# Patient Record
Sex: MALE | Race: WHITE | NOT HISPANIC OR LATINO | Employment: OTHER | URBAN - METROPOLITAN AREA
[De-identification: names, ages, dates, MRNs, and addresses within clinical notes are randomized per-mention and may not be internally consistent; named-entity substitution may affect disease eponyms.]

---

## 2023-12-28 ENCOUNTER — HOSPITAL ENCOUNTER (INPATIENT)
Facility: HOSPITAL | Age: 83
LOS: 3 days | Discharge: HOME | DRG: 683 | End: 2023-12-31
Attending: EMERGENCY MEDICINE | Admitting: FAMILY MEDICINE
Payer: MEDICARE

## 2023-12-28 ENCOUNTER — APPOINTMENT (OUTPATIENT)
Dept: RADIOLOGY | Facility: HOSPITAL | Age: 83
DRG: 683 | End: 2023-12-28
Payer: MEDICARE

## 2023-12-28 ENCOUNTER — APPOINTMENT (OUTPATIENT)
Dept: CARDIOLOGY | Facility: HOSPITAL | Age: 83
DRG: 683 | End: 2023-12-28
Payer: MEDICARE

## 2023-12-28 DIAGNOSIS — E86.1 HYPOTENSION DUE TO HYPOVOLEMIA: Primary | ICD-10-CM

## 2023-12-28 DIAGNOSIS — N17.9 ACUTE KIDNEY INJURY SUPERIMPOSED ON CKD (CMS-HCC): ICD-10-CM

## 2023-12-28 DIAGNOSIS — I50.812 CHRONIC RIGHT-SIDED CONGESTIVE HEART FAILURE (MULTI): ICD-10-CM

## 2023-12-28 DIAGNOSIS — N18.9 ACUTE KIDNEY INJURY SUPERIMPOSED ON CKD (CMS-HCC): ICD-10-CM

## 2023-12-28 DIAGNOSIS — I95.89 HYPOTENSION DUE TO HYPOVOLEMIA: Primary | ICD-10-CM

## 2023-12-28 LAB
ALBUMIN SERPL BCP-MCNC: 3.8 G/DL (ref 3.4–5)
ALP SERPL-CCNC: 65 U/L (ref 33–136)
ALT SERPL W P-5'-P-CCNC: 13 U/L (ref 10–52)
ANION GAP SERPL CALC-SCNC: 17 MMOL/L (ref 10–20)
APPEARANCE UR: CLEAR
AST SERPL W P-5'-P-CCNC: 11 U/L (ref 9–39)
BASOPHILS # BLD AUTO: 0.07 X10*3/UL (ref 0–0.1)
BASOPHILS NFR BLD AUTO: 0.8 %
BILIRUB SERPL-MCNC: 1 MG/DL (ref 0–1.2)
BILIRUB UR STRIP.AUTO-MCNC: NEGATIVE MG/DL
BNP SERPL-MCNC: 48 PG/ML (ref 0–99)
BUN SERPL-MCNC: 59 MG/DL (ref 6–23)
CALCIUM SERPL-MCNC: 8.7 MG/DL (ref 8.6–10.3)
CARDIAC TROPONIN I PNL SERPL HS: 33 NG/L (ref 0–20)
CHLORIDE SERPL-SCNC: 99 MMOL/L (ref 98–107)
CO2 SERPL-SCNC: 20 MMOL/L (ref 21–32)
COLOR UR: YELLOW
CREAT SERPL-MCNC: 3.32 MG/DL (ref 0.5–1.3)
EOSINOPHIL # BLD AUTO: 0.31 X10*3/UL (ref 0–0.4)
EOSINOPHIL NFR BLD AUTO: 3.4 %
ERYTHROCYTE [DISTWIDTH] IN BLOOD BY AUTOMATED COUNT: 13.6 % (ref 11.5–14.5)
FLUAV RNA RESP QL NAA+PROBE: NOT DETECTED
FLUBV RNA RESP QL NAA+PROBE: NOT DETECTED
GFR SERPL CREATININE-BSD FRML MDRD: 18 ML/MIN/1.73M*2
GLUCOSE SERPL-MCNC: 215 MG/DL (ref 74–99)
GLUCOSE UR STRIP.AUTO-MCNC: ABNORMAL MG/DL
HCT VFR BLD AUTO: 43.3 % (ref 41–52)
HGB BLD-MCNC: 14.5 G/DL (ref 13.5–17.5)
IMM GRANULOCYTES # BLD AUTO: 0.21 X10*3/UL (ref 0–0.5)
IMM GRANULOCYTES NFR BLD AUTO: 2.3 % (ref 0–0.9)
KETONES UR STRIP.AUTO-MCNC: NEGATIVE MG/DL
LEUKOCYTE ESTERASE UR QL STRIP.AUTO: NEGATIVE
LYMPHOCYTES # BLD AUTO: 1.11 X10*3/UL (ref 0.8–3)
LYMPHOCYTES NFR BLD AUTO: 12.3 %
MCH RBC QN AUTO: 28.8 PG (ref 26–34)
MCHC RBC AUTO-ENTMCNC: 33.5 G/DL (ref 32–36)
MCV RBC AUTO: 86 FL (ref 80–100)
MONOCYTES # BLD AUTO: 1.48 X10*3/UL (ref 0.05–0.8)
MONOCYTES NFR BLD AUTO: 16.4 %
NEUTROPHILS # BLD AUTO: 5.84 X10*3/UL (ref 1.6–5.5)
NEUTROPHILS NFR BLD AUTO: 64.8 %
NITRITE UR QL STRIP.AUTO: NEGATIVE
NRBC BLD-RTO: 0 /100 WBCS (ref 0–0)
PH UR STRIP.AUTO: 5 [PH]
PLATELET # BLD AUTO: 223 X10*3/UL (ref 150–450)
POTASSIUM SERPL-SCNC: 4.2 MMOL/L (ref 3.5–5.3)
PROT SERPL-MCNC: 7.1 G/DL (ref 6.4–8.2)
PROT UR STRIP.AUTO-MCNC: NEGATIVE MG/DL
RBC # BLD AUTO: 5.04 X10*6/UL (ref 4.5–5.9)
RBC # UR STRIP.AUTO: NEGATIVE /UL
SARS-COV-2 RNA RESP QL NAA+PROBE: NOT DETECTED
SODIUM SERPL-SCNC: 132 MMOL/L (ref 136–145)
SP GR UR STRIP.AUTO: 1.01
UROBILINOGEN UR STRIP.AUTO-MCNC: <2 MG/DL
WBC # BLD AUTO: 9 X10*3/UL (ref 4.4–11.3)

## 2023-12-28 PROCEDURE — 93005 ELECTROCARDIOGRAM TRACING: CPT

## 2023-12-28 PROCEDURE — 85025 COMPLETE CBC W/AUTO DIFF WBC: CPT | Performed by: EMERGENCY MEDICINE

## 2023-12-28 PROCEDURE — 85025 COMPLETE CBC W/AUTO DIFF WBC: CPT | Performed by: INTERNAL MEDICINE

## 2023-12-28 PROCEDURE — 71045 X-RAY EXAM CHEST 1 VIEW: CPT | Performed by: RADIOLOGY

## 2023-12-28 PROCEDURE — 87636 SARSCOV2 & INF A&B AMP PRB: CPT | Performed by: EMERGENCY MEDICINE

## 2023-12-28 PROCEDURE — 71045 X-RAY EXAM CHEST 1 VIEW: CPT

## 2023-12-28 PROCEDURE — 84484 ASSAY OF TROPONIN QUANT: CPT | Performed by: EMERGENCY MEDICINE

## 2023-12-28 PROCEDURE — 80053 COMPREHEN METABOLIC PANEL: CPT | Performed by: EMERGENCY MEDICINE

## 2023-12-28 PROCEDURE — 1210000001 HC SEMI-PRIVATE ROOM DAILY

## 2023-12-28 PROCEDURE — 36415 COLL VENOUS BLD VENIPUNCTURE: CPT | Performed by: INTERNAL MEDICINE

## 2023-12-28 PROCEDURE — 99285 EMERGENCY DEPT VISIT HI MDM: CPT | Performed by: EMERGENCY MEDICINE

## 2023-12-28 PROCEDURE — 81003 URINALYSIS AUTO W/O SCOPE: CPT | Performed by: EMERGENCY MEDICINE

## 2023-12-28 PROCEDURE — 83880 ASSAY OF NATRIURETIC PEPTIDE: CPT | Performed by: EMERGENCY MEDICINE

## 2023-12-28 PROCEDURE — 2500000004 HC RX 250 GENERAL PHARMACY W/ HCPCS (ALT 636 FOR OP/ED): Performed by: EMERGENCY MEDICINE

## 2023-12-28 RX ADMIN — SODIUM CHLORIDE 500 ML: 9 INJECTION, SOLUTION INTRAVENOUS at 15:08

## 2023-12-28 ASSESSMENT — PAIN - FUNCTIONAL ASSESSMENT
PAIN_FUNCTIONAL_ASSESSMENT: 0-10

## 2023-12-28 ASSESSMENT — PAIN SCALES - GENERAL
PAINLEVEL_OUTOF10: 0 - NO PAIN

## 2023-12-28 NOTE — ED PROVIDER NOTES
HPI   Chief Complaint   Patient presents with    Hypotension       This is a 83-year-old man with past medical history ofA-fib, CKD 3, hypertension, lipidemia, diabetes is present with complaints of weakness and lightheadedness and hypotension.  Family states patient is changed as medication to metoprolol 150 mg twice daily from 100 mg.  No fever was reported.  No vomiting.  Has been taking Imodium for his diarrhea with improvement.                        [place] Coma Scale Score: 15                  Patient History   No past medical history on file.  No past surgical history on file.  No family history on file.  Social History     Tobacco Use    Smoking status: Not on file    Smokeless tobacco: Not on file   Substance Use Topics    Alcohol use: Not on file    Drug use: Not on file       Physical Exam   ED Triage Vitals   Temp Heart Rate Resp BP   12/28/23 1354 12/28/23 1354 12/28/23 1354 12/28/23 1354   37 °C (98.6 °F) 103 16 101/64      SpO2 Temp src Heart Rate Source Patient Position   12/28/23 1354 -- 12/28/23 1400 --   98 %  Monitor       BP Location FiO2 (%)     -- --             Physical Exam  Vitals and nursing note reviewed.   Constitutional:       Appearance: He is normal weight.   HENT:      Head: Normocephalic and atraumatic.      Nose: Nose normal.      Mouth/Throat:      Mouth: Mucous membranes are dry.      Pharynx: Oropharynx is clear.   Eyes:      Extraocular Movements: Extraocular movements intact.      Pupils: Pupils are equal, round, and reactive to light.   Cardiovascular:      Rate and Rhythm: Rhythm irregular.      Pulses: Normal pulses.      Heart sounds: Normal heart sounds.   Pulmonary:      Effort: Pulmonary effort is normal.      Breath sounds: Normal breath sounds.   Abdominal:      General: Abdomen is flat. Bowel sounds are normal. There is no distension.      Tenderness: There is no abdominal tenderness. There is no right CVA tenderness, left CVA tenderness, guarding or rebound.    Musculoskeletal:         General: Normal range of motion.      Cervical back: Normal range of motion and neck supple.   Skin:     General: Skin is dry.      Capillary Refill: Capillary refill takes less than 2 seconds.   Neurological:      General: No focal deficit present.      Mental Status: He is alert and oriented to person, place, and time. Mental status is at baseline.   Psychiatric:         Mood and Affect: Mood normal.         Behavior: Behavior normal.         Thought Content: Thought content normal.         Judgment: Judgment normal.         ED Course & MDM   Diagnoses as of 12/28/23 1736   Hypotension due to hypovolemia   Acute kidney injury superimposed on CKD (CMS/[facility])       Medical Decision Making  EKG interpreted by me showsNormal sinus rhythm at a rate of 103 with no acute ischemic changes.  No STEMI  IV is placed and labs drawn including CBC, CMP, UA, flu A, COVID, BNP, troponin.  Troponin noted at 33 but likely not ACS.  BNP noted to 48.  Nitrite and leuk esterase negative for UTI.  His COVID and flu swabs are negative.  No clinically significant anemia.  Does have signs for ROSE over CKD creatinine 3.32 with elevated BUN consistent with dehydration likely contributing patient's hypovolemia.  I provide patient 500 cc bolus he did remain persistently on the lower side blood pressure is and given ROSE with lower blood pressures and known CHF required mission for further medication adjustment, rehydration and evaluation        Procedure  Procedures     Abdi Paul MD  12/28/23 9328

## 2023-12-29 PROBLEM — Z79.01 ANTICOAGULATION ADEQUATE: Status: ACTIVE | Noted: 2023-12-29

## 2023-12-29 PROBLEM — Z86.79 S/P ABLATION OF ATRIAL FIBRILLATION: Status: ACTIVE | Noted: 2019-05-03

## 2023-12-29 PROBLEM — N18.32 STAGE 3B CHRONIC KIDNEY DISEASE (MULTI): Status: ACTIVE | Noted: 2023-12-29

## 2023-12-29 PROBLEM — Z79.01 LONG TERM (CURRENT) USE OF ANTICOAGULANTS: Status: ACTIVE | Noted: 2022-01-01

## 2023-12-29 PROBLEM — N18.9 ACUTE KIDNEY INJURY SUPERIMPOSED ON CKD (CMS-HCC): Status: ACTIVE | Noted: 2023-12-29

## 2023-12-29 PROBLEM — I48.0 PAROXYSMAL ATRIAL FIBRILLATION (MULTI): Chronic | Status: ACTIVE | Noted: 2018-03-08

## 2023-12-29 PROBLEM — Z79.01 LONG TERM (CURRENT) USE OF ANTICOAGULANTS: Chronic | Status: ACTIVE | Noted: 2022-01-01

## 2023-12-29 PROBLEM — Z86.79 S/P ABLATION OF ATRIAL FIBRILLATION: Chronic | Status: ACTIVE | Noted: 2019-05-03

## 2023-12-29 PROBLEM — Z98.890 S/P ABLATION OF ATRIAL FIBRILLATION: Chronic | Status: ACTIVE | Noted: 2019-05-03

## 2023-12-29 PROBLEM — N18.32 TYPE 2 DIABETES MELLITUS WITH STAGE 3B CHRONIC KIDNEY DISEASE, WITHOUT LONG-TERM CURRENT USE OF INSULIN (MULTI): Status: ACTIVE | Noted: 2023-12-29

## 2023-12-29 PROBLEM — N18.32 STAGE 3B CHRONIC KIDNEY DISEASE (MULTI): Chronic | Status: ACTIVE | Noted: 2023-12-29

## 2023-12-29 PROBLEM — I48.0 PAROXYSMAL ATRIAL FIBRILLATION (MULTI): Status: ACTIVE | Noted: 2018-03-08

## 2023-12-29 PROBLEM — Z98.890 S/P ABLATION OF ATRIAL FIBRILLATION: Status: ACTIVE | Noted: 2019-05-03

## 2023-12-29 PROBLEM — E11.22 TYPE 2 DIABETES MELLITUS WITH STAGE 3B CHRONIC KIDNEY DISEASE, WITHOUT LONG-TERM CURRENT USE OF INSULIN (MULTI): Status: ACTIVE | Noted: 2023-12-29

## 2023-12-29 PROBLEM — N17.9 ACUTE KIDNEY INJURY SUPERIMPOSED ON CKD (CMS-HCC): Status: ACTIVE | Noted: 2023-12-29

## 2023-12-29 LAB
ALBUMIN SERPL BCP-MCNC: 3.6 G/DL (ref 3.4–5)
ALP SERPL-CCNC: 64 U/L (ref 33–136)
ALT SERPL W P-5'-P-CCNC: 11 U/L (ref 10–52)
ANION GAP SERPL CALC-SCNC: 13 MMOL/L (ref 10–20)
AST SERPL W P-5'-P-CCNC: 10 U/L (ref 9–39)
ATRIAL RATE: 103 BPM
BASOPHILS # BLD AUTO: 0.04 X10*3/UL (ref 0–0.1)
BASOPHILS NFR BLD AUTO: 0.5 %
BILIRUB SERPL-MCNC: 0.6 MG/DL (ref 0–1.2)
BUN SERPL-MCNC: 55 MG/DL (ref 6–23)
CALCIUM SERPL-MCNC: 8.4 MG/DL (ref 8.6–10.3)
CHLORIDE SERPL-SCNC: 104 MMOL/L (ref 98–107)
CO2 SERPL-SCNC: 23 MMOL/L (ref 21–32)
CREAT SERPL-MCNC: 2.8 MG/DL (ref 0.5–1.3)
EOSINOPHIL # BLD AUTO: 0.24 X10*3/UL (ref 0–0.4)
EOSINOPHIL NFR BLD AUTO: 3 %
ERYTHROCYTE [DISTWIDTH] IN BLOOD BY AUTOMATED COUNT: 13.3 % (ref 11.5–14.5)
GFR SERPL CREATININE-BSD FRML MDRD: 22 ML/MIN/1.73M*2
GLUCOSE SERPL-MCNC: 161 MG/DL (ref 74–99)
HCT VFR BLD AUTO: 39.7 % (ref 41–52)
HGB BLD-MCNC: 13.4 G/DL (ref 13.5–17.5)
IMM GRANULOCYTES # BLD AUTO: 0.14 X10*3/UL (ref 0–0.5)
IMM GRANULOCYTES NFR BLD AUTO: 1.7 % (ref 0–0.9)
LYMPHOCYTES # BLD AUTO: 0.84 X10*3/UL (ref 0.8–3)
LYMPHOCYTES NFR BLD AUTO: 10.4 %
MAGNESIUM SERPL-MCNC: 1.8 MG/DL (ref 1.6–2.4)
MCH RBC QN AUTO: 29.1 PG (ref 26–34)
MCHC RBC AUTO-ENTMCNC: 33.8 G/DL (ref 32–36)
MCV RBC AUTO: 86 FL (ref 80–100)
MONOCYTES # BLD AUTO: 1.32 X10*3/UL (ref 0.05–0.8)
MONOCYTES NFR BLD AUTO: 16.4 %
NEUTROPHILS # BLD AUTO: 5.48 X10*3/UL (ref 1.6–5.5)
NEUTROPHILS NFR BLD AUTO: 68 %
NRBC BLD-RTO: 0 /100 WBCS (ref 0–0)
P OFFSET: 185 MS
P ONSET: 165 MS
PLATELET # BLD AUTO: 182 X10*3/UL (ref 150–450)
POTASSIUM SERPL-SCNC: 3.9 MMOL/L (ref 3.5–5.3)
PR INTERVAL: 128 MS
PROT SERPL-MCNC: 6.5 G/DL (ref 6.4–8.2)
Q ONSET: 219 MS
QRS COUNT: 17 BEATS
QRS DURATION: 124 MS
QT INTERVAL: 376 MS
QTC CALCULATION(BAZETT): 492 MS
QTC FREDERICIA: 450 MS
R AXIS: -32 DEGREES
RBC # BLD AUTO: 4.6 X10*6/UL (ref 4.5–5.9)
SODIUM SERPL-SCNC: 136 MMOL/L (ref 136–145)
T AXIS: 117 DEGREES
T OFFSET: 407 MS
VENTRICULAR RATE: 103 BPM
WBC # BLD AUTO: 8.1 X10*3/UL (ref 4.4–11.3)

## 2023-12-29 PROCEDURE — 85025 COMPLETE CBC W/AUTO DIFF WBC: CPT | Performed by: PHYSICIAN ASSISTANT

## 2023-12-29 PROCEDURE — 2500000001 HC RX 250 WO HCPCS SELF ADMINISTERED DRUGS (ALT 637 FOR MEDICARE OP): Performed by: FAMILY MEDICINE

## 2023-12-29 PROCEDURE — 2500000004 HC RX 250 GENERAL PHARMACY W/ HCPCS (ALT 636 FOR OP/ED): Performed by: PHYSICIAN ASSISTANT

## 2023-12-29 PROCEDURE — 99233 SBSQ HOSP IP/OBS HIGH 50: CPT | Performed by: PHYSICIAN ASSISTANT

## 2023-12-29 PROCEDURE — 83735 ASSAY OF MAGNESIUM: CPT | Performed by: PHYSICIAN ASSISTANT

## 2023-12-29 PROCEDURE — 1200000002 HC GENERAL ROOM WITH TELEMETRY DAILY

## 2023-12-29 PROCEDURE — 84075 ASSAY ALKALINE PHOSPHATASE: CPT | Performed by: PHYSICIAN ASSISTANT

## 2023-12-29 PROCEDURE — 36415 COLL VENOUS BLD VENIPUNCTURE: CPT | Performed by: PHYSICIAN ASSISTANT

## 2023-12-29 PROCEDURE — 99223 1ST HOSP IP/OBS HIGH 75: CPT | Performed by: INTERNAL MEDICINE

## 2023-12-29 PROCEDURE — 2500000001 HC RX 250 WO HCPCS SELF ADMINISTERED DRUGS (ALT 637 FOR MEDICARE OP): Performed by: PHARMACIST

## 2023-12-29 RX ORDER — UBIDECARENONE 75 MG
500 CAPSULE ORAL DAILY
Status: ON HOLD | COMMUNITY
Start: 2023-12-11 | End: 2024-03-25 | Stop reason: ALTCHOICE

## 2023-12-29 RX ORDER — TALC
3 POWDER (GRAM) TOPICAL
Status: DISCONTINUED | OUTPATIENT
Start: 2023-12-29 | End: 2023-12-31 | Stop reason: HOSPADM

## 2023-12-29 RX ORDER — FINASTERIDE 5 MG/1
5 TABLET, FILM COATED ORAL DAILY
COMMUNITY

## 2023-12-29 RX ORDER — ACETAMINOPHEN 325 MG/1
950 TABLET ORAL EVERY 6 HOURS PRN
Status: DISCONTINUED | OUTPATIENT
Start: 2023-12-29 | End: 2023-12-31 | Stop reason: HOSPADM

## 2023-12-29 RX ORDER — PANTOPRAZOLE SODIUM 40 MG/10ML
40 INJECTION, POWDER, LYOPHILIZED, FOR SOLUTION INTRAVENOUS
Status: DISCONTINUED | OUTPATIENT
Start: 2023-12-29 | End: 2023-12-31 | Stop reason: HOSPADM

## 2023-12-29 RX ORDER — PANTOPRAZOLE SODIUM 40 MG/1
40 TABLET, DELAYED RELEASE ORAL
Status: DISCONTINUED | OUTPATIENT
Start: 2023-12-29 | End: 2023-12-31 | Stop reason: HOSPADM

## 2023-12-29 RX ORDER — DOXAZOSIN 8 MG/1
8 TABLET ORAL NIGHTLY
COMMUNITY
End: 2023-12-31 | Stop reason: HOSPADM

## 2023-12-29 RX ORDER — SODIUM CHLORIDE 9 MG/ML
100 INJECTION, SOLUTION INTRAVENOUS CONTINUOUS
Status: DISCONTINUED | OUTPATIENT
Start: 2023-12-29 | End: 2023-12-31 | Stop reason: HOSPADM

## 2023-12-29 RX ORDER — LOPERAMIDE HYDROCHLORIDE 2 MG/1
2 CAPSULE ORAL 4 TIMES DAILY PRN
Status: DISCONTINUED | OUTPATIENT
Start: 2023-12-29 | End: 2023-12-31 | Stop reason: HOSPADM

## 2023-12-29 RX ORDER — METOPROLOL SUCCINATE 50 MG/1
150 TABLET, EXTENDED RELEASE ORAL 2 TIMES DAILY
Status: DISCONTINUED | OUTPATIENT
Start: 2023-12-29 | End: 2023-12-31 | Stop reason: HOSPADM

## 2023-12-29 RX ORDER — ATORVASTATIN CALCIUM 20 MG/1
20 TABLET, FILM COATED ORAL NIGHTLY
Status: DISCONTINUED | OUTPATIENT
Start: 2023-12-29 | End: 2023-12-31 | Stop reason: HOSPADM

## 2023-12-29 RX ORDER — FUROSEMIDE 40 MG/1
40 TABLET ORAL 2 TIMES DAILY
Status: ON HOLD | COMMUNITY
End: 2023-12-31 | Stop reason: SDUPTHER

## 2023-12-29 RX ORDER — LOPERAMIDE HYDROCHLORIDE 2 MG/1
2 CAPSULE ORAL 4 TIMES DAILY PRN
COMMUNITY
Start: 2023-12-26 | End: 2024-01-05

## 2023-12-29 RX ORDER — METFORMIN HYDROCHLORIDE 1000 MG/1
1 TABLET ORAL
COMMUNITY
End: 2023-12-31 | Stop reason: HOSPADM

## 2023-12-29 RX ORDER — LOSARTAN POTASSIUM 50 MG/1
1 TABLET ORAL DAILY
COMMUNITY
End: 2023-12-31 | Stop reason: HOSPADM

## 2023-12-29 RX ORDER — METOPROLOL SUCCINATE 50 MG/1
150 TABLET, EXTENDED RELEASE ORAL 2 TIMES DAILY
Status: ON HOLD | COMMUNITY
Start: 2023-12-26 | End: 2024-03-08

## 2023-12-29 RX ORDER — ATORVASTATIN CALCIUM 20 MG/1
20 TABLET, FILM COATED ORAL NIGHTLY
COMMUNITY

## 2023-12-29 RX ORDER — DOCUSATE SODIUM 100 MG/1
100 CAPSULE, LIQUID FILLED ORAL 2 TIMES DAILY PRN
Status: DISCONTINUED | OUTPATIENT
Start: 2023-12-29 | End: 2023-12-31 | Stop reason: HOSPADM

## 2023-12-29 RX ORDER — OMEPRAZOLE 20 MG/1
20 CAPSULE, DELAYED RELEASE ORAL
COMMUNITY

## 2023-12-29 RX ADMIN — APIXABAN 2.5 MG: 2.5 TABLET, FILM COATED ORAL at 01:25

## 2023-12-29 RX ADMIN — APIXABAN 2.5 MG: 2.5 TABLET, FILM COATED ORAL at 10:02

## 2023-12-29 RX ADMIN — ATORVASTATIN CALCIUM 20 MG: 20 TABLET, FILM COATED ORAL at 23:08

## 2023-12-29 RX ADMIN — SODIUM CHLORIDE 100 ML/HR: 9 INJECTION, SOLUTION INTRAVENOUS at 01:19

## 2023-12-29 RX ADMIN — Medication 3 MG: at 23:08

## 2023-12-29 RX ADMIN — METOPROLOL SUCCINATE 150 MG: 50 TABLET, EXTENDED RELEASE ORAL at 23:08

## 2023-12-29 RX ADMIN — APIXABAN 2.5 MG: 2.5 TABLET, FILM COATED ORAL at 23:10

## 2023-12-29 SDOH — SOCIAL STABILITY: SOCIAL INSECURITY: WERE YOU ABLE TO COMPLETE ALL THE BEHAVIORAL HEALTH SCREENINGS?: YES

## 2023-12-29 SDOH — SOCIAL STABILITY: SOCIAL INSECURITY: ABUSE: ADULT

## 2023-12-29 SDOH — SOCIAL STABILITY: SOCIAL INSECURITY: DOES ANYONE TRY TO KEEP YOU FROM HAVING/CONTACTING OTHER FRIENDS OR DOING THINGS OUTSIDE YOUR HOME?: NO

## 2023-12-29 SDOH — SOCIAL STABILITY: SOCIAL INSECURITY: ARE YOU OR HAVE YOU BEEN THREATENED OR ABUSED PHYSICALLY, EMOTIONALLY, OR SEXUALLY BY ANYONE?: NO

## 2023-12-29 SDOH — SOCIAL STABILITY: SOCIAL INSECURITY: DO YOU FEEL ANYONE HAS EXPLOITED OR TAKEN ADVANTAGE OF YOU FINANCIALLY OR OF YOUR PERSONAL PROPERTY?: NO

## 2023-12-29 SDOH — SOCIAL STABILITY: SOCIAL INSECURITY: ARE THERE ANY APPARENT SIGNS OF INJURIES/BEHAVIORS THAT COULD BE RELATED TO ABUSE/NEGLECT?: NO

## 2023-12-29 SDOH — SOCIAL STABILITY: SOCIAL INSECURITY: DO YOU FEEL UNSAFE GOING BACK TO THE PLACE WHERE YOU ARE LIVING?: NO

## 2023-12-29 SDOH — SOCIAL STABILITY: SOCIAL INSECURITY: HAVE YOU HAD THOUGHTS OF HARMING ANYONE ELSE?: NO

## 2023-12-29 SDOH — SOCIAL STABILITY: SOCIAL INSECURITY: HAS ANYONE EVER THREATENED TO HURT YOUR FAMILY OR YOUR PETS?: NO

## 2023-12-29 ASSESSMENT — COGNITIVE AND FUNCTIONAL STATUS - GENERAL
DAILY ACTIVITIY SCORE: 24
CLIMB 3 TO 5 STEPS WITH RAILING: A LITTLE
MOBILITY SCORE: 24
PATIENT BASELINE BEDBOUND: NO
CLIMB 3 TO 5 STEPS WITH RAILING: A LITTLE
MOBILITY SCORE: 23
MOBILITY SCORE: 23
DAILY ACTIVITIY SCORE: 24
DAILY ACTIVITIY SCORE: 24

## 2023-12-29 ASSESSMENT — LIFESTYLE VARIABLES
HOW OFTEN DO YOU HAVE 6 OR MORE DRINKS ON ONE OCCASION: NEVER
HOW MANY STANDARD DRINKS CONTAINING ALCOHOL DO YOU HAVE ON A TYPICAL DAY: PATIENT DOES NOT DRINK
AUDIT-C TOTAL SCORE: 0
SKIP TO QUESTIONS 9-10: 1
AUDIT-C TOTAL SCORE: 0
HOW OFTEN DO YOU HAVE A DRINK CONTAINING ALCOHOL: NEVER

## 2023-12-29 ASSESSMENT — ACTIVITIES OF DAILY LIVING (ADL)
BATHING: INDEPENDENT
ADEQUATE_TO_COMPLETE_ADL: YES
FEEDING YOURSELF: INDEPENDENT
GROOMING: INDEPENDENT
HEARING - RIGHT EAR: FUNCTIONAL
DRESSING YOURSELF: INDEPENDENT
LACK_OF_TRANSPORTATION: NO
JUDGMENT_ADEQUATE_SAFELY_COMPLETE_DAILY_ACTIVITIES: YES
LACK_OF_TRANSPORTATION: NO
PATIENT'S MEMORY ADEQUATE TO SAFELY COMPLETE DAILY ACTIVITIES?: YES
TOILETING: INDEPENDENT
HEARING - LEFT EAR: FUNCTIONAL
WALKS IN HOME: INDEPENDENT

## 2023-12-29 ASSESSMENT — PAIN SCALES - GENERAL
PAINLEVEL_OUTOF10: 0 - NO PAIN

## 2023-12-29 ASSESSMENT — COLUMBIA-SUICIDE SEVERITY RATING SCALE - C-SSRS
6. HAVE YOU EVER DONE ANYTHING, STARTED TO DO ANYTHING, OR PREPARED TO DO ANYTHING TO END YOUR LIFE?: NO
2. HAVE YOU ACTUALLY HAD ANY THOUGHTS OF KILLING YOURSELF?: NO
1. IN THE PAST MONTH, HAVE YOU WISHED YOU WERE DEAD OR WISHED YOU COULD GO TO SLEEP AND NOT WAKE UP?: NO

## 2023-12-29 ASSESSMENT — PAIN - FUNCTIONAL ASSESSMENT
PAIN_FUNCTIONAL_ASSESSMENT: 0-10

## 2023-12-29 ASSESSMENT — PATIENT HEALTH QUESTIONNAIRE - PHQ9
2. FEELING DOWN, DEPRESSED OR HOPELESS: NOT AT ALL
1. LITTLE INTEREST OR PLEASURE IN DOING THINGS: NOT AT ALL
SUM OF ALL RESPONSES TO PHQ9 QUESTIONS 1 & 2: 0

## 2023-12-29 NOTE — CONSULTS
Reason For Consult  Acute kidney injury on stage G3 CKD    History Of Present Illness  New Castano is a 83 y.o. male with a past medical history of stage G3 CKD with a baseline creatinine between 1.3 to 1.5 mg/deciliter, atrial fibrillation status post ablation on Eliquis and a beta-blocker, HFrEF with LVEF 30%, hypertension, hyperlipidemia, diabetes who was recently admitted to [facility] with weakness, altered mental status and UTI.  In the ED wide-complex was noted with A-fib RVR.  He was admitted to CCU on an amiodarone drip.  He was later transitioned back to metoprolol which was increased to 250 mg twice daily.  His acute encephalopathy was felt to to have resolved.  He endorsed diarrhea, C diff  was ordered but ultimately he was given Imodium.  He was sent out on 25 mg of Jardiance, furosemide 40 mg twice daily, losartan 50 mg, 1000 mg of metformin and 8 mg of Cardura on 12/26.  His blood pressure was 126/74 on 12/25 with a creatinine of 1.48 mg/a deciliter.  He comes in now with weakness, hypotension and diarrhea and was found to have an acute kidney injury with a creatinine of 3.3 milligrams/deciliter now down to 2.8 after receiving a 500 cc saline bolus.  Nephrology is consulted for renal care.     Past Medical History:   Diagnosis Date    Mixed hyperlipidemia 02/13/2013    Paroxysmal atrial fibrillation (CMS/HCC) 03/08/2018    -s/p cardioversion and ablation   -AC on Eliquis     S/P ablation of atrial fibrillation 05/03/2019    Stage 3b chronic kidney disease (CMS/HCC) 12/29/2023       Past Surgical History:   Procedure Laterality Date    CARDIAC ELECTROPHYSIOLOGY MAPPING AND ABLATION      CARDIOVERSION         Social History     Tobacco Use    Smoking status: Former     Types: Cigarettes    Smokeless tobacco: Former        Family History  No family history on file.     Allergies  Pollen extracts    Review of Systems   10 point review of systems obtained and negative unless stated in HPI  Physical  "Exam   General: Alert and oriented,  no acute distress.    Lungs: Clear to auscultation, no wheezes, rales or rhonchi.   Heart: Normal rate, no murmur, gallop or edema.   Abdomen: Soft, non-tender, non-distended, normal bowel sounds, no masses.   Extremities: No edema  Neurologic: Awake, alert, and oriented X3, moves extremities spontaneously  Psychiatric: Appropriate mood and affect.       I&O 24HR    Intake/Output Summary (Last 24 hours) at 12/29/2023 1830  Last data filed at 12/29/2023 0930  Gross per 24 hour   Intake 490 ml   Output --   Net 490 ml       Vitals 24HR  Heart Rate:  []   Temp:  [36.2 °C (97.2 °F)-36.7 °C (98 °F)]   Resp:  [16-22]   BP: ()/(56-78)   Height:  [180.3 cm (5' 10.98\")]   Weight:  [88.6 kg (195 lb 5.2 oz)]   SpO2:  [92 %-99 %]     Scheduled Medications  apixaban, 2.5 mg, oral, BID  atorvastatin, 20 mg, oral, Nightly  melatonin, 3 mg, oral, Daily  metoprolol succinate XL, 150 mg, oral, BID  pantoprazole, 40 mg, oral, Daily before breakfast   Or  pantoprazole, 40 mg, intravenous, Daily before breakfast      Continuous medications  sodium chloride 0.9%, 100 mL/hr, Last Rate: 100 mL/hr (12/29/23 0119)        PRN medications: acetaminophen, docusate sodium, loperamide     Relevant Results  Results from last 7 days   Lab Units 12/29/23  0645 12/28/23  1407   WBC AUTO x10*3/uL 8.1 9.0   HEMOGLOBIN g/dL 13.4* 14.5   HEMATOCRIT % 39.7* 43.3   PLATELETS AUTO x10*3/uL 182 223   NEUTROS PCT AUTO % 68.0 64.8   LYMPHS PCT AUTO % 10.4 12.3   MONOS PCT AUTO % 16.4 16.4   EOS PCT AUTO % 3.0 3.4      Results from last 7 days   Lab Units 12/29/23  0645 12/28/23  1407   SODIUM mmol/L 136 132*   POTASSIUM mmol/L 3.9 4.2   CHLORIDE mmol/L 104 99   CO2 mmol/L 23 20*   BUN mg/dL 55* 59*   CREATININE mg/dL 2.80* 3.32*   CALCIUM mg/dL 8.4* 8.7   PROTEIN TOTAL g/dL 6.5 7.1   BILIRUBIN TOTAL mg/dL 0.6 1.0   ALK PHOS U/L 64 65   ALT U/L 11 13   AST U/L 10 11   GLUCOSE mg/dL 161* 215*      XR chest 1 view "   Final Result   No acute cardiopulmonary process.        MACRO:   None        Signed by: Tia Dillon 12/28/2023 3:19 PM   Dictation workstation:   [id number]            Assessment/Plan     New Castano is a 83 y.o. male with a past medical history of stage G3 CKD with a baseline creatinine between 1.3 to 1.5 mg/deciliter, atrial fibrillation status post ablation on Eliquis and a beta-blocker, HFrEF with LVEF 30%, hypertension, hyperlipidemia, diabetes who was recently admitted to [facility] with weakness, altered mental status and UTI.  In the ED wide-complex was noted with A-fib RVR.  He was admitted to CCU on an amiodarone drip.  He was later transitioned back to metoprolol which was increased to 250 mg twice daily.  His acute encephalopathy was felt to to have resolved.  He endorsed diarrhea, C diff  was ordered but ultimately he was given Imodium.  He was sent out on 25 mg of Jardiance, furosemide 40 mg twice daily, losartan 50 mg, 1000 mg of metformin and 8 mg of Cardura on 12/26.  His blood pressure was 126/74 on 12/25 with a creatinine of 1.48 mg/a deciliter.  He comes in now with weakness, hypotension and diarrhea and was found to have an acute kidney injury with a creatinine of 3.3 milligrams/deciliter now down to 2.8 after receiving a 500 cc saline bolus.  Nephrology is consulted for renal care.    Mr. Castano is suffering hemodynamic acute kidney injury.  He is off loop diuretic, SGLT2, ARB and metformin.  He does not appear to be fluid overloaded.  His creatinine is improving following gentle IV volume expansion.  I will obtain orthostatic vitals.  If positive he can receive additional IV volume expansion unless he has true hypotension.  He denies retention symptoms and did not use anti-inflammatories.  Nephrology will follow with his care.      Principal Problem:    Acute kidney injury superimposed on CKD (CMS/[facility])  Active Problems:    Hypotension due to hypovolemia    Paroxysmal atrial  fibrillation (CMS/HCC)    Mixed hyperlipidemia    S/P ablation of atrial fibrillation    Stage 3b chronic kidney disease (CMS/[facility])    Type 2 diabetes mellitus with stage 3b chronic kidney disease, without long-term current use of insulin (CMS/[facility])    Anticoagulation adequate      I spent 50 minutes in the professional and overall care of this patient.      DEE Pleiteznpatient consult to Nephrology  Consult performed by: Juan Spangler DO  Consult ordered by: Kevon Gunderson MD

## 2023-12-29 NOTE — NURSING NOTE
Pt found sitting in room with IV fluids draining into the bed d/t IV being pulled out. Pt received about 800ml of the 1L ordered. Pt is also currently refusing a new IV at the moment. Will have dayshift re approach.

## 2023-12-29 NOTE — CONSULTS
Inpatient consult to Cardiology  Consult performed by: Richmond Stein DO  Consult ordered by: Kevon Gunderson MD  Reason for consult: chf        History Of Present Illness:    New Castano is a 83 y.o. male with past medical history of Afib s/p ablation (Eliquis/BB), CKD3a, HFrEF (38%), HTN, HLD, DM2 who presented to ED with weakness, hypotension, and diarrhea.  Cardiology consulted for CHF.    Recent admit to [facility] 12/21/2023 - 12/26/2023 for a fib RVR where metoprolol succinate was increased from 100mg BID to 150mg BID.  Also with confusion and diarrhea (c diff negative), discharged home where he lives with is wife.    He came to ED with weakness, lightheadedness, and hypotension.  States he has been having some diarrhea at home.  No exacerbating or relieving factors.  Patient denies chest pain and angina.  Pt denies shortness of breath, dyspnea on exertion, orthopnea, and paroxysmal nocturnal dyspnea.  Pt denies worsening lower extremity edema.  Pt denies palpitations or syncope.  No recent falls.  No fever or chills.  No cough.  No change in bowel or bladder habits.  No sick contacts.  No recent travel.    Home cardiac medications:  Eliquis 5mg BID, Toprol 150mg BID, losartan 50mg daily, Jardiance 25mg daily, furosemide 40mg BID, doxazosin 8mg daily.     Follows with Dr. Colton Tolentino at [facility]    Past Cardiology Tests (Last 3 Years):  EKG:  Results for orders placed during the hospital encounter of 12/28/23    ECG 12 lead (Preliminary)  This result has not been signed. Information might be incomplete.    Narrative  Sinus tachycardia  Left axis deviation  Left ventricular hypertrophy with QRS widening and repolarization abnormality ( R in aVL , [place] product )  Abnormal ECG  No previous ECGs available    Echo:  Echocardiogram: 08/20/2023 at [facility]    CONCLUSIONS:   - Exam indication: Sustained atrial fibrillation   - The left ventricle is mildly dilated. There is mild concentric left ventricular   hypertrophy.  Left ventricular systolic function is moderately decreased. EF = 38 ±   5% (2D biplane) Left ventricular diastolic function was not evaluated due to AF.   - The right ventricle is normal in size. Right ventricular systolic function is   mildly decreased.   - The left atrial cavity is moderately dilated.   - The right atrial cavity is dilated.   - There is mild (1+) mitral valve regurgitation.   - There is mild (1+) aortic valve regurgitation.   - Markedly abnormal global LV strain -7% with apical sparing consider cardiac   amyloid   - Exam was compared with the prior CC echocardiographic exam performed on   09/13/2022 ([facility]). Slight decrease in LVEF, prior EF 45%.     Cath:  No results found for this or any previous visit.    Stress Test:  No results found for this or any previous visit.    Cardiac Imaging:    NM Spect/CT Cardiac Amyloid: 08/21/2023 (reviewed)    Summary:   Not Consistent with TTR amyloidosis     Past Medical History:  He has a past medical history of Mixed hyperlipidemia (02/13/2013), Paroxysmal atrial fibrillation (CMS/[facility]) (03/08/2018), S/P ablation of atrial fibrillation (05/03/2019), and Stage 3b chronic kidney disease (CMS/[facility]) (12/29/2023).    Past Surgical History:  He has a past surgical history that includes Cardioversion and Cardiac electrophysiology mapping and ablation.      Social History:  He reports that he has quit smoking. His smoking use included cigarettes. He has quit using smokeless tobacco. No history on file for alcohol use and drug use.    Family History:  No family history on file.     Allergies:  Patient has no known allergies.    ROS:  10 point review of systems including (Constitutional, Eyes, ENMT, Respiratory, Cardiac, Gastrointestinal, Neurological, Psychiatric, and Hematologic) was performed and is otherwise negative.    Objective Data:  Last Recorded Vitals:  Vitals:    12/28/23 2315 12/28/23 2330 12/29/23 0027 12/29/23 0313   BP: 108/69 107/77 106/65 108/73   BP  "Location:   Left arm Right arm   Patient Position:   Sitting Lying   Pulse: 100 99 101 98   Resp: 22 20 20 18   Temp:   36.3 °C (97.3 °F) 36.7 °C (98 °F)   TempSrc:   Temporal Temporal   SpO2: 92% 94% 99% 96%   Weight:   88.6 kg (195 lb 5.2 oz)    Height:   1.803 m (5' 10.98\")      Medical Gas Therapy: None (Room air)  Weight  Av.6 kg (195 lb 5.2 oz)  Min: 88.6 kg (195 lb 5.2 oz)  Max: 88.6 kg (195 lb 5.2 oz)    LABS:  CMP:  Results from last 7 days   Lab Units 23  0645 23  1407   SODIUM mmol/L 136 132*   POTASSIUM mmol/L 3.9 4.2   CHLORIDE mmol/L 104 99   CO2 mmol/L 23 20*   ANION GAP mmol/L 13 17   BUN mg/dL 55* 59*   CREATININE mg/dL 2.80* 3.32*   EGFR mL/min/1.73m*2 22* 18*   MAGNESIUM mg/dL 1.80  --    ALBUMIN g/dL 3.6 3.8   ALT U/L 11 13   AST U/L 10 11   BILIRUBIN TOTAL mg/dL 0.6 1.0     CBC:  Results from last 7 days   Lab Units 23  0645 23  1407   WBC AUTO x10*3/uL 8.1 9.0   HEMOGLOBIN g/dL 13.4* 14.5   HEMATOCRIT % 39.7* 43.3   PLATELETS AUTO x10*3/uL 182 223   MCV fL 86 86     COAG:     ABO: No results found for: \"ABO\"  HEME/ENDO:     CARDIAC:   Results from last 7 days   Lab Units 23  1407   TROPHS ng/L 33*   BNP pg/mL 48             Last I/O:    Intake/Output Summary (Last 24 hours) at 2023 08  Last data filed at 2023 0149  Gross per 24 hour   Intake 250 ml   Output --   Net 250 ml     Net IO Since Admission: 250 mL [23 0802]      Imaging Results:  ECG 12 lead    Result Date: 2023  Sinus tachycardia Left axis deviation Left ventricular hypertrophy with QRS widening and repolarization abnormality ( R in aVL , [place] product ) Abnormal ECG No previous ECGs available    XR chest 1 view    Result Date: 2023  Interpreted By:  Tia Dillon, STUDY: XR CHEST 1 VIEW;  2023 3:09 pm   INDICATION: Signs/Symptoms:hypotension.   COMPARISON: None.   ACCESSION NUMBER(S): [id number]   ORDERING CLINICIAN: FELISA BOYD   FINDINGS: " CARDIOMEDIASTINAL SILHOUETTE: Cardiomediastinal silhouette is normal in size and configuration.     LUNGS: Lungs are clear.   ABDOMEN: No remarkable upper abdominal findings.     BONES: No acute osseous changes.       No acute cardiopulmonary process.   MACRO: None   Signed by: Tia Dillon 12/28/2023 3:19 PM Dictation workstation:   [id number]      Inpatient Medications:  Scheduled medications   Medication Dose Route Frequency    apixaban  2.5 mg oral BID    atorvastatin  20 mg oral Nightly    melatonin  3 mg oral Daily    metoprolol succinate XL  150 mg oral BID    pantoprazole  40 mg oral Daily before breakfast    Or    pantoprazole  40 mg intravenous Daily before breakfast     PRN medications   Medication    acetaminophen    docusate sodium    loperamide     Continuous Medications   Medication Dose Last Rate    sodium chloride 0.9%  100 mL/hr 100 mL/hr (12/29/23 0119)       Outpatient Medications:  Current Outpatient Medications   Medication Instructions    apixaban (ELIQUIS) 2.5 mg, oral, 2 times daily    atorvastatin (LIPITOR) 20 mg, oral, Nightly    cyanocobalamin (VITAMIN B-12) 500 mcg, oral, Daily    doxazosin (CARDURA) 8 mg, oral, Nightly    empagliflozin (JARDIANCE) 25 mg, oral, Daily    finasteride (PROSCAR) 5 mg, oral, Daily    furosemide (LASIX) 40 mg, oral, 2 times daily    loperamide (IMODIUM) 2 mg, oral, 4 times daily PRN    losartan (Cozaar) 50 mg tablet 1 tablet, oral, Daily    metFORMIN (Glucophage) 1,000 mg tablet 1 tablet, oral, 2 times daily with meals    metoprolol succinate XL (TOPROL-XL) 150 mg, oral, 2 times daily       Physical Exam:  General: Elderly, well-nourished, in no acute distress  HEENT: Normocephalic, atraumatic  Neck: Supple, JVP is normal negative hepatojugular reflux   Pulmonary: Normal respiratory effort, clear to auscultation  Cardiovascular: Normal S1 and S2, no murmurs rubs or gallops  Abdomen: Soft, nontender, nondistended  Extremities: Warm without edema, but some  chronic venous changes, 2+ radial pulses bilaterally   Neurologic: Alert and oriented x3  Psychiatric: Normal mood and affect      Assessment/Plan   New Castano is a 83 y.o. male with past medical history of Afib s/p ablation (Eliquis/BB), CKD3a, HFrEF (38%), HTN, HLD, DM2 who presented to ED with weakness, hypotension, and diarrhea.  Cardiology consulted for CHF.    12/29 > CXR does not show any acute process.  BNP is 48.  HS troponin 33.  Creatinine 3.32 on admit > 2.8 after IVF. EKG showing ST, no ischemic changes.  Mildly hypotensive, appears dry on exam.      Assessment:  # HFrEF (LVEF 38% on 8/20/2023). Chronic systolic HF  # Paroxsymal atrial fibrillation s/p ablation 2011 with two recent admits at [place] for a fib RVR, currently mainitaining NSR  # Hypertension (hypotension this admit)  # Acute on chronic kidney disease  # Diarrhea  # Dehydration ( overdiuresed in the setting of diarrhea)    Plan:  - Hold home furosemide 40mg oral BID.  Likely send home on reduced dose, if any at all.  - GDMT:  continue Toprol 150mg BID.  Restart losartan 50mg daily as BP stabilizes.  Continue Jardiance 25mg daily  - Continue Eliquis 2.5 mg oral BID.     Follow up with established cardiologist, Dr. SHELBY Tolentino, within one month of hospital discharge.   Code Status:  Full Code    STAFF ADDENDUM:  This is a shared visit. I have reviewed the Advanced Practice Provider's encounter note, approve the Advanced Practice Provider's documentation, and provide the following additional information from my personal encounter.     Admission for hypotension consistent with dehydration and ROSE.  Potentially combination of medications and diarrhea.  Normal BNP.  Not in acute CHF and rather quite the opposite.  Holding furosemide, losartan.  Okay to continue Toprol 150 mg twice daily and Jardiance.  Resume losartan if BP and renal function recovers.    Diarrhea workup per primary    Richmond Stein,

## 2023-12-29 NOTE — PROGRESS NOTES
12/29/23 1329   [facility] Disability Status   Are you deaf or do you have serious difficulty hearing? N   Are you blind or do you have serious difficulty seeing, even when wearing glasses? N   Because of a physical, mental, or emotional condition, do you have serious difficulty concentrating, remembering, or making decisions? (5 years old or older) N   Do you have serious difficulty walking or climbing stairs? N   Do you have serious difficulty dressing or bathing? N   Because of a physical, mental, or emotional condition, do you have serious difficulty doing errands alone such as visiting the doctor? N

## 2023-12-29 NOTE — SIGNIFICANT EVENT
Brief HPI:  New Castano is a 83 y.o. male who presented to the ER with c/o weakness, lightheadedness, and hypotension.   Recent admission from 12/21-12/26/23 at [place].  His metoprolol succinate was recently increased from 100 mg po BID to 150 mg po BID.  He has been having diarrhea.  He is taking imodium.      Past Medical History:   Diagnosis Date    Mixed hyperlipidemia 02/13/2013    Paroxysmal atrial fibrillation (CMS/[facility]) 03/08/2018    -s/p cardioversion and ablation   -AC on Eliquis     S/P ablation of atrial fibrillation 05/03/2019    Stage 3b chronic kidney disease (CMS/[facility]) 12/29/2023       Reviewed:  Most recent labs  Most recent imaging  Current medications  Vital Flow sheets  ER documentation  Most recent admission/discharge summary from [facility] on 12/21-12/26/23    Results for orders placed or performed during the hospital encounter of 12/28/23 (from the past 24 hour(s))   CBC with Differential   Result Value Ref Range    WBC 9.0 4.4 - 11.3 x10*3/uL    nRBC 0.0 0.0 - 0.0 /100 WBCs    RBC 5.04 4.50 - 5.90 x10*6/uL    Hemoglobin 14.5 13.5 - 17.5 g/dL    Hematocrit 43.3 41.0 - 52.0 %    MCV 86 80 - 100 fL    MCH 28.8 26.0 - 34.0 pg    MCHC 33.5 32.0 - 36.0 g/dL    RDW 13.6 11.5 - 14.5 %    Platelets 223 150 - 450 x10*3/uL    Neutrophils % 64.8 40.0 - 80.0 %    Immature Granulocytes %, Automated 2.3 (H) 0.0 - 0.9 %    Lymphocytes % 12.3 13.0 - 44.0 %    Monocytes % 16.4 2.0 - 10.0 %    Eosinophils % 3.4 0.0 - 6.0 %    Basophils % 0.8 0.0 - 2.0 %    Neutrophils Absolute 5.84 (H) 1.60 - 5.50 x10*3/uL    Immature Granulocytes Absolute, Automated 0.21 0.00 - 0.50 x10*3/uL    Lymphocytes Absolute 1.11 0.80 - 3.00 x10*3/uL    Monocytes Absolute 1.48 (H) 0.05 - 0.80 x10*3/uL    Eosinophils Absolute 0.31 0.00 - 0.40 x10*3/uL    Basophils Absolute 0.07 0.00 - 0.10 x10*3/uL   Comprehensive Metabolic Panel   Result Value Ref Range    Glucose 215 (H) 74 - 99 mg/dL    Sodium 132 (L) 136 - 145 mmol/L    Potassium 4.2  3.5 - 5.3 mmol/L    Chloride 99 98 - 107 mmol/L    Bicarbonate 20 (L) 21 - 32 mmol/L    Anion Gap 17 10 - 20 mmol/L    Urea Nitrogen 59 (H) 6 - 23 mg/dL    Creatinine 3.32 (H) 0.50 - 1.30 mg/dL    eGFR 18 (L) >60 mL/min/1.73m*2    Calcium 8.7 8.6 - 10.3 mg/dL    Albumin 3.8 3.4 - 5.0 g/dL    Alkaline Phosphatase 65 33 - 136 U/L    Total Protein 7.1 6.4 - 8.2 g/dL    AST 11 9 - 39 U/L    Bilirubin, Total 1.0 0.0 - 1.2 mg/dL    ALT 13 10 - 52 U/L   Troponin I, High Sensitivity   Result Value Ref Range    Troponin I, High Sensitivity 33 (H) 0 - 20 ng/L   B-type Natriuretic Peptide   Result Value Ref Range    BNP 48 0 - 99 pg/mL   ECG 12 lead   Result Value Ref Range    Ventricular Rate 103 BPM    Atrial Rate 103 BPM    LA Interval 128 ms    QRS Duration 124 ms    QT Interval 376 ms    QTC Calculation(Bazett) 492 ms    R Axis -32 degrees    T Axis 117 degrees    QRS Count 17 beats    Q Onset 219 ms    P Onset 165 ms    P Offset 185 ms    T Offset 407 ms    QTC Fredericia 450 ms   Sars-CoV-2 and Influenza A/B PCR   Result Value Ref Range    Flu A Result Not Detected Not Detected    Flu B Result Not Detected Not Detected    Coronavirus 2019, PCR Not Detected Not Detected   Urinalysis with Reflex Microscopic   Result Value Ref Range    Color, Urine Yellow Straw, Yellow    Appearance, Urine Clear Clear    Specific Gravity, Urine 1.008 1.005 - 1.035    pH, Urine 5.0 5.0, 5.5, 6.0, 6.5, 7.0, 7.5, 8.0    Protein, Urine NEGATIVE NEGATIVE mg/dL    Glucose, Urine >=500 (3+) (A) NEGATIVE mg/dL    Blood, Urine NEGATIVE NEGATIVE    Ketones, Urine NEGATIVE NEGATIVE mg/dL    Bilirubin, Urine NEGATIVE NEGATIVE    Urobilinogen, Urine <2.0 <2.0 mg/dL    Nitrite, Urine NEGATIVE NEGATIVE    Leukocyte Esterase, Urine NEGATIVE NEGATIVE     XR chest 1 view   Final Result   No acute cardiopulmonary process.        MACRO:   None        Signed by: Tia Dillon 12/28/2023 3:19 PM   Dictation workstation:   [id number]           /65 (BP  "Location: Left arm, Patient Position: Sitting)   Pulse 101   Temp 36.3 °C (97.3 °F) (Temporal)   Resp 20   Ht 1.803 m (5' 10.98\")   Wt 88.6 kg (195 lb 5.2 oz)   SpO2 99%   BMI 27.25 kg/m²     A/P:  Principal Problem:    Acute kidney injury superimposed on CKD (CMS/HCC)  Active Problems:    Hypotension due to hypovolemia    Paroxysmal atrial fibrillation (CMS/HCC)    Mixed hyperlipidemia    S/P ablation of atrial fibrillation    Stage 3b chronic kidney disease (CMS/HCC)  -CMP: BUN/creatinine increased from baseline CKD indicating ROSE on CKD --> will give additional IVF  -CBC: no leukocytosis, no acute anemia, no thrombocytopenia  -CMP: no acute electrolyte abnormalities, no acute renal or liver dysfunction appreciated   -hold diuretics  -renally adjust meds  -metoprolol re-ordered with holding parameters  -home BP meds will need to be resumed when appropriate   -repeat labs in AM   -resume home meds as appropriate  -GI ppx: Protonix, bowel regimen  -VTE ppx: AC on Eliquis (Renally dosed)  -basic admission orders as a courtesy to attending physician.   -Pt was not examined by this provider.   -Primary attending to follow with full H&P, medication reconciliation, and additional orders/consults as appropriate    Total time spent obtaining and reviewing patient medical record, labs, vitals, and recent documentation without direct patient care: 45 minutes    Rosi Ojeda PA-C    "

## 2023-12-29 NOTE — PROGRESS NOTES
12/29/23 1330   Current Planned Discharge Disposition   Current Planned Discharge Disposition Home

## 2023-12-29 NOTE — H&P
History Of Present Illness  New Castano is a 83 y.o. male presenting with not feeling well , and low BP 75 systolic  Pt with chf ef 50  Recent adm to [facility] , for a fib rvr, chf , increased metoprolol to 150 bid, stopped amlodipine and is on losartan, cardura, jardiance, metformin, lasix bid,   And discharged   He is not doing well at home did have low bP and was sent to ER at [facility]   And admitted with arcelia, creat 3.3  Baseline 1.48  Given fluids and admitted  He is able to provide limited hx  Has nt been eating or drinking much over past couple days  Lives with wife  No chest pain  No shortness of rbeath        Past Medical History  He has a past medical history of Mixed hyperlipidemia (02/13/2013), Paroxysmal atrial fibrillation (CMS/[facility]) (03/08/2018), S/P ablation of atrial fibrillation (05/03/2019), and Stage 3b chronic kidney disease (CMS/[facility]) (12/29/2023).    Surgical History  He has a past surgical history that includes Cardioversion and Cardiac electrophysiology mapping and ablation.     Social History  He reports that he has quit smoking. His smoking use included cigarettes. He has quit using smokeless tobacco. No history on file for alcohol use and drug use.    Family History  No family history on file.     Allergies  Patient has no known allergies.    Review of Systems   See hpi    Physical Exam   Alert , thin, no resp distress, walking wth cane, unsteady,   Pleasant cooperative  Neck no jvd  Cvs regular  Resp good air entry   Abdo soft bs active no masses  Ext no edema  Cns alert apporpriate to simple questions  Last Recorded Vitals  /70 (BP Location: Left arm, Patient Position: Sitting)   Pulse 102   Temp 36.2 °C (97.2 °F) (Temporal)   Resp 18   Wt 88.6 kg (195 lb 5.2 oz)   SpO2 96%     Relevant Results    Scheduled medications  apixaban, 2.5 mg, oral, BID  atorvastatin, 20 mg, oral, Nightly  melatonin, 3 mg, oral, Daily  metoprolol succinate XL, 150 mg, oral, BID  pantoprazole, 40 mg,  oral, Daily before breakfast   Or  pantoprazole, 40 mg, intravenous, Daily before breakfast      Continuous medications  sodium chloride 0.9%, 100 mL/hr, Last Rate: 100 mL/hr (12/29/23 0119)      PRN medications  PRN medications: acetaminophen, docusate sodium, loperamide  Results for orders placed or performed during the hospital encounter of 12/28/23 (from the past 96 hour(s))   CBC with Differential   Result Value Ref Range    WBC 9.0 4.4 - 11.3 x10*3/uL    nRBC 0.0 0.0 - 0.0 /100 WBCs    RBC 5.04 4.50 - 5.90 x10*6/uL    Hemoglobin 14.5 13.5 - 17.5 g/dL    Hematocrit 43.3 41.0 - 52.0 %    MCV 86 80 - 100 fL    MCH 28.8 26.0 - 34.0 pg    MCHC 33.5 32.0 - 36.0 g/dL    RDW 13.6 11.5 - 14.5 %    Platelets 223 150 - 450 x10*3/uL    Neutrophils % 64.8 40.0 - 80.0 %    Immature Granulocytes %, Automated 2.3 (H) 0.0 - 0.9 %    Lymphocytes % 12.3 13.0 - 44.0 %    Monocytes % 16.4 2.0 - 10.0 %    Eosinophils % 3.4 0.0 - 6.0 %    Basophils % 0.8 0.0 - 2.0 %    Neutrophils Absolute 5.84 (H) 1.60 - 5.50 x10*3/uL    Immature Granulocytes Absolute, Automated 0.21 0.00 - 0.50 x10*3/uL    Lymphocytes Absolute 1.11 0.80 - 3.00 x10*3/uL    Monocytes Absolute 1.48 (H) 0.05 - 0.80 x10*3/uL    Eosinophils Absolute 0.31 0.00 - 0.40 x10*3/uL    Basophils Absolute 0.07 0.00 - 0.10 x10*3/uL   Comprehensive Metabolic Panel   Result Value Ref Range    Glucose 215 (H) 74 - 99 mg/dL    Sodium 132 (L) 136 - 145 mmol/L    Potassium 4.2 3.5 - 5.3 mmol/L    Chloride 99 98 - 107 mmol/L    Bicarbonate 20 (L) 21 - 32 mmol/L    Anion Gap 17 10 - 20 mmol/L    Urea Nitrogen 59 (H) 6 - 23 mg/dL    Creatinine 3.32 (H) 0.50 - 1.30 mg/dL    eGFR 18 (L) >60 mL/min/1.73m*2    Calcium 8.7 8.6 - 10.3 mg/dL    Albumin 3.8 3.4 - 5.0 g/dL    Alkaline Phosphatase 65 33 - 136 U/L    Total Protein 7.1 6.4 - 8.2 g/dL    AST 11 9 - 39 U/L    Bilirubin, Total 1.0 0.0 - 1.2 mg/dL    ALT 13 10 - 52 U/L   Troponin I, High Sensitivity   Result Value Ref Range    Troponin  I, High Sensitivity 33 (H) 0 - 20 ng/L   B-type Natriuretic Peptide   Result Value Ref Range    BNP 48 0 - 99 pg/mL   ECG 12 lead   Result Value Ref Range    Ventricular Rate 103 BPM    Atrial Rate 103 BPM    TN Interval 128 ms    QRS Duration 124 ms    QT Interval 376 ms    QTC Calculation(Bazett) 492 ms    R Axis -32 degrees    T Axis 117 degrees    QRS Count 17 beats    Q Onset 219 ms    P Onset 165 ms    P Offset 185 ms    T Offset 407 ms    QTC Fredericia 450 ms   Sars-CoV-2 and Influenza A/B PCR   Result Value Ref Range    Flu A Result Not Detected Not Detected    Flu B Result Not Detected Not Detected    Coronavirus 2019, PCR Not Detected Not Detected   Urinalysis with Reflex Microscopic   Result Value Ref Range    Color, Urine Yellow Straw, Yellow    Appearance, Urine Clear Clear    Specific Gravity, Urine 1.008 1.005 - 1.035    pH, Urine 5.0 5.0, 5.5, 6.0, 6.5, 7.0, 7.5, 8.0    Protein, Urine NEGATIVE NEGATIVE mg/dL    Glucose, Urine >=500 (3+) (A) NEGATIVE mg/dL    Blood, Urine NEGATIVE NEGATIVE    Ketones, Urine NEGATIVE NEGATIVE mg/dL    Bilirubin, Urine NEGATIVE NEGATIVE    Urobilinogen, Urine <2.0 <2.0 mg/dL    Nitrite, Urine NEGATIVE NEGATIVE    Leukocyte Esterase, Urine NEGATIVE NEGATIVE   Comprehensive metabolic panel   Result Value Ref Range    Glucose 161 (H) 74 - 99 mg/dL    Sodium 136 136 - 145 mmol/L    Potassium 3.9 3.5 - 5.3 mmol/L    Chloride 104 98 - 107 mmol/L    Bicarbonate 23 21 - 32 mmol/L    Anion Gap 13 10 - 20 mmol/L    Urea Nitrogen 55 (H) 6 - 23 mg/dL    Creatinine 2.80 (H) 0.50 - 1.30 mg/dL    eGFR 22 (L) >60 mL/min/1.73m*2    Calcium 8.4 (L) 8.6 - 10.3 mg/dL    Albumin 3.6 3.4 - 5.0 g/dL    Alkaline Phosphatase 64 33 - 136 U/L    Total Protein 6.5 6.4 - 8.2 g/dL    AST 10 9 - 39 U/L    Bilirubin, Total 0.6 0.0 - 1.2 mg/dL    ALT 11 10 - 52 U/L   CBC and Auto Differential   Result Value Ref Range    WBC 8.1 4.4 - 11.3 x10*3/uL    nRBC 0.0 0.0 - 0.0 /100 WBCs    RBC 4.60 4.50 -  5.90 x10*6/uL    Hemoglobin 13.4 (L) 13.5 - 17.5 g/dL    Hematocrit 39.7 (L) 41.0 - 52.0 %    MCV 86 80 - 100 fL    MCH 29.1 26.0 - 34.0 pg    MCHC 33.8 32.0 - 36.0 g/dL    RDW 13.3 11.5 - 14.5 %    Platelets 182 150 - 450 x10*3/uL    Neutrophils % 68.0 40.0 - 80.0 %    Immature Granulocytes %, Automated 1.7 (H) 0.0 - 0.9 %    Lymphocytes % 10.4 13.0 - 44.0 %    Monocytes % 16.4 2.0 - 10.0 %    Eosinophils % 3.0 0.0 - 6.0 %    Basophils % 0.5 0.0 - 2.0 %    Neutrophils Absolute 5.48 1.60 - 5.50 x10*3/uL    Immature Granulocytes Absolute, Automated 0.14 0.00 - 0.50 x10*3/uL    Lymphocytes Absolute 0.84 0.80 - 3.00 x10*3/uL    Monocytes Absolute 1.32 (H) 0.05 - 0.80 x10*3/uL    Eosinophils Absolute 0.24 0.00 - 0.40 x10*3/uL    Basophils Absolute 0.04 0.00 - 0.10 x10*3/uL   Magnesium   Result Value Ref Range    Magnesium 1.80 1.60 - 2.40 mg/dL          Assessment/Plan   Principal Problem:    Acute kidney injury superimposed on CKD (CMS/[facility])  Active Problems:    Hypotension due to hypovolemia    Paroxysmal atrial fibrillation (CMS/[facility])    Mixed hyperlipidemia    S/P ablation of atrial fibrillation    Stage 3b chronic kidney disease (CMS/[facility])    Type 2 diabetes mellitus with stage 3b chronic kidney disease, without long-term current use of insulin (CMS/[facility])    Anticoagulation adequate    Current meds reviewed   Consult renal and cardio   Monitor labs  Holding BP mesd  Will need to reintroduce slowly   Dc metformin  Called and dw his daughter       Kevon Gunderson MD

## 2023-12-29 NOTE — PROGRESS NOTES
12/29/23 1324   Discharge Planning   Living Arrangements Spouse/significant other   Support Systems Spouse/significant other   Assistance Needed Independent, uses a cane   Type of Residence Private residence   Number of Stairs to Enter Residence 2   Number of Stairs Within Residence 3   Do you have animals or pets at home? Yes   Type of Animals or Pets 2 dogs   Who is requesting discharge planning? Other (Comment)  ([facility] admission assessment)   Home or Post Acute Services None   Patient expects to be discharged to: Home   Does the patient need discharge transport arranged? No   Financial Resource Strain   How hard is it for you to pay for the very basics like food, housing, medical care, and heating? Not hard   Housing Stability   In the last 12 months, was there a time when you were not able to pay the mortgage or rent on time? N   In the last 12 months, how many places have you lived? 1   In the last 12 months, was there a time when you did not have a steady place to sleep or slept in a shelter (including now)? N   Transportation Needs   In the past 12 months, has lack of transportation kept you from medical appointments or from getting medications? no   In the past 12 months, has lack of transportation kept you from meetings, work, or from getting things needed for daily living? No     Met with patient at the bedside to discuss discharge plan.  Patient lives with his wife and was independent with his care including driving prior to admission.  His daughter, Nena, will transport patient home upon discharge.  Patient denies need for any homecare.  Patient was admitted for rehydration and ROSE.  Cardiology to see patient.  ADOD: tomorrow  Christy Fitzpatrick RN

## 2023-12-29 NOTE — PROGRESS NOTES
Pharmacy Medication History Review    New Castano is a 83 y.o. male admitted for Acute kidney injury superimposed on CKD (CMS/[facility]). Pharmacy reviewed the patient's [id number] medications and allergies for accuracy.    The list below reflectives the updated PTA list. Please review each medication in order reconciliation for additional clarification and justification.  Prior to Admission Medications   Prescriptions Last Dose Informant Patient Reported? Taking?   apixaban (Eliquis) 2.5 mg tablet 12/28/2023  Yes Yes   Sig: Take 1 tablet (2.5 mg) by mouth 2 times a day.   atorvastatin (Lipitor) 20 mg tablet 12/27/2023  Yes Yes   Sig: Take 1 tablet (20 mg) by mouth once daily at bedtime.   cyanocobalamin (Vitamin B-12) 500 mcg tablet   Yes Yes   Sig: Take 1 tablet (500 mcg) by mouth once daily.   doxazosin (Cardura) 8 mg tablet 12/27/2023  Yes Yes   Sig: Take 1 tablet (8 mg) by mouth once daily at bedtime.   empagliflozin (Jardiance) 25 mg 12/28/2023  Yes Yes   Sig: Take 1 tablet (25 mg) by mouth once daily.   finasteride (Proscar) 5 mg tablet 12/28/2023  Yes Yes   Sig: Take 1 tablet (5 mg) by mouth once daily.   furosemide (Lasix) 40 mg tablet 12/28/2023  Yes Yes   Sig: Take 1 tablet (40 mg) by mouth twice a day.   loperamide (Imodium) 2 mg capsule 12/28/2023  Yes Yes   Sig: Take 1 capsule (2 mg) by mouth 4 times a day as needed for diarrhea.   losartan (Cozaar) 50 mg tablet 12/28/2023  Yes Yes   Sig: Take 1 tablet (50 mg) by mouth once daily.   metFORMIN (Glucophage) 1,000 mg tablet 12/28/2023  Yes Yes   Sig: Take 1 tablet (1,000 mg) by mouth 2 times a day with meals.   metoprolol succinate XL (Toprol-XL) 50 mg 24 hr tablet 12/28/2023  Yes Yes   Sig: Take 3 tablets (150 mg) by mouth twice a day.   omeprazole (PriLOSEC) 20 mg DR capsule 12/28/2023  Yes No   Sig: Take 1 capsule (20 mg) by mouth once daily in the morning. Take before meals. Do not crush or chew.      Facility-Administered Medications: None          The list below reflectives the updated allergy list. Please review each documented allergy for additional clarification and justification.  Allergies  Reviewed by Kevon Gunderson MD on 12/29/2023        Severity Reactions Comments    Pollen Extracts Not Specified Itching             Below are additional concerns with the patient's PTA list.  Patient's daughter verified all medications and doses.    Tita Damico CPhT

## 2023-12-30 LAB
ANION GAP SERPL CALC-SCNC: 11 MMOL/L (ref 10–20)
BUN SERPL-MCNC: 47 MG/DL (ref 6–23)
CALCIUM SERPL-MCNC: 8.6 MG/DL (ref 8.6–10.3)
CHLORIDE SERPL-SCNC: 104 MMOL/L (ref 98–107)
CO2 SERPL-SCNC: 23 MMOL/L (ref 21–32)
CREAT SERPL-MCNC: 2.07 MG/DL (ref 0.5–1.3)
ERYTHROCYTE [DISTWIDTH] IN BLOOD BY AUTOMATED COUNT: 13.5 % (ref 11.5–14.5)
GFR SERPL CREATININE-BSD FRML MDRD: 31 ML/MIN/1.73M*2
GLUCOSE SERPL-MCNC: 238 MG/DL (ref 74–99)
HCT VFR BLD AUTO: 37.7 % (ref 41–52)
HGB BLD-MCNC: 12.7 G/DL (ref 13.5–17.5)
MCH RBC QN AUTO: 29.1 PG (ref 26–34)
MCHC RBC AUTO-ENTMCNC: 33.7 G/DL (ref 32–36)
MCV RBC AUTO: 86 FL (ref 80–100)
NRBC BLD-RTO: 0 /100 WBCS (ref 0–0)
PLATELET # BLD AUTO: 170 X10*3/UL (ref 150–450)
POTASSIUM SERPL-SCNC: 4.3 MMOL/L (ref 3.5–5.3)
RBC # BLD AUTO: 4.37 X10*6/UL (ref 4.5–5.9)
SODIUM SERPL-SCNC: 134 MMOL/L (ref 136–145)
WBC # BLD AUTO: 7.7 X10*3/UL (ref 4.4–11.3)

## 2023-12-30 PROCEDURE — 99232 SBSQ HOSP IP/OBS MODERATE 35: CPT | Performed by: INTERNAL MEDICINE

## 2023-12-30 PROCEDURE — 80048 BASIC METABOLIC PNL TOTAL CA: CPT | Performed by: NURSE PRACTITIONER

## 2023-12-30 PROCEDURE — 85027 COMPLETE CBC AUTOMATED: CPT | Performed by: NURSE PRACTITIONER

## 2023-12-30 PROCEDURE — 1200000002 HC GENERAL ROOM WITH TELEMETRY DAILY

## 2023-12-30 PROCEDURE — 2500000001 HC RX 250 WO HCPCS SELF ADMINISTERED DRUGS (ALT 637 FOR MEDICARE OP): Performed by: FAMILY MEDICINE

## 2023-12-30 PROCEDURE — 36415 COLL VENOUS BLD VENIPUNCTURE: CPT | Performed by: NURSE PRACTITIONER

## 2023-12-30 PROCEDURE — 2500000004 HC RX 250 GENERAL PHARMACY W/ HCPCS (ALT 636 FOR OP/ED): Performed by: FAMILY MEDICINE

## 2023-12-30 PROCEDURE — 99233 SBSQ HOSP IP/OBS HIGH 50: CPT | Performed by: NURSE PRACTITIONER

## 2023-12-30 RX ADMIN — ATORVASTATIN CALCIUM 20 MG: 20 TABLET, FILM COATED ORAL at 20:25

## 2023-12-30 RX ADMIN — METOPROLOL SUCCINATE 150 MG: 50 TABLET, EXTENDED RELEASE ORAL at 09:15

## 2023-12-30 RX ADMIN — APIXABAN 2.5 MG: 2.5 TABLET, FILM COATED ORAL at 20:25

## 2023-12-30 RX ADMIN — METOPROLOL SUCCINATE 150 MG: 50 TABLET, EXTENDED RELEASE ORAL at 20:25

## 2023-12-30 RX ADMIN — APIXABAN 2.5 MG: 2.5 TABLET, FILM COATED ORAL at 09:15

## 2023-12-30 RX ADMIN — PANTOPRAZOLE SODIUM 40 MG: 40 TABLET, DELAYED RELEASE ORAL at 06:52

## 2023-12-30 ASSESSMENT — COGNITIVE AND FUNCTIONAL STATUS - GENERAL
MOBILITY SCORE: 23
CLIMB 3 TO 5 STEPS WITH RAILING: A LITTLE

## 2023-12-30 ASSESSMENT — PAIN - FUNCTIONAL ASSESSMENT
PAIN_FUNCTIONAL_ASSESSMENT: 0-10

## 2023-12-30 ASSESSMENT — PAIN SCALES - GENERAL
PAINLEVEL_OUTOF10: 0 - NO PAIN

## 2023-12-30 NOTE — PROGRESS NOTES
"Subjective Data:  Sitting on side of bed.  No complaints, feeling better today  Denies chest pain, or SOB    Tele     Overnight Events:    none     Objective Data:  Last Recorded Vitals:  Vitals:    232 237 23 0813 23 1208   BP: 111/73 128/72 110/70 126/82   BP Location:   Left arm Left arm   Patient Position: Standing Lying Lying Lying   Pulse: 75 77 103 97   Resp:  16 18 18   Temp:  36.6 °C (97.8 °F) 36.4 °C (97.5 °F) 36.4 °C (97.5 °F)   TempSrc:  Oral Temporal Temporal   SpO2:  98% 96% 98%   Weight:       Height:         Medical Gas Therapy: None (Room air)  Weight  Av.6 kg (195 lb 5.2 oz)  Min: 88.6 kg (195 lb 5.2 oz)  Max: 88.6 kg (195 lb 5.2 oz)    LABS:  CMP:  Results from last 7 days   Lab Units 23  0956 23  0645 23  1407   SODIUM mmol/L 134* 136 132*   POTASSIUM mmol/L 4.3 3.9 4.2   CHLORIDE mmol/L 104 104 99   CO2 mmol/L 23 23 20*   ANION GAP mmol/L 11 13 17   BUN mg/dL 47* 55* 59*   CREATININE mg/dL 2.07* 2.80* 3.32*   EGFR mL/min/1.73m*2 31* 22* 18*   MAGNESIUM mg/dL  --  1.80  --    ALBUMIN g/dL  --  3.6 3.8   ALT U/L  --  11 13   AST U/L  --  10 11   BILIRUBIN TOTAL mg/dL  --  0.6 1.0     CBC:  Results from last 7 days   Lab Units 23  0956 23  0645 23  1407   WBC AUTO x10*3/uL 7.7 8.1 9.0   HEMOGLOBIN g/dL 12.7* 13.4* 14.5   HEMATOCRIT % 37.7* 39.7* 43.3   PLATELETS AUTO x10*3/uL 170 182 223   MCV fL 86 86 86     COAG:     ABO: No results found for: \"ABO\"  HEME/ENDO:     CARDIAC:   Results from last 7 days   Lab Units 23  1407   TROPHS ng/L 33*   BNP pg/mL 48             Last I/O:    Intake/Output Summary (Last 24 hours) at 2023 121  Last data filed at 2023 0900  Gross per 24 hour   Intake 1470 ml   Output 750 ml   Net 720 ml     Net IO Since Admission: 1,210 mL [23 1219]      Imaging Results:  ECG 12 lead    Result Date: 2023  Sinus tachycardia Left axis deviation Left ventricular hypertrophy " with QRS widening and repolarization abnormality ( R in aVL , Alan product ) Abnormal ECG No previous ECGs available See ED provider note for full interpretation and clinical correlation Confirmed by Yanick Solorzano ([id number]) on 12/29/2023 10:56:33 PM    XR chest 1 view    Result Date: 12/28/2023  Interpreted By:  Tia Dillon, STUDY: XR CHEST 1 VIEW;  12/28/2023 3:09 pm   INDICATION: Signs/Symptoms:hypotension.   COMPARISON: None.   ACCESSION NUMBER(S): [id number]   ORDERING CLINICIAN: FELISA BOYD   FINDINGS: CARDIOMEDIASTINAL SILHOUETTE: Cardiomediastinal silhouette is normal in size and configuration.     LUNGS: Lungs are clear.   ABDOMEN: No remarkable upper abdominal findings.     BONES: No acute osseous changes.       No acute cardiopulmonary process.   MACRO: None   Signed by: Tia Dillon 12/28/2023 3:19 PM Dictation workstation:   [id number]          Past Cardiology Tests (Last 3 Years):  EKG:  Results for orders placed during the hospital encounter of 12/28/23    ECG 12 lead    Narrative  Sinus tachycardia  Left axis deviation  Left ventricular hypertrophy with QRS widening and repolarization abnormality ( R in aVL , Alan product )  Abnormal ECG  No previous ECGs available  See ED provider note for full interpretation and clinical correlation  Confirmed by Yanick Solorzano ([id number]) on 12/29/2023 10:56:33 PM    Echo:  Echocardiogram: 08/20/2023 at [facility]    CONCLUSIONS:   - Exam indication: Sustained atrial fibrillation   - The left ventricle is mildly dilated. There is mild concentric left ventricular   hypertrophy. Left ventricular systolic function is moderately decreased. EF = 38 ±   5% (2D biplane) Left ventricular diastolic function was not evaluated due to AF.   - The right ventricle is normal in size. Right ventricular systolic function is   mildly decreased.   - The left atrial cavity is moderately dilated.   - The right atrial cavity is dilated.   - There is mild (1+) mitral valve  regurgitation.   - There is mild (1+) aortic valve regurgitation.   - Markedly abnormal global LV strain -7% with apical sparing consider cardiac   amyloid   - Exam was compared with the prior  echocardiographic exam performed on   09/13/2022 ([facility]). Slight decrease in LVEF, prior EF 45%.      Cath:  No results found for this or any previous visit.     Stress Test:  No results found for this or any previous visit.     Cardiac Imaging:     NM Spect/CT Cardiac Amyloid: 08/21/2023 (reviewed)    Summary:   Not Consistent with TTR amyloidosis     Inpatient Medications:  Scheduled medications   Medication Dose Route Frequency    apixaban  2.5 mg oral BID    atorvastatin  20 mg oral Nightly    melatonin  3 mg oral Daily    metoprolol succinate XL  150 mg oral BID    pantoprazole  40 mg oral Daily before breakfast    Or    pantoprazole  40 mg intravenous Daily before breakfast     PRN medications   Medication    acetaminophen    docusate sodium    loperamide     Continuous Medications   Medication Dose Last Rate    sodium chloride 0.9%  100 mL/hr 100 mL/hr (12/29/23 0119)       Physical Exam:    General: Elderly, well-nourished, in no acute distress  HEENT: Normocephalic, atraumatic  Neck: Supple, JVP is normal negative hepatojugular reflux   Pulmonary: Normal respiratory effort, clear to auscultation  Cardiovascular: Normal S1 and S2, no murmurs rubs or gallops  Abdomen: Soft, nontender, nondistended  Extremities: Warm without edema, but some chronic venous changes, 2+ radial pulses bilaterally   Neurologic: Alert and oriented x3  Psychiatric: Normal mood and affect      Assessment/Plan     New Castano is a 83 y.o. male with past medical history of Afib s/p ablation (Eliquis/SAMIRA), CKD3a, HFrEF (38%), HTN, HLD, DM2 who presented to ED with weakness, hypotension, and diarrhea.  Cardiology consulted for CHF.     12/29 > CXR does not show any acute process.  BNP is 48.  HS troponin 33.  Creatinine 3.32 on admit > 2.8 after  IVF. EKG showing ST, no ischemic changes.  Mildly hypotensive, appears dry on exam.      12/30 > Feeling better, Normotensive.  Creatinine better today, down from 2.8 > 2.07     Assessment:  # HFrEF (LVEF 38% on 8/20/2023). Chronic systolic HF  # Paroxsymal atrial fibrillation s/p ablation 2011 with two recent admits at [facility] for a fib RVR, currently mainitaining NSR  # Hypertension (hypotension this admit , stabilized)  # Acute on chronic kidney disease  # Diarrhea  # Dehydration ( overdiuresed in the setting of diarrhea)    Plan:  - Hold home furosemide 40mg oral BID.  Likely send home on reduced of furosemide 40mg daily ( although reasonable to send home without any diuretic at this point)  - GDMT:  continue Toprol 150mg BID.  Restart losartan 50mg daily when ok per renal.  Continue Jardiance 25mg daily  - Continue Eliquis 2.5 mg oral BID.   - Nephrology following, awaiting orthostatic BP     Follow up with established cardiologist, Dr. SHELBY Tolentino, within one month of hospital discharge.       He is stable for discharge home from a cardiology standpoint.    Code Status:  Full Code    I spent 40 minutes in the professional and overall care of this patient.    Charlotte Alba, APRN-CNP

## 2023-12-30 NOTE — CONSULTS
Reason For Consult  Acute kidney injury on stage G3 CKD    History Of Present Illness  New Castano is a 83 y.o. male with a past medical history of stage G3 CKD with a baseline creatinine between 1.3 to 1.5 mg/deciliter, atrial fibrillation status post ablation on Eliquis and a beta-blocker, HFrEF with LVEF 30%, hypertension, hyperlipidemia, diabetes who was recently admitted to [facility] with weakness, altered mental status and UTI.  In the ED wide-complex was noted with A-fib RVR.  He was admitted to CCU on an amiodarone drip.  He was later transitioned back to metoprolol which was increased to 250 mg twice daily.  His acute encephalopathy was felt to to have resolved.  He endorsed diarrhea, C diff  was ordered but ultimately he was given Imodium.  He was sent out on 25 mg of Jardiance, furosemide 40 mg twice daily, losartan 50 mg, 1000 mg of metformin and 8 mg of Cardura on 12/26.  His blood pressure was 126/74 on 12/25 with a creatinine of 1.48 mg/a deciliter.  He comes in now with weakness, hypotension and diarrhea and was found to have an acute kidney injury with a creatinine of 3.3 milligrams/deciliter now down to 2.8 after receiving a 500 cc saline bolus.  Nephrology is consulted for renal care.    Seen and examined.   HARRISON. He does not offer specific complaints.      Physical Exam   General: Alert and oriented,  no acute distress.    Lungs: Clear to auscultation, no wheezes, rales or rhonchi.   Heart: Normal rate, no murmur, gallop or edema.   Abdomen: Soft, non-tender, non-distended, normal bowel sounds, no masses.   Extremities: No edema  Neurologic: Awake, alert, and oriented X3, moves extremities spontaneously  Psychiatric: Appropriate mood and affect.       I&O 24HR    Intake/Output Summary (Last 24 hours) at 12/30/2023 1708  Last data filed at 12/30/2023 1345  Gross per 24 hour   Intake 1710 ml   Output 750 ml   Net 960 ml         Vitals 24HR  Heart Rate:  []   Temp:  [36.4 °C (97.5  °F)-36.6 °C (97.9 °F)]   Resp:  [16-18]   BP: (110-136)/(70-82)   SpO2:  [95 %-98 %]     Scheduled Medications  apixaban, 2.5 mg, oral, BID  atorvastatin, 20 mg, oral, Nightly  melatonin, 3 mg, oral, Daily  metoprolol succinate XL, 150 mg, oral, BID  pantoprazole, 40 mg, oral, Daily before breakfast   Or  pantoprazole, 40 mg, intravenous, Daily before breakfast      Continuous medications  sodium chloride 0.9%, 100 mL/hr, Last Rate: 100 mL/hr (12/29/23 0119)        PRN medications: acetaminophen, docusate sodium, loperamide     Relevant Results  Results from last 7 days   Lab Units 12/30/23  0956 12/29/23  0645 12/28/23  1407   WBC AUTO x10*3/uL 7.7 8.1 9.0   HEMOGLOBIN g/dL 12.7* 13.4* 14.5   HEMATOCRIT % 37.7* 39.7* 43.3   PLATELETS AUTO x10*3/uL 170 182 223   NEUTROS PCT AUTO %  --  68.0 64.8   LYMPHS PCT AUTO %  --  10.4 12.3   MONOS PCT AUTO %  --  16.4 16.4   EOS PCT AUTO %  --  3.0 3.4        Results from last 7 days   Lab Units 12/30/23  0956 12/29/23  0645 12/28/23  1407   SODIUM mmol/L 134* 136 132*   POTASSIUM mmol/L 4.3 3.9 4.2   CHLORIDE mmol/L 104 104 99   CO2 mmol/L 23 23 20*   BUN mg/dL 47* 55* 59*   CREATININE mg/dL 2.07* 2.80* 3.32*   CALCIUM mg/dL 8.6 8.4* 8.7   PROTEIN TOTAL g/dL  --  6.5 7.1   BILIRUBIN TOTAL mg/dL  --  0.6 1.0   ALK PHOS U/L  --  64 65   ALT U/L  --  11 13   AST U/L  --  10 11   GLUCOSE mg/dL 238* 161* 215*        XR chest 1 view   Final Result   No acute cardiopulmonary process.        MACRO:   None        Signed by: Tia Dillon 12/28/2023 3:19 PM   Dictation workstation:   [id number]            Assessment/Plan     New Castano is a 83 y.o. male with a past medical history of stage G3 CKD with a baseline creatinine between 1.3 to 1.5 mg/deciliter, atrial fibrillation status post ablation on Eliquis and a beta-blocker, HFrEF with LVEF 30%, hypertension, hyperlipidemia, diabetes who was recently admitted to [facility] with weakness, altered mental status and UTI.  In  the ED wide-complex was noted with A-fib RVR.  He was admitted to CCU on an amiodarone drip.  He was later transitioned back to metoprolol which was increased to 250 mg twice daily.  His acute encephalopathy was felt to to have resolved.  He endorsed diarrhea, C diff  was ordered but ultimately he was given Imodium.  He was sent out on 25 mg of Jardiance, furosemide 40 mg twice daily, losartan 50 mg, 1000 mg of metformin and 8 mg of Cardura on 12/26.  His blood pressure was 126/74 on 12/25 with a creatinine of 1.48 mg/a deciliter.  He comes in now with weakness, hypotension and diarrhea and was found to have an acute kidney injury with a creatinine of 3.3 milligrams/deciliter now down to 2.8 after receiving a 500 cc saline bolus.  Nephrology is consulted for renal care.    Mr. Castano is suffering hemodynamic acute kidney injury.  He is off loop diuretic, SGLT2, ARB and metformin. He remains on the beta-blocker. He does not appear to be fluid overloaded. His creatinine is improving following gentle IV volume expansion. I ordered orthostatic vitals albeit I do not see them reported. I anticipate that he will go out temporarily off the SGLT-2, and ARB until his renal function stabilizes. Cardiology recommends resuming the loop diuretic at a reduced dose which is reasonable. He will also need hyperglycemic coverage.  This can be done with metformin.  He will need to follow-up with his PCP within 1 week of discharge to review his medications.  I anticipate discharge tomorrow.  In the meantime, we will get a standing weight and orthostatic vitals.      Principal Problem:    Acute kidney injury superimposed on CKD (CMS/HCC)  Active Problems:    Hypotension due to hypovolemia    Paroxysmal atrial fibrillation (CMS/HCC)    Mixed hyperlipidemia    S/P ablation of atrial fibrillation    Stage 3b chronic kidney disease (CMS/HCC)    Type 2 diabetes mellitus with stage 3b chronic kidney disease, without long-term current use of  insulin (CMS/HCC)    Anticoagulation adequate          Juan Spangler, ELZAonsults

## 2023-12-30 NOTE — PROGRESS NOTES
New Castano is a 83 y.o. male     Patient is keen to go home  Kidney functions are improving    Review of Systems     Constitutional: no fever, no chills, not feeling poorly, not feeling tired   Cardiovascular: no chest pain   Respiratory: no cough, wheezing or shortness of breath a  Gastrointestinal: no abdominal pain, no constipation, no melena, no nausea, no diarrhea, no vomiting and no blood in stools.   Neurological: no headache,   All other systems have been reviewed and are negative for complaint.       Vitals:    12/30/23 1436   BP: 118/73   Pulse: 100   Resp: 18   Temp: 36.4 °C (97.5 °F)   SpO2: 98%        Scheduled medications  apixaban, 2.5 mg, oral, BID  atorvastatin, 20 mg, oral, Nightly  melatonin, 3 mg, oral, Daily  metoprolol succinate XL, 150 mg, oral, BID  pantoprazole, 40 mg, oral, Daily before breakfast   Or  pantoprazole, 40 mg, intravenous, Daily before breakfast      Continuous medications  sodium chloride 0.9%, 100 mL/hr, Last Rate: 100 mL/hr (12/29/23 0119)      PRN medications  PRN medications: acetaminophen, docusate sodium, loperamide    Lab Review   Results from last 7 days   Lab Units 12/30/23  0956 12/29/23  0645 12/28/23  1407   WBC AUTO x10*3/uL 7.7 8.1 9.0   HEMOGLOBIN g/dL 12.7* 13.4* 14.5   HEMATOCRIT % 37.7* 39.7* 43.3   PLATELETS AUTO x10*3/uL 170 182 223     Results from last 7 days   Lab Units 12/30/23  0956 12/29/23  0645 12/28/23  1407   SODIUM mmol/L 134* 136 132*   POTASSIUM mmol/L 4.3 3.9 4.2   CHLORIDE mmol/L 104 104 99   CO2 mmol/L 23 23 20*   BUN mg/dL 47* 55* 59*   CREATININE mg/dL 2.07* 2.80* 3.32*   CALCIUM mg/dL 8.6 8.4* 8.7   PROTEIN TOTAL g/dL  --  6.5 7.1   BILIRUBIN TOTAL mg/dL  --  0.6 1.0   ALK PHOS U/L  --  64 65   ALT U/L  --  11 13   AST U/L  --  10 11   GLUCOSE mg/dL 238* 161* 215*     Results from last 7 days   Lab Units 12/28/23  1407   TROPHS ng/L 33*        XR chest 1 view   Final Result   No acute cardiopulmonary process.        MACRO:   None         Signed by: Tia Dillon 12/28/2023 3:19 PM   Dictation workstation:   [id number]            Physical Exam     Constitutional   General appearance: Alert and in no acute distress.     Pulmonary   Respiratory assessment: No respiratory distress, normal respiratory rhythm and effort.    Auscultation of Lungs: Clear bilateral breath sounds.   Cardiovascular   Auscultation of heart: Apical pulse normal, heart rate and rhythm normal, normal S1 and S2, no murmurs and no pericardial rub.    Exam for edema: No peripheral edema.   Abdomen   Abdominal Exam: No bruits, normal bowel sounds, soft, non-tender, no abdominal mass palpated.    Liver and Spleen exam: No hepato-splenomegaly.   Musculoskeletal   Examination of gait: ROM intact  Skin inspection: Normal skin color and pigmentation, normal skin turgor and no visible rash.   Neurologic   Cranial nerves: Nerves 2-12 were intact, no focal neuro defects.     Assessment/Plan      #Acute on chronic kidney injury  Continues to improve  Will hold Jardiance for today  And recheck numbers tomorrow  Hopefully can resume    #Chronic systolic congestive heart failure  Currently euvolemic  Holding Lasix    #Dyslipidemia  Continue statins    #Paroxysmal atrial fibrillation  Continue Eliquis

## 2023-12-31 VITALS
BODY MASS INDEX: 27.35 KG/M2 | RESPIRATION RATE: 18 BRPM | HEART RATE: 109 BPM | OXYGEN SATURATION: 97 % | SYSTOLIC BLOOD PRESSURE: 112 MMHG | TEMPERATURE: 98.2 F | HEIGHT: 71 IN | WEIGHT: 195.33 LBS | DIASTOLIC BLOOD PRESSURE: 73 MMHG

## 2023-12-31 LAB
ANION GAP SERPL CALC-SCNC: 13 MMOL/L (ref 10–20)
BUN SERPL-MCNC: 38 MG/DL (ref 6–23)
CALCIUM SERPL-MCNC: 8.8 MG/DL (ref 8.6–10.3)
CHLORIDE SERPL-SCNC: 108 MMOL/L (ref 98–107)
CO2 SERPL-SCNC: 18 MMOL/L (ref 21–32)
CREAT SERPL-MCNC: 1.8 MG/DL (ref 0.5–1.3)
ERYTHROCYTE [DISTWIDTH] IN BLOOD BY AUTOMATED COUNT: 13.4 % (ref 11.5–14.5)
GFR SERPL CREATININE-BSD FRML MDRD: 37 ML/MIN/1.73M*2
GLUCOSE SERPL-MCNC: 193 MG/DL (ref 74–99)
HCT VFR BLD AUTO: 40.9 % (ref 41–52)
HGB BLD-MCNC: 13.9 G/DL (ref 13.5–17.5)
MCH RBC QN AUTO: 29.1 PG (ref 26–34)
MCHC RBC AUTO-ENTMCNC: 34 G/DL (ref 32–36)
MCV RBC AUTO: 86 FL (ref 80–100)
NRBC BLD-RTO: 0 /100 WBCS (ref 0–0)
PLATELET # BLD AUTO: 183 X10*3/UL (ref 150–450)
POTASSIUM SERPL-SCNC: 4.3 MMOL/L (ref 3.5–5.3)
RBC # BLD AUTO: 4.78 X10*6/UL (ref 4.5–5.9)
SODIUM SERPL-SCNC: 135 MMOL/L (ref 136–145)
WBC # BLD AUTO: 13.3 X10*3/UL (ref 4.4–11.3)

## 2023-12-31 PROCEDURE — 2500000001 HC RX 250 WO HCPCS SELF ADMINISTERED DRUGS (ALT 637 FOR MEDICARE OP): Performed by: FAMILY MEDICINE

## 2023-12-31 PROCEDURE — 99239 HOSP IP/OBS DSCHRG MGMT >30: CPT | Performed by: INTERNAL MEDICINE

## 2023-12-31 PROCEDURE — 36415 COLL VENOUS BLD VENIPUNCTURE: CPT | Performed by: INTERNAL MEDICINE

## 2023-12-31 PROCEDURE — 80048 BASIC METABOLIC PNL TOTAL CA: CPT | Performed by: INTERNAL MEDICINE

## 2023-12-31 PROCEDURE — 2500000004 HC RX 250 GENERAL PHARMACY W/ HCPCS (ALT 636 FOR OP/ED): Performed by: FAMILY MEDICINE

## 2023-12-31 PROCEDURE — 85027 COMPLETE CBC AUTOMATED: CPT | Performed by: INTERNAL MEDICINE

## 2023-12-31 RX ORDER — FUROSEMIDE 40 MG/1
40 TABLET ORAL DAILY
Qty: 30 TABLET | Refills: 0 | Status: ON HOLD | OUTPATIENT
Start: 2023-12-31 | End: 2024-02-08

## 2023-12-31 RX ADMIN — PANTOPRAZOLE SODIUM 40 MG: 40 TABLET, DELAYED RELEASE ORAL at 06:01

## 2023-12-31 RX ADMIN — METOPROLOL SUCCINATE 150 MG: 50 TABLET, EXTENDED RELEASE ORAL at 08:56

## 2023-12-31 RX ADMIN — DOCUSATE SODIUM 100 MG: 100 CAPSULE, LIQUID FILLED ORAL at 08:56

## 2023-12-31 RX ADMIN — APIXABAN 2.5 MG: 2.5 TABLET, FILM COATED ORAL at 08:57

## 2023-12-31 ASSESSMENT — COGNITIVE AND FUNCTIONAL STATUS - GENERAL
WALKING IN HOSPITAL ROOM: TOTAL
CLIMB 3 TO 5 STEPS WITH RAILING: A LITTLE
MOBILITY SCORE: 20
DAILY ACTIVITIY SCORE: 24

## 2023-12-31 ASSESSMENT — PAIN - FUNCTIONAL ASSESSMENT: PAIN_FUNCTIONAL_ASSESSMENT: 0-10

## 2023-12-31 ASSESSMENT — PAIN SCALES - GENERAL
PAINLEVEL_OUTOF10: 0 - NO PAIN
PAINLEVEL_OUTOF10: 0 - NO PAIN

## 2023-12-31 NOTE — CARE PLAN
Problem: Pain  Goal: My pain/discomfort is manageable  Outcome: Progressing   The patient's goals for the shift include      The clinical goals for the shift include pt will remain free from injury throughout the shift

## 2023-12-31 NOTE — CARE PLAN
The patient's goals for the shift include      The clinical goals for the shift include Patient will remain safe throughout shift.      Problem: Pain  Goal: My pain/discomfort is manageable  Outcome: Met     Problem: Safety  Goal: Patient will be injury free during hospitalization  Outcome: Met     Problem: Daily Care  Goal: Daily care needs are met  Outcome: Met     Problem: Psychosocial Needs  Goal: Demonstrates ability to cope with hospitalization/illness  Outcome: Met  Goal: Collaborate with me, my family, and caregiver to identify my specific goals  Outcome: Met

## 2023-12-31 NOTE — DISCHARGE SUMMARY
Discharge Diagnosis  Acute kidney injury superimposed on CKD (CMS/[facility])    Issues Requiring Follow-Up  Repeat CBC BMP in 1 week  Holding Cozaar and metformin until kidney functions normalize  Follow-up with primary care doctor for assessment and resumption of medications    Test Results Pending At Discharge  Pending Labs       No current pending labs.            Hospital Course   katarzyna Castano is a 83 y.o. male with a past medical history of stage G3 CKD with a baseline creatinine between 1.3 to 1.5 mg/deciliter, atrial fibrillation status post ablation on Eliquis and a beta-blocker, HFrEF with LVEF 30%, hypertension, hyperlipidemia, diabetes who was recently admitted to [facility] with weakness, altered mental status and UTI.  In the ED wide-complex was noted with A-fib RVR.  He was admitted to CCU on an amiodarone drip.  He was later transitioned back to metoprolol which was increased to 250 mg twice daily.  His acute encephalopathy was felt to to have resolved.  He endorsed diarrhea, C diff  was ordered but ultimately he was given Imodium.  He was sent out on 25 mg of Jardiance, furosemide 40 mg twice daily, losartan 50 mg, 1000 mg of metformin and 8 mg of Cardura on 12/26.  His blood pressure was 126/74 on 12/25 with a creatinine of 1.48 mg/a deciliter.  He comes in now with weakness, hypotension and diarrhea and was found to have an acute kidney injury with a creatinine of 3.3 milligrams/deciliter now down to 2.8 after receiving a 500 cc saline bolus.   The patient's renal functions trended down steadily he feels better without any chest pain shortness of breath or lightheadedness  Will continue to hold the Cozaar and metformin until kidney functions and GFR improved further  He will follow-up with his primary care doctor for repeat CBC BMP and they can make a decision of resuming medications  Discharge him on on metoprolol Jardiance  Holding the Cardura as her blood pressure still on the lower  side    Pertinent Physical Exam At Time of Discharge  Physical Exam    Constitutional   General appearance: Alert and in no acute distress.     Pulmonary   Respiratory assessment: No respiratory distress, normal respiratory rhythm and effort.    Auscultation of Lungs: Clear bilateral breath sounds.   Cardiovascular   Auscultation of heart: Apical pulse normal, heart rate and rhythm normal, normal S1 and S2, no murmurs and no pericardial rub.    Exam for edema: No peripheral edema.   Abdomen   Abdominal Exam: No bruits, normal bowel sounds, soft, non-tender, no abdominal mass palpated.    Liver and Spleen exam: No hepato-splenomegaly.   Musculoskeletal   Examination of gait: Normal.    Inspection of digits and nails: No clubbing or cyanosis of the fingernails.    Inspection/palpation of joints, bones and muscles: No joint swelling. Normal movement of all extremities.   Skin   Skin inspection: Normal skin color and pigmentation, normal skin turgor and no visible rash.   Neurologic   Cranial nerves: Nerves 2-12 were intact, no focal neuro defects.       Home Medications     Medication List      CHANGE how you take these medications     furosemide 40 mg tablet; Commonly known as: Lasix; Take 1 tablet (40 mg)   by mouth once daily.; What changed: when to take this     CONTINUE taking these medications     atorvastatin 20 mg tablet; Commonly known as: Lipitor   cyanocobalamin 500 mcg tablet; Commonly known as: Vitamin B-12   Eliquis 2.5 mg tablet; Generic drug: apixaban   empagliflozin 25 mg; Commonly known as: Jardiance   finasteride 5 mg tablet; Commonly known as: Proscar   loperamide 2 mg capsule; Commonly known as: Imodium   metoprolol succinate XL 50 mg 24 hr tablet; Commonly known as: Toprol-XL   omeprazole 20 mg DR capsule; Commonly known as: PriLOSEC     STOP taking these medications     doxazosin 8 mg tablet; Commonly known as: Cardura   losartan 50 mg tablet; Commonly known as: Cozaar   metFORMIN 1,000 mg  tablet; Commonly known as: Glucophage       Outpatient Follow-Up  No future appointments.      Patient seen at bedside. Events from the last visit reviewed. Discussed with staff. Results of tests and investigations from last visit reviewed and discussed with patient/Family. Electronic chart on [facility] reviewed. Input / Recommendations  from consultants  appreciated and reviewed and agreed with.     discharge summary and profile completed. medications reviewed and discussed with patient and family.  scripts completed and signed.     total discharge time in excess of 30 minutes.    Rossana Castillo MD

## 2024-02-04 ENCOUNTER — APPOINTMENT (OUTPATIENT)
Dept: CARDIOLOGY | Facility: HOSPITAL | Age: 84
DRG: 291 | End: 2024-02-04
Payer: MEDICARE

## 2024-02-04 ENCOUNTER — APPOINTMENT (OUTPATIENT)
Dept: RADIOLOGY | Facility: HOSPITAL | Age: 84
DRG: 291 | End: 2024-02-04
Payer: MEDICARE

## 2024-02-04 ENCOUNTER — HOSPITAL ENCOUNTER (INPATIENT)
Facility: HOSPITAL | Age: 84
LOS: 6 days | Discharge: HOME HEALTH | DRG: 291 | End: 2024-02-10
Attending: EMERGENCY MEDICINE | Admitting: INTERNAL MEDICINE
Payer: MEDICARE

## 2024-02-04 DIAGNOSIS — K59.04 CHRONIC IDIOPATHIC CONSTIPATION: ICD-10-CM

## 2024-02-04 DIAGNOSIS — I50.43 CHF (CONGESTIVE HEART FAILURE), NYHA CLASS I, ACUTE ON CHRONIC, COMBINED (MULTI): Primary | ICD-10-CM

## 2024-02-04 DIAGNOSIS — J18.9 PNEUMONIA DUE TO INFECTIOUS ORGANISM, UNSPECIFIED LATERALITY, UNSPECIFIED PART OF LUNG: ICD-10-CM

## 2024-02-04 DIAGNOSIS — I50.812 CHRONIC RIGHT-SIDED CONGESTIVE HEART FAILURE (MULTI): ICD-10-CM

## 2024-02-04 LAB
ALBUMIN SERPL BCP-MCNC: 3.4 G/DL (ref 3.4–5)
ALP SERPL-CCNC: 86 U/L (ref 33–136)
ALT SERPL W P-5'-P-CCNC: 55 U/L (ref 10–52)
ANION GAP BLDV CALCULATED.4IONS-SCNC: 5 MMOL/L (ref 10–25)
ANION GAP SERPL CALC-SCNC: 14 MMOL/L (ref 10–20)
APPEARANCE UR: CLEAR
AST SERPL W P-5'-P-CCNC: 18 U/L (ref 9–39)
BASE EXCESS BLDV CALC-SCNC: 4.1 MMOL/L (ref -2–3)
BASOPHILS # BLD AUTO: 0.05 X10*3/UL (ref 0–0.1)
BASOPHILS NFR BLD AUTO: 0.3 %
BILIRUB SERPL-MCNC: 1.4 MG/DL (ref 0–1.2)
BILIRUB UR STRIP.AUTO-MCNC: NEGATIVE MG/DL
BNP SERPL-MCNC: 3956 PG/ML (ref 0–99)
BODY TEMPERATURE: 37 DEGREES CELSIUS
BUN SERPL-MCNC: 36 MG/DL (ref 6–23)
CA-I BLDV-SCNC: 1.13 MMOL/L (ref 1.1–1.33)
CALCIUM SERPL-MCNC: 8.9 MG/DL (ref 8.6–10.3)
CARDIAC TROPONIN I PNL SERPL HS: 17 NG/L (ref 0–20)
CHLORIDE BLDV-SCNC: 99 MMOL/L (ref 98–107)
CHLORIDE SERPL-SCNC: 98 MMOL/L (ref 98–107)
CO2 SERPL-SCNC: 27 MMOL/L (ref 21–32)
COLOR UR: YELLOW
CREAT SERPL-MCNC: 2.05 MG/DL (ref 0.5–1.3)
EGFRCR SERPLBLD CKD-EPI 2021: 31 ML/MIN/1.73M*2
EOSINOPHIL # BLD AUTO: 0.01 X10*3/UL (ref 0–0.4)
EOSINOPHIL NFR BLD AUTO: 0.1 %
ERYTHROCYTE [DISTWIDTH] IN BLOOD BY AUTOMATED COUNT: 16.2 % (ref 11.5–14.5)
FLUAV RNA RESP QL NAA+PROBE: NOT DETECTED
FLUBV RNA RESP QL NAA+PROBE: NOT DETECTED
GLUCOSE BLD MANUAL STRIP-MCNC: 293 MG/DL (ref 74–99)
GLUCOSE BLDV-MCNC: 345 MG/DL (ref 74–99)
GLUCOSE SERPL-MCNC: 328 MG/DL (ref 74–99)
GLUCOSE UR STRIP.AUTO-MCNC: ABNORMAL MG/DL
HCO3 BLDV-SCNC: 31.1 MMOL/L (ref 22–26)
HCT VFR BLD AUTO: 45 % (ref 41–52)
HCT VFR BLD EST: 43 % (ref 41–52)
HGB BLD-MCNC: 13.6 G/DL (ref 13.5–17.5)
HGB BLDV-MCNC: 14.3 G/DL (ref 13.5–17.5)
IMM GRANULOCYTES # BLD AUTO: 0.07 X10*3/UL (ref 0–0.5)
IMM GRANULOCYTES NFR BLD AUTO: 0.4 % (ref 0–0.9)
INHALED O2 CONCENTRATION: 21 %
INR PPP: 1.7 (ref 0.9–1.1)
KETONES UR STRIP.AUTO-MCNC: NEGATIVE MG/DL
LACTATE BLDV-SCNC: 1.9 MMOL/L (ref 0.4–2)
LACTATE BLDV-SCNC: 2.5 MMOL/L (ref 0.4–2)
LACTATE SERPL-SCNC: 1.6 MMOL/L (ref 0.4–2)
LEUKOCYTE ESTERASE UR QL STRIP.AUTO: NEGATIVE
LYMPHOCYTES # BLD AUTO: 0.54 X10*3/UL (ref 0.8–3)
LYMPHOCYTES NFR BLD AUTO: 3.1 %
MAGNESIUM SERPL-MCNC: 2.2 MG/DL (ref 1.6–2.4)
MCH RBC QN AUTO: 28.5 PG (ref 26–34)
MCHC RBC AUTO-ENTMCNC: 30.2 G/DL (ref 32–36)
MCV RBC AUTO: 94 FL (ref 80–100)
MONOCYTES # BLD AUTO: 1.63 X10*3/UL (ref 0.05–0.8)
MONOCYTES NFR BLD AUTO: 9.4 %
NEUTROPHILS # BLD AUTO: 15.07 X10*3/UL (ref 1.6–5.5)
NEUTROPHILS NFR BLD AUTO: 86.7 %
NITRITE UR QL STRIP.AUTO: NEGATIVE
NRBC BLD-RTO: 0 /100 WBCS (ref 0–0)
OXYHGB MFR BLDV: 33.1 % (ref 45–75)
PCO2 BLDV: 55 MM HG (ref 41–51)
PH BLDV: 7.36 PH (ref 7.33–7.43)
PH UR STRIP.AUTO: 5 [PH]
PLATELET # BLD AUTO: 197 X10*3/UL (ref 150–450)
PO2 BLDV: 35 MM HG (ref 35–45)
POTASSIUM BLDV-SCNC: 5.4 MMOL/L (ref 3.5–5.3)
POTASSIUM SERPL-SCNC: 5 MMOL/L (ref 3.5–5.3)
PROT SERPL-MCNC: 6.6 G/DL (ref 6.4–8.2)
PROT UR STRIP.AUTO-MCNC: ABNORMAL MG/DL
PROTHROMBIN TIME: 19.8 SECONDS (ref 9.8–12.8)
RBC # BLD AUTO: 4.77 X10*6/UL (ref 4.5–5.9)
RBC # UR STRIP.AUTO: NEGATIVE /UL
RBC #/AREA URNS AUTO: NORMAL /HPF
RSV RNA RESP QL NAA+PROBE: NOT DETECTED
SAO2 % BLDV: 34 % (ref 45–75)
SARS-COV-2 RNA RESP QL NAA+PROBE: NOT DETECTED
SODIUM BLDV-SCNC: 130 MMOL/L (ref 136–145)
SODIUM SERPL-SCNC: 134 MMOL/L (ref 136–145)
SP GR UR STRIP.AUTO: 1.03
T4 FREE SERPL-MCNC: 0.99 NG/DL (ref 0.61–1.12)
TSH SERPL-ACNC: 4.13 MIU/L (ref 0.44–3.98)
UROBILINOGEN UR STRIP.AUTO-MCNC: <2 MG/DL
WBC # BLD AUTO: 17.4 X10*3/UL (ref 4.4–11.3)
WBC #/AREA URNS AUTO: NORMAL /HPF

## 2024-02-04 PROCEDURE — A4217 STERILE WATER/SALINE, 500 ML: HCPCS | Performed by: EMERGENCY MEDICINE

## 2024-02-04 PROCEDURE — 87040 BLOOD CULTURE FOR BACTERIA: CPT | Mod: AHULAB | Performed by: EMERGENCY MEDICINE

## 2024-02-04 PROCEDURE — 96367 TX/PROPH/DG ADDL SEQ IV INF: CPT

## 2024-02-04 PROCEDURE — 71046 X-RAY EXAM CHEST 2 VIEWS: CPT | Mod: FOREIGN READ | Performed by: RADIOLOGY

## 2024-02-04 PROCEDURE — 2500000004 HC RX 250 GENERAL PHARMACY W/ HCPCS (ALT 636 FOR OP/ED)

## 2024-02-04 PROCEDURE — 85610 PROTHROMBIN TIME: CPT | Performed by: EMERGENCY MEDICINE

## 2024-02-04 PROCEDURE — 82947 ASSAY GLUCOSE BLOOD QUANT: CPT

## 2024-02-04 PROCEDURE — 83735 ASSAY OF MAGNESIUM: CPT | Performed by: EMERGENCY MEDICINE

## 2024-02-04 PROCEDURE — 83605 ASSAY OF LACTIC ACID: CPT | Performed by: EMERGENCY MEDICINE

## 2024-02-04 PROCEDURE — 36415 COLL VENOUS BLD VENIPUNCTURE: CPT | Performed by: EMERGENCY MEDICINE

## 2024-02-04 PROCEDURE — 70450 CT HEAD/BRAIN W/O DYE: CPT

## 2024-02-04 PROCEDURE — 96375 TX/PRO/DX INJ NEW DRUG ADDON: CPT

## 2024-02-04 PROCEDURE — 84484 ASSAY OF TROPONIN QUANT: CPT | Performed by: EMERGENCY MEDICINE

## 2024-02-04 PROCEDURE — 84132 ASSAY OF SERUM POTASSIUM: CPT | Performed by: EMERGENCY MEDICINE

## 2024-02-04 PROCEDURE — 83880 ASSAY OF NATRIURETIC PEPTIDE: CPT | Performed by: EMERGENCY MEDICINE

## 2024-02-04 PROCEDURE — 74176 CT ABD & PELVIS W/O CONTRAST: CPT | Mod: FOREIGN READ | Performed by: RADIOLOGY

## 2024-02-04 PROCEDURE — 70450 CT HEAD/BRAIN W/O DYE: CPT | Performed by: RADIOLOGY

## 2024-02-04 PROCEDURE — 84439 ASSAY OF FREE THYROXINE: CPT | Performed by: EMERGENCY MEDICINE

## 2024-02-04 PROCEDURE — 93005 ELECTROCARDIOGRAM TRACING: CPT

## 2024-02-04 PROCEDURE — 74176 CT ABD & PELVIS W/O CONTRAST: CPT | Mod: FR

## 2024-02-04 PROCEDURE — 84443 ASSAY THYROID STIM HORMONE: CPT | Performed by: EMERGENCY MEDICINE

## 2024-02-04 PROCEDURE — 96365 THER/PROPH/DIAG IV INF INIT: CPT

## 2024-02-04 PROCEDURE — 81001 URINALYSIS AUTO W/SCOPE: CPT | Performed by: EMERGENCY MEDICINE

## 2024-02-04 PROCEDURE — 85025 COMPLETE CBC W/AUTO DIFF WBC: CPT | Performed by: EMERGENCY MEDICINE

## 2024-02-04 PROCEDURE — 71046 X-RAY EXAM CHEST 2 VIEWS: CPT

## 2024-02-04 PROCEDURE — 99285 EMERGENCY DEPT VISIT HI MDM: CPT | Mod: 25,27 | Performed by: EMERGENCY MEDICINE

## 2024-02-04 PROCEDURE — 1200000002 HC GENERAL ROOM WITH TELEMETRY DAILY

## 2024-02-04 PROCEDURE — 87637 SARSCOV2&INF A&B&RSV AMP PRB: CPT | Performed by: EMERGENCY MEDICINE

## 2024-02-04 PROCEDURE — 2500000004 HC RX 250 GENERAL PHARMACY W/ HCPCS (ALT 636 FOR OP/ED): Performed by: EMERGENCY MEDICINE

## 2024-02-04 RX ORDER — ACETAMINOPHEN 325 MG/1
650 TABLET ORAL EVERY 6 HOURS PRN
Status: DISCONTINUED | OUTPATIENT
Start: 2024-02-04 | End: 2024-02-04 | Stop reason: SDUPTHER

## 2024-02-04 RX ORDER — CEFTRIAXONE 1 G/50ML
1 INJECTION, SOLUTION INTRAVENOUS EVERY 24 HOURS
Status: DISCONTINUED | OUTPATIENT
Start: 2024-02-05 | End: 2024-02-05

## 2024-02-04 RX ORDER — TALC
3 POWDER (GRAM) TOPICAL NIGHTLY PRN
Status: DISCONTINUED | OUTPATIENT
Start: 2024-02-04 | End: 2024-02-10 | Stop reason: HOSPADM

## 2024-02-04 RX ORDER — DOCUSATE SODIUM 100 MG/1
100 CAPSULE, LIQUID FILLED ORAL 2 TIMES DAILY
Status: DISCONTINUED | OUTPATIENT
Start: 2024-02-04 | End: 2024-02-10 | Stop reason: HOSPADM

## 2024-02-04 RX ORDER — TRAMADOL HYDROCHLORIDE 50 MG/1
50 TABLET ORAL EVERY 12 HOURS PRN
Status: DISCONTINUED | OUTPATIENT
Start: 2024-02-04 | End: 2024-02-10 | Stop reason: HOSPADM

## 2024-02-04 RX ORDER — INSULIN LISPRO 100 [IU]/ML
0-5 INJECTION, SOLUTION INTRAVENOUS; SUBCUTANEOUS
Status: DISCONTINUED | OUTPATIENT
Start: 2024-02-05 | End: 2024-02-10 | Stop reason: HOSPADM

## 2024-02-04 RX ORDER — DEXTROSE 50 % IN WATER (D50W) INTRAVENOUS SYRINGE
25
Status: DISCONTINUED | OUTPATIENT
Start: 2024-02-04 | End: 2024-02-10 | Stop reason: HOSPADM

## 2024-02-04 RX ORDER — FUROSEMIDE 10 MG/ML
40 INJECTION INTRAMUSCULAR; INTRAVENOUS EVERY 12 HOURS
Status: DISCONTINUED | OUTPATIENT
Start: 2024-02-04 | End: 2024-02-10

## 2024-02-04 RX ORDER — ONDANSETRON HYDROCHLORIDE 2 MG/ML
4 INJECTION, SOLUTION INTRAVENOUS EVERY 6 HOURS PRN
Status: DISCONTINUED | OUTPATIENT
Start: 2024-02-04 | End: 2024-02-04 | Stop reason: SDUPTHER

## 2024-02-04 RX ORDER — ACETAMINOPHEN 325 MG/1
650 TABLET ORAL EVERY 6 HOURS PRN
Status: DISCONTINUED | OUTPATIENT
Start: 2024-02-04 | End: 2024-02-10 | Stop reason: HOSPADM

## 2024-02-04 RX ORDER — TRAMADOL HYDROCHLORIDE 50 MG/1
50 TABLET ORAL EVERY 12 HOURS PRN
Status: DISCONTINUED | OUTPATIENT
Start: 2024-02-04 | End: 2024-02-04 | Stop reason: SDUPTHER

## 2024-02-04 RX ORDER — PANTOPRAZOLE SODIUM 40 MG/1
40 TABLET, DELAYED RELEASE ORAL
Status: DISCONTINUED | OUTPATIENT
Start: 2024-02-05 | End: 2024-02-04 | Stop reason: SDUPTHER

## 2024-02-04 RX ORDER — ONDANSETRON HYDROCHLORIDE 2 MG/ML
4 INJECTION, SOLUTION INTRAVENOUS EVERY 6 HOURS PRN
Status: DISCONTINUED | OUTPATIENT
Start: 2024-02-04 | End: 2024-02-10 | Stop reason: HOSPADM

## 2024-02-04 RX ORDER — DEXTROSE MONOHYDRATE 100 MG/ML
0.3 INJECTION, SOLUTION INTRAVENOUS ONCE AS NEEDED
Status: DISCONTINUED | OUTPATIENT
Start: 2024-02-04 | End: 2024-02-10 | Stop reason: HOSPADM

## 2024-02-04 RX ORDER — PANTOPRAZOLE SODIUM 40 MG/1
40 TABLET, DELAYED RELEASE ORAL
Status: DISCONTINUED | OUTPATIENT
Start: 2024-02-05 | End: 2024-02-10 | Stop reason: HOSPADM

## 2024-02-04 RX ORDER — TALC
3 POWDER (GRAM) TOPICAL NIGHTLY PRN
Status: DISCONTINUED | OUTPATIENT
Start: 2024-02-04 | End: 2024-02-04 | Stop reason: SDUPTHER

## 2024-02-04 RX ORDER — FUROSEMIDE 10 MG/ML
80 INJECTION INTRAMUSCULAR; INTRAVENOUS ONCE
Status: COMPLETED | OUTPATIENT
Start: 2024-02-04 | End: 2024-02-04

## 2024-02-04 RX ADMIN — DOXYCYCLINE 100 MG: 100 INJECTION, POWDER, LYOPHILIZED, FOR SOLUTION INTRAVENOUS at 21:43

## 2024-02-04 RX ADMIN — PIPERACILLIN SODIUM AND TAZOBACTAM SODIUM 4.5 G: 4; .5 INJECTION, SOLUTION INTRAVENOUS at 18:40

## 2024-02-04 RX ADMIN — SODIUM CHLORIDE, POTASSIUM CHLORIDE, SODIUM LACTATE AND CALCIUM CHLORIDE 500 ML: 600; 310; 30; 20 INJECTION, SOLUTION INTRAVENOUS at 19:44

## 2024-02-04 RX ADMIN — VANCOMYCIN HYDROCHLORIDE 2 G: 10 INJECTION, POWDER, LYOPHILIZED, FOR SOLUTION INTRAVENOUS at 20:40

## 2024-02-04 RX ADMIN — FUROSEMIDE 80 MG: 10 INJECTION, SOLUTION INTRAMUSCULAR; INTRAVENOUS at 22:26

## 2024-02-04 NOTE — ED PROVIDER NOTES
History/Exam limitations: dementia.   Additional history was obtained from patient and relative(s).          HPI:    New Castano is a 83 y.o. male PMH CKD, CHF, A-fib on Eliquis, GERD, hyperlipidemia, hypertension, PVCs present with reports of generalized weakness and hyperglycemia.  Unclear how high the glucose was en route.  Patient states he feels generally fatigued.  States he has some diffuse abdominal discomfort primary in the lower abdomen.  Denies any headache, vision changes, focal weakness or numbness.  Denies chest pain or shortness of breath.  Patient somewhat confused, history limited.           Physical Exam:  ED Triage Vitals [02/04/24 1709]   Temperature Heart Rate Respirations BP   37 °C (98.6 °F) (!) 105 16 (!) 150/103      Pulse Ox Temp src Heart Rate Source Patient Position   98 % -- -- --      BP Location FiO2 (%)     Left arm --        GEN:      Alert, NAD  Eyes:       PERRL, EOMI  HENT:      NC/AT, OP clear, airway patent, MM, no nuchal rigidity  CV:      RRR, no MRG, 2+ bilateral LE pitting edema, 2+ radial and pedal pulses  PULM:     Crackles in bilateral lower lung fields, easy WOB, symmetric chest rise  ABD:      Soft, mild diffuse lower abdominal tenderness, ND, no masses, BS +  :       No CVA TTP  NEURO:   A/Ox self and hospital, CN II-XII intact, strength 5/5 in all 4 ext, SILT  MSK:      FROM, no joint deformities or swelling, no e/o trauma  SKIN:       Warm and dry  PSYCH:    Appropriate mood and affect         MDM/ED Course:   New Castano is a 83 y.o. male PMH CKD, CHF, A-fib on Eliquis, GERD, hyperlipidemia, hypertension, PVCs present with reports of generalized weakness and hyperglycemia.  Vitals remarkable for tachycardia with heart rate 105.  Exam as documented above.  Differential for abdominal pain includes but is not limited to SBO, incarcerated hernia, pancreatitis, viscous organ rupture, mesenteric ischemia, aortic pathology, biliary pathology, diverticulitis,  appendicitis, UTI/pyelonephritis, nephrolithiasis, viral illness.  IV placed and labs drawn.  CBC with leukocytosis 17.4 given this and tachycardia, sepsis alert initiated.  Only 500 cc IV fluids given as patient has history of volume overload and concern for volume overload.  Patient was covered with IV Zosyn.  Troponin reassuring at 70 ng/L.  TSH elevated and free thyroxine normal.  Mental status confirmed to be baseline by patient's family who arrived.  They are concerned about volume overload as primary concern.  States that he gets abdominal distention.  Abdomen is not markedly tender, lower suspicion for SBP.  CT abdomen pelvis with small amount of ascites, interstitial edema and pleural effusions noted.  CT head was obtained given initial concerns for altered mentation and no acute findings per radiology read.  Chest x-ray with possible opacity posterior laterally concerning for atelectasis versus pneumonia.  COVID flu RSV negative.  Lactate reassuring.  Doxycycline added given concern for possible pneumonia.  Patient given 80 mg IV Lasix.  Patient remained hemodynamically stable, signed out to Dr. Castillo for continued management in stable condition.    EKG reviewed by me: 6 PM sinus tachycardia, rate 112, left axis, LVH, QTc 45 ms, no STEMI, appears similar to prior EKG from December 28, 2023.     Diagnoses as of 02/09/24 1331   CHF (congestive heart failure), NYHA class I, acute on chronic, combined (CMS/[facility])   Pneumonia due to infectious organism, unspecified laterality, unspecified part of lung         Chronic medical conditions affecting care listed in MDM. I independently reviewed imaging studies and agreed with radiology reads. I reviewed recent and relevant outside records including PCP notes, Prior discharge summaries, and prior radiology reports.    Procedure  Procedures    Diagnosis:   1.  Volume overload  2.  Pneumonia    Dispo: Hospitalized in stable condition      Disclaimer: Portions of this  note were dictated by speech recognition. An attempt at proof reading was made to minimize errors. Minor errors in transcription may be present.  Please call if questions.     Al Reynaga MD  02/09/24 4257

## 2024-02-05 LAB
GLUCOSE BLD MANUAL STRIP-MCNC: 149 MG/DL (ref 74–99)
GLUCOSE BLD MANUAL STRIP-MCNC: 240 MG/DL (ref 74–99)
GLUCOSE BLD MANUAL STRIP-MCNC: 269 MG/DL (ref 74–99)
GLUCOSE BLD MANUAL STRIP-MCNC: 288 MG/DL (ref 74–99)
GLUCOSE BLD MANUAL STRIP-MCNC: 308 MG/DL (ref 74–99)
HOLD SPECIMEN: NORMAL

## 2024-02-05 PROCEDURE — 82947 ASSAY GLUCOSE BLOOD QUANT: CPT

## 2024-02-05 PROCEDURE — 2500000004 HC RX 250 GENERAL PHARMACY W/ HCPCS (ALT 636 FOR OP/ED): Performed by: INTERNAL MEDICINE

## 2024-02-05 PROCEDURE — 1200000002 HC GENERAL ROOM WITH TELEMETRY DAILY

## 2024-02-05 PROCEDURE — 2500000002 HC RX 250 W HCPCS SELF ADMINISTERED DRUGS (ALT 637 FOR MEDICARE OP, ALT 636 FOR OP/ED): Performed by: INTERNAL MEDICINE

## 2024-02-05 PROCEDURE — 2500000001 HC RX 250 WO HCPCS SELF ADMINISTERED DRUGS (ALT 637 FOR MEDICARE OP): Performed by: INTERNAL MEDICINE

## 2024-02-05 PROCEDURE — 99223 1ST HOSP IP/OBS HIGH 75: CPT | Performed by: INTERNAL MEDICINE

## 2024-02-05 RX ORDER — DOXAZOSIN 2 MG/1
4 TABLET ORAL NIGHTLY
Status: DISCONTINUED | OUTPATIENT
Start: 2024-02-05 | End: 2024-02-10 | Stop reason: HOSPADM

## 2024-02-05 RX ORDER — POLYETHYLENE GLYCOL 3350 17 G/17G
17 POWDER, FOR SOLUTION ORAL DAILY PRN
Status: DISCONTINUED | OUTPATIENT
Start: 2024-02-05 | End: 2024-02-10 | Stop reason: HOSPADM

## 2024-02-05 RX ORDER — FINASTERIDE 5 MG/1
5 TABLET, FILM COATED ORAL DAILY
Status: DISCONTINUED | OUTPATIENT
Start: 2024-02-05 | End: 2024-02-10 | Stop reason: HOSPADM

## 2024-02-05 RX ORDER — ONDANSETRON 4 MG/1
4 TABLET, FILM COATED ORAL EVERY 8 HOURS PRN
Status: DISCONTINUED | OUTPATIENT
Start: 2024-02-05 | End: 2024-02-10 | Stop reason: HOSPADM

## 2024-02-05 RX ORDER — NAPROXEN 500 MG/1
500 TABLET ORAL 2 TIMES DAILY PRN
COMMUNITY
End: 2024-02-10 | Stop reason: HOSPADM

## 2024-02-05 RX ORDER — ATORVASTATIN CALCIUM 20 MG/1
20 TABLET, FILM COATED ORAL NIGHTLY
Status: DISCONTINUED | OUTPATIENT
Start: 2024-02-05 | End: 2024-02-10 | Stop reason: HOSPADM

## 2024-02-05 RX ORDER — PANTOPRAZOLE SODIUM 40 MG/10ML
40 INJECTION, POWDER, LYOPHILIZED, FOR SOLUTION INTRAVENOUS
Status: DISCONTINUED | OUTPATIENT
Start: 2024-02-06 | End: 2024-02-10 | Stop reason: HOSPADM

## 2024-02-05 RX ORDER — ACETAMINOPHEN 160 MG/5ML
650 SOLUTION ORAL EVERY 4 HOURS PRN
Status: DISCONTINUED | OUTPATIENT
Start: 2024-02-05 | End: 2024-02-10 | Stop reason: HOSPADM

## 2024-02-05 RX ORDER — GLIMEPIRIDE 2 MG/1
2 TABLET ORAL
COMMUNITY

## 2024-02-05 RX ORDER — TALC
3 POWDER (GRAM) TOPICAL DAILY
Status: DISCONTINUED | OUTPATIENT
Start: 2024-02-05 | End: 2024-02-10 | Stop reason: HOSPADM

## 2024-02-05 RX ORDER — GLIMEPIRIDE 2 MG/1
2 TABLET ORAL
Status: DISCONTINUED | OUTPATIENT
Start: 2024-02-06 | End: 2024-02-10 | Stop reason: HOSPADM

## 2024-02-05 RX ORDER — LOPERAMIDE HYDROCHLORIDE 2 MG/1
2 CAPSULE ORAL 3 TIMES DAILY PRN
Status: ON HOLD | COMMUNITY
End: 2024-03-25 | Stop reason: ALTCHOICE

## 2024-02-05 RX ORDER — PANTOPRAZOLE SODIUM 40 MG/1
40 TABLET, DELAYED RELEASE ORAL
Status: DISCONTINUED | OUTPATIENT
Start: 2024-02-06 | End: 2024-02-05 | Stop reason: SDUPTHER

## 2024-02-05 RX ORDER — ACETAMINOPHEN 325 MG/1
650 TABLET ORAL EVERY 4 HOURS PRN
Status: DISCONTINUED | OUTPATIENT
Start: 2024-02-05 | End: 2024-02-05 | Stop reason: SDUPTHER

## 2024-02-05 RX ORDER — DOXAZOSIN 4 MG/1
4 TABLET ORAL NIGHTLY
COMMUNITY
End: 2024-04-09 | Stop reason: HOSPADM

## 2024-02-05 RX ORDER — ONDANSETRON HYDROCHLORIDE 2 MG/ML
4 INJECTION, SOLUTION INTRAVENOUS EVERY 8 HOURS PRN
Status: DISCONTINUED | OUTPATIENT
Start: 2024-02-05 | End: 2024-02-05 | Stop reason: SDUPTHER

## 2024-02-05 RX ORDER — GUAIFENESIN 600 MG/1
600 TABLET, EXTENDED RELEASE ORAL EVERY 12 HOURS PRN
Status: DISCONTINUED | OUTPATIENT
Start: 2024-02-05 | End: 2024-02-10 | Stop reason: HOSPADM

## 2024-02-05 RX ORDER — METOPROLOL SUCCINATE 50 MG/1
150 TABLET, EXTENDED RELEASE ORAL DAILY
Status: DISCONTINUED | OUTPATIENT
Start: 2024-02-05 | End: 2024-02-06

## 2024-02-05 RX ORDER — ACETAMINOPHEN 650 MG/1
650 SUPPOSITORY RECTAL EVERY 4 HOURS PRN
Status: DISCONTINUED | OUTPATIENT
Start: 2024-02-05 | End: 2024-02-10 | Stop reason: HOSPADM

## 2024-02-05 RX ADMIN — FUROSEMIDE 40 MG: 10 INJECTION, SOLUTION INTRAMUSCULAR; INTRAVENOUS at 22:48

## 2024-02-05 RX ADMIN — FINASTERIDE 5 MG: 5 TABLET, FILM COATED ORAL at 10:51

## 2024-02-05 RX ADMIN — CEFTRIAXONE SODIUM 1 G: 1 INJECTION, SOLUTION INTRAVENOUS at 01:40

## 2024-02-05 RX ADMIN — EMPAGLIFLOZIN 25 MG: 25 TABLET, FILM COATED ORAL at 10:51

## 2024-02-05 RX ADMIN — DOCUSATE SODIUM 100 MG: 100 CAPSULE, LIQUID FILLED ORAL at 20:46

## 2024-02-05 RX ADMIN — DOXAZOSIN 4 MG: 2 TABLET ORAL at 20:46

## 2024-02-05 RX ADMIN — APIXABAN 2.5 MG: 2.5 TABLET, FILM COATED ORAL at 20:46

## 2024-02-05 RX ADMIN — PANTOPRAZOLE SODIUM 40 MG: 40 TABLET, DELAYED RELEASE ORAL at 06:21

## 2024-02-05 RX ADMIN — APIXABAN 2.5 MG: 2.5 TABLET, FILM COATED ORAL at 10:51

## 2024-02-05 RX ADMIN — CEFTRIAXONE 2 G: 2 INJECTION, POWDER, FOR SOLUTION INTRAMUSCULAR; INTRAVENOUS at 23:40

## 2024-02-05 RX ADMIN — INSULIN LISPRO 4 UNITS: 100 INJECTION, SOLUTION INTRAVENOUS; SUBCUTANEOUS at 12:22

## 2024-02-05 RX ADMIN — INSULIN LISPRO 2 UNITS: 100 INJECTION, SOLUTION INTRAVENOUS; SUBCUTANEOUS at 07:42

## 2024-02-05 RX ADMIN — ATORVASTATIN CALCIUM 20 MG: 20 TABLET, FILM COATED ORAL at 20:46

## 2024-02-05 RX ADMIN — DOCUSATE SODIUM 100 MG: 100 CAPSULE, LIQUID FILLED ORAL at 10:51

## 2024-02-05 RX ADMIN — METOPROLOL SUCCINATE 150 MG: 50 TABLET, EXTENDED RELEASE ORAL at 10:51

## 2024-02-05 RX ADMIN — Medication 3 MG: at 20:48

## 2024-02-05 RX ADMIN — FUROSEMIDE 40 MG: 10 INJECTION, SOLUTION INTRAMUSCULAR; INTRAVENOUS at 11:11

## 2024-02-05 SDOH — SOCIAL STABILITY: SOCIAL INSECURITY: DO YOU FEEL UNSAFE GOING BACK TO THE PLACE WHERE YOU ARE LIVING?: NO

## 2024-02-05 SDOH — SOCIAL STABILITY: SOCIAL INSECURITY: ARE YOU OR HAVE YOU BEEN THREATENED OR ABUSED PHYSICALLY, EMOTIONALLY, OR SEXUALLY BY ANYONE?: NO

## 2024-02-05 SDOH — SOCIAL STABILITY: SOCIAL INSECURITY: DO YOU FEEL ANYONE HAS EXPLOITED OR TAKEN ADVANTAGE OF YOU FINANCIALLY OR OF YOUR PERSONAL PROPERTY?: NO

## 2024-02-05 SDOH — SOCIAL STABILITY: SOCIAL INSECURITY: DOES ANYONE TRY TO KEEP YOU FROM HAVING/CONTACTING OTHER FRIENDS OR DOING THINGS OUTSIDE YOUR HOME?: NO

## 2024-02-05 SDOH — SOCIAL STABILITY: SOCIAL INSECURITY: WERE YOU ABLE TO COMPLETE ALL THE BEHAVIORAL HEALTH SCREENINGS?: YES

## 2024-02-05 SDOH — SOCIAL STABILITY: SOCIAL INSECURITY: HAS ANYONE EVER THREATENED TO HURT YOUR FAMILY OR YOUR PETS?: NO

## 2024-02-05 SDOH — SOCIAL STABILITY: SOCIAL INSECURITY: ABUSE: ADULT

## 2024-02-05 SDOH — SOCIAL STABILITY: SOCIAL INSECURITY: HAVE YOU HAD THOUGHTS OF HARMING ANYONE ELSE?: NO

## 2024-02-05 SDOH — SOCIAL STABILITY: SOCIAL INSECURITY: ARE THERE ANY APPARENT SIGNS OF INJURIES/BEHAVIORS THAT COULD BE RELATED TO ABUSE/NEGLECT?: NO

## 2024-02-05 ASSESSMENT — COGNITIVE AND FUNCTIONAL STATUS - GENERAL
MOBILITY SCORE: 20
DAILY ACTIVITIY SCORE: 20
CLIMB 3 TO 5 STEPS WITH RAILING: A LOT
TOILETING: A LITTLE
WALKING IN HOSPITAL ROOM: A LITTLE
DRESSING REGULAR LOWER BODY CLOTHING: A LITTLE
HELP NEEDED FOR BATHING: A LITTLE
STANDING UP FROM CHAIR USING ARMS: A LITTLE
PATIENT BASELINE BEDBOUND: NO
DRESSING REGULAR UPPER BODY CLOTHING: A LITTLE

## 2024-02-05 ASSESSMENT — PAIN SCALES - GENERAL
PAINLEVEL_OUTOF10: 0 - NO PAIN

## 2024-02-05 ASSESSMENT — ACTIVITIES OF DAILY LIVING (ADL)
PATIENT'S MEMORY ADEQUATE TO SAFELY COMPLETE DAILY ACTIVITIES?: NO
BATHING: NEEDS ASSISTANCE
FEEDING YOURSELF: NEEDS ASSISTANCE
HEARING - RIGHT EAR: DIFFICULTY WITH NOISE
TOILETING: NEEDS ASSISTANCE
GROOMING: NEEDS ASSISTANCE
ADEQUATE_TO_COMPLETE_ADL: YES
DRESSING YOURSELF: NEEDS ASSISTANCE
LACK_OF_TRANSPORTATION: NO
JUDGMENT_ADEQUATE_SAFELY_COMPLETE_DAILY_ACTIVITIES: NO
HEARING - LEFT EAR: DIFFICULTY WITH NOISE
WALKS IN HOME: NEEDS ASSISTANCE

## 2024-02-05 ASSESSMENT — LIFESTYLE VARIABLES
AUDIT-C TOTAL SCORE: 0
HOW MANY STANDARD DRINKS CONTAINING ALCOHOL DO YOU HAVE ON A TYPICAL DAY: PATIENT DOES NOT DRINK
HOW OFTEN DO YOU HAVE A DRINK CONTAINING ALCOHOL: NEVER
AUDIT-C TOTAL SCORE: 0
HOW OFTEN DO YOU HAVE 6 OR MORE DRINKS ON ONE OCCASION: NEVER
SKIP TO QUESTIONS 9-10: 1

## 2024-02-05 ASSESSMENT — PAIN - FUNCTIONAL ASSESSMENT
PAIN_FUNCTIONAL_ASSESSMENT: 0-10

## 2024-02-05 ASSESSMENT — COLUMBIA-SUICIDE SEVERITY RATING SCALE - C-SSRS
2. HAVE YOU ACTUALLY HAD ANY THOUGHTS OF KILLING YOURSELF?: NO
1. IN THE PAST MONTH, HAVE YOU WISHED YOU WERE DEAD OR WISHED YOU COULD GO TO SLEEP AND NOT WAKE UP?: NO
6. HAVE YOU EVER DONE ANYTHING, STARTED TO DO ANYTHING, OR PREPARED TO DO ANYTHING TO END YOUR LIFE?: NO

## 2024-02-05 NOTE — CARE PLAN
Problem: Nutrition  Goal: Adequate PO fluid intake  Outcome: Progressing  Goal: BG  mg/dL  Outcome: Not Progressing     Problem: Pain - Adult  Goal: Verbalizes/displays adequate comfort level or baseline comfort level  Outcome: Progressing     Problem: Safety - Adult  Goal: Free from fall injury  Outcome: Progressing     Problem: Discharge Planning  Goal: Discharge to home or other facility with appropriate resources  Outcome: Progressing     Problem: Chronic Conditions and Co-morbidities  Goal: Patient's chronic conditions and co-morbidity symptoms are monitored and maintained or improved  Outcome: Progressing     Problem: Heart Failure  Goal: Improved gas exchange this shift  Outcome: Progressing  Goal: Improved urinary output this shift  Outcome: Progressing  Goal: Reduction in peripheral edema within 24 hours  Outcome: Progressing  Goal: Report improvement of dyspnea/breathlessness this shift  Outcome: Progressing  Goal: Weight from fluid excess reduced over 2-3 days, then stabilize  Outcome: Progressing  Goal: Increase self care and/or family involvement in 24 hours  Outcome: Progressing     Problem: Diabetes  Goal: Achieve decreasing blood glucose levels by end of shift  Outcome: Not Progressing  Goal: Increase stability of blood glucose readings by end of shift  Outcome: Progressing  Goal: Maintain glucose levels >70mg/dl to <250mg/dl throughout shift  Outcome: Not Progressing  Goal: No changes in neurological exam by end of shift  Outcome: Progressing  Goal: Vital signs within normal range for age by end of shift  Outcome: Not Progressing  Goal: Increase self care and/or family involovement by end of shift  Outcome: Progressing     Problem: Fall/Injury  Goal: Not fall by end of shift  Outcome: Progressing  Goal: Be free from injury by end of the shift  Outcome: Progressing  Goal: Verbalize understanding of personal risk factors for fall in the hospital  Outcome: Progressing  Goal: Verbalize  understanding of risk factor reduction measures to prevent injury from fall in the home  Outcome: Progressing  Goal: Use assistive devices by end of the shift  Outcome: Progressing  Goal: Pace activities to prevent fatigue by end of the shift  Outcome: Progressing     Problem: Nutrition  Goal: BG  mg/dL  Outcome: Not Progressing     Problem: Diabetes  Goal: Achieve decreasing blood glucose levels by end of shift  Outcome: Not Progressing  Goal: Maintain glucose levels >70mg/dl to <250mg/dl throughout shift  Outcome: Not Progressing  Goal: Vital signs within normal range for age by end of shift  Outcome: Not Progressing     High glucose readings, high BP, on IV lasix, ambulating on RA, little bit shortness of breath, confused impulsive at times, sitter at bedside, remaining safe, bed alarm on, no pain

## 2024-02-05 NOTE — PROGRESS NOTES
Home with wife      02/05/24 0623   Current Planned Discharge Disposition   Current Planned Discharge Disposition Home

## 2024-02-05 NOTE — PROGRESS NOTES
02/05/24 0645   [facility] Disability Status   Are you deaf or do you have serious difficulty hearing? N   Are you blind or do you have serious difficulty seeing, even when wearing glasses? N   Because of a physical, mental, or emotional condition, do you have serious difficulty concentrating, remembering, or making decisions? (5 years old or older) N   Do you have serious difficulty walking or climbing stairs? Y   Do you have serious difficulty dressing or bathing? N   Because of a physical, mental, or emotional condition, do you have serious difficulty doing errands alone such as visiting the doctor? Y

## 2024-02-05 NOTE — PROGRESS NOTES
Transitional Care Coordination Progress Note:  Plan per Medical/Surgical team: treatment of CHF & Hyperglycemia with insulin, IV lasix, IV ATB  Status: inpatient  Payor source: medicare A/B, [facility]  Discharge disposition: Home with wife & [facility]  Potential Barriers: , renal 36/2.05, glucose , , /106  ADOD: 2/7/2024  TAMEKA Negron RN, BSN Transitional Care Coordinator ED# [phone]      02/05/24 0645   Discharge Planning   Living Arrangements Spouse/significant other   Support Systems Spouse/significant other;Children   Assistance Needed cardio work up   Type of Residence Private residence   Number of Stairs to Enter Residence 2   Number of Stairs Within Residence 3   Do you have animals or pets at home? Yes   Type of Animals or Pets 2 dogs   Home or Post Acute Services In home services   Type of Home Care Services Home PT;Home nursing visits;Other (Comment)   Patient expects to be discharged to: Home with wife & [facility]   Does the patient need discharge transport arranged? Yes   RoundTrip coordination needed? Yes   Has discharge transport been arranged? No   Financial Resource Strain   How hard is it for you to pay for the very basics like food, housing, medical care, and heating? Not hard   Housing Stability   In the last 12 months, was there a time when you were not able to pay the mortgage or rent on time? N   In the last 12 months, how many places have you lived? 1   In the last 12 months, was there a time when you did not have a steady place to sleep or slept in a shelter (including now)? N   Transportation Needs   In the past 12 months, has lack of transportation kept you from medical appointments or from getting medications? no   In the past 12 months, has lack of transportation kept you from meetings, work, or from getting things needed for daily living? No   Patient Choice   Provider Choice list and CMS website (https://medicare.gov/care-compare#search) for post-acute Quality and  Resource Measure Data were provided and reviewed with: Family;Patient   Patient / Family choosing to utilize agency / facility established prior to hospitalization Yes

## 2024-02-05 NOTE — PROGRESS NOTES
Home with wife & [facility]     02/05/24 0641   Current Planned Discharge Disposition   Current Planned Discharge Disposition Home Health

## 2024-02-05 NOTE — H&P
New Castano is a 83 y.o. male   Weakness, Gen    Hyperglycemia       Patient with a past medical history of hypertension diabetes mellitus dyslipidemia paroxysmal atrial fibrillation s/p ablation history of chronic systolic congestive heart failure chronic kidney disease stage IIIb who has had multiple admissions in the last few months for congestive heart failure returns with worsening shortness of breath and cough along with generalized weakness and elevated blood sugars  The patient is not a particularly good historian and I had to take out information from the patient regarding symptoms    Past Medical History  Past Medical History:   Diagnosis Date    Diabetes mellitus (CMS/[facility])     Hypertension     Mixed hyperlipidemia 02/13/2013    Paroxysmal atrial fibrillation (CMS/[facility]) 03/08/2018    -s/p cardioversion and ablation   -AC on Eliquis     S/P ablation of atrial fibrillation 05/03/2019    Stage 3b chronic kidney disease (CMS/HCC) 12/29/2023       Surgical History  Past Surgical History:   Procedure Laterality Date    CARDIAC ELECTROPHYSIOLOGY MAPPING AND ABLATION      CARDIOVERSION          Social History  He reports that he has quit smoking. His smoking use included cigarettes. He has quit using smokeless tobacco. No history on file for alcohol use and drug use.    Family History  No family history on file.     Allergies  Pollen extracts    Review of Systems     Constitutional: Feels weak  Eyes: no blurred vision and no diplopia.   ENT: no hearing loss, no tinnitus, no earache, no sore throat, no hoarseness and no swollen glands in the neck.   Cardiovascular: no chest pain, no tightness or heavy pressure, no shortness of breath, no palpitations a  Respiratory: Breath  Gastrointestinal: no change in bowel habits, no diarrhea, no constipation, no bloody stools, no nausea, no vomiting, no abdominal pain, no signs and symptoms of ulcer disease, no cindy colored stools and no intolerance to fatty foods.    Genitourinary: no urinary frequency, no dysuria, no hematuria, no burning sensation during urination, urinary stream is not smaller and urinary stream does not start and stop.   Musculoskeletal: no arthralgias, no joint stiffness, no muscle weakness, no back pain and no difficulty walking.   Skin: no rashes, no change in skin color and pigmentation, no skin lesions and no skin lumps.   Neurological: no headaches, no dizziness, no seizures, no tingling, no numbness, no signs and symptoms of stroke and no limb weakness.   Psychiatric: Some memory impairment  Endocrine:  no excessive thirst, no dry skin, no cold intolerance, no heat intolerance and no increased urinary frequency.   Hematologic/Lymphatic: is not slow to heal, does not bleed easily, does not bruise easily, no thrombophlebitis, no anemia and no history of blood transfusion.   All other systems have been reviewed and are negative for complaint.     Vitals:    02/05/24 1636   BP: (!) 130/92   Pulse: 106   Resp: 16   Temp: 36.3 °C (97.4 °F)   SpO2: 95%        Scheduled medications  apixaban, 2.5 mg, oral, BID  atorvastatin, 20 mg, oral, Nightly  [START ON 2/6/2024] cefTRIAXone, 2 g, intravenous, q24h  docusate sodium, 100 mg, oral, BID  doxazosin, 4 mg, oral, Nightly  empagliflozin, 25 mg, oral, Daily  finasteride, 5 mg, oral, Daily  furosemide, 40 mg, intravenous, q12h  [START ON 2/6/2024] glimepiride, 2 mg, oral, Daily before breakfast  insulin lispro, 0-5 Units, subcutaneous, TID with meals  melatonin, 3 mg, oral, Daily  metoprolol succinate XL, 150 mg, oral, Daily  pantoprazole, 40 mg, oral, Daily before breakfast  [START ON 2/6/2024] pantoprazole, 40 mg, intravenous, Daily before breakfast      Continuous medications     PRN medications  PRN medications: [DISCONTINUED] acetaminophen **OR** acetaminophen **OR** acetaminophen, acetaminophen, dextrose 10 % in water (D10W), dextrose, glucagon, guaiFENesin, melatonin, ondansetron, ondansetron **OR**  [DISCONTINUED] ondansetron, polyethylene glycol, traMADol    Results from last 7 days   Lab Units 02/04/24  1757   WBC AUTO x10*3/uL 17.4*   HEMOGLOBIN g/dL 13.6   HEMATOCRIT % 45.0   PLATELETS AUTO x10*3/uL 197     Results from last 7 days   Lab Units 02/04/24  1757   SODIUM mmol/L 134*   POTASSIUM mmol/L 5.0   CHLORIDE mmol/L 98   CO2 mmol/L 27   BUN mg/dL 36*   CREATININE mg/dL 2.05*   CALCIUM mg/dL 8.9   PROTEIN TOTAL g/dL 6.6   BILIRUBIN TOTAL mg/dL 1.4*   ALK PHOS U/L 86   ALT U/L 55*   AST U/L 18   GLUCOSE mg/dL 328*     Results from last 7 days   Lab Units 02/04/24  1757   TROPHS ng/L 17        CT head wo IV contrast   Final Result   No acute intracranial hemorrhage or mass effect.        Minimal soft tissue swelling over the left frontal bone.             MACRO:   None.        Signed by: Erinn Torrez 2/4/2024 9:58 PM   Dictation workstation:   [id number]      CT abdomen pelvis wo IV contrast   Final Result   No acute process involving the abdomen or pelvis   Small amount of ascites.   Moderate interstitial edema and pleural effusions at the lung bases   Mildly enlarged external iliac lymph nodes. Correlate for any history   of neoplasm. Correlation with PSA could also be performed.   Signed by Juve Philippe MD      XR chest 2 views   Final Result   1.  Possible opacity posteriorly seen on the lateral projection at the   lung base which may represent atelectasis or pneumonia..   Signed by Juice Chairez MD          Physical Exam     Constitutional   General appearance: Alert   Eyes   Inspection of eyes: Sclera and conjunctiva were normal.    Pupil exam: Pupils were equal in size. Extraocular movements were intact.   Pulmonary   Respiratory assessment: Basilar crackles  Cardiovascular   Auscultation of heart: Apical pulse normal, heart rate and rhythm normal, normal S1 and S2, no murmurs and no pericardial rub.    Exam for edema: Plus edema  Abdomen   Abdominal Exam: No bruits, normal bowel sounds, soft,  non-tender, no abdominal mass palpated.    Liver and Spleen exam: No hepato-splenomegaly.   Musculoskeletal   Examination of gait: Normal.    Inspection of digits and nails: No clubbing or cyanosis of the fingernails.    Inspection/palpation of joints, bones and muscles: No joint swelling. Normal movement of all extremities.   Skin   Skin inspection: Normal skin color and pigmentation, normal skin turgor and no visible rash.   Neurologic   Cranial nerves: Nerves 2-12 were intact, no focal neuro defects.   Psychiatric   Alert x 2-3      Assessment/Plan      #Acute on chronic systolic congestive heart failure  Start IV Lasix  Daily weights and electrolytes  Cardiology consulted    #Community-acquired pneumonia  Start IV Rocephin/Zithromax  DuoNebs as needed    #CKD 3B  Monitor with diuresing    #Diabetes mellitus  Sliding scale insulin coverage    #Hypertension  Hold medications for systolic less than 120    #Atrial fibrillation s/p ablation  Continue Eliquis    #Dyslipidemia  Continue statins and maintain target LDL less than 70

## 2024-02-05 NOTE — PROGRESS NOTES
Pharmacy Medication History Review    New Castano is a 83 y.o. male admitted for CHF (congestive heart failure), NYHA class I, acute on chronic, combined (CMS/[facility]). Pharmacy reviewed the patient's [id number] medications and allergies for accuracy.    The list below reflectives the updated PTA list. Please review each medication in order reconciliation for additional clarification and justification.       The list below reflectives the updated allergy list. Please review each documented allergy for additional clarification and justification.  Allergies  Reviewed by Rossana Castillo MD on 2/5/2024        Severity Reactions Comments    Pollen Extracts Not Specified Itching             Below are additional concerns with the patient's PTA list.  Prior to Admission Medications   Prescriptions Last Dose Informant   apixaban (Eliquis) 2.5 mg tablet     Sig: Take 1 tablet (2.5 mg) by mouth 2 times a day.   atorvastatin (Lipitor) 20 mg tablet     Sig: Take 1 tablet (20 mg) by mouth once daily at bedtime.   cyanocobalamin (Vitamin B-12) 500 mcg tablet     Sig: Take 1 tablet (500 mcg) by mouth once daily.   doxazosin (Cardura) 4 mg tablet     Sig: Take 1 tablet (4 mg) by mouth once daily at bedtime.   empagliflozin (Jardiance) 25 mg     Sig: Take 1 tablet (25 mg) by mouth once daily.   finasteride (Proscar) 5 mg tablet     Sig: Take 1 tablet (5 mg) by mouth once daily.   furosemide (Lasix) 40 mg tablet     Sig: Take 1 tablet (40 mg) by mouth once daily.   glimepiride (Amaryl) 2 mg tablet     Sig: Take 1 tablet (2 mg) by mouth once daily in the morning. Take before meals.   loperamide (Imodium) 2 mg capsule     Sig: Take 1 capsule (2 mg) by mouth 3 times a day as needed for diarrhea.   metoprolol succinate XL (Toprol-XL) 50 mg 24 hr tablet     Sig: Take 3 tablets (150 mg) by mouth twice a day.   naproxen (Naprosyn) 500 mg tablet     Sig: Take 1 tablet (500 mg) by mouth 2 times a day as needed for mild pain (1 - 3).    omeprazole (PriLOSEC) 20 mg DR capsule     Sig: Take 1 capsule (20 mg) by mouth once daily in the morning. Take before meals. Do not crush or chew.      Facility-Administered Medications: None      Per daughter. [facility] and [facility]. Had been on Amlodipine 10mg daily but looks like stopped that after increasing the metoprolol.     Amelia Sanchez, [id number]

## 2024-02-06 ENCOUNTER — APPOINTMENT (OUTPATIENT)
Dept: CARDIOLOGY | Facility: HOSPITAL | Age: 84
DRG: 291 | End: 2024-02-06
Payer: MEDICARE

## 2024-02-06 LAB
ANION GAP SERPL CALC-SCNC: 15 MMOL/L (ref 10–20)
ATRIAL RATE: 103 BPM
ATRIAL RATE: 112 BPM
BUN SERPL-MCNC: 36 MG/DL (ref 6–23)
CALCIUM SERPL-MCNC: 8.7 MG/DL (ref 8.6–10.3)
CHLORIDE SERPL-SCNC: 100 MMOL/L (ref 98–107)
CO2 SERPL-SCNC: 27 MMOL/L (ref 21–32)
CREAT SERPL-MCNC: 1.94 MG/DL (ref 0.5–1.3)
EGFRCR SERPLBLD CKD-EPI 2021: 34 ML/MIN/1.73M*2
ERYTHROCYTE [DISTWIDTH] IN BLOOD BY AUTOMATED COUNT: 16.1 % (ref 11.5–14.5)
GLUCOSE BLD MANUAL STRIP-MCNC: 138 MG/DL (ref 74–99)
GLUCOSE BLD MANUAL STRIP-MCNC: 153 MG/DL (ref 74–99)
GLUCOSE BLD MANUAL STRIP-MCNC: 218 MG/DL (ref 74–99)
GLUCOSE BLD MANUAL STRIP-MCNC: 277 MG/DL (ref 74–99)
GLUCOSE SERPL-MCNC: 216 MG/DL (ref 74–99)
HCT VFR BLD AUTO: 43.2 % (ref 41–52)
HGB BLD-MCNC: 12.9 G/DL (ref 13.5–17.5)
MCH RBC QN AUTO: 29.3 PG (ref 26–34)
MCHC RBC AUTO-ENTMCNC: 29.9 G/DL (ref 32–36)
MCV RBC AUTO: 98 FL (ref 80–100)
NRBC BLD-RTO: 0 /100 WBCS (ref 0–0)
P AXIS: 108 DEGREES
P AXIS: 87 DEGREES
P OFFSET: 181 MS
P OFFSET: 189 MS
P ONSET: 156 MS
P ONSET: 158 MS
PLATELET # BLD AUTO: 149 X10*3/UL (ref 150–450)
POTASSIUM SERPL-SCNC: 3.9 MMOL/L (ref 3.5–5.3)
PR INTERVAL: 114 MS
PR INTERVAL: 120 MS
Q ONSET: 215 MS
Q ONSET: 216 MS
QRS COUNT: 17 BEATS
QRS COUNT: 19 BEATS
QRS DURATION: 116 MS
QRS DURATION: 124 MS
QT INTERVAL: 356 MS
QT INTERVAL: 380 MS
QTC CALCULATION(BAZETT): 485 MS
QTC CALCULATION(BAZETT): 497 MS
QTC FREDERICIA: 438 MS
QTC FREDERICIA: 455 MS
R AXIS: -31 DEGREES
R AXIS: -34 DEGREES
RBC # BLD AUTO: 4.41 X10*6/UL (ref 4.5–5.9)
SODIUM SERPL-SCNC: 138 MMOL/L (ref 136–145)
T AXIS: 131 DEGREES
T AXIS: 144 DEGREES
T OFFSET: 393 MS
T OFFSET: 406 MS
VENTRICULAR RATE: 103 BPM
VENTRICULAR RATE: 112 BPM
WBC # BLD AUTO: 10.6 X10*3/UL (ref 4.4–11.3)

## 2024-02-06 PROCEDURE — 82947 ASSAY GLUCOSE BLOOD QUANT: CPT

## 2024-02-06 PROCEDURE — 99222 1ST HOSP IP/OBS MODERATE 55: CPT | Performed by: INTERNAL MEDICINE

## 2024-02-06 PROCEDURE — 2500000001 HC RX 250 WO HCPCS SELF ADMINISTERED DRUGS (ALT 637 FOR MEDICARE OP): Performed by: NURSE PRACTITIONER

## 2024-02-06 PROCEDURE — 2500000001 HC RX 250 WO HCPCS SELF ADMINISTERED DRUGS (ALT 637 FOR MEDICARE OP): Performed by: INTERNAL MEDICINE

## 2024-02-06 PROCEDURE — 2500000004 HC RX 250 GENERAL PHARMACY W/ HCPCS (ALT 636 FOR OP/ED): Performed by: INTERNAL MEDICINE

## 2024-02-06 PROCEDURE — 93005 ELECTROCARDIOGRAM TRACING: CPT

## 2024-02-06 PROCEDURE — 2500000002 HC RX 250 W HCPCS SELF ADMINISTERED DRUGS (ALT 637 FOR MEDICARE OP, ALT 636 FOR OP/ED): Performed by: INTERNAL MEDICINE

## 2024-02-06 PROCEDURE — 1200000002 HC GENERAL ROOM WITH TELEMETRY DAILY

## 2024-02-06 PROCEDURE — 93010 ELECTROCARDIOGRAM REPORT: CPT | Performed by: INTERNAL MEDICINE

## 2024-02-06 PROCEDURE — 85027 COMPLETE CBC AUTOMATED: CPT | Performed by: INTERNAL MEDICINE

## 2024-02-06 PROCEDURE — 36415 COLL VENOUS BLD VENIPUNCTURE: CPT | Performed by: INTERNAL MEDICINE

## 2024-02-06 PROCEDURE — 80048 BASIC METABOLIC PNL TOTAL CA: CPT | Performed by: INTERNAL MEDICINE

## 2024-02-06 PROCEDURE — 2500000001 HC RX 250 WO HCPCS SELF ADMINISTERED DRUGS (ALT 637 FOR MEDICARE OP): Performed by: PHARMACIST

## 2024-02-06 PROCEDURE — 2500000004 HC RX 250 GENERAL PHARMACY W/ HCPCS (ALT 636 FOR OP/ED): Performed by: PHARMACIST

## 2024-02-06 RX ORDER — METOPROLOL SUCCINATE 50 MG/1
150 TABLET, EXTENDED RELEASE ORAL 2 TIMES DAILY
Status: DISCONTINUED | OUTPATIENT
Start: 2024-02-06 | End: 2024-02-10 | Stop reason: HOSPADM

## 2024-02-06 RX ORDER — LOSARTAN POTASSIUM 50 MG/1
50 TABLET ORAL DAILY
Status: DISCONTINUED | OUTPATIENT
Start: 2024-02-06 | End: 2024-02-10 | Stop reason: HOSPADM

## 2024-02-06 RX ORDER — AZITHROMYCIN 500 MG/1
500 TABLET, FILM COATED ORAL
Status: COMPLETED | OUTPATIENT
Start: 2024-02-06 | End: 2024-02-08

## 2024-02-06 RX ADMIN — FINASTERIDE 5 MG: 5 TABLET, FILM COATED ORAL at 09:31

## 2024-02-06 RX ADMIN — APIXABAN 2.5 MG: 2.5 TABLET, FILM COATED ORAL at 21:40

## 2024-02-06 RX ADMIN — INSULIN LISPRO 3 UNITS: 100 INJECTION, SOLUTION INTRAVENOUS; SUBCUTANEOUS at 12:19

## 2024-02-06 RX ADMIN — DOCUSATE SODIUM 100 MG: 100 CAPSULE, LIQUID FILLED ORAL at 09:31

## 2024-02-06 RX ADMIN — DOCUSATE SODIUM 100 MG: 100 CAPSULE, LIQUID FILLED ORAL at 21:40

## 2024-02-06 RX ADMIN — ATORVASTATIN CALCIUM 20 MG: 20 TABLET, FILM COATED ORAL at 21:40

## 2024-02-06 RX ADMIN — Medication 3 MG: at 21:40

## 2024-02-06 RX ADMIN — PANTOPRAZOLE SODIUM 40 MG: 40 TABLET, DELAYED RELEASE ORAL at 09:31

## 2024-02-06 RX ADMIN — GLIMEPIRIDE 2 MG: 2 TABLET ORAL at 09:31

## 2024-02-06 RX ADMIN — LOSARTAN POTASSIUM 50 MG: 50 TABLET, FILM COATED ORAL at 12:19

## 2024-02-06 RX ADMIN — AZITHROMYCIN DIHYDRATE 500 MG: 500 TABLET ORAL at 09:34

## 2024-02-06 RX ADMIN — METOPROLOL SUCCINATE 150 MG: 50 TABLET, EXTENDED RELEASE ORAL at 09:31

## 2024-02-06 RX ADMIN — EMPAGLIFLOZIN 25 MG: 25 TABLET, FILM COATED ORAL at 09:31

## 2024-02-06 RX ADMIN — INSULIN LISPRO 2 UNITS: 100 INJECTION, SOLUTION INTRAVENOUS; SUBCUTANEOUS at 09:32

## 2024-02-06 RX ADMIN — DOXAZOSIN 4 MG: 2 TABLET ORAL at 21:40

## 2024-02-06 RX ADMIN — FUROSEMIDE 40 MG: 10 INJECTION, SOLUTION INTRAMUSCULAR; INTRAVENOUS at 22:44

## 2024-02-06 RX ADMIN — APIXABAN 2.5 MG: 2.5 TABLET, FILM COATED ORAL at 09:31

## 2024-02-06 RX ADMIN — FUROSEMIDE 40 MG: 10 INJECTION, SOLUTION INTRAMUSCULAR; INTRAVENOUS at 11:23

## 2024-02-06 RX ADMIN — METOPROLOL SUCCINATE 150 MG: 50 TABLET, EXTENDED RELEASE ORAL at 21:40

## 2024-02-06 RX ADMIN — CEFTRIAXONE 2 G: 2 INJECTION, POWDER, FOR SOLUTION INTRAMUSCULAR; INTRAVENOUS at 23:39

## 2024-02-06 RX ADMIN — POLYETHYLENE GLYCOL 3350 17 G: 17 POWDER, FOR SOLUTION ORAL at 09:31

## 2024-02-06 ASSESSMENT — COGNITIVE AND FUNCTIONAL STATUS - GENERAL
DRESSING REGULAR LOWER BODY CLOTHING: A LITTLE
MOBILITY SCORE: 19
WALKING IN HOSPITAL ROOM: A LITTLE
DRESSING REGULAR UPPER BODY CLOTHING: A LITTLE
TOILETING: A LITTLE
HELP NEEDED FOR BATHING: A LITTLE
DAILY ACTIVITIY SCORE: 20
STANDING UP FROM CHAIR USING ARMS: A LITTLE
MOVING TO AND FROM BED TO CHAIR: A LITTLE
CLIMB 3 TO 5 STEPS WITH RAILING: A LOT

## 2024-02-06 ASSESSMENT — PAIN - FUNCTIONAL ASSESSMENT
PAIN_FUNCTIONAL_ASSESSMENT: 0-10

## 2024-02-06 ASSESSMENT — PAIN SCALES - GENERAL
PAINLEVEL_OUTOF10: 0 - NO PAIN

## 2024-02-06 NOTE — CARE PLAN
The patient's goals for the shift include      The clinical goals for the shift include pt will remain HDS throughout shift    Over the shift, the patient did not make progress toward the following goals. Barriers to progression include   Problem: Nutrition  Goal: Oral intake greater than 50%  Outcome: Progressing     Problem: Nutrition  Goal: Consume prescribed supplement  Outcome: Progressing     Problem: Nutrition  Goal: Adequate PO fluid intake  Outcome: Progressing     Problem: Nutrition  Goal: Nutrition support is meeting 75% of nutrient needs  Outcome: Progressing     Problem: Nutrition  Goal: BG  mg/dL  Outcome: Progressing     Problem: Nutrition  Goal: Lab values WNL  Outcome: Progressing     Problem: Nutrition  Goal: Electrolytes WNL  Outcome: Progressing     Problem: Nutrition  Goal: Maintain stable weight  Outcome: Progressing     Problem: Nutrition  Goal: Reduce weight from edema/fluid  Outcome: Progressing     Problem: Pain - Adult  Goal: Verbalizes/displays adequate comfort level or baseline comfort level  Outcome: Progressing     Problem: Safety - Adult  Goal: Free from fall injury  Outcome: Progressing     Problem: Discharge Planning  Goal: Discharge to home or other facility with appropriate resources  Outcome: Progressing     Problem: Chronic Conditions and Co-morbidities  Goal: Patient's chronic conditions and co-morbidity symptoms are monitored and maintained or improved  Outcome: Progressing     Problem: Heart Failure  Goal: Improved gas exchange this shift  Outcome: Progressing     Problem: Heart Failure  Goal: Improved urinary output this shift  Outcome: Progressing     Problem: Heart Failure  Goal: Reduction in peripheral edema within 24 hours  Outcome: Progressing     Problem: Heart Failure  Goal: Report improvement of dyspnea/breathlessness this shift  Outcome: Progressing     Problem: Heart Failure  Goal: Weight from fluid excess reduced over 2-3 days, then stabilize  Outcome:  Progressing     Problem: Heart Failure  Goal: Increase self care and/or family involvement in 24 hours  Outcome: Progressing     Problem: Diabetes  Goal: Achieve decreasing blood glucose levels by end of shift  Outcome: Progressing     Problem: Diabetes  Goal: Increase stability of blood glucose readings by end of shift  Outcome: Progressing     Problem: Diabetes  Goal: Maintain electrolyte levels within acceptable range throughout shift  Outcome: Progressing     Problem: Diabetes  Goal: Decrease in ketones present in urine by end of shift  Outcome: Progressing     Problem: Diabetes  Goal: Maintain glucose levels >70mg/dl to <250mg/dl throughout shift  Outcome: Progressing     Problem: Diabetes  Goal: No changes in neurological exam by end of shift  Outcome: Progressing     Problem: Diabetes  Goal: Vital signs within normal range for age by end of shift  Outcome: Progressing     Problem: Diabetes  Goal: Increase self care and/or family involovement by end of shift  Outcome: Progressing     Problem: Fall/Injury  Goal: Not fall by end of shift  Outcome: Progressing     Problem: Fall/Injury  Goal: Be free from injury by end of the shift  Outcome: Progressing     Problem: Fall/Injury  Goal: Verbalize understanding of personal risk factors for fall in the hospital  Outcome: Progressing     Problem: Fall/Injury  Goal: Verbalize understanding of risk factor reduction measures to prevent injury from fall in the home  Outcome: Progressing     Problem: Fall/Injury  Goal: Use assistive devices by end of the shift  Outcome: Progressing     Problem: Fall/Injury  Goal: Pace activities to prevent fatigue by end of the shift  Outcome: Progressing   . Recommendations to address these barriers include .

## 2024-02-06 NOTE — CONSULTS
Inpatient consult to Nephrology  Consult performed by: Juan Spangler DO  Consult ordered by: Rossana Castillo MD      Reason For Consult  ROSE on CKD stage III     History Of Present Illness  New Castano is a 83 y.o. male with a past medical history of stage G3 CKD with a baseline creatinine between 1.3 to 1.5 mg/deciliter, atrial fibrillation status post ablation on Eliquis and a beta-blocker, HFrEF with LVEF 30%, hypertension, hyperlipidemia, diabetes who was recently admitted to [facility] with weakness, altered mental status and UTI. Had a wide-complex with A-fib RVR.  He was admitted to CCU on an amiodarone drip and later transitioned back to metoprolol, increased to 150 mg twice daily. He was discharged on 25 mg of Jardiance, furosemide 40 mg twice daily, losartan 50 mg, 1000 mg of metformin and 8 mg of Cardura on 12/26.  His blood pressure was 126/74 on 12/25 with a creatinine of 1.48 mg/a deciliter.      He then presented to [facility] on 12/28 with weakness, hypotension and diarrhea. Was found to have an acute kidney injury with a creatinine of 3.3 thus we saw him. Mr. Castano had a hemodynamic acute kidney injury.  He was taken off the loop diuretic, SGLT2, ARB and metformin. His creatinine improved following gentle IV volume expansion settling around 2 mg/dL. He was sent out on Lasix 40 mg daily. His weight was around 195 lbs. He comes back in with shortness of breath. His weight is up to 210 lbs. CT of the abdomen and pelvis showed moderate interstitial edema and pleural effusions. Moderate cardiomegaly. No renal obstruction with an enlarged prostate.  Nephrology is consulted for renal care.         Past Medical History:   Diagnosis Date    Diabetes mellitus (CMS/[facility])     Hypertension     Mixed hyperlipidemia 02/13/2013    Paroxysmal atrial fibrillation (CMS/HCC) 03/08/2018    -s/p cardioversion and ablation   -AC on Eliquis     S/P ablation of atrial fibrillation 05/03/2019    Stage 3b chronic kidney  disease (CMS/[facility]) 12/29/2023       Past Surgical History:   Procedure Laterality Date    CARDIAC ELECTROPHYSIOLOGY MAPPING AND ABLATION      CARDIOVERSION         Social History     Tobacco Use    Smoking status: Former     Types: Cigarettes    Smokeless tobacco: Former        Family History  No family history on file.     Allergies  Pollen extracts    Review of Systems   10 point review of systems obtained and negative unless stated in HPI    Physical Exam   General: Alert and oriented, no acute distress.    Lungs: Mildly diminished at the bases, no wheezes, rales or rhonchi.   Heart: Normal rate, no murmur, gallop or edema.   Abdomen: Soft, non-tender, non-distended, normal bowel sounds, no masses.   Extremities: Pitting LE edema  Neurologic: Awake, alert, and oriented X3, moves extremities spontaneously  Psychiatric: Appropriate mood and affect.       I&O 24HR    Intake/Output Summary (Last 24 hours) at 2/6/2024 1507  Last data filed at 2/6/2024 1348  Gross per 24 hour   Intake 1155 ml   Output 1350 ml   Net -195 ml       Vitals 24HR  Heart Rate:  [102-106]   Temp:  [36 °C (96.8 °F)-36.6 °C (97.9 °F)]   Resp:  [16-20]   BP: (130-151)/()   Weight:  [95.6 kg (210 lb 12.2 oz)]   SpO2:  [95 %-97 %]     Scheduled Medications  apixaban, 2.5 mg, oral, BID  atorvastatin, 20 mg, oral, Nightly  azithromycin, 500 mg, oral, q24h JAVI  cefTRIAXone, 2 g, intravenous, q24h  docusate sodium, 100 mg, oral, BID  doxazosin, 4 mg, oral, Nightly  empagliflozin, 25 mg, oral, Daily  finasteride, 5 mg, oral, Daily  furosemide, 40 mg, intravenous, q12h  glimepiride, 2 mg, oral, Daily before breakfast  insulin lispro, 0-5 Units, subcutaneous, TID with meals  losartan, 50 mg, oral, Daily  melatonin, 3 mg, oral, Daily  metoprolol succinate XL, 150 mg, oral, BID  pantoprazole, 40 mg, oral, Daily before breakfast  pantoprazole, 40 mg, intravenous, Daily before breakfast      Continuous medications       PRN medications: [DISCONTINUED]  acetaminophen **OR** acetaminophen **OR** acetaminophen, acetaminophen, dextrose 10 % in water (D10W), dextrose, glucagon, guaiFENesin, melatonin, ondansetron, ondansetron **OR** [DISCONTINUED] ondansetron, polyethylene glycol, traMADol     Relevant Results  Results from last 7 days   Lab Units 02/06/24  0647 02/04/24  1757   WBC AUTO x10*3/uL 10.6 17.4*   HEMOGLOBIN g/dL 12.9* 13.6   HEMATOCRIT % 43.2 45.0   PLATELETS AUTO x10*3/uL 149* 197   NEUTROS PCT AUTO %  --  86.7   LYMPHS PCT AUTO %  --  3.1   MONOS PCT AUTO %  --  9.4   EOS PCT AUTO %  --  0.1      Results from last 7 days   Lab Units 02/06/24  0647 02/04/24  1757   SODIUM mmol/L 138 134*   POTASSIUM mmol/L 3.9 5.0   CHLORIDE mmol/L 100 98   CO2 mmol/L 27 27   BUN mg/dL 36* 36*   CREATININE mg/dL 1.94* 2.05*   CALCIUM mg/dL 8.7 8.9   PROTEIN TOTAL g/dL  --  6.6   BILIRUBIN TOTAL mg/dL  --  1.4*   ALK PHOS U/L  --  86   ALT U/L  --  55*   AST U/L  --  18   GLUCOSE mg/dL 216* 328*      CT head wo IV contrast   Final Result   No acute intracranial hemorrhage or mass effect.        Minimal soft tissue swelling over the left frontal bone.             MACRO:   None.        Signed by: Erinn Torrez 2/4/2024 9:58 PM   Dictation workstation:   [id number]      CT abdomen pelvis wo IV contrast   Final Result   No acute process involving the abdomen or pelvis   Small amount of ascites.   Moderate interstitial edema and pleural effusions at the lung bases   Mildly enlarged external iliac lymph nodes. Correlate for any history   of neoplasm. Correlation with PSA could also be performed.   Signed by Juve Philippe MD      XR chest 2 views   Final Result   1.  Possible opacity posteriorly seen on the lateral projection at the   lung base which may represent atelectasis or pneumonia..   Signed by Juice Chairez MD            Assessment/Plan   New Castano is a 83 y.o. male with a past medical history of stage G3 CKD with a baseline creatinine between 1.3 to 1.5  mg/deciliter, atrial fibrillation status post ablation on Eliquis and a beta-blocker, HFrEF with LVEF 30%, hypertension, hyperlipidemia, diabetes who was recently admitted to [facility] with weakness, altered mental status and UTI. Had a wide-complex with A-fib RVR.  He was admitted to CCU on an amiodarone drip and later transitioned back to metoprolol, increased to 150 mg twice daily. He was discharged on 25 mg of Jardiance, furosemide 40 mg twice daily, losartan 50 mg, 1000 mg of metformin and 8 mg of Cardura on 12/26.  His blood pressure was 126/74 on 12/25 with a creatinine of 1.48 mg/a deciliter.      He then presented to [facility] on 12/28 with weakness, hypotension and diarrhea. Was found to have an acute kidney injury with a creatinine of 3.3 thus we saw him. Mr. Castano had a hemodynamic acute kidney injury.  He was taken off the loop diuretic, SGLT2, ARB and metformin. His creatinine improved following gentle IV volume expansion settling around 2 mg/dL. He was sent out on Lasix 40 mg daily. His weight was around 195 lbs. He comes back in with shortness of breath. His weight is up to 210 lbs. CT of the abdomen and pelvis showed moderate interstitial edema and pleural effusions. Moderate cardiomegaly. No renal obstruction with an enlarged prostate.  Nephrology is consulted for renal care.    Mr. Castano has improved renal function with recent history of ROSE. This may be his new baseline but we will have to wait and see where his renal function settles. Blood pressures are elevated. Given his creatinine stability it is reasonable to resume the losartan.  He has been on the SGLT2i and is ordered an IVP Lasix 40 mg twice daily which is reasonable.  Trend his RFP.  Avoid nephrotoxins as we are able.  Will follow with his care.    Principal Problem:    CHF (congestive heart failure), NYHA class I, acute on chronic, combined (CMS/[facility])      I spent 50 minutes in the professional and overall care of this  patient.      Juan Spangler, DO

## 2024-02-06 NOTE — PROGRESS NOTES
New Castano is a 83 y.o. male on day 2 of admission presenting with CHF (congestive heart failure), NYHA class I, acute on chronic, combined (CMS/HCC).      Subjective   Pt is alert oriented to place  No events overnight  Does have sitter  No pain   Breathing ok  No nausea vomiting   Is making urine       Objective     Last Recorded Vitals  BP (!) 151/107   Pulse 102   Temp 36.2 °C (97.2 °F) (Axillary)   Resp 18   Wt 95.6 kg (210 lb 12.2 oz)   SpO2 97%   Intake/Output last 3 Shifts:    Intake/Output Summary (Last 24 hours) at 2/6/2024 0924  Last data filed at 2/6/2024 0739  Gross per 24 hour   Intake 755 ml   Output 850 ml   Net -95 ml       Admission Weight  Weight: 88.6 kg (195 lb 5.2 oz) (02/04/24 1709)    Daily Weight  02/06/24 : 95.6 kg (210 lb 12.2 oz)    Image Results  Electrocardiogram, 12-lead PRN ACS symptoms  Sinus tachycardia with Fusion complexes  Left axis deviation  Nonspecific intraventricular conduction delay  ST & T wave abnormality, consider lateral ischemia  Abnormal ECG  When compared with ECG of 04-FEB-2024 18:00, (unconfirmed)  Fusion complexes are now Present  Premature ventricular complexes are no longer Present      Physical Exam  Alert appropriate , obese, flat affect, no resp distress  Chst good air entry camila   Cvs regular  Abdo distended bs active, no tenderness   external cath in place  Ext chronic changes, does have trace edema  Cns nonfocal    Relevant Results  Scheduled medications  apixaban, 2.5 mg, oral, BID  atorvastatin, 20 mg, oral, Nightly  cefTRIAXone, 2 g, intravenous, q24h  docusate sodium, 100 mg, oral, BID  doxazosin, 4 mg, oral, Nightly  empagliflozin, 25 mg, oral, Daily  finasteride, 5 mg, oral, Daily  furosemide, 40 mg, intravenous, q12h  glimepiride, 2 mg, oral, Daily before breakfast  insulin lispro, 0-5 Units, subcutaneous, TID with meals  melatonin, 3 mg, oral, Daily  metoprolol succinate XL, 150 mg, oral, Daily  pantoprazole, 40 mg, oral, Daily before  breakfast  pantoprazole, 40 mg, intravenous, Daily before breakfast      Continuous medications     PRN medications  PRN medications: [DISCONTINUED] acetaminophen **OR** acetaminophen **OR** acetaminophen, acetaminophen, dextrose 10 % in water (D10W), dextrose, glucagon, guaiFENesin, melatonin, ondansetron, ondansetron **OR** [DISCONTINUED] ondansetron, polyethylene glycol, traMADol  Results for orders placed or performed during the hospital encounter of 02/04/24 (from the past 96 hour(s))   CBC and Auto Differential   Result Value Ref Range    WBC 17.4 (H) 4.4 - 11.3 x10*3/uL    nRBC 0.0 0.0 - 0.0 /100 WBCs    RBC 4.77 4.50 - 5.90 x10*6/uL    Hemoglobin 13.6 13.5 - 17.5 g/dL    Hematocrit 45.0 41.0 - 52.0 %    MCV 94 80 - 100 fL    MCH 28.5 26.0 - 34.0 pg    MCHC 30.2 (L) 32.0 - 36.0 g/dL    RDW 16.2 (H) 11.5 - 14.5 %    Platelets 197 150 - 450 x10*3/uL    Neutrophils % 86.7 40.0 - 80.0 %    Immature Granulocytes %, Automated 0.4 0.0 - 0.9 %    Lymphocytes % 3.1 13.0 - 44.0 %    Monocytes % 9.4 2.0 - 10.0 %    Eosinophils % 0.1 0.0 - 6.0 %    Basophils % 0.3 0.0 - 2.0 %    Neutrophils Absolute 15.07 (H) 1.60 - 5.50 x10*3/uL    Immature Granulocytes Absolute, Automated 0.07 0.00 - 0.50 x10*3/uL    Lymphocytes Absolute 0.54 (L) 0.80 - 3.00 x10*3/uL    Monocytes Absolute 1.63 (H) 0.05 - 0.80 x10*3/uL    Eosinophils Absolute 0.01 0.00 - 0.40 x10*3/uL    Basophils Absolute 0.05 0.00 - 0.10 x10*3/uL   Comprehensive metabolic panel   Result Value Ref Range    Glucose 328 (H) 74 - 99 mg/dL    Sodium 134 (L) 136 - 145 mmol/L    Potassium 5.0 3.5 - 5.3 mmol/L    Chloride 98 98 - 107 mmol/L    Bicarbonate 27 21 - 32 mmol/L    Anion Gap 14 10 - 20 mmol/L    Urea Nitrogen 36 (H) 6 - 23 mg/dL    Creatinine 2.05 (H) 0.50 - 1.30 mg/dL    eGFR 31 (L) >60 mL/min/1.73m*2    Calcium 8.9 8.6 - 10.3 mg/dL    Albumin 3.4 3.4 - 5.0 g/dL    Alkaline Phosphatase 86 33 - 136 U/L    Total Protein 6.6 6.4 - 8.2 g/dL    AST 18 9 - 39 U/L     Bilirubin, Total 1.4 (H) 0.0 - 1.2 mg/dL    ALT 55 (H) 10 - 52 U/L   Magnesium   Result Value Ref Range    Magnesium 2.20 1.60 - 2.40 mg/dL   Protime-INR   Result Value Ref Range    Protime 19.8 (H) 9.8 - 12.8 seconds    INR 1.7 (H) 0.9 - 1.1   Troponin I, High Sensitivity   Result Value Ref Range    Troponin I, High Sensitivity 17 0 - 20 ng/L   BLOOD GAS VENOUS FULL PANEL   Result Value Ref Range    POCT pH, Venous 7.36 7.33 - 7.43 pH    POCT pCO2, Venous 55 (H) 41 - 51 mm Hg    POCT pO2, Venous 35 35 - 45 mm Hg    POCT SO2, Venous 34 (L) 45 - 75 %    POCT Oxy Hemoglobin, Venous 33.1 (L) 45.0 - 75.0 %    POCT Hematocrit Calculated, Venous 43.0 41.0 - 52.0 %    POCT Sodium, Venous 130 (L) 136 - 145 mmol/L    POCT Potassium, Venous 5.4 (H) 3.5 - 5.3 mmol/L    POCT Chloride, Venous 99 98 - 107 mmol/L    POCT Ionized Calicum, Venous 1.13 1.10 - 1.33 mmol/L    POCT Glucose, Venous 345 (H) 74 - 99 mg/dL    POCT Lactate, Venous 2.5 (H) 0.4 - 2.0 mmol/L    POCT Base Excess, Venous 4.1 (H) -2.0 - 3.0 mmol/L    POCT HCO3 Calculated, Venous 31.1 (H) 22.0 - 26.0 mmol/L    POCT Hemoglobin, Venous 14.3 13.5 - 17.5 g/dL    POCT Anion Gap, Venous 5.0 (L) 10.0 - 25.0 mmol/L    Patient Temperature 37.0 degrees Celsius    FiO2 21 %   Urinalysis with Reflex Culture and Microscopic   Result Value Ref Range    Color, Urine Yellow Straw, Yellow    Appearance, Urine Clear Clear    Specific Gravity, Urine 1.028 1.005 - 1.035    pH, Urine 5.0 5.0, 5.5, 6.0, 6.5, 7.0, 7.5, 8.0    Protein, Urine 100 (2+) (N) NEGATIVE mg/dL    Glucose, Urine >=500 (3+) (A) NEGATIVE mg/dL    Blood, Urine NEGATIVE NEGATIVE    Ketones, Urine NEGATIVE NEGATIVE mg/dL    Bilirubin, Urine NEGATIVE NEGATIVE    Urobilinogen, Urine <2.0 <2.0 mg/dL    Nitrite, Urine NEGATIVE NEGATIVE    Leukocyte Esterase, Urine NEGATIVE NEGATIVE   Extra Urine Gray Tube   Result Value Ref Range    Extra Tube Hold for add-ons.    TSH with reflex to Free T4 if abnormal   Result Value Ref  Range    Thyroid Stimulating Hormone 4.13 (H) 0.44 - 3.98 mIU/L   Thyroxine, Free   Result Value Ref Range    Thyroxine, Free 0.99 0.61 - 1.12 ng/dL   B-type natriuretic peptide   Result Value Ref Range    BNP 3,956 (H) 0 - 99 pg/mL   Urinalysis Microscopic   Result Value Ref Range    WBC, Urine 1-5 1-5, NONE /HPF    RBC, Urine 1-2 NONE, 1-2, 3-5 /HPF   Sars-CoV-2 and Influenza A/B PCR   Result Value Ref Range    Flu A Result Not Detected Not Detected    Flu B Result Not Detected Not Detected    Coronavirus 2019, PCR Not Detected Not Detected   RSV PCR   Result Value Ref Range    RSV PCR Not Detected Not Detected   POCT GLUCOSE   Result Value Ref Range    POCT Glucose 293 (H) 74 - 99 mg/dL   ECG 12 lead   Result Value Ref Range    Ventricular Rate 112 BPM    Atrial Rate 112 BPM    CO Interval 114 ms    QRS Duration 116 ms    QT Interval 356 ms    QTC Calculation(Bazett) 485 ms    P Axis 108 degrees    R Axis -31 degrees    T Axis 144 degrees    QRS Count 19 beats    Q Onset 215 ms    P Onset 158 ms    P Offset 181 ms    T Offset 393 ms    QTC Fredericia 438 ms   Blood Culture    Specimen: Peripheral Venipuncture; Blood culture   Result Value Ref Range    Blood Culture No growth at 1 day    Blood Culture    Specimen: Peripheral Venipuncture; Blood culture   Result Value Ref Range    Blood Culture No growth at 1 day    Blood Gas Lactic Acid, Venous   Result Value Ref Range    POCT Lactate, Venous 1.9 0.4 - 2.0 mmol/L   Lactate   Result Value Ref Range    Lactate 1.6 0.4 - 2.0 mmol/L   POCT GLUCOSE   Result Value Ref Range    POCT Glucose 269 (H) 74 - 99 mg/dL   POCT GLUCOSE   Result Value Ref Range    POCT Glucose 240 (H) 74 - 99 mg/dL   POCT GLUCOSE   Result Value Ref Range    POCT Glucose 288 (H) 74 - 99 mg/dL   POCT GLUCOSE   Result Value Ref Range    POCT Glucose 308 (H) 74 - 99 mg/dL   POCT GLUCOSE   Result Value Ref Range    POCT Glucose 149 (H) 74 - 99 mg/dL   CBC   Result Value Ref Range    WBC 10.6 4.4 - 11.3  x10*3/uL    nRBC 0.0 0.0 - 0.0 /100 WBCs    RBC 4.41 (L) 4.50 - 5.90 x10*6/uL    Hemoglobin 12.9 (L) 13.5 - 17.5 g/dL    Hematocrit 43.2 41.0 - 52.0 %    MCV 98 80 - 100 fL    MCH 29.3 26.0 - 34.0 pg    MCHC 29.9 (L) 32.0 - 36.0 g/dL    RDW 16.1 (H) 11.5 - 14.5 %    Platelets 149 (L) 150 - 450 x10*3/uL   Basic metabolic panel   Result Value Ref Range    Glucose 216 (H) 74 - 99 mg/dL    Sodium 138 136 - 145 mmol/L    Potassium 3.9 3.5 - 5.3 mmol/L    Chloride 100 98 - 107 mmol/L    Bicarbonate 27 21 - 32 mmol/L    Anion Gap 15 10 - 20 mmol/L    Urea Nitrogen 36 (H) 6 - 23 mg/dL    Creatinine 1.94 (H) 0.50 - 1.30 mg/dL    eGFR 34 (L) >60 mL/min/1.73m*2    Calcium 8.7 8.6 - 10.3 mg/dL   Electrocardiogram, 12-lead PRN ACS symptoms   Result Value Ref Range    Ventricular Rate 103 BPM    Atrial Rate 103 BPM    ME Interval 120 ms    QRS Duration 124 ms    QT Interval 380 ms    QTC Calculation(Bazett) 497 ms    P Axis 87 degrees    R Axis -34 degrees    T Axis 131 degrees    QRS Count 17 beats    Q Onset 216 ms    P Onset 156 ms    P Offset 189 ms    T Offset 406 ms    QTC Fredericia 455 ms   POCT GLUCOSE   Result Value Ref Range    POCT Glucose 218 (H) 74 - 99 mg/dL     Echo ef 50           Assessment/Plan      #Acute on chronic systolic congestive heart failure  Start IV Lasix  Daily weights and electrolytes  Cardiology consulted  BNP 3900      #Community-acquired pneumonia   IV Rocephin/Zithromax  DuoNebs as needed  Check procal     #CKD 3B  Monitor with diuresing  .creatinine 1.9  Cont with jardiance       #Diabetes mellitus  Sliding scale insulin coverage  Hba1c 7.5     #Hypertension  Hold medications for systolic less than 120     #Atrial fibrillation s/p ablation  Continue Eliquis     #Dyslipidemia  Continue statins and maintain target LDL less than 70    Principal Problem:    CHF (congestive heart failure), NYHA class I, acute on chronic, combined (CMS/[facility])             Kevon Gunderson MD

## 2024-02-06 NOTE — CONSULTS
Inpatient consult to Cardiology  Consult performed by: Charlotte Alba, VANCE-CNP  Consult ordered by: Rossana Castillo MD  Reason for consult: chf        History Of Present Illness:    New Castano is a 83 y.o. male with  past medical history of Afib s/p ablation (Eliquis/BB), atrial tachycardia, CKD3a, HFrEF (38%), HTN, HLD, DM2 who presented to ED with shortness of breath, cough, weakness, and elevated blood sugars.  Cardiology consulted for CHF.     States he came to the ED with shortness of breath.  Patient is using one word answers for some of the interview, and does state that he is not really sure why he is in the hospital.  Denies any chest pain or angina.  States he is more short of breath than usual, and unable to lie flat at home.  Denies any fever, chills, changes in bowel or bladder habits.  States he is having swelling in both of his legs.  States he has been living at home with his wife.     Recent admit to [facility] [place], [place] from 12/29/2023 - 12/31/2023 where he was admitted for weakness, and hypotension, and diarrhea, found to be dehydrated (overdiuresed at home in the setting of diarrhea), and ROSE.  He was sent home with decreased dose of furosemide 40mg daily, also to stop losartan 50mg daily    Recent admit to [facility] 12/21/2023 - 12/26/2023 for a fib RVR where metoprolol succinate was increased from 100mg BID to 150mg BID.  Also with confusion and diarrhea (c diff negative), discharged home where he lives with is wife.     Home cardiac medications:  Eliquis 2.5mg BID, Toprol 150mg BID, losartan 50mg daily, Jardiance 25mg daily, furosemide 40mg daily doxazosin 8mg daily.      Follows with cardiology, Dr. Colton Tolentino at [facility]     Past Cardiology Tests (Last 3 Years):  EKG:  Results for orders placed during the hospital encounter of 02/04/24    ECG 12 lead (Preliminary)  This result has not been signed. Information might be incomplete.    Narrative  Sinus tachycardia with occasional Premature ventricular  complexes  Left axis deviation  Pulmonary disease pattern  Left ventricular hypertrophy with QRS widening and repolarization abnormality ( R in aVL , Alan product , Romhilt-Durham )  Inferior infarct , age undetermined  Abnormal ECG  When compared with ECG of 28-DEC-2023 14:04,  Premature ventricular complexes are now Present  Inferior infarct is now Present      Results for orders placed during the hospital encounter of 12/28/23    ECG 12 lead    Narrative  Sinus tachycardia  Left axis deviation  Left ventricular hypertrophy with QRS widening and repolarization abnormality ( R in aVL , [place] product )  Abnormal ECG  No previous ECGs available  See ED provider note for full interpretation and clinical correlation  Confirmed by Yanick Solorzano ([id number]) on 12/29/2023 10:56:33 PM    Echo:    Echocardiogram 12/23/2023 at [facility]  CONCLUSIONS:   - Technically difficult exam due to suboptimal positioning.   - Exam indication: Sustained atrial fibrillation   - The left ventricle is normal in size. Left ventricular systolic function is   mildly decreased. EF = 50 ± 5% (visual est.) Definity contrast used for   endocardial border detection.   - The right ventricle is normal in size. Right ventricular systolic function is   low normal.   - The left atrial cavity is mildly dilated.   - There is AV sclerosis, no stenosis.   - Exam was compared with the prior  echocardiographic exam performed on 8/20/23.    LVEF has improved on side by side comparison. LV volumes have also improved     Echocardiogram: 08/20/2023 at [facility]  CONCLUSIONS:   - Exam indication: Sustained atrial fibrillation   - The left ventricle is mildly dilated. There is mild concentric left ventricular   hypertrophy. Left ventricular systolic function is moderately decreased. EF = 38 ±   5% (2D biplane) Left ventricular diastolic function was not evaluated due to AF.   - The right ventricle is normal in size. Right ventricular systolic function is   mildly  decreased.   - The left atrial cavity is moderately dilated.   - The right atrial cavity is dilated.   - There is mild (1+) mitral valve regurgitation.   - There is mild (1+) aortic valve regurgitation.   - Markedly abnormal global LV strain -7% with apical sparing consider cardiac   amyloid   - Exam was compared with the prior  echocardiographic exam performed on   09/13/2022 ([facility]). Slight decrease in LVEF, prior EF 45%.      Cath:  No results found for this or any previous visit.     Stress Test:  No results found for this or any previous visit.     Cardiac Imaging:     NM Spect/CT Cardiac Amyloid: 08/21/2023 (reviewed)    Summary:   Not Consistent with TTR amyloidosis        Past Medical History:  He has a past medical history of Diabetes mellitus (CMS/[facility]), Hypertension, Mixed hyperlipidemia (02/13/2013), Paroxysmal atrial fibrillation (CMS/[facility]) (03/08/2018), S/P ablation of atrial fibrillation (05/03/2019), and Stage 3b chronic kidney disease (CMS/[facility]) (12/29/2023).    Past Surgical History:  He has a past surgical history that includes Cardioversion and Cardiac electrophysiology mapping and ablation.      Social History:  He reports that he has quit smoking. His smoking use included cigarettes. He has quit using smokeless tobacco. No history on file for alcohol use and drug use.    Family History:  No family history on file.     Allergies:  Pollen extracts    ROS:  10 point review of systems including (Constitutional, Eyes, ENMT, Respiratory, Cardiac, Gastrointestinal, Neurological, Psychiatric, and Hematologic) was performed and is otherwise negative.    Objective Data:  Last Recorded Vitals:  Vitals:    02/05/24 2000 02/05/24 2335 02/06/24 0519 02/06/24 0739   BP:  (!) 147/95 (!) 145/103 (!) 151/107   BP Location:       Patient Position:       Pulse:  105 106 102   Resp: 20   18   Temp:  36.6 °C (97.9 °F) 36 °C (96.8 °F) 36.2 °C (97.2 °F)   TempSrc:    Axillary   SpO2:  96% 96% 97%   Weight:       Height:   "       Medical Gas Therapy: None (Room air)  O2 Delivery Method: Nasal cannula  Weight  Av.6 kg (195 lb 5.2 oz)  Min: 88.6 kg (195 lb 5.2 oz)  Max: 88.6 kg (195 lb 5.2 oz)    LABS:  CMP:  Results from last 7 days   Lab Units 24  0647 24  1757   SODIUM mmol/L 138 134*   POTASSIUM mmol/L 3.9 5.0   CHLORIDE mmol/L 100 98   CO2 mmol/L 27 27   ANION GAP mmol/L 15 14   BUN mg/dL 36* 36*   CREATININE mg/dL 1.94* 2.05*   EGFR mL/min/1.73m*2 34* 31*   MAGNESIUM mg/dL  --  2.20   ALBUMIN g/dL  --  3.4   ALT U/L  --  55*   AST U/L  --  18   BILIRUBIN TOTAL mg/dL  --  1.4*     CBC:  Results from last 7 days   Lab Units 24  0647 24  1757   WBC AUTO x10*3/uL 10.6 17.4*   HEMOGLOBIN g/dL 12.9* 13.6   HEMATOCRIT % 43.2 45.0   PLATELETS AUTO x10*3/uL 149* 197   MCV fL 98 94     COAG:   Results from last 7 days   Lab Units 24  1757   INR  1.7*     ABO: No results found for: \"ABO\"  HEME/ENDO:  Results from last 7 days   Lab Units 24  1757   TSH mIU/L 4.13*      CARDIAC:   Results from last 7 days   Lab Units 24  1757   TROPHS ng/L 17   BNP pg/mL 3,956*             Last I/O:    Intake/Output Summary (Last 24 hours) at 2024 0835  Last data filed at 2024 0739  Gross per 24 hour   Intake 875 ml   Output 850 ml   Net 25 ml     Net IO Since Admission: 25 mL [24 0835]      Imaging Results:  ECG 12 lead    Result Date: 2024  Sinus tachycardia with occasional Premature ventricular complexes Left axis deviation Pulmonary disease pattern Left ventricular hypertrophy with QRS widening and repolarization abnormality ( R in aVL , Alan product , Romhilt-[place] ) Inferior infarct , age undetermined Abnormal ECG When compared with ECG of 28-DEC-2023 14:04, Premature ventricular complexes are now Present Inferior infarct is now Present    CT head wo IV contrast    Result Date: 2024    No acute intracranial hemorrhage or mass effect.   Minimal soft tissue swelling over the left " frontal bone.     MACRO: None.   Signed by: Erinn Torrez 2/4/2024 9:58 PM Dictation workstation:   [id number]    CT abdomen pelvis wo IV contrast    Result Date: 2/4/2024    . Correlate for any history of neoplasm. Correlation with PSA could also be performed. Signed by Juve Philippe MD    XR chest 2 views    Result Date: 2/4/2024  STUDY: Chest Radiographs;  2/4/2024 5:42PM INDICATION: Fatigue. COMPARISON: XR chest 12/28/2023 ACCESSION NUMBER(S): [id number] ORDERING CLINICIAN: NATALIE CHANG TECHNIQUE:  Frontal and lateral chest. FINDINGS: CARDIOMEDIASTINAL SILHOUETTE: Cardiomediastinal silhouette is normal in size and configuration.  LUNGS: The lateral projection shows a possible opacity posteriorly possibly 3 lung base which represent atelectasis or pneumonia..  ABDOMEN: No remarkable upper abdominal findings.  BONES: No acute osseous changes.    1.  Possible opacity posteriorly seen on the lateral projection at the lung base which may represent atelectasis or pneumonia.. Signed by Juice Chairez MD      Inpatient Medications:  Scheduled medications   Medication Dose Route Frequency    apixaban  2.5 mg oral BID    atorvastatin  20 mg oral Nightly    cefTRIAXone  2 g intravenous q24h    docusate sodium  100 mg oral BID    doxazosin  4 mg oral Nightly    empagliflozin  25 mg oral Daily    finasteride  5 mg oral Daily    furosemide  40 mg intravenous q12h    glimepiride  2 mg oral Daily before breakfast    insulin lispro  0-5 Units subcutaneous TID with meals    melatonin  3 mg oral Daily    metoprolol succinate XL  150 mg oral Daily    pantoprazole  40 mg oral Daily before breakfast    pantoprazole  40 mg intravenous Daily before breakfast     PRN medications   Medication    acetaminophen    Or    acetaminophen    acetaminophen    dextrose 10 % in water (D10W)    dextrose    glucagon    guaiFENesin    melatonin    ondansetron    ondansetron    polyethylene glycol    traMADol     Continuous Medications    Medication Dose Last Rate       Outpatient Medications:  Current Outpatient Medications   Medication Instructions    apixaban (ELIQUIS) 2.5 mg, oral, 2 times daily    atorvastatin (LIPITOR) 20 mg, oral, Nightly    cyanocobalamin (VITAMIN B-12) 500 mcg, oral, Daily    doxazosin (CARDURA) 4 mg, oral, Nightly    empagliflozin (JARDIANCE) 25 mg, oral, Daily    finasteride (PROSCAR) 5 mg, oral, Daily    furosemide (LASIX) 40 mg, oral, Daily    glimepiride (AMARYL) 2 mg, oral, Daily before breakfast    loperamide (IMODIUM) 2 mg, oral, 3 times daily PRN    metoprolol succinate XL (TOPROL-XL) 150 mg, oral, 2 times daily    naproxen (NAPROSYN) 500 mg, oral, 2 times daily PRN    omeprazole (PRILOSEC) 20 mg, oral, Daily before breakfast, Do not crush or chew.       Physical Exam:    General: Elderly, well-nourished, in no acute distress  HEENT: Normocephalic, atraumatic  Neck: Supple, JVP is normal  elevated, 2+ carotid pulses without bruit  Pulmonary: Normal respiratory effort, crackles in bases, no wheezing  Cardiovascular: Normal S1 and S2, no murmurs rubs or gallops  Abdomen: Soft, nontender, protuberant  Extremities: Warm , 2-3+ LE edema with erythema bilaterally,  2+ radial pulses bilaterally   Neurologic: Alert and oriented x3  Psychiatric: Flat affect, one word answers at times, can avoid eye contact     Assessment/Plan   New Castano is a 83 y.o. male with  past medical history of Afib s/p ablation (Eliquis/BB), CKD3a, HFrEF (38%), HTN, HLD, DM2 who presented to ED with shortness of breath, cough, weakness, and elevated blood sugars.  Cardiology consulted for CHF.     2/6/2024 No acute process involving the abdomen or pelvis Small amount of ascites. Moderate interstitial edema and pleural effusions at the lung bases Mildly enlarged external iliac lymph nodes.  Creatinine is 1.94 ( was up to 3.32 when dehydrated for admit on 12/28/2023).  HS troponin neg.  BNP now 3956 ( previous 48 on 12/28/2023).  Volume up on  exam. Mildly hypertensive.     Weight  on admit 2/6/2024:  95.6kg  Weight on 12/30/2023 88.6k    Assessment:  # Acute on chronic systolic heart failure ( recovered LVEF to 50% on echo 12/2023)  # Atrial tachycardia 2:1, vs less likely atypical atrial flutter.  Rate controlled.   # CKD  # Hypertension  # Leukocytosis, WBC 17 on admit, ? CAP    Plan:  - Agree with diuresis and he is responding to furosemide 40mg IV BID.  Home dose of furosemide was reduced to 40mg daily from 40mg BID as he was having hypotension and diarrhea at the end of 2023.  Will likely be discharged home on furosemide 40mg oral BID.  - Daily weights, strict I/O  - Agree with Eliquis 2.5mg BID  - Increase Toprol to home dose of 150mg BID  - Adding back losartan 50mg daily as BP is elevated and creatinine is at baseline    He can follow up with his established cardiologist, Dr SHELBY Tolentino at [facility] within 2 weeks of hospital discharge.     Code Status:  Full Code

## 2024-02-07 ENCOUNTER — APPOINTMENT (OUTPATIENT)
Dept: CARDIOLOGY | Facility: HOSPITAL | Age: 84
DRG: 291 | End: 2024-02-07
Payer: MEDICARE

## 2024-02-07 LAB
ANION GAP SERPL CALC-SCNC: 14 MMOL/L (ref 10–20)
ATRIAL RATE: 101 BPM
BUN SERPL-MCNC: 32 MG/DL (ref 6–23)
CALCIUM SERPL-MCNC: 8.2 MG/DL (ref 8.6–10.3)
CHLORIDE SERPL-SCNC: 98 MMOL/L (ref 98–107)
CO2 SERPL-SCNC: 30 MMOL/L (ref 21–32)
CREAT SERPL-MCNC: 1.66 MG/DL (ref 0.5–1.3)
EGFRCR SERPLBLD CKD-EPI 2021: 40 ML/MIN/1.73M*2
ERYTHROCYTE [DISTWIDTH] IN BLOOD BY AUTOMATED COUNT: 16.2 % (ref 11.5–14.5)
GLUCOSE BLD MANUAL STRIP-MCNC: 176 MG/DL (ref 74–99)
GLUCOSE BLD MANUAL STRIP-MCNC: 196 MG/DL (ref 74–99)
GLUCOSE BLD MANUAL STRIP-MCNC: 236 MG/DL (ref 74–99)
GLUCOSE BLD MANUAL STRIP-MCNC: 259 MG/DL (ref 74–99)
GLUCOSE SERPL-MCNC: 219 MG/DL (ref 74–99)
HCT VFR BLD AUTO: 40.1 % (ref 41–52)
HGB BLD-MCNC: 12.4 G/DL (ref 13.5–17.5)
MCH RBC QN AUTO: 29.3 PG (ref 26–34)
MCHC RBC AUTO-ENTMCNC: 30.9 G/DL (ref 32–36)
MCV RBC AUTO: 95 FL (ref 80–100)
NRBC BLD-RTO: 0 /100 WBCS (ref 0–0)
P AXIS: 118 DEGREES
P OFFSET: 191 MS
P ONSET: 163 MS
PLATELET # BLD AUTO: 153 X10*3/UL (ref 150–450)
POTASSIUM SERPL-SCNC: 3.7 MMOL/L (ref 3.5–5.3)
PR INTERVAL: 124 MS
Q ONSET: 215 MS
QRS COUNT: 16 BEATS
QRS DURATION: 124 MS
QT INTERVAL: 386 MS
QTC CALCULATION(BAZETT): 500 MS
QTC FREDERICIA: 459 MS
R AXIS: 7 DEGREES
RBC # BLD AUTO: 4.23 X10*6/UL (ref 4.5–5.9)
SODIUM SERPL-SCNC: 138 MMOL/L (ref 136–145)
T AXIS: 151 DEGREES
T OFFSET: 408 MS
VENTRICULAR RATE: 101 BPM
WBC # BLD AUTO: 10.1 X10*3/UL (ref 4.4–11.3)

## 2024-02-07 PROCEDURE — 36415 COLL VENOUS BLD VENIPUNCTURE: CPT | Performed by: INTERNAL MEDICINE

## 2024-02-07 PROCEDURE — 85027 COMPLETE CBC AUTOMATED: CPT | Performed by: INTERNAL MEDICINE

## 2024-02-07 PROCEDURE — 2500000002 HC RX 250 W HCPCS SELF ADMINISTERED DRUGS (ALT 637 FOR MEDICARE OP, ALT 636 FOR OP/ED): Performed by: INTERNAL MEDICINE

## 2024-02-07 PROCEDURE — 2500000001 HC RX 250 WO HCPCS SELF ADMINISTERED DRUGS (ALT 637 FOR MEDICARE OP): Performed by: INTERNAL MEDICINE

## 2024-02-07 PROCEDURE — 93005 ELECTROCARDIOGRAM TRACING: CPT

## 2024-02-07 PROCEDURE — 2500000004 HC RX 250 GENERAL PHARMACY W/ HCPCS (ALT 636 FOR OP/ED): Performed by: PHARMACIST

## 2024-02-07 PROCEDURE — 1200000002 HC GENERAL ROOM WITH TELEMETRY DAILY

## 2024-02-07 PROCEDURE — 80048 BASIC METABOLIC PNL TOTAL CA: CPT | Performed by: INTERNAL MEDICINE

## 2024-02-07 PROCEDURE — 2500000001 HC RX 250 WO HCPCS SELF ADMINISTERED DRUGS (ALT 637 FOR MEDICARE OP): Performed by: PHARMACIST

## 2024-02-07 PROCEDURE — 99233 SBSQ HOSP IP/OBS HIGH 50: CPT | Performed by: NURSE PRACTITIONER

## 2024-02-07 PROCEDURE — 82947 ASSAY GLUCOSE BLOOD QUANT: CPT

## 2024-02-07 PROCEDURE — 93010 ELECTROCARDIOGRAM REPORT: CPT | Performed by: STUDENT IN AN ORGANIZED HEALTH CARE EDUCATION/TRAINING PROGRAM

## 2024-02-07 PROCEDURE — 2500000001 HC RX 250 WO HCPCS SELF ADMINISTERED DRUGS (ALT 637 FOR MEDICARE OP): Performed by: NURSE PRACTITIONER

## 2024-02-07 PROCEDURE — 2500000004 HC RX 250 GENERAL PHARMACY W/ HCPCS (ALT 636 FOR OP/ED): Performed by: INTERNAL MEDICINE

## 2024-02-07 RX ORDER — METOPROLOL TARTRATE 1 MG/ML
5 INJECTION, SOLUTION INTRAVENOUS EVERY 6 HOURS PRN
Status: DISCONTINUED | OUTPATIENT
Start: 2024-02-07 | End: 2024-02-10 | Stop reason: HOSPADM

## 2024-02-07 RX ADMIN — METOPROLOL SUCCINATE 150 MG: 50 TABLET, EXTENDED RELEASE ORAL at 10:37

## 2024-02-07 RX ADMIN — DOCUSATE SODIUM 100 MG: 100 CAPSULE, LIQUID FILLED ORAL at 10:37

## 2024-02-07 RX ADMIN — Medication 3 MG: at 20:00

## 2024-02-07 RX ADMIN — DOXAZOSIN 4 MG: 2 TABLET ORAL at 21:33

## 2024-02-07 RX ADMIN — EMPAGLIFLOZIN 25 MG: 25 TABLET, FILM COATED ORAL at 10:37

## 2024-02-07 RX ADMIN — FUROSEMIDE 40 MG: 10 INJECTION, SOLUTION INTRAMUSCULAR; INTRAVENOUS at 23:34

## 2024-02-07 RX ADMIN — INSULIN LISPRO 2 UNITS: 100 INJECTION, SOLUTION INTRAVENOUS; SUBCUTANEOUS at 12:35

## 2024-02-07 RX ADMIN — AZITHROMYCIN DIHYDRATE 500 MG: 500 TABLET ORAL at 10:37

## 2024-02-07 RX ADMIN — CEFTRIAXONE 2 G: 2 INJECTION, POWDER, FOR SOLUTION INTRAMUSCULAR; INTRAVENOUS at 23:34

## 2024-02-07 RX ADMIN — DOCUSATE SODIUM 100 MG: 100 CAPSULE, LIQUID FILLED ORAL at 21:33

## 2024-02-07 RX ADMIN — APIXABAN 2.5 MG: 2.5 TABLET, FILM COATED ORAL at 10:37

## 2024-02-07 RX ADMIN — APIXABAN 2.5 MG: 2.5 TABLET, FILM COATED ORAL at 21:33

## 2024-02-07 RX ADMIN — FUROSEMIDE 40 MG: 10 INJECTION, SOLUTION INTRAMUSCULAR; INTRAVENOUS at 10:57

## 2024-02-07 RX ADMIN — INSULIN LISPRO 3 UNITS: 100 INJECTION, SOLUTION INTRAVENOUS; SUBCUTANEOUS at 17:04

## 2024-02-07 RX ADMIN — ATORVASTATIN CALCIUM 20 MG: 20 TABLET, FILM COATED ORAL at 21:33

## 2024-02-07 RX ADMIN — GLIMEPIRIDE 2 MG: 2 TABLET ORAL at 10:40

## 2024-02-07 RX ADMIN — FINASTERIDE 5 MG: 5 TABLET, FILM COATED ORAL at 10:37

## 2024-02-07 RX ADMIN — LOSARTAN POTASSIUM 50 MG: 50 TABLET, FILM COATED ORAL at 10:37

## 2024-02-07 RX ADMIN — PANTOPRAZOLE SODIUM 40 MG: 40 TABLET, DELAYED RELEASE ORAL at 10:40

## 2024-02-07 RX ADMIN — INSULIN LISPRO 1 UNITS: 100 INJECTION, SOLUTION INTRAVENOUS; SUBCUTANEOUS at 10:42

## 2024-02-07 RX ADMIN — METOPROLOL SUCCINATE 150 MG: 50 TABLET, EXTENDED RELEASE ORAL at 21:32

## 2024-02-07 ASSESSMENT — COGNITIVE AND FUNCTIONAL STATUS - GENERAL
DRESSING REGULAR LOWER BODY CLOTHING: A LITTLE
STANDING UP FROM CHAIR USING ARMS: A LITTLE
HELP NEEDED FOR BATHING: A LITTLE
DAILY ACTIVITIY SCORE: 20
MOVING TO AND FROM BED TO CHAIR: A LITTLE
TOILETING: A LITTLE
DRESSING REGULAR UPPER BODY CLOTHING: A LITTLE
MOBILITY SCORE: 19
WALKING IN HOSPITAL ROOM: A LITTLE
CLIMB 3 TO 5 STEPS WITH RAILING: A LOT

## 2024-02-07 ASSESSMENT — PAIN SCALES - GENERAL
PAINLEVEL_OUTOF10: 0 - NO PAIN

## 2024-02-07 ASSESSMENT — PAIN - FUNCTIONAL ASSESSMENT
PAIN_FUNCTIONAL_ASSESSMENT: 0-10

## 2024-02-07 NOTE — PROGRESS NOTES
"Subjective Data:  Symptoms are improving  Reports good urinary response with IV Lasix.   Negative 1960 ml  Weight was not obtained standing today     Telemetry showed AT HR 120s    Overnight Events:    None reported      Objective Data:  Last Recorded Vitals:  Vitals:    02/06/24 2338 02/07/24 0300 02/07/24 0600 02/07/24 0700   BP: 117/79 (!) 146/99     BP Location:       Patient Position:       Pulse: 100 103     Resp: 18 18     Temp: 36.4 °C (97.5 °F)      TempSrc: Axillary      SpO2: 94% 94%     Weight:   97.6 kg (215 lb 2.7 oz) 97.6 kg (215 lb 2.7 oz)   Height:           Last Labs:  LABS:  CMP:  Results from last 7 days   Lab Units 02/07/24  0541 02/06/24  0647 02/04/24  1757   SODIUM mmol/L 138 138 134*   POTASSIUM mmol/L 3.7 3.9 5.0   CHLORIDE mmol/L 98 100 98   CO2 mmol/L 30 27 27   ANION GAP mmol/L 14 15 14   BUN mg/dL 32* 36* 36*   CREATININE mg/dL 1.66* 1.94* 2.05*   EGFR mL/min/1.73m*2 40* 34* 31*   MAGNESIUM mg/dL  --   --  2.20   ALBUMIN g/dL  --   --  3.4   ALT U/L  --   --  55*   AST U/L  --   --  18   BILIRUBIN TOTAL mg/dL  --   --  1.4*     CBC:  Results from last 7 days   Lab Units 02/07/24  0541 02/06/24  0647 02/04/24  1757   WBC AUTO x10*3/uL 10.1 10.6 17.4*   HEMOGLOBIN g/dL 12.4* 12.9* 13.6   HEMATOCRIT % 40.1* 43.2 45.0   PLATELETS AUTO x10*3/uL 153 149* 197   MCV fL 95 98 94     COAG:   Results from last 7 days   Lab Units 02/04/24  1757   INR  1.7*     ABO: No results found for: \"ABO\"  HEME/ENDO:  Results from last 7 days   Lab Units 02/04/24  1757   TSH mIU/L 4.13*      CARDIAC:   Results from last 7 days   Lab Units 02/04/24  1757   TROPHS ng/L 17   BNP pg/mL 3,956*   No results for input(s): \"CHOL\", \"LDLF\", \"LDL\", \"LDLCALC\", \"HDL\", \"TRIG\" in the last 34082 hours.     Imagine results  CT head wo IV contrast   Final Result   No acute intracranial hemorrhage or mass effect.        Minimal soft tissue swelling over the left frontal bone.             MACRO:   None.        Signed by: Erinn" "Lore 2/4/2024 9:58 PM   Dictation workstation:   [id number]      CT abdomen pelvis wo IV contrast   Final Result   No acute process involving the abdomen or pelvis   Small amount of ascites.   Moderate interstitial edema and pleural effusions at the lung bases   Mildly enlarged external iliac lymph nodes. Correlate for any history   of neoplasm. Correlation with PSA could also be performed.   Signed by Juve Philippe MD      XR chest 2 views   Final Result   1.  Possible opacity posteriorly seen on the lateral projection at the   lung base which may represent atelectasis or pneumonia..   Signed by Juice Chairez MD           Past Cardiology Tests (Last 3 Years):  EKG:  Electrocardiogram, 12-lead PRN ACS symptoms 02/07/2024 (Preliminary)      Electrocardiogram, 12-lead PRN ACS symptoms 02/06/2024      ECG 12 lead 02/04/2024      ECG 12 lead 12/28/2023    Echo:  No results found for this or any previous visit from the past 1095 days.    Ejection Fractions:  No results found for: \"EF\"  Cath:    Echo:   Echocardiogram 12/23/20[facility] at [facility]  - Technically difficult exam due to suboptimal positioning.   - Exam indication: Sustained atrial fibrillation   - The left ventricle is normal in size. Left ventricular systolic function is mildly decreased. EF = 50 ± 5% (visual est.) Definity contrast used for endocardial border detection.   - The right ventricle is normal in size. Right ventricular systolic function is low normal.   - The left atrial cavity is mildly dilated.   - There is AV sclerosis, no stenosis.   - Exam was compared with the prior  echocardiographic exam performed on 8/20/23.    LVEF has improved on side by side comparison. LV volumes have also improved      Echocardiogram: 08/20/2023 at [facility]  - Exam indication: Sustained atrial fibrillation   - The left ventricle is mildly dilated. There is mild concentric left ventricular   hypertrophy. Left ventricular systolic function is moderately decreased. EF = 38 ± " 5% (2D biplane) Left ventricular diastolic function was not evaluated due to AF.   - The right ventricle is normal in size. Right ventricular systolic function is mildly decreased.   - The left atrial cavity is moderately dilated.   - The right atrial cavity is dilated.   - There is mild (1+) mitral valve regurgitation.   - There is mild (1+) aortic valve regurgitation.   - Markedly abnormal global LV strain -7% with apical sparing consider cardiac  amyloid   - Exam was compared with the prior  echocardiographic exam performed on   2022 ([facility]). Slight decrease in LVEF, prior EF 45%.     Stress Test:  No results found for this or any previous visit from the past 1095 days.    Cardiac Imagin2023   NM SPECT/CT cardiac amyloid      1. Not Consistent with TTR amyloidosis     2021   1. SPECT Perfusion Study: Normal Perfusion but abnormal EF.    2. There is no scintigraphic evidence for inducible ischemia.    3. No evidence of scarred myocardium.    4. Left ventricle is mildly dilated. The left ventricle systolic function is mildly decreased.    5. Right ventricle is normal in size. The right ventricle systolic function is normal.    6. This is a low risk scan.    7. Incidental Findings from limited non-diagnostic CTAC:       - Coronary calcifications visualized.   gated Stress FBP    LVEF % 52       Inpatient Medications:  Scheduled medications   Medication Dose Route Frequency    apixaban  2.5 mg oral BID    atorvastatin  20 mg oral Nightly    azithromycin  500 mg oral q24h JAVI    cefTRIAXone  2 g intravenous q24h    docusate sodium  100 mg oral BID    doxazosin  4 mg oral Nightly    empagliflozin  25 mg oral Daily    finasteride  5 mg oral Daily    furosemide  40 mg intravenous q12h    glimepiride  2 mg oral Daily before breakfast    insulin lispro  0-5 Units subcutaneous TID with meals    losartan  50 mg oral Daily    melatonin  3 mg oral Daily    metoprolol succinate XL  150 mg oral BID     pantoprazole  40 mg oral Daily before breakfast    pantoprazole  40 mg intravenous Daily before breakfast     PRN medications   Medication    acetaminophen    Or    acetaminophen    acetaminophen    dextrose 10 % in water (D10W)    dextrose    glucagon    guaiFENesin    melatonin    metoprolol    ondansetron    ondansetron    polyethylene glycol    traMADol     Continuous Medications   Medication Dose Last Rate       Physical Exam:  GENERAL: alert, cooperative, pleasant, in no acute distress  SKIN: warm, dry  NECK: +JVD  CARDIAC: Tachycardic   CHEST: Normal respiratory efforts, lungs clear to auscultation bilaterally.  ABDOMEN: soft, nondistended  EXTREMITIES: +2 bilateral lower extremity edema   NEURO: Alert and oriented x 3.  Grossly normal.  Moves all 4 extremities.      Assessment/Plan   New Castano is a 83 y.o. male with  past medical history of Afib s/p ablation (Eliquis/SAMIRA), CKD3a, HFrEF (38%), HTN, HLD, DM2 who presented to ED with shortness of breath, cough, weakness, and elevated blood sugars.  Cardiology consulted for CHF.      2/6/2024 No acute process involving the abdomen or pelvis Small amount of ascites. Moderate interstitial edema and pleural effusions at the lung bases Mildly enlarged external iliac lymph nodes.  Creatinine is 1.94 ( was up to 3.32 when dehydrated for admit on 12/28/2023).  HS troponin neg.  BNP now 3956 ( previous 48 on 12/28/2023).  Volume up on exam. Mildly hypertensive.     2/7 Atrial tachycardia on telemetry. HR 120s diuresis well negative 1960ml      Weight  on admit 2/6/2024:  95.6kg  Weight on 12/30/2023 88.6k     Assessment:  # Acute on chronic systolic heart failure ( recovered LVEF to 50% on echo 12/2023)  # Atrial tachycardia 2:1, vs less likely atypical atrial flutter.    # CKD  # Hypertension  # Leukocytosis, WBC 17 on admit, ? CAP     Plan:  - Continue with diuresis and he is responding to furosemide 40mg IV BID.  Home dose of furosemide was reduced to 40mg daily  from 40mg BID as he was having hypotension and diarrhea at the end of 2023.  Will likely be discharged home on furosemide 40mg oral BID.  - Daily weights, strict I/O  - Agree with Eliquis 2.5mg BID  - continue increased Toprol to home dose of 150mg BID  - Continue losartan 50mg daily as BP is elevated and creatinine is at baseline     He can follow up with his established cardiologist, Dr SHELBY Tolentino at [facility] within 2 weeks of hospital discharge.     Code Status:  Full Code      Crystal Lim, APRN-CNP

## 2024-02-07 NOTE — PROGRESS NOTES
New Castano is a 83 y.o. male on day 3 of admission presenting with CHF (congestive heart failure), NYHA class I, acute on chronic, combined (CMS/HCC).      Subjective   New Castano is a 83 y.o. male with a past medical history of stage G3 CKD with a baseline creatinine between 1.3 to 1.5 mg/deciliter, atrial fibrillation status post ablation on Eliquis and a beta-blocker, HFrEF with LVEF 30%, hypertension, hyperlipidemia, diabetes who was recently admitted to [facility] with weakness, altered mental status and UTI. Had a wide-complex with A-fib RVR.  He was admitted to CCU on an amiodarone drip and later transitioned back to metoprolol, increased to 150 mg twice daily. He was discharged on 25 mg of Jardiance, furosemide 40 mg twice daily, losartan 50 mg, 1000 mg of metformin and 8 mg of Cardura on 12/26.  His blood pressure was 126/74 on 12/25 with a creatinine of 1.48 mg/a deciliter.       He then presented to [facility] on 12/28 with weakness, hypotension and diarrhea. Was found to have an acute kidney injury with a creatinine of 3.3 thus we saw him. Mr. Castano had a hemodynamic acute kidney injury.  He was taken off the loop diuretic, SGLT2, ARB and metformin. His creatinine improved following gentle IV volume expansion settling around 2 mg/dL. He was sent out on Lasix 40 mg daily. His weight was around 195 lbs. He comes back in with shortness of breath. His weight is up to 210 lbs. CT of the abdomen and pelvis showed moderate interstitial edema and pleural effusions. Moderate cardiomegaly. No renal obstruction with an enlarged prostate.  Nephrology is consulted for renal care.       Objective          Vitals 24HR  Heart Rate:  [100-104]   Temp:  [36.3 °C (97.3 °F)-37 °C (98.6 °F)]   Resp:  [18]   BP: (117-146)/(75-99)   Weight:  [97.6 kg (215 lb 2.7 oz)]   SpO2:  [94 %-97 %]     Intake/Output last 3 Shifts:    Intake/Output Summary (Last 24 hours) at 2/7/2024 1546  Last data filed at 2/7/2024 1400  Gross per  24 hour   Intake 720 ml   Output 3600 ml   Net -2880 ml       Physical Exam  General: Alert and oriented, no acute distress.    Lungs: Mildly diminished at the bases, no wheezes, rales or rhonchi.   Heart: Normal rate, no murmur, gallop or edema.   Abdomen: Soft, non-tender, non-distended, normal bowel sounds, no masses.   Extremities: Pitting LE edema improving   Neurologic: Awake, alert, and oriented X3, moves extremities spontaneously  Psychiatric: Appropriate mood and affect.     Scheduled Medications  apixaban, 2.5 mg, oral, BID  atorvastatin, 20 mg, oral, Nightly  azithromycin, 500 mg, oral, q24h JAVI  cefTRIAXone, 2 g, intravenous, q24h  docusate sodium, 100 mg, oral, BID  doxazosin, 4 mg, oral, Nightly  empagliflozin, 25 mg, oral, Daily  finasteride, 5 mg, oral, Daily  furosemide, 40 mg, intravenous, q12h  glimepiride, 2 mg, oral, Daily before breakfast  insulin lispro, 0-5 Units, subcutaneous, TID with meals  losartan, 50 mg, oral, Daily  melatonin, 3 mg, oral, Daily  metoprolol succinate XL, 150 mg, oral, BID  pantoprazole, 40 mg, oral, Daily before breakfast  pantoprazole, 40 mg, intravenous, Daily before breakfast      Continuous medications       PRN medications: [DISCONTINUED] acetaminophen **OR** acetaminophen **OR** acetaminophen, acetaminophen, dextrose 10 % in water (D10W), dextrose, glucagon, guaiFENesin, melatonin, metoprolol, ondansetron, ondansetron **OR** [DISCONTINUED] ondansetron, polyethylene glycol, traMADol     Relevant Results  Results from last 7 days   Lab Units 02/07/24  0541 02/06/24  0647 02/04/24  1757   WBC AUTO x10*3/uL 10.1 10.6 17.4*   HEMOGLOBIN g/dL 12.4* 12.9* 13.6   HEMATOCRIT % 40.1* 43.2 45.0   PLATELETS AUTO x10*3/uL 153 149* 197   NEUTROS PCT AUTO %  --   --  86.7   LYMPHS PCT AUTO %  --   --  3.1   MONOS PCT AUTO %  --   --  9.4   EOS PCT AUTO %  --   --  0.1     Results from last 7 days   Lab Units 02/07/24  0541 02/06/24  0647 02/04/24  1757   SODIUM mmol/L 138 138  134*   POTASSIUM mmol/L 3.7 3.9 5.0   CHLORIDE mmol/L 98 100 98   CO2 mmol/L 30 27 27   BUN mg/dL 32* 36* 36*   CREATININE mg/dL 1.66* 1.94* 2.05*   GLUCOSE mg/dL 219* 216* 328*   CALCIUM mg/dL 8.2* 8.7 8.9       CT head wo IV contrast   Final Result   No acute intracranial hemorrhage or mass effect.        Minimal soft tissue swelling over the left frontal bone.             MACRO:   None.        Signed by: Erinn Torrez 2/4/2024 9:58 PM   Dictation workstation:   [id number]      CT abdomen pelvis wo IV contrast   Final Result   No acute process involving the abdomen or pelvis   Small amount of ascites.   Moderate interstitial edema and pleural effusions at the lung bases   Mildly enlarged external iliac lymph nodes. Correlate for any history   of neoplasm. Correlation with PSA could also be performed.   Signed by Juve Philippe MD      XR chest 2 views   Final Result   1.  Possible opacity posteriorly seen on the lateral projection at the   lung base which may represent atelectasis or pneumonia..   Signed by Juice Chairez MD               Assessment/Plan      New Castano is a 83 y.o. male with a past medical history of stage G3 CKD with a baseline creatinine between 1.3 to 1.5 mg/deciliter, atrial fibrillation status post ablation on Eliquis and a beta-blocker, HFrEF with LVEF 30%, hypertension, hyperlipidemia, diabetes who was recently admitted to [facility] with weakness, altered mental status and UTI. Had a wide-complex with A-fib RVR.  He was admitted to CCU on an amiodarone drip and later transitioned back to metoprolol, increased to 150 mg twice daily. He was discharged on 25 mg of Jardiance, furosemide 40 mg twice daily, losartan 50 mg, 1000 mg of metformin and 8 mg of Cardura on 12/26.  His blood pressure was 126/74 on 12/25 with a creatinine of 1.48 mg/a deciliter.       He then presented to [facility] on 12/28 with weakness, hypotension and diarrhea. Was found to have an acute kidney injury with a  creatinine of 3.3 thus we saw him. Mr. Castano had a hemodynamic acute kidney injury.  He was taken off the loop diuretic, SGLT2, ARB and metformin. His creatinine improved following gentle IV volume expansion settling around 2 mg/dL. He was sent out on Lasix 40 mg daily. His weight was around 195 lbs. He comes back in with shortness of breath. His weight is up to 210 lbs. CT of the abdomen and pelvis showed moderate interstitial edema and pleural effusions. Moderate cardiomegaly. No renal obstruction with an enlarged prostate.  Nephrology is consulted for renal care.     Mr. Castano has improved renal function with his current creatinine back at baseline after recent history of ROSE. Unfortunately he has gained significant weight since being discharge and is in decompensated HF. Blood pressures have been elevated. He is now back on the SGLT2i, Losartan and is ordered IVP Lasix 40 mg twice daily with adequate urine output. His renal function remains stable with some improvement. Trend his RFP.  Avoid nephrotoxins as we are able.  Will follow with his care.        Principal Problem:    CHF (congestive heart failure), NYHA class I, acute on chronic, combined (CMS/[facility])       I spent 40 minutes in the professional and overall care of this patient.      Juan Spangler, DO

## 2024-02-07 NOTE — CARE PLAN
The patient's goals for the shift include      The clinical goals for the shift include Patient will remain hemodynamically stable throughout shift.

## 2024-02-07 NOTE — PROGRESS NOTES
New Castano is a 83 y.o. male on day 3 of admission presenting with CHF (congestive heart failure), NYHA class I, acute on chronic, combined (CMS/HCC).      Subjective   Pt is alert oriented to place  No events overnight  Does have chest pain since last night   Noted ekg  No pain   Breathing ok  No nausea vomiting   Is making urine       Objective     Last Recorded Vitals  BP (!) 146/99 Comment: RN NOTIFIED  Pulse 103   Temp 36.4 °C (97.5 °F) (Axillary)   Resp 18   Wt 97.6 kg (215 lb 2.7 oz)   SpO2 94%   Intake/Output last 3 Shifts:    Intake/Output Summary (Last 24 hours) at 2/7/2024 0967  Last data filed at 2/7/2024 0746  Gross per 24 hour   Intake 960 ml   Output 3100 ml   Net -2140 ml         Admission Weight  Weight: 88.6 kg (195 lb 5.2 oz) (02/04/24 1709)    Daily Weight  02/07/24 : 97.6 kg (215 lb 2.7 oz)    Image Results  Electrocardiogram, 12-lead PRN ACS symptoms  Sinus tachycardia  Nonspecific intraventricular conduction delay  ST & T wave abnormality, consider inferolateral ischemia  Abnormal ECG  When compared with ECG of 06-FEB-2024 06:54,  Previous ECG has undetermined rhythm, needs review  QRS axis Shifted right      Physical Exam  Alert appropriate , obese, flat affect, no resp distress  Chst good air entry camila   Cvs regular  Abdo soft  bs active, no tenderness   external cath in place  Ext chronic changes, does have trace edema  Cns nonfocal    Relevant Results  Scheduled medications  apixaban, 2.5 mg, oral, BID  atorvastatin, 20 mg, oral, Nightly  azithromycin, 500 mg, oral, q24h JAVI  cefTRIAXone, 2 g, intravenous, q24h  docusate sodium, 100 mg, oral, BID  doxazosin, 4 mg, oral, Nightly  empagliflozin, 25 mg, oral, Daily  finasteride, 5 mg, oral, Daily  furosemide, 40 mg, intravenous, q12h  glimepiride, 2 mg, oral, Daily before breakfast  insulin lispro, 0-5 Units, subcutaneous, TID with meals  losartan, 50 mg, oral, Daily  melatonin, 3 mg, oral, Daily  metoprolol succinate XL, 150 mg,  oral, BID  pantoprazole, 40 mg, oral, Daily before breakfast  pantoprazole, 40 mg, intravenous, Daily before breakfast      Continuous medications     PRN medications  PRN medications: [DISCONTINUED] acetaminophen **OR** acetaminophen **OR** acetaminophen, acetaminophen, dextrose 10 % in water (D10W), dextrose, glucagon, guaiFENesin, melatonin, ondansetron, ondansetron **OR** [DISCONTINUED] ondansetron, polyethylene glycol, traMADol  Results for orders placed or performed during the hospital encounter of 02/04/24 (from the past 96 hour(s))   CBC and Auto Differential   Result Value Ref Range    WBC 17.4 (H) 4.4 - 11.3 x10*3/uL    nRBC 0.0 0.0 - 0.0 /100 WBCs    RBC 4.77 4.50 - 5.90 x10*6/uL    Hemoglobin 13.6 13.5 - 17.5 g/dL    Hematocrit 45.0 41.0 - 52.0 %    MCV 94 80 - 100 fL    MCH 28.5 26.0 - 34.0 pg    MCHC 30.2 (L) 32.0 - 36.0 g/dL    RDW 16.2 (H) 11.5 - 14.5 %    Platelets 197 150 - 450 x10*3/uL    Neutrophils % 86.7 40.0 - 80.0 %    Immature Granulocytes %, Automated 0.4 0.0 - 0.9 %    Lymphocytes % 3.1 13.0 - 44.0 %    Monocytes % 9.4 2.0 - 10.0 %    Eosinophils % 0.1 0.0 - 6.0 %    Basophils % 0.3 0.0 - 2.0 %    Neutrophils Absolute 15.07 (H) 1.60 - 5.50 x10*3/uL    Immature Granulocytes Absolute, Automated 0.07 0.00 - 0.50 x10*3/uL    Lymphocytes Absolute 0.54 (L) 0.80 - 3.00 x10*3/uL    Monocytes Absolute 1.63 (H) 0.05 - 0.80 x10*3/uL    Eosinophils Absolute 0.01 0.00 - 0.40 x10*3/uL    Basophils Absolute 0.05 0.00 - 0.10 x10*3/uL   Comprehensive metabolic panel   Result Value Ref Range    Glucose 328 (H) 74 - 99 mg/dL    Sodium 134 (L) 136 - 145 mmol/L    Potassium 5.0 3.5 - 5.3 mmol/L    Chloride 98 98 - 107 mmol/L    Bicarbonate 27 21 - 32 mmol/L    Anion Gap 14 10 - 20 mmol/L    Urea Nitrogen 36 (H) 6 - 23 mg/dL    Creatinine 2.05 (H) 0.50 - 1.30 mg/dL    eGFR 31 (L) >60 mL/min/1.73m*2    Calcium 8.9 8.6 - 10.3 mg/dL    Albumin 3.4 3.4 - 5.0 g/dL    Alkaline Phosphatase 86 33 - 136 U/L    Total  Protein 6.6 6.4 - 8.2 g/dL    AST 18 9 - 39 U/L    Bilirubin, Total 1.4 (H) 0.0 - 1.2 mg/dL    ALT 55 (H) 10 - 52 U/L   Magnesium   Result Value Ref Range    Magnesium 2.20 1.60 - 2.40 mg/dL   Protime-INR   Result Value Ref Range    Protime 19.8 (H) 9.8 - 12.8 seconds    INR 1.7 (H) 0.9 - 1.1   Troponin I, High Sensitivity   Result Value Ref Range    Troponin I, High Sensitivity 17 0 - 20 ng/L   BLOOD GAS VENOUS FULL PANEL   Result Value Ref Range    POCT pH, Venous 7.36 7.33 - 7.43 pH    POCT pCO2, Venous 55 (H) 41 - 51 mm Hg    POCT pO2, Venous 35 35 - 45 mm Hg    POCT SO2, Venous 34 (L) 45 - 75 %    POCT Oxy Hemoglobin, Venous 33.1 (L) 45.0 - 75.0 %    POCT Hematocrit Calculated, Venous 43.0 41.0 - 52.0 %    POCT Sodium, Venous 130 (L) 136 - 145 mmol/L    POCT Potassium, Venous 5.4 (H) 3.5 - 5.3 mmol/L    POCT Chloride, Venous 99 98 - 107 mmol/L    POCT Ionized Calicum, Venous 1.13 1.10 - 1.33 mmol/L    POCT Glucose, Venous 345 (H) 74 - 99 mg/dL    POCT Lactate, Venous 2.5 (H) 0.4 - 2.0 mmol/L    POCT Base Excess, Venous 4.1 (H) -2.0 - 3.0 mmol/L    POCT HCO3 Calculated, Venous 31.1 (H) 22.0 - 26.0 mmol/L    POCT Hemoglobin, Venous 14.3 13.5 - 17.5 g/dL    POCT Anion Gap, Venous 5.0 (L) 10.0 - 25.0 mmol/L    Patient Temperature 37.0 degrees Celsius    FiO2 21 %   Urinalysis with Reflex Culture and Microscopic   Result Value Ref Range    Color, Urine Yellow Straw, Yellow    Appearance, Urine Clear Clear    Specific Gravity, Urine 1.028 1.005 - 1.035    pH, Urine 5.0 5.0, 5.5, 6.0, 6.5, 7.0, 7.5, 8.0    Protein, Urine 100 (2+) (N) NEGATIVE mg/dL    Glucose, Urine >=500 (3+) (A) NEGATIVE mg/dL    Blood, Urine NEGATIVE NEGATIVE    Ketones, Urine NEGATIVE NEGATIVE mg/dL    Bilirubin, Urine NEGATIVE NEGATIVE    Urobilinogen, Urine <2.0 <2.0 mg/dL    Nitrite, Urine NEGATIVE NEGATIVE    Leukocyte Esterase, Urine NEGATIVE NEGATIVE   Extra Urine Gray Tube   Result Value Ref Range    Extra Tube Hold for add-ons.    TSH with  reflex to Free T4 if abnormal   Result Value Ref Range    Thyroid Stimulating Hormone 4.13 (H) 0.44 - 3.98 mIU/L   Thyroxine, Free   Result Value Ref Range    Thyroxine, Free 0.99 0.61 - 1.12 ng/dL   B-type natriuretic peptide   Result Value Ref Range    BNP 3,956 (H) 0 - 99 pg/mL   Urinalysis Microscopic   Result Value Ref Range    WBC, Urine 1-5 1-5, NONE /HPF    RBC, Urine 1-2 NONE, 1-2, 3-5 /HPF   Sars-CoV-2 and Influenza A/B PCR   Result Value Ref Range    Flu A Result Not Detected Not Detected    Flu B Result Not Detected Not Detected    Coronavirus 2019, PCR Not Detected Not Detected   RSV PCR   Result Value Ref Range    RSV PCR Not Detected Not Detected   POCT GLUCOSE   Result Value Ref Range    POCT Glucose 293 (H) 74 - 99 mg/dL   ECG 12 lead   Result Value Ref Range    Ventricular Rate 112 BPM    Atrial Rate 112 BPM    CA Interval 114 ms    QRS Duration 116 ms    QT Interval 356 ms    QTC Calculation(Bazett) 485 ms    P Axis 108 degrees    R Axis -31 degrees    T Axis 144 degrees    QRS Count 19 beats    Q Onset 215 ms    P Onset 158 ms    P Offset 181 ms    T Offset 393 ms    QTC Fredericia 438 ms   Blood Culture    Specimen: Peripheral Venipuncture; Blood culture   Result Value Ref Range    Blood Culture No growth at 2 days    Blood Culture    Specimen: Peripheral Venipuncture; Blood culture   Result Value Ref Range    Blood Culture No growth at 2 days    Blood Gas Lactic Acid, Venous   Result Value Ref Range    POCT Lactate, Venous 1.9 0.4 - 2.0 mmol/L   Lactate   Result Value Ref Range    Lactate 1.6 0.4 - 2.0 mmol/L   POCT GLUCOSE   Result Value Ref Range    POCT Glucose 269 (H) 74 - 99 mg/dL   POCT GLUCOSE   Result Value Ref Range    POCT Glucose 240 (H) 74 - 99 mg/dL   POCT GLUCOSE   Result Value Ref Range    POCT Glucose 288 (H) 74 - 99 mg/dL   POCT GLUCOSE   Result Value Ref Range    POCT Glucose 308 (H) 74 - 99 mg/dL   POCT GLUCOSE   Result Value Ref Range    POCT Glucose 149 (H) 74 - 99 mg/dL    CBC   Result Value Ref Range    WBC 10.6 4.4 - 11.3 x10*3/uL    nRBC 0.0 0.0 - 0.0 /100 WBCs    RBC 4.41 (L) 4.50 - 5.90 x10*6/uL    Hemoglobin 12.9 (L) 13.5 - 17.5 g/dL    Hematocrit 43.2 41.0 - 52.0 %    MCV 98 80 - 100 fL    MCH 29.3 26.0 - 34.0 pg    MCHC 29.9 (L) 32.0 - 36.0 g/dL    RDW 16.1 (H) 11.5 - 14.5 %    Platelets 149 (L) 150 - 450 x10*3/uL   Basic metabolic panel   Result Value Ref Range    Glucose 216 (H) 74 - 99 mg/dL    Sodium 138 136 - 145 mmol/L    Potassium 3.9 3.5 - 5.3 mmol/L    Chloride 100 98 - 107 mmol/L    Bicarbonate 27 21 - 32 mmol/L    Anion Gap 15 10 - 20 mmol/L    Urea Nitrogen 36 (H) 6 - 23 mg/dL    Creatinine 1.94 (H) 0.50 - 1.30 mg/dL    eGFR 34 (L) >60 mL/min/1.73m*2    Calcium 8.7 8.6 - 10.3 mg/dL   Electrocardiogram, 12-lead PRN ACS symptoms   Result Value Ref Range    Ventricular Rate 103 BPM    Atrial Rate 103 BPM    MI Interval 120 ms    QRS Duration 124 ms    QT Interval 380 ms    QTC Calculation(Bazett) 497 ms    P Axis 87 degrees    R Axis -34 degrees    T Axis 131 degrees    QRS Count 17 beats    Q Onset 216 ms    P Onset 156 ms    P Offset 189 ms    T Offset 406 ms    QTC Fredericia 455 ms   POCT GLUCOSE   Result Value Ref Range    POCT Glucose 218 (H) 74 - 99 mg/dL   POCT GLUCOSE   Result Value Ref Range    POCT Glucose 277 (H) 74 - 99 mg/dL   POCT GLUCOSE   Result Value Ref Range    POCT Glucose 138 (H) 74 - 99 mg/dL   POCT GLUCOSE   Result Value Ref Range    POCT Glucose 153 (H) 74 - 99 mg/dL   CBC   Result Value Ref Range    WBC 10.1 4.4 - 11.3 x10*3/uL    nRBC 0.0 0.0 - 0.0 /100 WBCs    RBC 4.23 (L) 4.50 - 5.90 x10*6/uL    Hemoglobin 12.4 (L) 13.5 - 17.5 g/dL    Hematocrit 40.1 (L) 41.0 - 52.0 %    MCV 95 80 - 100 fL    MCH 29.3 26.0 - 34.0 pg    MCHC 30.9 (L) 32.0 - 36.0 g/dL    RDW 16.2 (H) 11.5 - 14.5 %    Platelets 153 150 - 450 x10*3/uL   Basic metabolic panel   Result Value Ref Range    Glucose 219 (H) 74 - 99 mg/dL    Sodium 138 136 - 145 mmol/L    Potassium  3.7 3.5 - 5.3 mmol/L    Chloride 98 98 - 107 mmol/L    Bicarbonate 30 21 - 32 mmol/L    Anion Gap 14 10 - 20 mmol/L    Urea Nitrogen 32 (H) 6 - 23 mg/dL    Creatinine 1.66 (H) 0.50 - 1.30 mg/dL    eGFR 40 (L) >60 mL/min/1.73m*2    Calcium 8.2 (L) 8.6 - 10.3 mg/dL   Electrocardiogram, 12-lead PRN ACS symptoms   Result Value Ref Range    Ventricular Rate 101 BPM    Atrial Rate 101 BPM    CO Interval 124 ms    QRS Duration 124 ms    QT Interval 386 ms    QTC Calculation(Bazett) 500 ms    P Axis 118 degrees    R Axis 7 degrees    T Axis 151 degrees    QRS Count 16 beats    Q Onset 215 ms    P Onset 163 ms    P Offset 191 ms    T Offset 408 ms    QTC Fredericia 459 ms   POCT GLUCOSE   Result Value Ref Range    POCT Glucose 176 (H) 74 - 99 mg/dL     Echo ef 50           Assessment/Plan      #Acute on chronic systolic congestive heart failure  Cont with IV Lasix  Daily weights and electrolytes  Cardiology consulted  BNP 3900   Ot and pt     #Community-acquired pneumonia   IV Rocephin/Zithromax  However lung bases on the CT abdo are consistent with edema  Will change to po antibiotics     #CKD 3B  Monitor with diuresing  .creatinine 1.6  Cont with jardiance       #Diabetes mellitus  Sliding scale insulin coverage  Hba1c 7.5     #Hypertension  Hold medications for systolic less than 120     #Atrial fibrillation s/p ablation  Continue Eliquis     #Dyslipidemia  Continue statins and maintain target LDL less than 70    Principal Problem:    CHF (congestive heart failure), NYHA class I, acute on chronic, combined (CMS/[facility])    Code status dw pt and he has not made a decisoin         Kevon Gunderson MD

## 2024-02-07 NOTE — PROGRESS NOTES
Care Coordinator Note:  Spoke to patient daughter Nena [phone] to confirm discharge plan with [facility] home care, referral sent to [facility] home health care  Plan:  patient admitted d/t CHF PLAN; IV abx-community acquired pneumonia-will be changed to PO, cont IV lasix ; Cardiology following   Patient TYREE is active  Esme MULLNIS, RN TCC

## 2024-02-07 NOTE — NURSING NOTE
Message MD regarding patient HR that's been resting in the 120s-129s. Patient is resting in the chair and is asymptomatic. Will wait for further orders from MD.

## 2024-02-07 NOTE — NURSING NOTE
Made MD aware of patient HR hanging in the 128's . Patient states he is feeling fine at this time and is eating breakfast. Patient is asymptomatic resting in bed at this time. Will wait for further orders from MD.

## 2024-02-08 LAB
ANION GAP SERPL CALC-SCNC: 12 MMOL/L (ref 10–20)
BUN SERPL-MCNC: 29 MG/DL (ref 6–23)
CALCIUM SERPL-MCNC: 8.2 MG/DL (ref 8.6–10.3)
CHLORIDE SERPL-SCNC: 99 MMOL/L (ref 98–107)
CO2 SERPL-SCNC: 33 MMOL/L (ref 21–32)
CREAT SERPL-MCNC: 1.66 MG/DL (ref 0.5–1.3)
EGFRCR SERPLBLD CKD-EPI 2021: 40 ML/MIN/1.73M*2
ERYTHROCYTE [DISTWIDTH] IN BLOOD BY AUTOMATED COUNT: 16.1 % (ref 11.5–14.5)
GLUCOSE BLD MANUAL STRIP-MCNC: 154 MG/DL (ref 74–99)
GLUCOSE BLD MANUAL STRIP-MCNC: 177 MG/DL (ref 74–99)
GLUCOSE BLD MANUAL STRIP-MCNC: 242 MG/DL (ref 74–99)
GLUCOSE SERPL-MCNC: 149 MG/DL (ref 74–99)
HCT VFR BLD AUTO: 40.4 % (ref 41–52)
HGB BLD-MCNC: 12.1 G/DL (ref 13.5–17.5)
MCH RBC QN AUTO: 28.5 PG (ref 26–34)
MCHC RBC AUTO-ENTMCNC: 30 G/DL (ref 32–36)
MCV RBC AUTO: 95 FL (ref 80–100)
NRBC BLD-RTO: 0 /100 WBCS (ref 0–0)
PLATELET # BLD AUTO: 135 X10*3/UL (ref 150–450)
POTASSIUM SERPL-SCNC: 3.6 MMOL/L (ref 3.5–5.3)
RBC # BLD AUTO: 4.24 X10*6/UL (ref 4.5–5.9)
SODIUM SERPL-SCNC: 140 MMOL/L (ref 136–145)
WBC # BLD AUTO: 8.4 X10*3/UL (ref 4.4–11.3)

## 2024-02-08 PROCEDURE — C9113 INJ PANTOPRAZOLE SODIUM, VIA: HCPCS | Performed by: INTERNAL MEDICINE

## 2024-02-08 PROCEDURE — 80051 ELECTROLYTE PANEL: CPT | Performed by: INTERNAL MEDICINE

## 2024-02-08 PROCEDURE — 2500000001 HC RX 250 WO HCPCS SELF ADMINISTERED DRUGS (ALT 637 FOR MEDICARE OP): Performed by: PHARMACIST

## 2024-02-08 PROCEDURE — 85027 COMPLETE CBC AUTOMATED: CPT | Performed by: INTERNAL MEDICINE

## 2024-02-08 PROCEDURE — 82947 ASSAY GLUCOSE BLOOD QUANT: CPT

## 2024-02-08 PROCEDURE — 36415 COLL VENOUS BLD VENIPUNCTURE: CPT | Performed by: INTERNAL MEDICINE

## 2024-02-08 PROCEDURE — 2500000002 HC RX 250 W HCPCS SELF ADMINISTERED DRUGS (ALT 637 FOR MEDICARE OP, ALT 636 FOR OP/ED): Performed by: INTERNAL MEDICINE

## 2024-02-08 PROCEDURE — 99232 SBSQ HOSP IP/OBS MODERATE 35: CPT | Performed by: INTERNAL MEDICINE

## 2024-02-08 PROCEDURE — 2500000004 HC RX 250 GENERAL PHARMACY W/ HCPCS (ALT 636 FOR OP/ED): Performed by: INTERNAL MEDICINE

## 2024-02-08 PROCEDURE — 2500000001 HC RX 250 WO HCPCS SELF ADMINISTERED DRUGS (ALT 637 FOR MEDICARE OP): Performed by: NURSE PRACTITIONER

## 2024-02-08 PROCEDURE — 1200000002 HC GENERAL ROOM WITH TELEMETRY DAILY

## 2024-02-08 PROCEDURE — 2500000001 HC RX 250 WO HCPCS SELF ADMINISTERED DRUGS (ALT 637 FOR MEDICARE OP): Performed by: INTERNAL MEDICINE

## 2024-02-08 RX ADMIN — ATORVASTATIN CALCIUM 20 MG: 20 TABLET, FILM COATED ORAL at 20:40

## 2024-02-08 RX ADMIN — AZITHROMYCIN DIHYDRATE 500 MG: 500 TABLET ORAL at 08:38

## 2024-02-08 RX ADMIN — FUROSEMIDE 40 MG: 10 INJECTION, SOLUTION INTRAMUSCULAR; INTRAVENOUS at 12:41

## 2024-02-08 RX ADMIN — PANTOPRAZOLE SODIUM 40 MG: 40 INJECTION, POWDER, FOR SOLUTION INTRAVENOUS at 08:38

## 2024-02-08 RX ADMIN — DOCUSATE SODIUM 100 MG: 100 CAPSULE, LIQUID FILLED ORAL at 08:38

## 2024-02-08 RX ADMIN — METOPROLOL SUCCINATE 150 MG: 50 TABLET, EXTENDED RELEASE ORAL at 08:38

## 2024-02-08 RX ADMIN — INSULIN LISPRO 3 UNITS: 100 INJECTION, SOLUTION INTRAVENOUS; SUBCUTANEOUS at 12:40

## 2024-02-08 RX ADMIN — Medication 3 MG: at 20:40

## 2024-02-08 RX ADMIN — INSULIN LISPRO 1 UNITS: 100 INJECTION, SOLUTION INTRAVENOUS; SUBCUTANEOUS at 17:09

## 2024-02-08 RX ADMIN — GLIMEPIRIDE 2 MG: 2 TABLET ORAL at 07:10

## 2024-02-08 RX ADMIN — METOPROLOL SUCCINATE 150 MG: 50 TABLET, EXTENDED RELEASE ORAL at 20:40

## 2024-02-08 RX ADMIN — APIXABAN 2.5 MG: 2.5 TABLET, FILM COATED ORAL at 08:38

## 2024-02-08 RX ADMIN — PANTOPRAZOLE SODIUM 40 MG: 40 TABLET, DELAYED RELEASE ORAL at 07:10

## 2024-02-08 RX ADMIN — FINASTERIDE 5 MG: 5 TABLET, FILM COATED ORAL at 08:38

## 2024-02-08 RX ADMIN — APIXABAN 2.5 MG: 2.5 TABLET, FILM COATED ORAL at 20:40

## 2024-02-08 RX ADMIN — DOCUSATE SODIUM 100 MG: 100 CAPSULE, LIQUID FILLED ORAL at 20:40

## 2024-02-08 RX ADMIN — DOXAZOSIN 4 MG: 2 TABLET ORAL at 20:40

## 2024-02-08 RX ADMIN — EMPAGLIFLOZIN 25 MG: 25 TABLET, FILM COATED ORAL at 08:38

## 2024-02-08 RX ADMIN — LOSARTAN POTASSIUM 50 MG: 50 TABLET, FILM COATED ORAL at 08:38

## 2024-02-08 ASSESSMENT — PAIN SCALES - GENERAL
PAINLEVEL_OUTOF10: 0 - NO PAIN
PAINLEVEL_OUTOF10: 0 - NO PAIN

## 2024-02-08 ASSESSMENT — PAIN SCALES - WONG BAKER: WONGBAKER_NUMERICALRESPONSE: NO HURT

## 2024-02-08 NOTE — PROGRESS NOTES
"Subjective Data:  No new complaints. Notes improved leg swelling but still not to baseline.        Objective Data:  Last Recorded Vitals:  Vitals:    24 2127 24 0600 24 0733 24 1200   BP: (!) 131/91  120/72 112/77   BP Location:   Right arm Right arm   Patient Position:   Sitting Sitting   Pulse: 101  91 83   Resp: 18  16 16   Temp: 36.6 °C (97.9 °F)  36.1 °C (97 °F) 36.2 °C (97.2 °F)   TempSrc: Oral  Temporal Temporal   SpO2: 94%  93% 95%   Weight:  97.3 kg (214 lb 8.1 oz)     Height:         Medical Gas Therapy: None (Room air)  O2 Delivery Method: Nasal cannula  Weight  Av.3 kg (210 lb 3 oz)  Min: 88.6 kg (195 lb 5.2 oz)  Max: 97.6 kg (215 lb 2.7 oz)    LABS:  CMP:  Results from last 7 days   Lab Units 24  0548 24  0541 24  0647 24  1757   SODIUM mmol/L 140 138 138 134*   POTASSIUM mmol/L 3.6 3.7 3.9 5.0   CHLORIDE mmol/L 99 98 100 98   CO2 mmol/L 33* 30 27 27   ANION GAP mmol/L 12 14 15 14   BUN mg/dL 29* 32* 36* 36*   CREATININE mg/dL 1.66* 1.66* 1.94* 2.05*   EGFR mL/min/1.73m*2 40* 40* 34* 31*   MAGNESIUM mg/dL  --   --   --  2.20   ALBUMIN g/dL  --   --   --  3.4   ALT U/L  --   --   --  55*   AST U/L  --   --   --  18   BILIRUBIN TOTAL mg/dL  --   --   --  1.4*     CBC:  Results from last 7 days   Lab Units 24  0548 24  0541 24  0647 24  1757   WBC AUTO x10*3/uL 8.4 10.1 10.6 17.4*   HEMOGLOBIN g/dL 12.1* 12.4* 12.9* 13.6   HEMATOCRIT % 40.4* 40.1* 43.2 45.0   PLATELETS AUTO x10*3/uL 135* 153 149* 197   MCV fL 95 95 98 94     COAG:   Results from last 7 days   Lab Units 24  1757   INR  1.7*     ABO: No results found for: \"ABO\"  HEME/ENDO:  Results from last 7 days   Lab Units 24  1757   TSH mIU/L 4.13*      CARDIAC:   Results from last 7 days   Lab Units 24  1757   TROPHS ng/L 17   BNP pg/mL 3,956*             Last I/O:    Intake/Output Summary (Last 24 hours) at 2024 1359  Last data filed at 2024 " 0900  Gross per 24 hour   Intake 701 ml   Output 400 ml   Net 301 ml     Net IO Since Admission: -2,754 mL [02/08/24 1359]      Imaging Results:  ECG 12 lead    Result Date: 2/6/2024  Sinus tachycardia with occasional Premature ventricular complexes Left axis deviation Pulmonary disease pattern Left ventricular hypertrophy with QRS widening and repolarization abnormality ( R in aVL , [place] product , Romhilt-[place] ) Inferior infarct , age undetermined Abnormal ECG When compared with ECG of 28-DEC-2023 14:04, Premature ventricular complexes are now Present Inferior infarct is now Present See ED provider note for full interpretation and clinical correlation Confirmed by Yanick Solorzano ([id number]) on 2/6/2024 6:15:51 PM    CT head wo IV contrast    Result Date: 2/4/2024  Interpreted By:  Erinn Torrez, STUDY: CT HEAD WO IV CONTRAST;  2/4/2024 8:35 pm   INDICATION: Signs/Symptoms:AMS.   COMPARISON: None.   ACCESSION NUMBER(S): [id number]   ORDERING CLINICIAN: NATALIE CHANG   TECHNIQUE: Axial noncontrast CT images of the head.   FINDINGS: BRAIN PARENCHYMA: Moderate non-specific white matter changes and parenchymal volume loss. Atherosclerotic calcifications of the carotid and vertebral arteries. The no mass effect or midline shift.   HEMORRHAGE: No acute intracranial hemorrhage. VENTRICLES and EXTRA-AXIAL SPACES: The ventricles, sulci and basal cisterns enlarged, concordant with parenchymal volume loss. EXTRACRANIAL SOFT TISSUES: Minimal soft tissue swelling over the left frontal calvarium. PARANASAL SINUSES/MASTOIDS: The visualized paranasal sinuses and mastoid air cells are aerated. CALVARIUM: No depressed skull fracture. No destructive osseous lesion.   OTHER FINDINGS: None.       No acute intracranial hemorrhage or mass effect.   Minimal soft tissue swelling over the left frontal bone.     MACRO: None.   Signed by: Erinn Torrez 2/4/2024 9:58 PM Dictation workstation:   [id number]    CT abdomen pelvis wo IV  contrast    Result Date: 2/4/2024  STUDY: CT Abdomen and Pelvis without IV Contrast; 2/4/2024 at 8:36 p.m. INDICATION: Lower abdominal pain. COMPARISON: None Available. ACCESSION NUMBER(S): [id number] ORDERING CLINICIAN: NATALIE CHANG TECHNIQUE: CT of the abdomen and pelvis was performed.  Contiguous axial images were obtained at 3 mm slice thickness through the abdomen and pelvis. Coronal and sagittal reconstructions at 3 mm slice thickness were performed. No intravenous contrast was administered.  Automated mA/kV exposure control was utilized and patient examination was performed in strict accordance with principles of ALARA. FINDINGS: Please note that the evaluation of vessels, lymph nodes and organs is limited without intravenous contrast.  LOWER CHEST: Moderate interstitial edema and moderate pleural effusions at the lung bases. Moderate cardiomegaly.  ABDOMEN:  LIVER: No hepatomegaly.  Smooth surface contour.  Normal attenuation.  BILE DUCTS: No intrahepatic or extrahepatic biliary ductal dilatation.  GALLBLADDER: The gallbladder is surgically absent. STOMACH: Small hiatal hernia.  PANCREAS: No masses or ductal dilatation.  SPLEEN: No splenomegaly or focal splenic lesion.  ADRENAL GLANDS: No thickening or nodules.  KIDNEYS AND URETERS: Kidneys are normal in size and location.  No renal or ureteral calculi.  PELVIS:  BLADDER: No abnormalities identified.  REPRODUCTIVE ORGANS: Moderate prostate enlargement  BOWEL: No abnormalities identified.  VESSELS: No abnormalities identified.  Abdominal aorta is normal in caliber.  PERITONEUM/RETROPERITONEUM/LYMPH NODES: Small amount of ascites  No pneumoperitoneum. Mildly enlarged external iliac lymph nodes. Lymph node on the left measures 1.3 cm image 129 series 604. Lymph node on the right measures 1 cm image 133  ABDOMINAL WALL: Fat-containing inguinal hernias SOFT TISSUES: No abnormalities identified.  BONES: Disc degeneration lower lumbar spine with prior  posterior decompression. No aggressive osseous lesions    No acute process involving the abdomen or pelvis Small amount of ascites. Moderate interstitial edema and pleural effusions at the lung bases Mildly enlarged external iliac lymph nodes. Correlate for any history of neoplasm. Correlation with PSA could also be performed. Signed by Juve Philippe MD    XR chest 2 views    Result Date: 2/4/2024  STUDY: Chest Radiographs;  2/4/2024 5:42PM INDICATION: Fatigue. COMPARISON: XR chest 12/28/2023 ACCESSION NUMBER(S): [id number] ORDERING CLINICIAN: NATALIE CHANG TECHNIQUE:  Frontal and lateral chest. FINDINGS: CARDIOMEDIASTINAL SILHOUETTE: Cardiomediastinal silhouette is normal in size and configuration.  LUNGS: The lateral projection shows a possible opacity posteriorly possibly 3 lung base which represent atelectasis or pneumonia..  ABDOMEN: No remarkable upper abdominal findings.  BONES: No acute osseous changes.    1.  Possible opacity posteriorly seen on the lateral projection at the lung base which may represent atelectasis or pneumonia.. Signed by Juice Chairez MD            Inpatient Medications:  Scheduled medications   Medication Dose Route Frequency    apixaban  2.5 mg oral BID    atorvastatin  20 mg oral Nightly    cefTRIAXone  2 g intravenous q24h    docusate sodium  100 mg oral BID    doxazosin  4 mg oral Nightly    empagliflozin  25 mg oral Daily    finasteride  5 mg oral Daily    furosemide  40 mg intravenous q12h    glimepiride  2 mg oral Daily before breakfast    insulin lispro  0-5 Units subcutaneous TID with meals    losartan  50 mg oral Daily    melatonin  3 mg oral Daily    metoprolol succinate XL  150 mg oral BID    pantoprazole  40 mg oral Daily before breakfast    pantoprazole  40 mg intravenous Daily before breakfast     PRN medications   Medication    acetaminophen    Or    acetaminophen    acetaminophen    dextrose 10 % in water (D10W)    dextrose    glucagon    guaiFENesin    melatonin     metoprolol    ondansetron    ondansetron    polyethylene glycol    traMADol     Continuous Medications   Medication Dose Last Rate           Physical Exam:  Gen Well appearing elderly male sitting up in NAD. Body mass index is 29.92 kg/m².   CV reg rate. No m/r/g appreciated. +JVD. 1-2+ bilateral leg edema. Pulses 2+ and symmetric.    Pulm Reduced at the bases. Lungs otherwise clear with normal respiratory effort.   Neuro Alert and conversant. Grossly nonfocal.    I reviewed Telemetry - AT with CVR       Assessment:  Acute on chronic HFpEF  Volume status improved though remains volume+ Urine output adequate.  Persistent atrial tachycardia  Vs. Atypical atrial flutter. Rates acceptable. On anticoagulation regardless.  3. Hypertension   BP acceptable under present circumstances        Recommendations:  Con't IV diuretics. Strict I/O's. Daily weights. Monitor Mag/K and supplement to >2/4. Close monitoring of renal function. Likely to oral tomorrow.     Mundo Holguin MD

## 2024-02-08 NOTE — PROGRESS NOTES
[place] is a 83 y.o. male on day 4 of admission presenting with CHF (congestive heart failure), NYHA class I, acute on chronic, combined (CMS/HCC).      Subjective   Pt is alert oriented to place  No events overnight  No pain   Breathing ok  No nausea vomiting   Is making urine       Objective     Last Recorded Vitals  /72 (BP Location: Right arm, Patient Position: Sitting)   Pulse 91   Temp 36.1 °C (97 °F) (Temporal)   Resp 16   Wt 97.3 kg (214 lb 8.1 oz)   SpO2 93%   Intake/Output last 3 Shifts:    Intake/Output Summary (Last 24 hours) at 2/8/2024 0826  Last data filed at 2/7/2024 2129  Gross per 24 hour   Intake 461 ml   Output 1300 ml   Net -839 ml         Admission Weight  Weight: 88.6 kg (195 lb 5.2 oz) (02/04/24 1709)    Daily Weight  02/08/24 : 97.3 kg (214 lb 8.1 oz)    Image Results  Electrocardiogram, 12-lead PRN ACS symptoms  Sinus tachycardia  Nonspecific intraventricular conduction delay  ST & T wave abnormality, consider inferolateral ischemia  Abnormal ECG  When compared with ECG of 06-FEB-2024 06:54,  Previous ECG has undetermined rhythm, needs review  QRS axis Shifted right  Confirmed by Sadiq Watts ([id number]) on 2/7/2024 8:50:06 PM      Physical Exam  Alert appropriate , obese, flat affect, no resp distress  Chst good air entry camila   Cvs regular  Abdo soft  bs active, no tenderness   external cath in place  Ext chronic changes, does have trace edema  Cns nonfocal    Relevant Results  Scheduled medications  apixaban, 2.5 mg, oral, BID  atorvastatin, 20 mg, oral, Nightly  azithromycin, 500 mg, oral, q24h JAVI  cefTRIAXone, 2 g, intravenous, q24h  docusate sodium, 100 mg, oral, BID  doxazosin, 4 mg, oral, Nightly  empagliflozin, 25 mg, oral, Daily  finasteride, 5 mg, oral, Daily  furosemide, 40 mg, intravenous, q12h  glimepiride, 2 mg, oral, Daily before breakfast  insulin lispro, 0-5 Units, subcutaneous, TID with meals  losartan, 50 mg, oral, Daily  melatonin, 3 mg, oral,  Daily  metoprolol succinate XL, 150 mg, oral, BID  pantoprazole, 40 mg, oral, Daily before breakfast  pantoprazole, 40 mg, intravenous, Daily before breakfast      Continuous medications     PRN medications  PRN medications: [DISCONTINUED] acetaminophen **OR** acetaminophen **OR** acetaminophen, acetaminophen, dextrose 10 % in water (D10W), dextrose, glucagon, guaiFENesin, melatonin, metoprolol, ondansetron, ondansetron **OR** [DISCONTINUED] ondansetron, polyethylene glycol, traMADol  Results for orders placed or performed during the hospital encounter of 02/04/24 (from the past 96 hour(s))   CBC and Auto Differential   Result Value Ref Range    WBC 17.4 (H) 4.4 - 11.3 x10*3/uL    nRBC 0.0 0.0 - 0.0 /100 WBCs    RBC 4.77 4.50 - 5.90 x10*6/uL    Hemoglobin 13.6 13.5 - 17.5 g/dL    Hematocrit 45.0 41.0 - 52.0 %    MCV 94 80 - 100 fL    MCH 28.5 26.0 - 34.0 pg    MCHC 30.2 (L) 32.0 - 36.0 g/dL    RDW 16.2 (H) 11.5 - 14.5 %    Platelets 197 150 - 450 x10*3/uL    Neutrophils % 86.7 40.0 - 80.0 %    Immature Granulocytes %, Automated 0.4 0.0 - 0.9 %    Lymphocytes % 3.1 13.0 - 44.0 %    Monocytes % 9.4 2.0 - 10.0 %    Eosinophils % 0.1 0.0 - 6.0 %    Basophils % 0.3 0.0 - 2.0 %    Neutrophils Absolute 15.07 (H) 1.60 - 5.50 x10*3/uL    Immature Granulocytes Absolute, Automated 0.07 0.00 - 0.50 x10*3/uL    Lymphocytes Absolute 0.54 (L) 0.80 - 3.00 x10*3/uL    Monocytes Absolute 1.63 (H) 0.05 - 0.80 x10*3/uL    Eosinophils Absolute 0.01 0.00 - 0.40 x10*3/uL    Basophils Absolute 0.05 0.00 - 0.10 x10*3/uL   Comprehensive metabolic panel   Result Value Ref Range    Glucose 328 (H) 74 - 99 mg/dL    Sodium 134 (L) 136 - 145 mmol/L    Potassium 5.0 3.5 - 5.3 mmol/L    Chloride 98 98 - 107 mmol/L    Bicarbonate 27 21 - 32 mmol/L    Anion Gap 14 10 - 20 mmol/L    Urea Nitrogen 36 (H) 6 - 23 mg/dL    Creatinine 2.05 (H) 0.50 - 1.30 mg/dL    eGFR 31 (L) >60 mL/min/1.73m*2    Calcium 8.9 8.6 - 10.3 mg/dL    Albumin 3.4 3.4 - 5.0 g/dL     Alkaline Phosphatase 86 33 - 136 U/L    Total Protein 6.6 6.4 - 8.2 g/dL    AST 18 9 - 39 U/L    Bilirubin, Total 1.4 (H) 0.0 - 1.2 mg/dL    ALT 55 (H) 10 - 52 U/L   Magnesium   Result Value Ref Range    Magnesium 2.20 1.60 - 2.40 mg/dL   Protime-INR   Result Value Ref Range    Protime 19.8 (H) 9.8 - 12.8 seconds    INR 1.7 (H) 0.9 - 1.1   Troponin I, High Sensitivity   Result Value Ref Range    Troponin I, High Sensitivity 17 0 - 20 ng/L   BLOOD GAS VENOUS FULL PANEL   Result Value Ref Range    POCT pH, Venous 7.36 7.33 - 7.43 pH    POCT pCO2, Venous 55 (H) 41 - 51 mm Hg    POCT pO2, Venous 35 35 - 45 mm Hg    POCT SO2, Venous 34 (L) 45 - 75 %    POCT Oxy Hemoglobin, Venous 33.1 (L) 45.0 - 75.0 %    POCT Hematocrit Calculated, Venous 43.0 41.0 - 52.0 %    POCT Sodium, Venous 130 (L) 136 - 145 mmol/L    POCT Potassium, Venous 5.4 (H) 3.5 - 5.3 mmol/L    POCT Chloride, Venous 99 98 - 107 mmol/L    POCT Ionized Calicum, Venous 1.13 1.10 - 1.33 mmol/L    POCT Glucose, Venous 345 (H) 74 - 99 mg/dL    POCT Lactate, Venous 2.5 (H) 0.4 - 2.0 mmol/L    POCT Base Excess, Venous 4.1 (H) -2.0 - 3.0 mmol/L    POCT HCO3 Calculated, Venous 31.1 (H) 22.0 - 26.0 mmol/L    POCT Hemoglobin, Venous 14.3 13.5 - 17.5 g/dL    POCT Anion Gap, Venous 5.0 (L) 10.0 - 25.0 mmol/L    Patient Temperature 37.0 degrees Celsius    FiO2 21 %   Urinalysis with Reflex Culture and Microscopic   Result Value Ref Range    Color, Urine Yellow Straw, Yellow    Appearance, Urine Clear Clear    Specific Gravity, Urine 1.028 1.005 - 1.035    pH, Urine 5.0 5.0, 5.5, 6.0, 6.5, 7.0, 7.5, 8.0    Protein, Urine 100 (2+) (N) NEGATIVE mg/dL    Glucose, Urine >=500 (3+) (A) NEGATIVE mg/dL    Blood, Urine NEGATIVE NEGATIVE    Ketones, Urine NEGATIVE NEGATIVE mg/dL    Bilirubin, Urine NEGATIVE NEGATIVE    Urobilinogen, Urine <2.0 <2.0 mg/dL    Nitrite, Urine NEGATIVE NEGATIVE    Leukocyte Esterase, Urine NEGATIVE NEGATIVE   Extra Urine Gray Tube   Result Value Ref  Range    Extra Tube Hold for add-ons.    TSH with reflex to Free T4 if abnormal   Result Value Ref Range    Thyroid Stimulating Hormone 4.13 (H) 0.44 - 3.98 mIU/L   Thyroxine, Free   Result Value Ref Range    Thyroxine, Free 0.99 0.61 - 1.12 ng/dL   B-type natriuretic peptide   Result Value Ref Range    BNP 3,956 (H) 0 - 99 pg/mL   Urinalysis Microscopic   Result Value Ref Range    WBC, Urine 1-5 1-5, NONE /HPF    RBC, Urine 1-2 NONE, 1-2, 3-5 /HPF   Sars-CoV-2 and Influenza A/B PCR   Result Value Ref Range    Flu A Result Not Detected Not Detected    Flu B Result Not Detected Not Detected    Coronavirus 2019, PCR Not Detected Not Detected   RSV PCR   Result Value Ref Range    RSV PCR Not Detected Not Detected   POCT GLUCOSE   Result Value Ref Range    POCT Glucose 293 (H) 74 - 99 mg/dL   ECG 12 lead   Result Value Ref Range    Ventricular Rate 112 BPM    Atrial Rate 112 BPM    IL Interval 114 ms    QRS Duration 116 ms    QT Interval 356 ms    QTC Calculation(Bazett) 485 ms    P Axis 108 degrees    R Axis -31 degrees    T Axis 144 degrees    QRS Count 19 beats    Q Onset 215 ms    P Onset 158 ms    P Offset 181 ms    T Offset 393 ms    QTC Fredericia 438 ms   Blood Culture    Specimen: Peripheral Venipuncture; Blood culture   Result Value Ref Range    Blood Culture No growth at 3 days    Blood Culture    Specimen: Peripheral Venipuncture; Blood culture   Result Value Ref Range    Blood Culture No growth at 3 days    Blood Gas Lactic Acid, Venous   Result Value Ref Range    POCT Lactate, Venous 1.9 0.4 - 2.0 mmol/L   Lactate   Result Value Ref Range    Lactate 1.6 0.4 - 2.0 mmol/L   POCT GLUCOSE   Result Value Ref Range    POCT Glucose 269 (H) 74 - 99 mg/dL   POCT GLUCOSE   Result Value Ref Range    POCT Glucose 240 (H) 74 - 99 mg/dL   POCT GLUCOSE   Result Value Ref Range    POCT Glucose 288 (H) 74 - 99 mg/dL   POCT GLUCOSE   Result Value Ref Range    POCT Glucose 308 (H) 74 - 99 mg/dL   POCT GLUCOSE   Result Value  Ref Range    POCT Glucose 149 (H) 74 - 99 mg/dL   CBC   Result Value Ref Range    WBC 10.6 4.4 - 11.3 x10*3/uL    nRBC 0.0 0.0 - 0.0 /100 WBCs    RBC 4.41 (L) 4.50 - 5.90 x10*6/uL    Hemoglobin 12.9 (L) 13.5 - 17.5 g/dL    Hematocrit 43.2 41.0 - 52.0 %    MCV 98 80 - 100 fL    MCH 29.3 26.0 - 34.0 pg    MCHC 29.9 (L) 32.0 - 36.0 g/dL    RDW 16.1 (H) 11.5 - 14.5 %    Platelets 149 (L) 150 - 450 x10*3/uL   Basic metabolic panel   Result Value Ref Range    Glucose 216 (H) 74 - 99 mg/dL    Sodium 138 136 - 145 mmol/L    Potassium 3.9 3.5 - 5.3 mmol/L    Chloride 100 98 - 107 mmol/L    Bicarbonate 27 21 - 32 mmol/L    Anion Gap 15 10 - 20 mmol/L    Urea Nitrogen 36 (H) 6 - 23 mg/dL    Creatinine 1.94 (H) 0.50 - 1.30 mg/dL    eGFR 34 (L) >60 mL/min/1.73m*2    Calcium 8.7 8.6 - 10.3 mg/dL   Electrocardiogram, 12-lead PRN ACS symptoms   Result Value Ref Range    Ventricular Rate 103 BPM    Atrial Rate 103 BPM    SC Interval 120 ms    QRS Duration 124 ms    QT Interval 380 ms    QTC Calculation(Bazett) 497 ms    P Axis 87 degrees    R Axis -34 degrees    T Axis 131 degrees    QRS Count 17 beats    Q Onset 216 ms    P Onset 156 ms    P Offset 189 ms    T Offset 406 ms    QTC Fredericia 455 ms   POCT GLUCOSE   Result Value Ref Range    POCT Glucose 218 (H) 74 - 99 mg/dL   POCT GLUCOSE   Result Value Ref Range    POCT Glucose 277 (H) 74 - 99 mg/dL   POCT GLUCOSE   Result Value Ref Range    POCT Glucose 138 (H) 74 - 99 mg/dL   POCT GLUCOSE   Result Value Ref Range    POCT Glucose 153 (H) 74 - 99 mg/dL   CBC   Result Value Ref Range    WBC 10.1 4.4 - 11.3 x10*3/uL    nRBC 0.0 0.0 - 0.0 /100 WBCs    RBC 4.23 (L) 4.50 - 5.90 x10*6/uL    Hemoglobin 12.4 (L) 13.5 - 17.5 g/dL    Hematocrit 40.1 (L) 41.0 - 52.0 %    MCV 95 80 - 100 fL    MCH 29.3 26.0 - 34.0 pg    MCHC 30.9 (L) 32.0 - 36.0 g/dL    RDW 16.2 (H) 11.5 - 14.5 %    Platelets 153 150 - 450 x10*3/uL   Basic metabolic panel   Result Value Ref Range    Glucose 219 (H) 74 - 99  mg/dL    Sodium 138 136 - 145 mmol/L    Potassium 3.7 3.5 - 5.3 mmol/L    Chloride 98 98 - 107 mmol/L    Bicarbonate 30 21 - 32 mmol/L    Anion Gap 14 10 - 20 mmol/L    Urea Nitrogen 32 (H) 6 - 23 mg/dL    Creatinine 1.66 (H) 0.50 - 1.30 mg/dL    eGFR 40 (L) >60 mL/min/1.73m*2    Calcium 8.2 (L) 8.6 - 10.3 mg/dL   Electrocardiogram, 12-lead PRN ACS symptoms   Result Value Ref Range    Ventricular Rate 101 BPM    Atrial Rate 101 BPM    IN Interval 124 ms    QRS Duration 124 ms    QT Interval 386 ms    QTC Calculation(Bazett) 500 ms    P Axis 118 degrees    R Axis 7 degrees    T Axis 151 degrees    QRS Count 16 beats    Q Onset 215 ms    P Onset 163 ms    P Offset 191 ms    T Offset 408 ms    QTC Fredericia 459 ms   POCT GLUCOSE   Result Value Ref Range    POCT Glucose 176 (H) 74 - 99 mg/dL   POCT GLUCOSE   Result Value Ref Range    POCT Glucose 236 (H) 74 - 99 mg/dL   POCT GLUCOSE   Result Value Ref Range    POCT Glucose 259 (H) 74 - 99 mg/dL   POCT GLUCOSE   Result Value Ref Range    POCT Glucose 196 (H) 74 - 99 mg/dL   CBC   Result Value Ref Range    WBC 8.4 4.4 - 11.3 x10*3/uL    nRBC 0.0 0.0 - 0.0 /100 WBCs    RBC 4.24 (L) 4.50 - 5.90 x10*6/uL    Hemoglobin 12.1 (L) 13.5 - 17.5 g/dL    Hematocrit 40.4 (L) 41.0 - 52.0 %    MCV 95 80 - 100 fL    MCH 28.5 26.0 - 34.0 pg    MCHC 30.0 (L) 32.0 - 36.0 g/dL    RDW 16.1 (H) 11.5 - 14.5 %    Platelets 135 (L) 150 - 450 x10*3/uL   Basic metabolic panel   Result Value Ref Range    Glucose 149 (H) 74 - 99 mg/dL    Sodium 140 136 - 145 mmol/L    Potassium 3.6 3.5 - 5.3 mmol/L    Chloride 99 98 - 107 mmol/L    Bicarbonate 33 (H) 21 - 32 mmol/L    Anion Gap 12 10 - 20 mmol/L    Urea Nitrogen 29 (H) 6 - 23 mg/dL    Creatinine 1.66 (H) 0.50 - 1.30 mg/dL    eGFR 40 (L) >60 mL/min/1.73m*2    Calcium 8.2 (L) 8.6 - 10.3 mg/dL   POCT GLUCOSE   Result Value Ref Range    POCT Glucose 154 (H) 74 - 99 mg/dL     Echo ef 50           Assessment/Plan      #Acute on chronic systolic  congestive heart failure  Cont with IV Lasix  Daily weights and electrolytes  Cardiology consulted  BNP 3900   Ot and pt  Response to diuresis ok  Will plan for discharge to home once cleared by cardio  On lasix 40 twice daily   Will need followup with cardio at [facility]     #Community-acquired pneumonia   IV Rocephin/Zithromax  However lung bases on the CT abdo are consistent with edema  Will change to po antibiotics     #CKD 3B  Monitor with diuresing  .creatinine 1.6  Cont with jardiance  Losartan renal following        #Diabetes mellitus  Sliding scale insulin coverage  Hba1c 7.5     #Hypertension  Cont with current     #Atrial fibrillation s/p ablation  Continue Eliquis     #Dyslipidemia  Continue statins and maintain target LDL less than 70    Principal Problem:    CHF (congestive heart failure), NYHA class I, acute on chronic, combined (CMS/[facility])    Code status dw pt and he has not made a decisoin  Dc plan ho once cleared by cardio       Kevon Gunderson MD

## 2024-02-08 NOTE — PROGRESS NOTES
New Castano is a 83 y.o. male on day 4 of admission presenting with CHF (congestive heart failure), NYHA class I, acute on chronic, combined (CMS/HCC).      Subjective   New Castano is a 83 y.o. male with a past medical history of stage G3 CKD with a baseline creatinine between 1.3 to 1.5 mg/deciliter, atrial fibrillation status post ablation on Eliquis and a beta-blocker, HFrEF with LVEF 30%, hypertension, hyperlipidemia, diabetes who was recently admitted to [facility] with weakness, altered mental status and UTI. Had a wide-complex with A-fib RVR.  He was admitted to CCU on an amiodarone drip and later transitioned back to metoprolol, increased to 150 mg twice daily. He was discharged on 25 mg of Jardiance, furosemide 40 mg twice daily, losartan 50 mg, 1000 mg of metformin and 8 mg of Cardura on 12/26.  His blood pressure was 126/74 on 12/25 with a creatinine of 1.48 mg/a deciliter.       He then presented to [facility] on 12/28 with weakness, hypotension and diarrhea. Was found to have an acute kidney injury with a creatinine of 3.3 thus we saw him. Mr. Castano had a hemodynamic acute kidney injury.  He was taken off the loop diuretic, SGLT2, ARB and metformin. His creatinine improved following gentle IV volume expansion settling around 2 mg/dL. He was sent out on Lasix 40 mg daily. His weight was around 195 lbs. He comes back in with shortness of breath. His weight is up to 210 lbs. CT of the abdomen and pelvis showed moderate interstitial edema and pleural effusions. Moderate cardiomegaly. No renal obstruction with an enlarged prostate.  Nephrology is consulted for renal care.    HARRISON. Improved lower limb edema with IVP Lasix.          Objective          Vitals 24HR  Heart Rate:  []   Temp:  [36.1 °C (97 °F)-36.6 °C (97.9 °F)]   Resp:  [16-18]   BP: (112-131)/(72-91)   Weight:  [97.3 kg (214 lb 8.1 oz)]   SpO2:  [93 %-95 %]     Intake/Output last 3 Shifts:    Intake/Output Summary (Last 24 hours) at  2/8/2024 1533  Last data filed at 2/8/2024 1436  Gross per 24 hour   Intake 461 ml   Output 1200 ml   Net -739 ml         Physical Exam  General: Alert and oriented, no acute distress.    Lungs: Mildly diminished at the bases, no wheezes, rales or rhonchi.   Heart: Normal rate, no murmur, gallop or edema.   Abdomen: Soft, non-tender, non-distended, normal bowel sounds, no masses.   Extremities: Pitting LE edema improving   Neurologic: Awake, alert, and oriented X3, moves extremities spontaneously  Psychiatric: Appropriate mood and affect.     Scheduled Medications  apixaban, 2.5 mg, oral, BID  atorvastatin, 20 mg, oral, Nightly  cefTRIAXone, 2 g, intravenous, q24h  docusate sodium, 100 mg, oral, BID  doxazosin, 4 mg, oral, Nightly  empagliflozin, 25 mg, oral, Daily  finasteride, 5 mg, oral, Daily  furosemide, 40 mg, intravenous, q12h  glimepiride, 2 mg, oral, Daily before breakfast  insulin lispro, 0-5 Units, subcutaneous, TID with meals  losartan, 50 mg, oral, Daily  melatonin, 3 mg, oral, Daily  metoprolol succinate XL, 150 mg, oral, BID  pantoprazole, 40 mg, oral, Daily before breakfast  pantoprazole, 40 mg, intravenous, Daily before breakfast      Continuous medications       PRN medications: [DISCONTINUED] acetaminophen **OR** acetaminophen **OR** acetaminophen, acetaminophen, dextrose 10 % in water (D10W), dextrose, glucagon, guaiFENesin, melatonin, metoprolol, ondansetron, ondansetron **OR** [DISCONTINUED] ondansetron, polyethylene glycol, traMADol     Relevant Results  Results from last 7 days   Lab Units 02/08/24  0548 02/07/24  0541 02/06/24  0647 02/04/24  1757   WBC AUTO x10*3/uL 8.4 10.1 10.6 17.4*   HEMOGLOBIN g/dL 12.1* 12.4* 12.9* 13.6   HEMATOCRIT % 40.4* 40.1* 43.2 45.0   PLATELETS AUTO x10*3/uL 135* 153 149* 197   NEUTROS PCT AUTO %  --   --   --  86.7   LYMPHS PCT AUTO %  --   --   --  3.1   MONOS PCT AUTO %  --   --   --  9.4   EOS PCT AUTO %  --   --   --  0.1       Results from last 7 days    Lab Units 02/08/24  0548 02/07/24  0541 02/06/24  0647   SODIUM mmol/L 140 138 138   POTASSIUM mmol/L 3.6 3.7 3.9   CHLORIDE mmol/L 99 98 100   CO2 mmol/L 33* 30 27   BUN mg/dL 29* 32* 36*   CREATININE mg/dL 1.66* 1.66* 1.94*   GLUCOSE mg/dL 149* 219* 216*   CALCIUM mg/dL 8.2* 8.2* 8.7         CT head wo IV contrast   Final Result   No acute intracranial hemorrhage or mass effect.        Minimal soft tissue swelling over the left frontal bone.             MACRO:   None.        Signed by: Erinn Torrez 2/4/2024 9:58 PM   Dictation workstation:   [id number]      CT abdomen pelvis wo IV contrast   Final Result   No acute process involving the abdomen or pelvis   Small amount of ascites.   Moderate interstitial edema and pleural effusions at the lung bases   Mildly enlarged external iliac lymph nodes. Correlate for any history   of neoplasm. Correlation with PSA could also be performed.   Signed by Juve Philippe MD      XR chest 2 views   Final Result   1.  Possible opacity posteriorly seen on the lateral projection at the   lung base which may represent atelectasis or pneumonia..   Signed by Juice Chairez MD               Assessment/Plan      New Castano is a 83 y.o. male with a past medical history of stage G3 CKD with a baseline creatinine between 1.3 to 1.5 mg/deciliter, atrial fibrillation status post ablation on Eliquis and a beta-blocker, HFrEF with LVEF 30%, hypertension, hyperlipidemia, diabetes who was recently admitted to [facility] with weakness, altered mental status and UTI. Had a wide-complex with A-fib RVR.  He was admitted to CCU on an amiodarone drip and later transitioned back to metoprolol, increased to 150 mg twice daily. He was discharged on 25 mg of Jardiance, furosemide 40 mg twice daily, losartan 50 mg, 1000 mg of metformin and 8 mg of Cardura on 12/26.  His blood pressure was 126/74 on 12/25 with a creatinine of 1.48 mg/a deciliter.       He then presented to [facility] on 12/28 with  weakness, hypotension and diarrhea. Was found to have an acute kidney injury with a creatinine of 3.3 thus we saw him. Mr. Castano had a hemodynamic acute kidney injury.  He was taken off the loop diuretic, SGLT2, ARB and metformin. His creatinine improved following gentle IV volume expansion settling around 2 mg/dL. He was sent out on Lasix 40 mg daily. His weight was around 195 lbs. He comes back in with shortness of breath. His weight is up to 210 lbs. CT of the abdomen and pelvis showed moderate interstitial edema and pleural effusions. Moderate cardiomegaly. No renal obstruction with an enlarged prostate.  Nephrology is consulted for renal care.     Mr. Castano has improved renal function with his current creatinine back at baseline after recent history of ROSE. Unfortunately he has gained significant weight since being discharged and is in decompensated HF. Blood pressures have been elevated. He is now back on the SGLT2i, Losartan and is ordered IVP Lasix 40 mg twice daily. UOP was less in the past 24 hours. We will see where he stands this evening. I will consider giving him a dose of Metolazone as he is still overloaded. His renal function remains stable. I will obtain a standing weight prior to discharge. Trend his RFP.  Avoid nephrotoxins as we are able.  Will follow with his care.        Principal Problem:    CHF (congestive heart failure), NYHA class I, acute on chronic, combined (CMS/[facility])       I spent 40 minutes in the professional and overall care of this patient.      Juan Spangler, DO

## 2024-02-09 ENCOUNTER — APPOINTMENT (OUTPATIENT)
Dept: CARDIOLOGY | Facility: HOSPITAL | Age: 84
DRG: 291 | End: 2024-02-09
Payer: MEDICARE

## 2024-02-09 LAB
ANION GAP SERPL CALC-SCNC: 13 MMOL/L (ref 10–20)
BACTERIA BLD CULT: NORMAL
BACTERIA BLD CULT: NORMAL
BUN SERPL-MCNC: 29 MG/DL (ref 6–23)
CALCIUM SERPL-MCNC: 8.2 MG/DL (ref 8.6–10.3)
CHLORIDE SERPL-SCNC: 99 MMOL/L (ref 98–107)
CO2 SERPL-SCNC: 30 MMOL/L (ref 21–32)
CREAT SERPL-MCNC: 1.66 MG/DL (ref 0.5–1.3)
EGFRCR SERPLBLD CKD-EPI 2021: 40 ML/MIN/1.73M*2
ERYTHROCYTE [DISTWIDTH] IN BLOOD BY AUTOMATED COUNT: 15.9 % (ref 11.5–14.5)
GLUCOSE BLD MANUAL STRIP-MCNC: 170 MG/DL (ref 74–99)
GLUCOSE BLD MANUAL STRIP-MCNC: 201 MG/DL (ref 74–99)
GLUCOSE BLD MANUAL STRIP-MCNC: 213 MG/DL (ref 74–99)
GLUCOSE SERPL-MCNC: 183 MG/DL (ref 74–99)
HCT VFR BLD AUTO: 40.5 % (ref 41–52)
HGB BLD-MCNC: 12.2 G/DL (ref 13.5–17.5)
MCH RBC QN AUTO: 28.2 PG (ref 26–34)
MCHC RBC AUTO-ENTMCNC: 30.1 G/DL (ref 32–36)
MCV RBC AUTO: 94 FL (ref 80–100)
NRBC BLD-RTO: 0 /100 WBCS (ref 0–0)
PLATELET # BLD AUTO: 133 X10*3/UL (ref 150–450)
POTASSIUM SERPL-SCNC: 3.3 MMOL/L (ref 3.5–5.3)
RBC # BLD AUTO: 4.32 X10*6/UL (ref 4.5–5.9)
SODIUM SERPL-SCNC: 139 MMOL/L (ref 136–145)
WBC # BLD AUTO: 7.2 X10*3/UL (ref 4.4–11.3)

## 2024-02-09 PROCEDURE — 85027 COMPLETE CBC AUTOMATED: CPT | Performed by: INTERNAL MEDICINE

## 2024-02-09 PROCEDURE — 2500000001 HC RX 250 WO HCPCS SELF ADMINISTERED DRUGS (ALT 637 FOR MEDICARE OP): Performed by: INTERNAL MEDICINE

## 2024-02-09 PROCEDURE — 93005 ELECTROCARDIOGRAM TRACING: CPT

## 2024-02-09 PROCEDURE — 36415 COLL VENOUS BLD VENIPUNCTURE: CPT | Performed by: INTERNAL MEDICINE

## 2024-02-09 PROCEDURE — 2500000004 HC RX 250 GENERAL PHARMACY W/ HCPCS (ALT 636 FOR OP/ED): Performed by: PHARMACIST

## 2024-02-09 PROCEDURE — 99232 SBSQ HOSP IP/OBS MODERATE 35: CPT | Performed by: INTERNAL MEDICINE

## 2024-02-09 PROCEDURE — 97161 PT EVAL LOW COMPLEX 20 MIN: CPT | Mod: GP

## 2024-02-09 PROCEDURE — 97165 OT EVAL LOW COMPLEX 30 MIN: CPT | Mod: GO

## 2024-02-09 PROCEDURE — 80048 BASIC METABOLIC PNL TOTAL CA: CPT | Performed by: INTERNAL MEDICINE

## 2024-02-09 PROCEDURE — 2500000002 HC RX 250 W HCPCS SELF ADMINISTERED DRUGS (ALT 637 FOR MEDICARE OP, ALT 636 FOR OP/ED): Performed by: INTERNAL MEDICINE

## 2024-02-09 PROCEDURE — 2500000004 HC RX 250 GENERAL PHARMACY W/ HCPCS (ALT 636 FOR OP/ED): Performed by: INTERNAL MEDICINE

## 2024-02-09 PROCEDURE — 2500000004 HC RX 250 GENERAL PHARMACY W/ HCPCS (ALT 636 FOR OP/ED): Performed by: FAMILY MEDICINE

## 2024-02-09 PROCEDURE — 1100000001 HC PRIVATE ROOM DAILY

## 2024-02-09 PROCEDURE — 82947 ASSAY GLUCOSE BLOOD QUANT: CPT

## 2024-02-09 PROCEDURE — 2500000001 HC RX 250 WO HCPCS SELF ADMINISTERED DRUGS (ALT 637 FOR MEDICARE OP): Performed by: NURSE PRACTITIONER

## 2024-02-09 RX ORDER — POTASSIUM CHLORIDE 20 MEQ/1
40 TABLET, EXTENDED RELEASE ORAL ONCE
Status: COMPLETED | OUTPATIENT
Start: 2024-02-09 | End: 2024-02-09

## 2024-02-09 RX ORDER — METOLAZONE 5 MG/1
5 TABLET ORAL ONCE
Status: COMPLETED | OUTPATIENT
Start: 2024-02-09 | End: 2024-02-09

## 2024-02-09 RX ADMIN — INSULIN LISPRO 2 UNITS: 100 INJECTION, SOLUTION INTRAVENOUS; SUBCUTANEOUS at 16:56

## 2024-02-09 RX ADMIN — POTASSIUM CHLORIDE 40 MEQ: 1500 TABLET, EXTENDED RELEASE ORAL at 12:24

## 2024-02-09 RX ADMIN — LOSARTAN POTASSIUM 50 MG: 50 TABLET, FILM COATED ORAL at 08:24

## 2024-02-09 RX ADMIN — INSULIN LISPRO 2 UNITS: 100 INJECTION, SOLUTION INTRAVENOUS; SUBCUTANEOUS at 12:24

## 2024-02-09 RX ADMIN — DOCUSATE SODIUM 100 MG: 100 CAPSULE, LIQUID FILLED ORAL at 08:23

## 2024-02-09 RX ADMIN — FUROSEMIDE 40 MG: 10 INJECTION, SOLUTION INTRAMUSCULAR; INTRAVENOUS at 00:33

## 2024-02-09 RX ADMIN — PANTOPRAZOLE SODIUM 40 MG: 40 TABLET, DELAYED RELEASE ORAL at 08:24

## 2024-02-09 RX ADMIN — INSULIN LISPRO 1 UNITS: 100 INJECTION, SOLUTION INTRAVENOUS; SUBCUTANEOUS at 08:24

## 2024-02-09 RX ADMIN — METOPROLOL SUCCINATE 150 MG: 50 TABLET, EXTENDED RELEASE ORAL at 08:23

## 2024-02-09 RX ADMIN — APIXABAN 2.5 MG: 2.5 TABLET, FILM COATED ORAL at 08:24

## 2024-02-09 RX ADMIN — EMPAGLIFLOZIN 25 MG: 25 TABLET, FILM COATED ORAL at 08:24

## 2024-02-09 RX ADMIN — APIXABAN 2.5 MG: 2.5 TABLET, FILM COATED ORAL at 22:13

## 2024-02-09 RX ADMIN — Medication 3 MG: at 22:13

## 2024-02-09 RX ADMIN — DOXAZOSIN 4 MG: 2 TABLET ORAL at 22:13

## 2024-02-09 RX ADMIN — METOLAZONE 5 MG: 5 TABLET ORAL at 22:13

## 2024-02-09 RX ADMIN — DOCUSATE SODIUM 100 MG: 100 CAPSULE, LIQUID FILLED ORAL at 22:13

## 2024-02-09 RX ADMIN — CEFTRIAXONE 2 G: 2 INJECTION, POWDER, FOR SOLUTION INTRAMUSCULAR; INTRAVENOUS at 00:30

## 2024-02-09 RX ADMIN — ATORVASTATIN CALCIUM 20 MG: 20 TABLET, FILM COATED ORAL at 22:13

## 2024-02-09 RX ADMIN — FUROSEMIDE 40 MG: 10 INJECTION, SOLUTION INTRAMUSCULAR; INTRAVENOUS at 12:24

## 2024-02-09 RX ADMIN — METOPROLOL SUCCINATE 150 MG: 50 TABLET, EXTENDED RELEASE ORAL at 22:14

## 2024-02-09 RX ADMIN — GLIMEPIRIDE 2 MG: 2 TABLET ORAL at 08:24

## 2024-02-09 RX ADMIN — FINASTERIDE 5 MG: 5 TABLET, FILM COATED ORAL at 08:23

## 2024-02-09 ASSESSMENT — COGNITIVE AND FUNCTIONAL STATUS - GENERAL
EATING MEALS: A LITTLE
DAILY ACTIVITIY SCORE: 17
TURNING FROM BACK TO SIDE WHILE IN FLAT BAD: A LITTLE
DRESSING REGULAR UPPER BODY CLOTHING: A LITTLE
WALKING IN HOSPITAL ROOM: A LITTLE
DAILY ACTIVITIY SCORE: 19
CLIMB 3 TO 5 STEPS WITH RAILING: TOTAL
HELP NEEDED FOR BATHING: A LOT
MOVING TO AND FROM BED TO CHAIR: A LITTLE
PERSONAL GROOMING: A LITTLE
DRESSING REGULAR UPPER BODY CLOTHING: A LITTLE
TURNING FROM BACK TO SIDE WHILE IN FLAT BAD: A LITTLE
TOILETING: A LITTLE
DRESSING REGULAR LOWER BODY CLOTHING: A LITTLE
MOBILITY SCORE: 19
MOVING TO AND FROM BED TO CHAIR: A LITTLE
MOBILITY SCORE: 16
DRESSING REGULAR LOWER BODY CLOTHING: A LITTLE
TOILETING: A LITTLE
STANDING UP FROM CHAIR USING ARMS: A LITTLE
HELP NEEDED FOR BATHING: A LITTLE
MOVING FROM LYING ON BACK TO SITTING ON SIDE OF FLAT BED WITH BEDRAILS: A LITTLE
WALKING IN HOSPITAL ROOM: A LITTLE
EATING MEALS: A LITTLE
STANDING UP FROM CHAIR USING ARMS: A LITTLE
CLIMB 3 TO 5 STEPS WITH RAILING: A LITTLE

## 2024-02-09 ASSESSMENT — PAIN SCALES - GENERAL
PAINLEVEL_OUTOF10: 0 - NO PAIN

## 2024-02-09 ASSESSMENT — PAIN - FUNCTIONAL ASSESSMENT
PAIN_FUNCTIONAL_ASSESSMENT: 0-10
PAIN_FUNCTIONAL_ASSESSMENT: 0-10

## 2024-02-09 ASSESSMENT — ACTIVITIES OF DAILY LIVING (ADL)
ADL_ASSISTANCE: INDEPENDENT
ADL_ASSISTANCE: INDEPENDENT

## 2024-02-09 NOTE — PROGRESS NOTES
Care Coordinator Note:  Plan: Awaiting Cardiology clearance  Disposition: home with [facility] -Home Care-PT/NURSE  Referral placed physician made aware  Esme MULLINS, RN [facility]

## 2024-02-09 NOTE — PROGRESS NOTES
Physical Therapy    Physical Therapy Evaluation    Patient Name: New Castano  MRN: [id number]  Today's Date: 2/9/2024   Time Calculation  Start Time: 1528  Stop Time: 1539  Time Calculation (min): 11 min    Assessment/Plan   PT Assessment  PT Assessment Results: Decreased strength, Decreased range of motion, Decreased endurance, Impaired balance, Decreased mobility, Decreased safety awareness, Impaired judgement  Rehab Prognosis: Good  Evaluation/Treatment Tolerance: Patient limited by fatigue  Medical Staff Made Aware: Yes  Strengths: Attitude of self, Housing layout, Premorbid level of function  Barriers to Participation: Ability to acquire knowledge, Comorbidities, Coping skills, Insight into problems, Support and attitude of living partners  End of Session Communication: Bedside nurse  Assessment Comment: Patient seen for PT evaluation with admission for CHF excaerbation.  Presents with weakness and impaired balance, mobility.  He is unsteady with gait and will benefit from ongoing PT in house and following DC to maximize his safety and promote functional I.  End of Session Patient Position: Bed, 3 rail up, Alarm on  IP OR SWING BED PT PLAN  Inpatient or Swing Bed: Inpatient  PT Plan  Treatment/Interventions: Bed mobility, Transfer training, Gait training, Stair training, Balance training, Strengthening, Endurance training, Range of motion, Therapeutic exercise, Therapeutic activity, Home exercise program, Positioning, Postural re-education  PT Plan: Skilled PT  PT Frequency: 3 times per week  PT Discharge Recommendations: Moderate intensity level of continued care  Equipment Recommended upon Discharge: Wheeled walker  PT Recommended Transfer Status: Assist x1, Assistive device  PT - OK to Discharge: Yes (per POC)      Subjective   General Visit Information:  General  Reason for Referral: 83 y.o. male presenting with CHF and generalized weakness; PT consulted to assess mobility and activity tolerance  Referred  By: Neptali  Past Medical History Relevant to Rehab: PMH CKD, CHF, A-fib on Eliquis, GERD, hyperlipidemia, hypertension, PVCs  Family/Caregiver Present: No  Prior to Session Communication: Bedside nurse  Patient Position Received: Up in chair, Alarm on  Preferred Learning Style: verbal, visual  General Comment: Patient recieved in chair. Flat affect and short answers.  Appeared to be annoyed with questions.  Completed mobility but declined out of room. Pt unsteady and unsafe to amb alone  Home Living:  Home Living  Type of Home: House  Lives With: Spouse  Home Adaptive Equipment: Walker rolling or standard, Cane  Home Layout: Two level  Home Access: Stairs to enter without rails  Entrance Stairs-Rails: None  Entrance Stairs-Number of Steps: 2  Bathroom Shower/Tub: Tub/shower unit  Bathroom Toilet: Handicapped height  Bathroom Equipment: Grab bars in shower, Grab bars around toilet  Prior Level of Function:  Prior Function Per Pt/Caregiver Report  Level of [place]ie: Independent with ADLs and functional transfers, Independent with homemaking with ambulation  ADL Assistance: Independent  Homemaking Assistance: Independent  Ambulatory Assistance: Independent (with cane)  Vocational: Retired ([employer] )  Prior Function Comments: Pt reports independent in all ADL/iADLs, + drive, and per RN assists with wife.  Precautions:  Precautions  Medical Precautions: Fall precautions  Vital Signs:       Objective   Pain:  Pain Assessment  Pain Assessment: 0-10  Pain Score: 0 - No pain  Cognition:  Cognition  Overall Cognitive Status:  (Patient with limited responses to questions.  Appears to be oriented x3. patient with impaired insight/judgment and safety demonstrated.)  Safety/Judgement: Exceptions to WFL  Other (Comment): moderate impairment with safety  Insight: Mild  Impulsive: Moderately    General Assessments:  General Observation  General Observation: patient unsafe to ambulate alone               Activity  Tolerance  Endurance: Tolerates 10 - 20 min exercise with multiple rests, Decreased tolerance for upright activites  Activity Tolerance Comments: fair    Sensation  Light Touch: No apparent deficits    Strength  Strength Comments: grossly >3/5. patient with limited participation with MMT  Strength  Strength Comments: grossly >3/5. patient with limited participation with MMT           Coordination  Movements are Fluid and Coordinated: Yes    Postural Control  Posture Comment: mildly impaired balance and stability    Static Sitting Balance  Static Sitting-Comment/Number of Minutes: supervision  Dynamic Sitting Balance  Dynamic Sitting-Comments: CGA    Static Standing Balance  Static Standing-Comment/Number of Minutes: CGA to min A  Dynamic Standing Balance  Dynamic Standing-Comments: min A, unsteady  Functional Assessments:  Bed Mobility  Bed Mobility: Yes  Bed Mobility 1  Bed Mobility 1: Sitting to supine  Level of Assistance 1: Set up, Minimum assistance, Minimal verbal cues, Minimal tactile cues    Transfers  Transfer: Yes  Transfer 1  Technique 1: Sit to stand, Stand to sit  Transfer Device 1: Cane  Transfer Level of Assistance 1: Contact guard, Moderate verbal cues, Minimal tactile cues  Trials/Comments 1: instruction for safety and sequencing, avoiding sitting prior to arriving at surface. pt with impaired prox LE concentric/eccentric strength demonstrated.    Ambulation/Gait Training  Ambulation/Gait Training Performed: Yes  Ambulation/Gait Training 1  Surface 1: Level tile  Device 1: Single point cane  Assistance 1: Minimum assistance, Maximum verbal cues, Maximum tactile cues  Quality of Gait 1: Wide base of support, Diminished heel strike, Decreased step length, Inconsistent stride length, Forward flexed posture, Ataxic  Comments/Distance (ft) 1: 20 feet with cues for safety and sequencing. patient with increased instability with turning and retrostepping.    Stairs  Stairs: No  Extremity/Trunk  Assessments:  Cervical Spine   Cervical Spine: Within Functional Limits  Lumbar Spine   Lumbar Spine : Within Functional Limits    RUE   RUE : Within Functional Limits  LUE   LUE: Within Functional Limits  RLE   RLE : Exceptions to WFL  LLE   LLE : Exceptions to WFL  Outcome Measures:  [facility] Basic Mobility  Turning from your back to your side while in a flat bed without using bedrails: A little  Moving from lying on your back to sitting on the side of a flat bed without using bedrails: A little  Moving to and from bed to chair (including a wheelchair): A little  Standing up from a chair using your arms (e.g. wheelchair or bedside chair): A little  To walk in hospital room: A little  Climbing 3-5 steps with railing: Total  Basic Mobility - Total Score: 16    Tinetti  Sitting Balance: Steady, safe  Arises: Able, uses arms to help  Attempts to Arise: Able to arise, one attempt  Immediate Standing Balance (First 5 Seconds): Steady but uses walker or other support  Standing Balance: Steady but wide stance, uses cane or other support  Nudged: Staggers, grabs, catches self  Eyes Closed: Unsteady  Turned 360 Degrees: Steadiness: Unsteady (Grabs, staggers)  Turned 360 Degrees: Continuity of Steps: Discontinuous steps  Sitting Down: Unsafe (Misjudges distance, falls into chair)  Balance Score: 7  Initiation of Gait: No hesitancy  Step Height: R Swing Foot: Right foot complete clears floor  Step Length: R Swing Foot: Passes left stance foot  Step Height: L Swing Foot: Left foot complete clears floor  Step Length: L Swing Foot: Passes right stance foot  Step Symmetry: Right and left step appear equal  Step Continuity: Steps appear continuous  Path: Mild/moderate deviation or uses walking aid  Trunk: Marked sway or uses walking aid  Walking Time: Heels apart  Gait Score: 8  Total Score: 15    Encounter Problems       Encounter Problems (Active)       Balance       STG - Maintains dynamic standing balance with upper extremity  support       Start:  02/09/24    Expected End:  02/23/24       INTERVENTIONS:  1. Practice standing with SBA and LRAD support >15 minutes.  2. Educate patient about standing tolerance.  3. Educate patient about independence with gait, transfers, and ADL's.  4. Educate patient about use of assistive device.  5. Educate patient about self-directed care.         Goal 1  - Tinetti and 5xSTS       Start:  02/09/24    Expected End:  02/23/24       1) Increase Tinetti OTTO score to >24/28 to indicate low risk of falling.  2) Patient will perform 5x Sit to Stand independently without UE support in less than 15 seconds to evidence improved strength in BLE, with stable vitals.              Mobility       STG - Patient will ambulate       Start:  02/09/24    Expected End:  02/23/24       >150 feet with LRAD and SBA, with fluid and steady reciprocal gait pattern          STG - Patient will ascend and descend four to six stairs       Start:  02/09/24    Expected End:  02/23/24       With CGA support and HR, safe technique            Pain - Adult          Transfers       STG - Patient will perform bed mobility       Start:  02/09/24    Expected End:  02/23/24       With mod I and HOB flat to L and R         STG - Patient will transfer sit to and from stand       Start:  02/09/24    Expected End:  02/23/24       With SBA and LRAD, safe technique from all surfaces/heights                Education Documentation  Precautions, taught by Josefina Patel, PT at 2/9/2024  4:49 PM.  Learner: Patient  Readiness: Nonacceptance  Method: Demonstration, Explanation  Response: Needs Reinforcement    Body Mechanics, taught by Josefina Patel, PT at 2/9/2024  4:49 PM.  Learner: Patient  Readiness: Nonacceptance  Method: Demonstration, Explanation  Response: Needs Reinforcement    Home Exercise Program, taught by Josefina Patel, PT at 2/9/2024  4:49 PM.  Learner: Patient  Readiness: Nonacceptance  Method: Demonstration, Explanation  Response:  Needs Reinforcement    Mobility Training, taught by Josefina Patel, PT at 2/9/2024  4:49 PM.  Learner: Patient  Readiness: Nonacceptance  Method: Demonstration, Explanation  Response: Needs Reinforcement    Education Comments  No comments found.

## 2024-02-09 NOTE — PROGRESS NOTES
New Castano is a 83 y.o. male on day 5 of admission presenting with CHF (congestive heart failure), NYHA class I, acute on chronic, combined (CMS/HCC).      Subjective   New Castano is a 83 y.o. male with a past medical history of stage G3 CKD with a baseline creatinine between 1.3 to 1.5 mg/deciliter, atrial fibrillation status post ablation on Eliquis and a beta-blocker, HFrEF with LVEF 30%, hypertension, hyperlipidemia, diabetes who was recently admitted to [facility] with weakness, altered mental status and UTI. Had a wide-complex with A-fib RVR.  He was admitted to CCU on an amiodarone drip and later transitioned back to metoprolol, increased to 150 mg twice daily. He was discharged on 25 mg of Jardiance, furosemide 40 mg twice daily, losartan 50 mg, 1000 mg of metformin and 8 mg of Cardura on 12/26.  His blood pressure was 126/74 on 12/25 with a creatinine of 1.48 mg/a deciliter.       He then presented to [facility] on 12/28 with weakness, hypotension and diarrhea. Was found to have an acute kidney injury with a creatinine of 3.3 thus we saw him. Mr. Castano had a hemodynamic acute kidney injury.  He was taken off the loop diuretic, SGLT2, ARB and metformin. His creatinine improved following gentle IV volume expansion settling around 2 mg/dL. He was sent out on Lasix 40 mg daily. His weight was around 195 lbs. He comes back in with shortness of breath. His weight is up to 210 lbs. CT of the abdomen and pelvis showed moderate interstitial edema and pleural effusions. Moderate cardiomegaly. No renal obstruction with an enlarged prostate.  Nephrology is consulted for renal care.    HARRISON. Improved lower limb edema with IVP Lasix. Anxious for discharge.   Chart/labs/meds/notes/imaging/VS reviewed.         Objective          Vitals 24HR  Heart Rate:  [72-87]   Temp:  [36.3 °C (97.4 °F)-36.4 °C (97.6 °F)]   Resp:  [16]   BP: (123-144)/(72-93)   Weight:  [94.3 kg (207 lb 12.8 oz)]   SpO2:  [95 %-96 %]      Intake/Output last 3 Shifts:    Intake/Output Summary (Last 24 hours) at 2/9/2024 1559  Last data filed at 2/9/2024 0615  Gross per 24 hour   Intake 290 ml   Output 1000 ml   Net -710 ml         Physical Exam  General: Alert and oriented, no acute distress.    Lungs: Mildly diminished at the bases, no wheezes, rales or rhonchi.   Heart: Normal rate, no murmur, gallop or edema.   Abdomen: Soft, non-tender, non-distended, normal bowel sounds, no masses.   Extremities: Pitting LE edema improving   Neurologic: Awake, alert, and oriented X3, moves extremities spontaneously  Psychiatric: Appropriate mood and affect.     Scheduled Medications  apixaban, 2.5 mg, oral, BID  atorvastatin, 20 mg, oral, Nightly  cefTRIAXone, 2 g, intravenous, q24h  docusate sodium, 100 mg, oral, BID  doxazosin, 4 mg, oral, Nightly  empagliflozin, 25 mg, oral, Daily  finasteride, 5 mg, oral, Daily  furosemide, 40 mg, intravenous, q12h  glimepiride, 2 mg, oral, Daily before breakfast  insulin lispro, 0-5 Units, subcutaneous, TID with meals  losartan, 50 mg, oral, Daily  melatonin, 3 mg, oral, Daily  metOLazone, 5 mg, oral, Once  metoprolol succinate XL, 150 mg, oral, BID  pantoprazole, 40 mg, oral, Daily before breakfast  pantoprazole, 40 mg, intravenous, Daily before breakfast      Continuous medications       PRN medications: [DISCONTINUED] acetaminophen **OR** acetaminophen **OR** acetaminophen, acetaminophen, dextrose 10 % in water (D10W), dextrose, glucagon, guaiFENesin, melatonin, metoprolol, ondansetron, ondansetron **OR** [DISCONTINUED] ondansetron, polyethylene glycol, traMADol     Relevant Results  Results from last 7 days   Lab Units 02/09/24  0705 02/08/24  0548 02/07/24  0541 02/06/24  0647 02/04/24  1757   WBC AUTO x10*3/uL 7.2 8.4 10.1   < > 17.4*   HEMOGLOBIN g/dL 12.2* 12.1* 12.4*   < > 13.6   HEMATOCRIT % 40.5* 40.4* 40.1*   < > 45.0   PLATELETS AUTO x10*3/uL 133* 135* 153   < > 197   NEUTROS PCT AUTO %  --   --   --   --   86.7   LYMPHS PCT AUTO %  --   --   --   --  3.1   MONOS PCT AUTO %  --   --   --   --  9.4   EOS PCT AUTO %  --   --   --   --  0.1    < > = values in this interval not displayed.       Results from last 7 days   Lab Units 02/09/24  0705 02/08/24  0548 02/07/24  0541   SODIUM mmol/L 139 140 138   POTASSIUM mmol/L 3.3* 3.6 3.7   CHLORIDE mmol/L 99 99 98   CO2 mmol/L 30 33* 30   BUN mg/dL 29* 29* 32*   CREATININE mg/dL 1.66* 1.66* 1.66*   GLUCOSE mg/dL 183* 149* 219*   CALCIUM mg/dL 8.2* 8.2* 8.2*         CT head wo IV contrast   Final Result   No acute intracranial hemorrhage or mass effect.        Minimal soft tissue swelling over the left frontal bone.             MACRO:   None.        Signed by: Erinn Torrez 2/4/2024 9:58 PM   Dictation workstation:   [id number]      CT abdomen pelvis wo IV contrast   Final Result   No acute process involving the abdomen or pelvis   Small amount of ascites.   Moderate interstitial edema and pleural effusions at the lung bases   Mildly enlarged external iliac lymph nodes. Correlate for any history   of neoplasm. Correlation with PSA could also be performed.   Signed by Juve Philippe MD      XR chest 2 views   Final Result   1.  Possible opacity posteriorly seen on the lateral projection at the   lung base which may represent atelectasis or pneumonia..   Signed by Juice Chairez MD               Assessment/Plan      New Castano is a 83 y.o. male with a past medical history of stage G3 CKD with a baseline creatinine between 1.3 to 1.5 mg/deciliter, atrial fibrillation status post ablation on Eliquis and a beta-blocker, HFrEF with LVEF 30%, hypertension, hyperlipidemia, diabetes who was recently admitted to [facility] with weakness, altered mental status and UTI. Had a wide-complex with A-fib RVR.  He was admitted to CCU on an amiodarone drip and later transitioned back to metoprolol, increased to 150 mg twice daily. He was discharged on 25 mg of Jardiance, furosemide 40 mg  twice daily, losartan 50 mg, 1000 mg of metformin and 8 mg of Cardura on 12/26.  His blood pressure was 126/74 on 12/25 with a creatinine of 1.48 mg/a deciliter.       He then presented to [facility] on 12/28 with weakness, hypotension and diarrhea. Was found to have an acute kidney injury with a creatinine of 3.3 thus we saw him. Mr. Castano had a hemodynamic acute kidney injury.  He was taken off the loop diuretic, SGLT2, ARB and metformin. His creatinine improved following gentle IV volume expansion settling around 2 mg/dL. He was sent out on Lasix 40 mg daily. His weight was around 195 lbs. He comes back in with shortness of breath. His weight is up to 210 lbs. CT of the abdomen and pelvis showed moderate interstitial edema and pleural effusions. Moderate cardiomegaly. No renal obstruction with an enlarged prostate.  Nephrology is consulted for renal care.     Mr. Castano has improved renal function with his current creatinine back at baseline after recent history of ROSE. Unfortunately he has gained significant weight since being discharged and is in decompensated HF. Blood pressures have been elevated. He is now back on the SGLT2i, Losartan and is ordered IVP Lasix 40 mg twice daily. He was given a dose of Metolazone today to augment diuresis given decreased response to the loop diuretic. I anticipate transitioning him back to 40 mg BID of oral Lasix tomorrow. His renal function remains stable. I will obtain a standing weight prior to discharge. Trend his RFP.  Avoid nephrotoxins as we are able.  Will follow with his care.        Principal Problem:    CHF (congestive heart failure), NYHA class I, acute on chronic, combined (CMS/[facility])       I spent 40 minutes in the professional and overall care of this patient.      Juan Spangler, DO

## 2024-02-09 NOTE — PROGRESS NOTES
"Subjective Data:  No events.        Objective Data:  Last Recorded Vitals:  Vitals:    24 0424 24 0600 24 0730 24 1204   BP: 133/79  (!) 144/91 (!) 144/93   BP Location: Right arm  Right arm Right arm   Patient Position: Lying  Lying Lying   Pulse: 85  72 87   Resp: 16  16 16   Temp: 36.4 °C (97.5 °F)  36.3 °C (97.4 °F) 36.3 °C (97.4 °F)   TempSrc: Temporal  Temporal Temporal   SpO2: 96%  96% 95%   Weight:  94.3 kg (207 lb 12.8 oz)     Height:         Medical Gas Therapy: None (Room air)  O2 Delivery Method: Nasal cannula  Weight  Av.2 kg (209 lb 12.6 oz)  Min: 88.6 kg (195 lb 5.2 oz)  Max: 97.6 kg (215 lb 2.7 oz)    LABS:  CMP:  Results from last 7 days   Lab Units 24  0724  0548 24  0541 24  0647 24  1757   SODIUM mmol/L 139 140 138 138 134*   POTASSIUM mmol/L 3.3* 3.6 3.7 3.9 5.0   CHLORIDE mmol/L 99 99 98 100 98   CO2 mmol/L 30 33* 30 27 27   ANION GAP mmol/L 13 12 14 15 14   BUN mg/dL 29* 29* 32* 36* 36*   CREATININE mg/dL 1.66* 1.66* 1.66* 1.94* 2.05*   EGFR mL/min/1.73m*2 40* 40* 40* 34* 31*   MAGNESIUM mg/dL  --   --   --   --  2.20   ALBUMIN g/dL  --   --   --   --  3.4   ALT U/L  --   --   --   --  55*   AST U/L  --   --   --   --  18   BILIRUBIN TOTAL mg/dL  --   --   --   --  1.4*     CBC:  Results from last 7 days   Lab Units 24  0705 24  0548 24  0541 24  0647 24  1757   WBC AUTO x10*3/uL 7.2 8.4 10.1 10.6 17.4*   HEMOGLOBIN g/dL 12.2* 12.1* 12.4* 12.9* 13.6   HEMATOCRIT % 40.5* 40.4* 40.1* 43.2 45.0   PLATELETS AUTO x10*3/uL 133* 135* 153 149* 197   MCV fL 94 95 95 98 94     COAG:   Results from last 7 days   Lab Units 24  1757   INR  1.7*     ABO: No results found for: \"ABO\"  HEME/ENDO:  Results from last 7 days   Lab Units 24  1757   TSH mIU/L 4.13*      CARDIAC:   Results from last 7 days   Lab Units 24  1757   TROPHS ng/L 17   BNP pg/mL 3,956*             Last I/O:    Intake/Output Summary " (Last 24 hours) at 2/9/2024 1238  Last data filed at 2/9/2024 0615  Gross per 24 hour   Intake 290 ml   Output 1800 ml   Net -1510 ml     Net IO Since Admission: -4,264 mL [02/09/24 1238]      Inpatient Medications:  Scheduled medications   Medication Dose Route Frequency    apixaban  2.5 mg oral BID    atorvastatin  20 mg oral Nightly    cefTRIAXone  2 g intravenous q24h    docusate sodium  100 mg oral BID    doxazosin  4 mg oral Nightly    empagliflozin  25 mg oral Daily    finasteride  5 mg oral Daily    furosemide  40 mg intravenous q12h    glimepiride  2 mg oral Daily before breakfast    insulin lispro  0-5 Units subcutaneous TID with meals    losartan  50 mg oral Daily    melatonin  3 mg oral Daily    metOLazone  5 mg oral Once    metoprolol succinate XL  150 mg oral BID    pantoprazole  40 mg oral Daily before breakfast    pantoprazole  40 mg intravenous Daily before breakfast     PRN medications   Medication    acetaminophen    Or    acetaminophen    acetaminophen    dextrose 10 % in water (D10W)    dextrose    glucagon    guaiFENesin    melatonin    metoprolol    ondansetron    ondansetron    polyethylene glycol    traMADol     Continuous Medications   Medication Dose Last Rate           Physical Exam:  Gen Well appearing elderly male sitting up in NAD. Body mass index is 28.98 kg/m².   CV irregularly irregular. No m/r/g appreciated. No JVD; 2+ bilateral leg edema. Pulses 2+ and symmetric.    Pulm Reduced at the bases. Lungs otherwise clear with normal respiratory effort.   Neuro Alert and conversant. Grossly nonfocal.         Assessment:  Acute on chronic HFpEF  Volume status improved though still a fair amount of fluid  Persistent atrial tachycardia  Vs. Atypical atrial flutter. Rates acceptable. On anticoagulation regardless.  3. Hypertension   BP acceptable under present circumstances          Recommendations:  Con't IV diuretics. Added metolazone today.              Mundo Holguin MD

## 2024-02-09 NOTE — PROGRESS NOTES
Occupational Therapy    Evaluation    Patient Name: New Castano  MRN: [id number]  Today's Date: 2/9/2024  Time Calculation  Start Time: 1330  Stop Time: 1347  Time Calculation (min): 17 min        Assessment:  OT Assessment: Pt presents with decreased balance, endurance, & strength with increasing impulsivity, impeding ADL performance and functional mobility. Pt would benefit from skilled OT services to address these deficits and facilitate highest level of function.  Barriers to Discharge: Decreased caregiver support  Medical Staff Made Aware: Yes  End of Session Communication: Bedside nurse  End of Session Patient Position: Up in chair, Alarm on  OT Assessment Results: Decreased ADL status, Decreased upper extremity range of motion, Decreased upper extremity strength, Decreased safe judgment during ADL, Decreased endurance, Decreased functional mobility  Barriers to Discharge: Decreased caregiver support  Medical Staff Made Aware: Yes  Plan:  Treatment Interventions: ADL retraining, Functional transfer training, UE strengthening/ROM, Endurance training, Equipment evaluation/education, Compensatory technique education  OT Frequency: 3 times per week  OT Discharge Recommendations: Low intensity level of continued care, 24 hr supervision due to cognition  OT Recommended Transfer Status: Assist of 1  OT - OK to Discharge: Yes (Per POC)  Treatment Interventions: ADL retraining, Functional transfer training, UE strengthening/ROM, Endurance training, Equipment evaluation/education, Compensatory technique education    Subjective        General:  General  Reason for Referral: 83 y.o. male presenting with CHF and generalized weakness,  Referred By: Kevon Gunderson MD  Past Medical History Relevant to Rehab: PMH CKD, CHF, A-fib on Eliquis, GERD, hyperlipidemia, hypertension, PVCs  Family/Caregiver Present: No  Prior to Session Communication: Bedside nurse  Patient Position Received: Up in bathroom  Preferred Learning Style:  verbal, visual  [facility], requiring increasing assist from therapist to perform transfer. Cleared by RN to participate in OT eval. Pt agreeable and moderately impulsive.  Precautions:  Medical Precautions: Fall precautions     Pain:  Pain Assessment  Pain Assessment: 0-10  Pain Score: 0 - No pain    Objective   Cognition:  Orientation Level: Oriented X4  Insight: Mild  Impulsive: Moderately           Home Living:  Type of Home: House  Lives With: Spouse  Home Adaptive Equipment: Walker rolling or standard, Cane  Home Layout: Two level  Home Access: Stairs to enter without rails  Entrance Stairs-Number of Steps: +2 SHARAD to enter through garage  Bathroom Shower/Tub: Tub/shower unit  Bathroom Toilet: Handicapped height  Bathroom Equipment: Grab bars in shower, Grab bars around toilet  Prior Function:  Level of Bureau: Independent with ADLs and functional transfers, Independent with homemaking with ambulation  ADL Assistance: Independent  Homemaking Assistance: Independent  Ambulatory Assistance: Independent (Use of cane)  Prior Function Comments: Pt reports independent in all ADL/iADLs, + drive, and per RN assists with wife.     Activity Tolerance:  Endurance: Tolerates 10 - 20 min exercise with multiple rests  Bed Mobility/Transfers: Bed Mobility  Bed Mobility: No    Transfers  Transfer: Yes  Transfer 1  Transfer From 1: Bed to  Transfer to 1: Stand  Technique 1: Sit to stand, Stand to sit  Transfer Device 1: Walker  Transfer Level of Assistance 1: Contact guard  Trials/Comments 1: Cues for safe hand placement during transfer.  Transfers 2  Transfer From 2: Toilet to  Transfer to 2: Stand  Technique 2: Sit to stand  Transfer Level of Assistance 2: Minimum assistance, Arm in arm assistance  Trials/Comments 2: MIN A sit to stand transfer from [facility], required arm in arm assist to ambulate from [facility] to bed (short distance household functional  mobility)      Ambulation/Gait Training:  Ambulation/Gait Training  Ambulation/Gait Training Performed: Yes  Ambulation/Gait Training 1  Surface 1: Level tile  Device 1: Standard walker  Assistance 1: Contact guard  Comments/Distance (ft) 1: Pt performed short distance household functional mobility, cues to manuever FWW safely due to impulsivity.  Sitting Balance:  Static Sitting Balance  Static Sitting-Balance Support: Feet supported  Static Sitting-Level of Assistance: Close supervision  Static Sitting-Comment/Number of Minutes: EOB  Dynamic Sitting Balance  Dynamic Sitting-Balance Support: Feet supported  Dynamic Sitting-Balance: Reaching for objects, Reaching across midline  Dynamic Sitting-Comments: SBA at EOB  Standing Balance:  Static Standing Balance  Static Standing-Balance Support: Bilateral upper extremity supported  Static Standing-Level of Assistance: Close supervision  Dynamic Standing Balance  Dynamic Standing-Balance Support: Bilateral upper extremity supported  Dynamic Standing-Balance: Turning  Dynamic Standing-Comments: CGA for safety.     Vision:   Sensation:  Light Touch: No apparent deficits  Strength:  Strength Comments: Limited shoulder flexion      Hand Function:  Gross Grasp: Functional  Coordination: Functional  Extremities: RUE   RUE : Within Functional Limits (>=4/5 distally) and LUE   LUE: Within Functional Limits (>=3+/5 distally)    Outcome Measures:[facility] Daily Activity  Putting on and taking off regular lower body clothing: A little  Bathing (including washing, rinsing, drying): A lot  Putting on and taking off regular upper body clothing: A little  Toileting, which includes using toilet, bedpan or urinal: A little  Taking care of personal grooming such as brushing teeth: A little  Eating Meals: A little  Daily Activity - Total Score: 17        Education Documentation  Body Mechanics, taught by Paige Sanchez OT at 2/9/2024  2:37 PM.  Learner: Patient  Readiness:  Acceptance  Method: Explanation, Demonstration  Response: Needs Reinforcement    ADL Training, taught by Paige Sanchez OT at 2/9/2024  2:37 PM.  Learner: Patient  Readiness: Acceptance  Method: Explanation, Demonstration  Response: Needs Reinforcement    Education Comments  No comments found.        OP EDUCATION:       Goals:  Encounter Problems       Encounter Problems (Active)       ADLs       Patient will perform UB and LB bathing with contact guard assist level of assistance and grab bars, shower chair, and long-handled sponge. (Progressing)       Start:  02/09/24    Expected End:  02/23/24            Patient with complete upper body dressing with modified independent level of assistance donning and doffing all UE clothes with PRN adaptive equipment while supported sitting and edge of bed  (Progressing)       Start:  02/09/24    Expected End:  02/23/24            Patient with complete lower body dressing with stand by assist level of assistance donning and doffing all LE clothes  with PRN adaptive equipment while edge of bed  (Progressing)       Start:  02/09/24    Expected End:  02/23/24            Patient will complete daily grooming tasks with stand by assist level of assistance and PRN adaptive equipment while standing. (Progressing)       Start:  02/09/24    Expected End:  02/23/24            Patient will complete toileting including hygiene clothing management/hygiene with stand by assist level of assistance and raised toilet seat and grab bars. (Progressing)       Start:  02/09/24    Expected End:  02/23/24               BALANCE       Pt will maintain dynamic standing balance during ADL task with supervision level of assistance in order to demonstrate decreased risk of falling and improved postural control. (Progressing)       Start:  02/09/24    Expected End:  02/23/24               EXERCISE/STRENGTHENING       Patient will complete BUE exercises for 3 sets and 10 reps in order to improve strength and  activity for ADL performance.  (Progressing)       Start:  02/09/24    Expected End:  02/23/24               MOBILITY       Patient will perform Functional mobility x Household distances/Community Distances with supervision level of assistance and least restrictive device in order to improve safety and functional mobility. (Progressing)       Start:  02/09/24    Expected End:  02/23/24               TRANSFERS       Patient will complete functional transfer with least restrictive device with supervision level of assistance. (Progressing)       Start:  02/09/24    Expected End:  02/23/24

## 2024-02-09 NOTE — PROGRESS NOTES
New Castano is a 83 y.o. male on day 5 of admission presenting with CHF (congestive heart failure), NYHA class I, acute on chronic, combined (CMS/HCC).      Subjective   Pt is alert oriented to place  No events overnight  No pain   Breathing ok  No nausea vomiting   Is making urine       Objective     Last Recorded Vitals  BP (!) 144/91 (BP Location: Right arm, Patient Position: Lying)   Pulse 72   Temp 36.3 °C (97.4 °F) (Temporal)   Resp 16   Wt 94.3 kg (207 lb 12.8 oz)   SpO2 96%   Intake/Output last 3 Shifts:    Intake/Output Summary (Last 24 hours) at 2/9/2024 0825  Last data filed at 2/9/2024 0615  Gross per 24 hour   Intake 530 ml   Output 2200 ml   Net -1670 ml         Admission Weight  Weight: 88.6 kg (195 lb 5.2 oz) (02/04/24 1709)    Daily Weight  02/09/24 : 94.3 kg (207 lb 12.8 oz)    Image Results  Electrocardiogram, 12-lead PRN ACS symptoms  Sinus tachycardia  Nonspecific intraventricular conduction delay  ST & T wave abnormality, consider inferolateral ischemia  Abnormal ECG  When compared with ECG of 06-FEB-2024 06:54,  Previous ECG has undetermined rhythm, needs review  QRS axis Shifted right  Confirmed by Sadiq Watts ([id number]) on 2/7/2024 8:50:06 PM      Physical Exam  Alert appropriate , obese, flat affect, no resp distress  Chst good air entry camila   Cvs regular  Abdo soft  bs active, no tenderness   external cath in place  Ext chronic changes, edema +  Cns nonfocal    Relevant Results  Scheduled medications  apixaban, 2.5 mg, oral, BID  atorvastatin, 20 mg, oral, Nightly  cefTRIAXone, 2 g, intravenous, q24h  docusate sodium, 100 mg, oral, BID  doxazosin, 4 mg, oral, Nightly  empagliflozin, 25 mg, oral, Daily  finasteride, 5 mg, oral, Daily  furosemide, 40 mg, intravenous, q12h  glimepiride, 2 mg, oral, Daily before breakfast  insulin lispro, 0-5 Units, subcutaneous, TID with meals  losartan, 50 mg, oral, Daily  melatonin, 3 mg, oral, Daily  metoprolol succinate XL, 150 mg, oral,  BID  pantoprazole, 40 mg, oral, Daily before breakfast  pantoprazole, 40 mg, intravenous, Daily before breakfast      Continuous medications     PRN medications  PRN medications: [DISCONTINUED] acetaminophen **OR** acetaminophen **OR** acetaminophen, acetaminophen, dextrose 10 % in water (D10W), dextrose, glucagon, guaiFENesin, melatonin, metoprolol, ondansetron, ondansetron **OR** [DISCONTINUED] ondansetron, polyethylene glycol, traMADol  Results for orders placed or performed during the hospital encounter of 02/04/24 (from the past 96 hour(s))   POCT GLUCOSE   Result Value Ref Range    POCT Glucose 288 (H) 74 - 99 mg/dL   POCT GLUCOSE   Result Value Ref Range    POCT Glucose 308 (H) 74 - 99 mg/dL   POCT GLUCOSE   Result Value Ref Range    POCT Glucose 149 (H) 74 - 99 mg/dL   CBC   Result Value Ref Range    WBC 10.6 4.4 - 11.3 x10*3/uL    nRBC 0.0 0.0 - 0.0 /100 WBCs    RBC 4.41 (L) 4.50 - 5.90 x10*6/uL    Hemoglobin 12.9 (L) 13.5 - 17.5 g/dL    Hematocrit 43.2 41.0 - 52.0 %    MCV 98 80 - 100 fL    MCH 29.3 26.0 - 34.0 pg    MCHC 29.9 (L) 32.0 - 36.0 g/dL    RDW 16.1 (H) 11.5 - 14.5 %    Platelets 149 (L) 150 - 450 x10*3/uL   Basic metabolic panel   Result Value Ref Range    Glucose 216 (H) 74 - 99 mg/dL    Sodium 138 136 - 145 mmol/L    Potassium 3.9 3.5 - 5.3 mmol/L    Chloride 100 98 - 107 mmol/L    Bicarbonate 27 21 - 32 mmol/L    Anion Gap 15 10 - 20 mmol/L    Urea Nitrogen 36 (H) 6 - 23 mg/dL    Creatinine 1.94 (H) 0.50 - 1.30 mg/dL    eGFR 34 (L) >60 mL/min/1.73m*2    Calcium 8.7 8.6 - 10.3 mg/dL   Electrocardiogram, 12-lead PRN ACS symptoms   Result Value Ref Range    Ventricular Rate 103 BPM    Atrial Rate 103 BPM    FL Interval 120 ms    QRS Duration 124 ms    QT Interval 380 ms    QTC Calculation(Bazett) 497 ms    P Axis 87 degrees    R Axis -34 degrees    T Axis 131 degrees    QRS Count 17 beats    Q Onset 216 ms    P Onset 156 ms    P Offset 189 ms    T Offset 406 ms    QTC Fredericia 455 ms   POCT  GLUCOSE   Result Value Ref Range    POCT Glucose 218 (H) 74 - 99 mg/dL   POCT GLUCOSE   Result Value Ref Range    POCT Glucose 277 (H) 74 - 99 mg/dL   POCT GLUCOSE   Result Value Ref Range    POCT Glucose 138 (H) 74 - 99 mg/dL   POCT GLUCOSE   Result Value Ref Range    POCT Glucose 153 (H) 74 - 99 mg/dL   CBC   Result Value Ref Range    WBC 10.1 4.4 - 11.3 x10*3/uL    nRBC 0.0 0.0 - 0.0 /100 WBCs    RBC 4.23 (L) 4.50 - 5.90 x10*6/uL    Hemoglobin 12.4 (L) 13.5 - 17.5 g/dL    Hematocrit 40.1 (L) 41.0 - 52.0 %    MCV 95 80 - 100 fL    MCH 29.3 26.0 - 34.0 pg    MCHC 30.9 (L) 32.0 - 36.0 g/dL    RDW 16.2 (H) 11.5 - 14.5 %    Platelets 153 150 - 450 x10*3/uL   Basic metabolic panel   Result Value Ref Range    Glucose 219 (H) 74 - 99 mg/dL    Sodium 138 136 - 145 mmol/L    Potassium 3.7 3.5 - 5.3 mmol/L    Chloride 98 98 - 107 mmol/L    Bicarbonate 30 21 - 32 mmol/L    Anion Gap 14 10 - 20 mmol/L    Urea Nitrogen 32 (H) 6 - 23 mg/dL    Creatinine 1.66 (H) 0.50 - 1.30 mg/dL    eGFR 40 (L) >60 mL/min/1.73m*2    Calcium 8.2 (L) 8.6 - 10.3 mg/dL   Electrocardiogram, 12-lead PRN ACS symptoms   Result Value Ref Range    Ventricular Rate 101 BPM    Atrial Rate 101 BPM    KS Interval 124 ms    QRS Duration 124 ms    QT Interval 386 ms    QTC Calculation(Bazett) 500 ms    P Axis 118 degrees    R Axis 7 degrees    T Axis 151 degrees    QRS Count 16 beats    Q Onset 215 ms    P Onset 163 ms    P Offset 191 ms    T Offset 408 ms    QTC Fredericia 459 ms   POCT GLUCOSE   Result Value Ref Range    POCT Glucose 176 (H) 74 - 99 mg/dL   POCT GLUCOSE   Result Value Ref Range    POCT Glucose 236 (H) 74 - 99 mg/dL   POCT GLUCOSE   Result Value Ref Range    POCT Glucose 259 (H) 74 - 99 mg/dL   POCT GLUCOSE   Result Value Ref Range    POCT Glucose 196 (H) 74 - 99 mg/dL   CBC   Result Value Ref Range    WBC 8.4 4.4 - 11.3 x10*3/uL    nRBC 0.0 0.0 - 0.0 /100 WBCs    RBC 4.24 (L) 4.50 - 5.90 x10*6/uL    Hemoglobin 12.1 (L) 13.5 - 17.5 g/dL     Hematocrit 40.4 (L) 41.0 - 52.0 %    MCV 95 80 - 100 fL    MCH 28.5 26.0 - 34.0 pg    MCHC 30.0 (L) 32.0 - 36.0 g/dL    RDW 16.1 (H) 11.5 - 14.5 %    Platelets 135 (L) 150 - 450 x10*3/uL   Basic metabolic panel   Result Value Ref Range    Glucose 149 (H) 74 - 99 mg/dL    Sodium 140 136 - 145 mmol/L    Potassium 3.6 3.5 - 5.3 mmol/L    Chloride 99 98 - 107 mmol/L    Bicarbonate 33 (H) 21 - 32 mmol/L    Anion Gap 12 10 - 20 mmol/L    Urea Nitrogen 29 (H) 6 - 23 mg/dL    Creatinine 1.66 (H) 0.50 - 1.30 mg/dL    eGFR 40 (L) >60 mL/min/1.73m*2    Calcium 8.2 (L) 8.6 - 10.3 mg/dL   POCT GLUCOSE   Result Value Ref Range    POCT Glucose 154 (H) 74 - 99 mg/dL   POCT GLUCOSE   Result Value Ref Range    POCT Glucose 242 (H) 74 - 99 mg/dL   POCT GLUCOSE   Result Value Ref Range    POCT Glucose 177 (H) 74 - 99 mg/dL   CBC   Result Value Ref Range    WBC 7.2 4.4 - 11.3 x10*3/uL    nRBC 0.0 0.0 - 0.0 /100 WBCs    RBC 4.32 (L) 4.50 - 5.90 x10*6/uL    Hemoglobin 12.2 (L) 13.5 - 17.5 g/dL    Hematocrit 40.5 (L) 41.0 - 52.0 %    MCV 94 80 - 100 fL    MCH 28.2 26.0 - 34.0 pg    MCHC 30.1 (L) 32.0 - 36.0 g/dL    RDW 15.9 (H) 11.5 - 14.5 %    Platelets 133 (L) 150 - 450 x10*3/uL   Basic metabolic panel   Result Value Ref Range    Glucose 183 (H) 74 - 99 mg/dL    Sodium 139 136 - 145 mmol/L    Potassium 3.3 (L) 3.5 - 5.3 mmol/L    Chloride 99 98 - 107 mmol/L    Bicarbonate 30 21 - 32 mmol/L    Anion Gap 13 10 - 20 mmol/L    Urea Nitrogen 29 (H) 6 - 23 mg/dL    Creatinine 1.66 (H) 0.50 - 1.30 mg/dL    eGFR 40 (L) >60 mL/min/1.73m*2    Calcium 8.2 (L) 8.6 - 10.3 mg/dL   POCT GLUCOSE   Result Value Ref Range    POCT Glucose 170 (H) 74 - 99 mg/dL     Echo ef 50           Assessment/Plan      #Acute on chronic systolic congestive heart failure  Cont with IV Lasix  Daily weights and electrolytes  Cardiology consulted  BNP 3900   Ot and pt  Response to diuresis ok  Will plan for discharge to home once cleared by cardio  On lasix 40 twice daily    Will need followup with [facility] at [facility]     #Community-acquired pneumonia   IV Rocephin/Zithromax  However lung bases on the CT abdo are consistent with edema  Will change to po antibiotics     #CKD 3B  Monitor with diuresing  .creatinine 1.6  Cont with jardiance  Losartan renal following        #Diabetes mellitus  Sliding scale insulin coverage  Hba1c 7.5     #Hypertension  Cont with current     #Atrial fibrillation s/p ablation  Continue Eliquis     #Dyslipidemia  Continue statins and maintain target LDL less than 70    Principal Problem:    CHF (congestive heart failure), NYHA class I, acute on chronic, combined (CMS/[facility])    Code status dw pt and he has not made a decisoin  Dc plan hoem once cleared by cardio       Kevon Gunderson MD

## 2024-02-10 VITALS
BODY MASS INDEX: 28.73 KG/M2 | DIASTOLIC BLOOD PRESSURE: 77 MMHG | HEIGHT: 71 IN | RESPIRATION RATE: 18 BRPM | TEMPERATURE: 97.7 F | OXYGEN SATURATION: 95 % | SYSTOLIC BLOOD PRESSURE: 128 MMHG | WEIGHT: 205.25 LBS | HEART RATE: 81 BPM

## 2024-02-10 LAB
ANION GAP SERPL CALC-SCNC: 13 MMOL/L (ref 10–20)
BUN SERPL-MCNC: 29 MG/DL (ref 6–23)
CALCIUM SERPL-MCNC: 8.6 MG/DL (ref 8.6–10.3)
CHLORIDE SERPL-SCNC: 97 MMOL/L (ref 98–107)
CO2 SERPL-SCNC: 33 MMOL/L (ref 21–32)
CREAT SERPL-MCNC: 1.73 MG/DL (ref 0.5–1.3)
EGFRCR SERPLBLD CKD-EPI 2021: 38 ML/MIN/1.73M*2
ERYTHROCYTE [DISTWIDTH] IN BLOOD BY AUTOMATED COUNT: 15.6 % (ref 11.5–14.5)
GLUCOSE BLD MANUAL STRIP-MCNC: 280 MG/DL (ref 74–99)
GLUCOSE BLD MANUAL STRIP-MCNC: 321 MG/DL (ref 74–99)
GLUCOSE SERPL-MCNC: 174 MG/DL (ref 74–99)
HCT VFR BLD AUTO: 39.2 % (ref 41–52)
HGB BLD-MCNC: 11.8 G/DL (ref 13.5–17.5)
MCH RBC QN AUTO: 28.4 PG (ref 26–34)
MCHC RBC AUTO-ENTMCNC: 30.1 G/DL (ref 32–36)
MCV RBC AUTO: 94 FL (ref 80–100)
NRBC BLD-RTO: 0 /100 WBCS (ref 0–0)
PLATELET # BLD AUTO: 145 X10*3/UL (ref 150–450)
POTASSIUM SERPL-SCNC: 3.9 MMOL/L (ref 3.5–5.3)
RBC # BLD AUTO: 4.16 X10*6/UL (ref 4.5–5.9)
SODIUM SERPL-SCNC: 139 MMOL/L (ref 136–145)
WBC # BLD AUTO: 7.5 X10*3/UL (ref 4.4–11.3)

## 2024-02-10 PROCEDURE — 2500000001 HC RX 250 WO HCPCS SELF ADMINISTERED DRUGS (ALT 637 FOR MEDICARE OP): Performed by: NURSE PRACTITIONER

## 2024-02-10 PROCEDURE — 82374 ASSAY BLOOD CARBON DIOXIDE: CPT | Performed by: INTERNAL MEDICINE

## 2024-02-10 PROCEDURE — 82947 ASSAY GLUCOSE BLOOD QUANT: CPT

## 2024-02-10 PROCEDURE — 2500000004 HC RX 250 GENERAL PHARMACY W/ HCPCS (ALT 636 FOR OP/ED): Performed by: INTERNAL MEDICINE

## 2024-02-10 PROCEDURE — 36415 COLL VENOUS BLD VENIPUNCTURE: CPT | Performed by: INTERNAL MEDICINE

## 2024-02-10 PROCEDURE — 2500000001 HC RX 250 WO HCPCS SELF ADMINISTERED DRUGS (ALT 637 FOR MEDICARE OP): Performed by: INTERNAL MEDICINE

## 2024-02-10 PROCEDURE — 85027 COMPLETE CBC AUTOMATED: CPT | Performed by: INTERNAL MEDICINE

## 2024-02-10 PROCEDURE — 2500000002 HC RX 250 W HCPCS SELF ADMINISTERED DRUGS (ALT 637 FOR MEDICARE OP, ALT 636 FOR OP/ED): Performed by: INTERNAL MEDICINE

## 2024-02-10 PROCEDURE — C9113 INJ PANTOPRAZOLE SODIUM, VIA: HCPCS | Performed by: INTERNAL MEDICINE

## 2024-02-10 PROCEDURE — 2500000004 HC RX 250 GENERAL PHARMACY W/ HCPCS (ALT 636 FOR OP/ED): Performed by: PHARMACIST

## 2024-02-10 PROCEDURE — 99232 SBSQ HOSP IP/OBS MODERATE 35: CPT | Performed by: INTERNAL MEDICINE

## 2024-02-10 RX ORDER — FUROSEMIDE 40 MG/1
40 TABLET ORAL 2 TIMES DAILY
Qty: 60 TABLET | Refills: 0 | Status: ON HOLD | OUTPATIENT
Start: 2024-02-10 | End: 2024-04-09

## 2024-02-10 RX ORDER — AMOXICILLIN AND CLAVULANATE POTASSIUM 875; 125 MG/1; MG/1
1 TABLET, FILM COATED ORAL 2 TIMES DAILY
Qty: 6 TABLET | Refills: 0 | Status: SHIPPED | OUTPATIENT
Start: 2024-02-10 | End: 2024-02-13

## 2024-02-10 RX ORDER — DOCUSATE SODIUM 100 MG/1
100 CAPSULE, LIQUID FILLED ORAL 2 TIMES DAILY
Status: ON HOLD
Start: 2024-02-10 | End: 2024-03-25 | Stop reason: ALTCHOICE

## 2024-02-10 RX ORDER — FUROSEMIDE 40 MG/1
40 TABLET ORAL
Status: DISCONTINUED | OUTPATIENT
Start: 2024-02-10 | End: 2024-02-10 | Stop reason: HOSPADM

## 2024-02-10 RX ORDER — LOSARTAN POTASSIUM 50 MG/1
50 TABLET ORAL DAILY
Qty: 30 TABLET | Refills: 0 | Status: SHIPPED | OUTPATIENT
Start: 2024-02-10 | End: 2024-03-11

## 2024-02-10 RX ADMIN — INSULIN LISPRO 3 UNITS: 100 INJECTION, SOLUTION INTRAVENOUS; SUBCUTANEOUS at 11:17

## 2024-02-10 RX ADMIN — CEFTRIAXONE 2 G: 2 INJECTION, POWDER, FOR SOLUTION INTRAMUSCULAR; INTRAVENOUS at 02:36

## 2024-02-10 RX ADMIN — EMPAGLIFLOZIN 25 MG: 25 TABLET, FILM COATED ORAL at 11:19

## 2024-02-10 RX ADMIN — LOSARTAN POTASSIUM 50 MG: 50 TABLET, FILM COATED ORAL at 11:19

## 2024-02-10 RX ADMIN — PANTOPRAZOLE SODIUM 40 MG: 40 TABLET, DELAYED RELEASE ORAL at 06:17

## 2024-02-10 RX ADMIN — PANTOPRAZOLE SODIUM 40 MG: 40 INJECTION, POWDER, FOR SOLUTION INTRAVENOUS at 11:18

## 2024-02-10 RX ADMIN — APIXABAN 2.5 MG: 2.5 TABLET, FILM COATED ORAL at 11:19

## 2024-02-10 RX ADMIN — GLIMEPIRIDE 2 MG: 2 TABLET ORAL at 06:16

## 2024-02-10 RX ADMIN — METOPROLOL SUCCINATE 150 MG: 50 TABLET, EXTENDED RELEASE ORAL at 11:18

## 2024-02-10 RX ADMIN — FINASTERIDE 5 MG: 5 TABLET, FILM COATED ORAL at 11:19

## 2024-02-10 ASSESSMENT — COGNITIVE AND FUNCTIONAL STATUS - GENERAL
TOILETING: A LITTLE
STANDING UP FROM CHAIR USING ARMS: A LITTLE
DAILY ACTIVITIY SCORE: 17
WALKING IN HOSPITAL ROOM: TOTAL
MOVING TO AND FROM BED TO CHAIR: A LITTLE
WALKING IN HOSPITAL ROOM: A LITTLE
TOILETING: A LITTLE
DRESSING REGULAR UPPER BODY CLOTHING: A LITTLE
DRESSING REGULAR LOWER BODY CLOTHING: A LOT
MOVING TO AND FROM BED TO CHAIR: A LITTLE
MOVING FROM LYING ON BACK TO SITTING ON SIDE OF FLAT BED WITH BEDRAILS: A LITTLE
TURNING FROM BACK TO SIDE WHILE IN FLAT BAD: A LITTLE
DAILY ACTIVITIY SCORE: 18
DRESSING REGULAR LOWER BODY CLOTHING: A LITTLE
MOBILITY SCORE: 18
HELP NEEDED FOR BATHING: A LITTLE
EATING MEALS: A LITTLE
DRESSING REGULAR UPPER BODY CLOTHING: A LITTLE
STANDING UP FROM CHAIR USING ARMS: A LITTLE
MOBILITY SCORE: 14
PERSONAL GROOMING: A LITTLE
PERSONAL GROOMING: A LITTLE
CLIMB 3 TO 5 STEPS WITH RAILING: A LOT
TURNING FROM BACK TO SIDE WHILE IN FLAT BAD: A LITTLE
CLIMB 3 TO 5 STEPS WITH RAILING: TOTAL
HELP NEEDED FOR BATHING: A LOT

## 2024-02-10 ASSESSMENT — PAIN SCALES - GENERAL: PAINLEVEL_OUTOF10: 0 - NO PAIN

## 2024-02-10 NOTE — PROGRESS NOTES
New Castano is a 83 y.o. male on day 6 of admission presenting with CHF (congestive heart failure), NYHA class I, acute on chronic, combined (CMS/HCC).      Subjective   New Castano is a 83 y.o. male with a past medical history of stage G3 CKD with a baseline creatinine between 1.3 to 1.5 mg/deciliter, atrial fibrillation status post ablation on Eliquis and a beta-blocker, HFrEF with LVEF 30%, hypertension, hyperlipidemia, diabetes who was recently admitted to [facility] with weakness, altered mental status and UTI. Had a wide-complex with A-fib RVR.  He was admitted to CCU on an amiodarone drip and later transitioned back to metoprolol, increased to 150 mg twice daily. He was discharged on 25 mg of Jardiance, furosemide 40 mg twice daily, losartan 50 mg, 1000 mg of metformin and 8 mg of Cardura on 12/26.  His blood pressure was 126/74 on 12/25 with a creatinine of 1.48 mg/a deciliter.       He then [facility] on 12/[facility] with weakness, hypotension and diarrhea. Was found to have an acute kidney injury with a creatinine of 3.3 thus we saw him. Mr. Castano had a hemodynamic acute kidney injury.  He was taken off the loop diuretic, SGLT2, ARB and metformin. His creatinine improved following gentle IV volume expansion settling around 2 mg/dL. He was sent out on Lasix 40 mg daily. His weight was around 195 lbs. He comes back in with shortness of breath. His weight is up to 210 lbs. CT of the abdomen and pelvis showed moderate interstitial edema and pleural effusions. Moderate cardiomegaly. No renal obstruction with an enlarged prostate.  Nephrology is consulted for renal care.    HARRISON. Improved lower limb edema with IVP Lasix. Anxious for discharge.   Chart/labs/meds/notes/imaging/VS reviewed.         Objective          Vitals 24HR  Heart Rate:  [80-90]   Temp:  [36.4 °C (97.5 °F)-36.9 °C (98.4 °F)]   Resp:  [16-18]   BP: (128-147)/(77-86)   Weight:  [93.1 kg (205 lb 4 oz)-94 kg (207 lb 4.8 oz)]   SpO2:  [95  %-98 %]     Intake/Output last 3 Shifts:    Intake/Output Summary (Last 24 hours) at 2/10/2024 1326  Last data filed at 2/10/2024 0600  Gross per 24 hour   Intake 50 ml   Output 1950 ml   Net -1900 ml         Physical Exam  General: Alert and oriented, no acute distress.    Lungs: Mildly diminished at the bases, no wheezes, rales or rhonchi.   Heart: Normal rate, no murmur, gallop or edema.   Abdomen: Soft, non-tender, non-distended, normal bowel sounds, no masses.   Extremities: Pitting LE edema improving   Neurologic: Awake, alert, and oriented X3, moves extremities spontaneously  Psychiatric: Appropriate mood and affect.     Scheduled Medications  apixaban, 2.5 mg, oral, BID  atorvastatin, 20 mg, oral, Nightly  cefTRIAXone, 2 g, intravenous, q24h  docusate sodium, 100 mg, oral, BID  doxazosin, 4 mg, oral, Nightly  empagliflozin, 25 mg, oral, Daily  finasteride, 5 mg, oral, Daily  furosemide, 40 mg, oral, 2 times per day  glimepiride, 2 mg, oral, Daily before breakfast  insulin lispro, 0-5 Units, subcutaneous, TID with meals  losartan, 50 mg, oral, Daily  melatonin, 3 mg, oral, Daily  metoprolol succinate XL, 150 mg, oral, BID  pantoprazole, 40 mg, oral, Daily before breakfast  pantoprazole, 40 mg, intravenous, Daily before breakfast      Continuous medications       PRN medications: [DISCONTINUED] acetaminophen **OR** acetaminophen **OR** acetaminophen, acetaminophen, dextrose 10 % in water (D10W), dextrose, glucagon, guaiFENesin, melatonin, metoprolol, ondansetron, ondansetron **OR** [DISCONTINUED] ondansetron, polyethylene glycol, traMADol     Relevant Results  Results from last 7 days   Lab Units 02/10/24  0547 02/09/24  0705 02/08/24  0548 02/06/24  0647 02/04/24  1757   WBC AUTO x10*3/uL 7.5 7.2 8.4   < > 17.4*   HEMOGLOBIN g/dL 11.8* 12.2* 12.1*   < > 13.6   HEMATOCRIT % 39.2* 40.5* 40.4*   < > 45.0   PLATELETS AUTO x10*3/uL 145* 133* 135*   < > 197   NEUTROS PCT AUTO %  --   --   --   --  86.7   LYMPHS PCT  AUTO %  --   --   --   --  3.1   MONOS PCT AUTO %  --   --   --   --  9.4   EOS PCT AUTO %  --   --   --   --  0.1    < > = values in this interval not displayed.       Results from last 7 days   Lab Units 02/10/24  0547 02/09/24  0705 02/08/24  0548   SODIUM mmol/L 139 139 140   POTASSIUM mmol/L 3.9 3.3* 3.6   CHLORIDE mmol/L 97* 99 99   CO2 mmol/L 33* 30 33*   BUN mg/dL 29* 29* 29*   CREATININE mg/dL 1.73* 1.66* 1.66*   GLUCOSE mg/dL 174* 183* 149*   CALCIUM mg/dL 8.6 8.2* 8.2*         CT head wo IV contrast   Final Result   No acute intracranial hemorrhage or mass effect.        Minimal soft tissue swelling over the left frontal bone.             MACRO:   None.        Signed by: Erinn Torrez 2/4/2024 9:58 PM   Dictation workstation:   [id number]      CT abdomen pelvis wo IV contrast   Final Result   No acute process involving the abdomen or pelvis   Small amount of ascites.   Moderate interstitial edema and pleural effusions at the lung bases   Mildly enlarged external iliac lymph nodes. Correlate for any history   of neoplasm. Correlation with PSA could also be performed.   Signed by Juve Philippe MD      XR chest 2 views   Final Result   1.  Possible opacity posteriorly seen on the lateral projection at the   lung base which may represent atelectasis or pneumonia..   Signed by Juice Chairez MD               Assessment/Plan      New Castano is a 83 y.o. male with a past medical history of stage G3 CKD with a baseline creatinine between 1.3 to 1.5 mg/deciliter, atrial fibrillation status post ablation on Eliquis and a beta-blocker, HFrEF with LVEF 30%, hypertension, hyperlipidemia, diabetes who was recently admitted to [facility] with weakness, altered mental status and UTI. Had a wide-complex with A-fib RVR.  He was admitted to CCU on an amiodarone drip and later transitioned back to metoprolol, increased to 150 mg twice daily. He was discharged on 25 mg of Jardiance, furosemide 40 mg twice daily,  losartan 50 mg, 1000 mg of metformin and 8 mg of Cardura on 12/26.  His blood pressure was 126/74 on 12/25 with a creatinine of 1.48 mg/a deciliter.       He then presented to [facility] on 12/28 with weakness, hypotension and diarrhea. Was found to have an acute kidney injury with a creatinine of 3.3 thus we saw him. Mr. Castano had a hemodynamic acute kidney injury.  He was taken off the loop diuretic, SGLT2, ARB and metformin. His creatinine improved following gentle IV volume expansion settling around 2 mg/dL. He was sent out on Lasix 40 mg daily. His weight was around 195 lbs. He comes back in with shortness of breath. His weight is up to 210 lbs. CT of the abdomen and pelvis showed moderate interstitial edema and pleural effusions. Moderate cardiomegaly. No renal obstruction with an enlarged prostate.  Nephrology is consulted for renal care.     Mr. Castano has improved renal function with his current creatinine back at baseline after recent history of ROSE. Unfortunately he gained significant weight since being discharged and is in decompensated HF. Blood pressures have been elevated. He is now back on the SGLT2i, Losartan and is ordered IVP Lasix 40 mg twice daily. He was given a dose of Metolazone yesterday to augment diuresis given decreased response to the loop diuretic. His volume status is improved. He is back on 40 mg BID of oral Lasix. His renal function remains stable. He is 205 lbs by standing weight. I would like to see his weight down closer to 200 lbs. There are no renal barriers to discharge.       Principal Problem:    CHF (congestive heart failure), NYHA class I, acute on chronic, combined (CMS/[facility])       I spent 40 minutes in the professional and overall care of this patient.      Juan Spangler, DO

## 2024-02-10 NOTE — CARE PLAN
Problem: Nutrition  Goal: Less than 5 days NPO/clear liquids  Outcome: Progressing  Goal: Oral intake greater than 50%  Outcome: Progressing  Goal: Oral intake greater 75%  Outcome: Progressing  Goal: Consume prescribed supplement  Outcome: Progressing  Goal: Adequate PO fluid intake  Outcome: Progressing  Goal: Nutrition support goals are met within 48 hrs  Outcome: Progressing  Goal: Nutrition support is meeting 75% of nutrient needs  Outcome: Progressing  Goal: Tube feed tolerance  Outcome: Progressing  Goal: BG  mg/dL  Outcome: Progressing  Goal: Lab values WNL  Outcome: Progressing  Goal: Electrolytes WNL  Outcome: Progressing  Goal: Promote healing  Outcome: Progressing  Goal: Maintain stable weight  Outcome: Progressing  Goal: Reduce weight from edema/fluid  Outcome: Progressing  Goal: Gradual weight gain  Outcome: Progressing  Goal: Improve ostomy output  Outcome: Progressing   The patient's goals for the shift include      The clinical goals for the shift include to remain fall free throughout shift

## 2024-02-10 NOTE — CARE PLAN
Pt is  awaiting  cardiology  clearance  prior to  dc  to home.     SW  met with  pt  to  discuss  recommendation for MOD. He  is A+Ox4, says he  is from home with wife  and wants to return. He will make  sure someone is  with him when walking. He wants to resume [facility].     Choice  list provided  at  bedside for  facilities and put in chart. [facility] updated.     [facility]  requested from MD.             BRIT Gonzales, BRANDON               2-10-24 9801  HHC  order sent  to [facility]  BRIT Gonzales, BRANDON

## 2024-02-10 NOTE — PROGRESS NOTES
"Subjective Data:  No new complaints. He reports that his leg swelling is back to baseline.        Objective Data:  Last Recorded Vitals:  Vitals:    24 2210 02/10/24 0415 02/10/24 0600 02/10/24 0838   BP: 132/83 147/86  128/77   BP Location: Left arm Right arm  Right arm   Patient Position: Sitting Sitting  Sitting   Pulse: 90   81   Resp: 18 18  18   Temp: 36.7 °C (98.1 °F) 36.9 °C (98.4 °F)  36.5 °C (97.7 °F)   TempSrc: Temporal Temporal  Oral   SpO2: 96% 98%  95%   Weight:   94 kg (207 lb 4.8 oz)    Height:         Medical Gas Therapy: None (Room air)  O2 Delivery Method: Nasal cannula  Weight  Av kg (209 lb 6.9 oz)  Min: 88.6 kg (195 lb 5.2 oz)  Max: 97.6 kg (215 lb 2.7 oz)    LABS:  CMP:  Results from last 7 days   Lab Units 02/10/24  0547 24  0705 24  0548 24  0541 24  0647 24  1757   SODIUM mmol/L 139 139 140 138 138 134*   POTASSIUM mmol/L 3.9 3.3* 3.6 3.7 3.9 5.0   CHLORIDE mmol/L 97* 99 99 98 100 98   CO2 mmol/L 33* 30 33* 30 27 27   ANION GAP mmol/L 13 13 12 14 15 14   BUN mg/dL 29* 29* 29* 32* 36* 36*   CREATININE mg/dL 1.73* 1.66* 1.66* 1.66* 1.94* 2.05*   EGFR mL/min/1.73m*2 38* 40* 40* 40* 34* 31*   MAGNESIUM mg/dL  --   --   --   --   --  2.20   ALBUMIN g/dL  --   --   --   --   --  3.4   ALT U/L  --   --   --   --   --  55*   AST U/L  --   --   --   --   --  18   BILIRUBIN TOTAL mg/dL  --   --   --   --   --  1.4*     CBC:  Results from last 7 days   Lab Units 02/10/24  0547 24  0705 24  0548 24  0541 24  0647 24  1757   WBC AUTO x10*3/uL 7.5 7.2 8.4 10.1 10.6 17.4*   HEMOGLOBIN g/dL 11.8* 12.2* 12.1* 12.4* 12.9* 13.6   HEMATOCRIT % 39.2* 40.5* 40.4* 40.1* 43.2 45.0   PLATELETS AUTO x10*3/uL 145* 133* 135* 153 149* 197   MCV fL 94 94 95 95 98 94     COAG:   Results from last 7 days   Lab Units 24  1757   INR  1.7*     ABO: No results found for: \"ABO\"  HEME/ENDO:  Results from last 7 days   Lab Units 24  1757   TSH " mIU/L 4.13*      CARDIAC:   Results from last 7 days   Lab Units 02/04/24  1757   TROPHS ng/L 17   BNP pg/mL 3,956*             Last I/O:    Intake/Output Summary (Last 24 hours) at 2/10/2024 1104  Last data filed at 2/10/2024 0600  Gross per 24 hour   Intake 50 ml   Output 1950 ml   Net -1900 ml     Net IO Since Admission: -6,164 mL [02/10/24 1104]            Inpatient Medications:  Scheduled medications   Medication Dose Route Frequency    apixaban  2.5 mg oral BID    atorvastatin  20 mg oral Nightly    cefTRIAXone  2 g intravenous q24h    docusate sodium  100 mg oral BID    doxazosin  4 mg oral Nightly    empagliflozin  25 mg oral Daily    finasteride  5 mg oral Daily    furosemide  40 mg intravenous q12h    glimepiride  2 mg oral Daily before breakfast    insulin lispro  0-5 Units subcutaneous TID with meals    losartan  50 mg oral Daily    melatonin  3 mg oral Daily    metoprolol succinate XL  150 mg oral BID    pantoprazole  40 mg oral Daily before breakfast    pantoprazole  40 mg intravenous Daily before breakfast     PRN medications   Medication    acetaminophen    Or    acetaminophen    acetaminophen    dextrose 10 % in water (D10W)    dextrose    glucagon    guaiFENesin    melatonin    metoprolol    ondansetron    ondansetron    polyethylene glycol    traMADol     Continuous Medications   Medication Dose Last Rate           Physical Exam:  Gen Well appearing elderly male sitting up in NAD. Body mass index is 28.91 kg/m².   CV reg rate. No m/r/g appreciated. No JVD. 1+ bilateral leg edema. Pulses 2+ and symmetric.    Pulm Lungs clear with normal respiratory effort.  Neuro Alert and conversant. Grossly nonfocal.            Assessment:  Acute on chronic HFpEF  Volume status improved and now acceptable   Persistent atrial tachycardia  Vs. Atypical atrial flutter. Rates acceptable. On anticoagulation regardless. May benefit from restoration of sinus rhythm at some point. Defer to his outpatient  cardiologist.  3. Hypertension   BP acceptable under present circumstances          Recommendations:  Diuretics to oral. No cardiac barriers to discharge. Follow up with his outpatient cardiologist.     Thank you for the consult. Will sign off, but we are available for any ?'s                 Mundo Holguin MD

## 2024-02-10 NOTE — PROGRESS NOTES
[place] is a 83 y.o. male on day 6 of admission presenting with CHF (congestive heart failure), NYHA class I, acute on chronic, combined (CMS/HCC).      Subjective   Pt is alert oriented to place  No events overnight  No pain   Breathing ok  No nausea vomiting   Is making urine       Objective     Last Recorded Vitals  /77 (BP Location: Right arm, Patient Position: Sitting)   Pulse 81   Temp 36.5 °C (97.7 °F) (Oral)   Resp 18   Wt 93.1 kg (205 lb 4 oz)   SpO2 95%   Intake/Output last 3 Shifts:    Intake/Output Summary (Last 24 hours) at 2/10/2024 1313  Last data filed at 2/10/2024 0600  Gross per 24 hour   Intake 50 ml   Output 1950 ml   Net -1900 ml         Admission Weight  Weight: 88.6 kg (195 lb 5.2 oz) (02/04/24 1709)    Daily Weight  02/10/24 : 93.1 kg (205 lb 4 oz)    Image Results  Electrocardiogram, 12-lead PRN ACS symptoms  Sinus tachycardia  Nonspecific intraventricular conduction delay  ST & T wave abnormality, consider inferolateral ischemia  Abnormal ECG  When compared with ECG of 06-FEB-2024 06:54,  Previous ECG has undetermined rhythm, needs review  QRS axis Shifted right  Confirmed by Sadiq Watts ([id number]) on 2/7/2024 8:50:06 PM      Physical Exam  Alert appropriate , obese, flat affect, no resp distress  Chst good air entry camila   Cvs regular  Abdo soft  bs active, no tenderness   external cath in place  Ext chronic changes, edema +  Cns nonfocal    Relevant Results  Scheduled medications  apixaban, 2.5 mg, oral, BID  atorvastatin, 20 mg, oral, Nightly  cefTRIAXone, 2 g, intravenous, q24h  docusate sodium, 100 mg, oral, BID  doxazosin, 4 mg, oral, Nightly  empagliflozin, 25 mg, oral, Daily  finasteride, 5 mg, oral, Daily  furosemide, 40 mg, oral, 2 times per day  glimepiride, 2 mg, oral, Daily before breakfast  insulin lispro, 0-5 Units, subcutaneous, TID with meals  losartan, 50 mg, oral, Daily  melatonin, 3 mg, oral, Daily  metoprolol succinate XL, 150 mg, oral,  BID  pantoprazole, 40 mg, oral, Daily before breakfast  pantoprazole, 40 mg, intravenous, Daily before breakfast      Continuous medications     PRN medications  PRN medications: [DISCONTINUED] acetaminophen **OR** acetaminophen **OR** acetaminophen, acetaminophen, dextrose 10 % in water (D10W), dextrose, glucagon, guaiFENesin, melatonin, metoprolol, ondansetron, ondansetron **OR** [DISCONTINUED] ondansetron, polyethylene glycol, traMADol  Results for orders placed or performed during the hospital encounter of 02/04/24 (from the past 96 hour(s))   POCT GLUCOSE   Result Value Ref Range    POCT Glucose 138 (H) 74 - 99 mg/dL   POCT GLUCOSE   Result Value Ref Range    POCT Glucose 153 (H) 74 - 99 mg/dL   CBC   Result Value Ref Range    WBC 10.1 4.4 - 11.3 x10*3/uL    nRBC 0.0 0.0 - 0.0 /100 WBCs    RBC 4.23 (L) 4.50 - 5.90 x10*6/uL    Hemoglobin 12.4 (L) 13.5 - 17.5 g/dL    Hematocrit 40.1 (L) 41.0 - 52.0 %    MCV 95 80 - 100 fL    MCH 29.3 26.0 - 34.0 pg    MCHC 30.9 (L) 32.0 - 36.0 g/dL    RDW 16.2 (H) 11.5 - 14.5 %    Platelets 153 150 - 450 x10*3/uL   Basic metabolic panel   Result Value Ref Range    Glucose 219 (H) 74 - 99 mg/dL    Sodium 138 136 - 145 mmol/L    Potassium 3.7 3.5 - 5.3 mmol/L    Chloride 98 98 - 107 mmol/L    Bicarbonate 30 21 - 32 mmol/L    Anion Gap 14 10 - 20 mmol/L    Urea Nitrogen 32 (H) 6 - 23 mg/dL    Creatinine 1.66 (H) 0.50 - 1.30 mg/dL    eGFR 40 (L) >60 mL/min/1.73m*2    Calcium 8.2 (L) 8.6 - 10.3 mg/dL   Electrocardiogram, 12-lead PRN ACS symptoms   Result Value Ref Range    Ventricular Rate 101 BPM    Atrial Rate 101 BPM    TX Interval 124 ms    QRS Duration 124 ms    QT Interval 386 ms    QTC Calculation(Bazett) 500 ms    P Axis 118 degrees    R Axis 7 degrees    T Axis 151 degrees    QRS Count 16 beats    Q Onset 215 ms    P Onset 163 ms    P Offset 191 ms    T Offset 408 ms    QTC Fredericia 459 ms   POCT GLUCOSE   Result Value Ref Range    POCT Glucose 176 (H) 74 - 99 mg/dL   POCT  GLUCOSE   Result Value Ref Range    POCT Glucose 236 (H) 74 - 99 mg/dL   POCT GLUCOSE   Result Value Ref Range    POCT Glucose 259 (H) 74 - 99 mg/dL   POCT GLUCOSE   Result Value Ref Range    POCT Glucose 196 (H) 74 - 99 mg/dL   CBC   Result Value Ref Range    WBC 8.4 4.4 - 11.3 x10*3/uL    nRBC 0.0 0.0 - 0.0 /100 WBCs    RBC 4.24 (L) 4.50 - 5.90 x10*6/uL    Hemoglobin 12.1 (L) 13.5 - 17.5 g/dL    Hematocrit 40.4 (L) 41.0 - 52.0 %    MCV 95 80 - 100 fL    MCH 28.5 26.0 - 34.0 pg    MCHC 30.0 (L) 32.0 - 36.0 g/dL    RDW 16.1 (H) 11.5 - 14.5 %    Platelets 135 (L) 150 - 450 x10*3/uL   Basic metabolic panel   Result Value Ref Range    Glucose 149 (H) 74 - 99 mg/dL    Sodium 140 136 - 145 mmol/L    Potassium 3.6 3.5 - 5.3 mmol/L    Chloride 99 98 - 107 mmol/L    Bicarbonate 33 (H) 21 - 32 mmol/L    Anion Gap 12 10 - 20 mmol/L    Urea Nitrogen 29 (H) 6 - 23 mg/dL    Creatinine 1.66 (H) 0.50 - 1.30 mg/dL    eGFR 40 (L) >60 mL/min/1.73m*2    Calcium 8.2 (L) 8.6 - 10.3 mg/dL   POCT GLUCOSE   Result Value Ref Range    POCT Glucose 154 (H) 74 - 99 mg/dL   POCT GLUCOSE   Result Value Ref Range    POCT Glucose 242 (H) 74 - 99 mg/dL   POCT GLUCOSE   Result Value Ref Range    POCT Glucose 177 (H) 74 - 99 mg/dL   CBC   Result Value Ref Range    WBC 7.2 4.4 - 11.3 x10*3/uL    nRBC 0.0 0.0 - 0.0 /100 WBCs    RBC 4.32 (L) 4.50 - 5.90 x10*6/uL    Hemoglobin 12.2 (L) 13.5 - 17.5 g/dL    Hematocrit 40.5 (L) 41.0 - 52.0 %    MCV 94 80 - 100 fL    MCH 28.2 26.0 - 34.0 pg    MCHC 30.1 (L) 32.0 - 36.0 g/dL    RDW 15.9 (H) 11.5 - 14.5 %    Platelets 133 (L) 150 - 450 x10*3/uL   Basic metabolic panel   Result Value Ref Range    Glucose 183 (H) 74 - 99 mg/dL    Sodium 139 136 - 145 mmol/L    Potassium 3.3 (L) 3.5 - 5.3 mmol/L    Chloride 99 98 - 107 mmol/L    Bicarbonate 30 21 - 32 mmol/L    Anion Gap 13 10 - 20 mmol/L    Urea Nitrogen 29 (H) 6 - 23 mg/dL    Creatinine 1.66 (H) 0.50 - 1.30 mg/dL    eGFR 40 (L) >60 mL/min/1.73m*2    Calcium 8.2  (L) 8.6 - 10.3 mg/dL   POCT GLUCOSE   Result Value Ref Range    POCT Glucose 170 (H) 74 - 99 mg/dL   POCT GLUCOSE   Result Value Ref Range    POCT Glucose 201 (H) 74 - 99 mg/dL   POCT GLUCOSE   Result Value Ref Range    POCT Glucose 213 (H) 74 - 99 mg/dL   CBC   Result Value Ref Range    WBC 7.5 4.4 - 11.3 x10*3/uL    nRBC 0.0 0.0 - 0.0 /100 WBCs    RBC 4.16 (L) 4.50 - 5.90 x10*6/uL    Hemoglobin 11.8 (L) 13.5 - 17.5 g/dL    Hematocrit 39.2 (L) 41.0 - 52.0 %    MCV 94 80 - 100 fL    MCH 28.4 26.0 - 34.0 pg    MCHC 30.1 (L) 32.0 - 36.0 g/dL    RDW 15.6 (H) 11.5 - 14.5 %    Platelets 145 (L) 150 - 450 x10*3/uL   Basic metabolic panel   Result Value Ref Range    Glucose 174 (H) 74 - 99 mg/dL    Sodium 139 136 - 145 mmol/L    Potassium 3.9 3.5 - 5.3 mmol/L    Chloride 97 (L) 98 - 107 mmol/L    Bicarbonate 33 (H) 21 - 32 mmol/L    Anion Gap 13 10 - 20 mmol/L    Urea Nitrogen 29 (H) 6 - 23 mg/dL    Creatinine 1.73 (H) 0.50 - 1.30 mg/dL    eGFR 38 (L) >60 mL/min/1.73m*2    Calcium 8.6 8.6 - 10.3 mg/dL   POCT GLUCOSE   Result Value Ref Range    POCT Glucose 280 (H) 74 - 99 mg/dL   POCT GLUCOSE   Result Value Ref Range    POCT Glucose 321 (H) 74 - 99 mg/dL     Echo ef 50           Assessment/Plan      #Acute on chronic systolic congestive heart failure  Cont with IV Lasix  Daily weights and electrolytes  Cardiology consulted  BNP 3900   Ot and pt  Response to diuresis ok  Will plan for discharge to home once cleared by cardio  On lasix 40 twice daily   Will need followup with [facility] at [facility]     #Community-acquired pneumonia   IV Rocephin/Zithromax  However lung bases on the CT abdo are consistent with edema  Will change to po antibiotics     #CKD 3B  Monitor with diuresing  .creatinine 1.6  Cont with jardiance  Losartan renal following        #Diabetes mellitus  Sliding scale insulin coverage  Hba1c 7.5     #Hypertension  Cont with current     #Atrial fibrillation s/p ablation  Continue Eliquis     #Dyslipidemia  Continue  statins and maintain target LDL less than 70    Principal Problem:    CHF (congestive heart failure), NYHA class I, acute on chronic, combined (CMS/[facility])    Code status dw pt and he has not made a decisoin  Dc to home today   See ordres  Noted input from cardio  Time > 30 min in discharge planning         Kevon Gunderson MD

## 2024-03-07 ENCOUNTER — APPOINTMENT (OUTPATIENT)
Dept: CARDIOLOGY | Facility: HOSPITAL | Age: 84
End: 2024-03-07
Payer: MEDICARE

## 2024-03-07 ENCOUNTER — HOSPITAL ENCOUNTER (OUTPATIENT)
Facility: HOSPITAL | Age: 84
Setting detail: OBSERVATION
Discharge: HOME | End: 2024-03-08
Attending: EMERGENCY MEDICINE | Admitting: INTERNAL MEDICINE
Payer: MEDICARE

## 2024-03-07 ENCOUNTER — APPOINTMENT (OUTPATIENT)
Dept: RADIOLOGY | Facility: HOSPITAL | Age: 84
End: 2024-03-07
Payer: MEDICARE

## 2024-03-07 DIAGNOSIS — I50.33 ACUTE ON CHRONIC DIASTOLIC CONGESTIVE HEART FAILURE (MULTI): ICD-10-CM

## 2024-03-07 DIAGNOSIS — R29.6 FREQUENT FALLS: ICD-10-CM

## 2024-03-07 DIAGNOSIS — R53.1 GENERALIZED WEAKNESS: ICD-10-CM

## 2024-03-07 DIAGNOSIS — I48.3 TYPICAL ATRIAL FLUTTER (MULTI): Primary | ICD-10-CM

## 2024-03-07 PROBLEM — I50.9 HEART FAILURE (MULTI): Status: ACTIVE | Noted: 2024-03-07

## 2024-03-07 LAB
ALBUMIN SERPL BCP-MCNC: 3.6 G/DL (ref 3.4–5)
ALP SERPL-CCNC: 83 U/L (ref 33–136)
ALT SERPL W P-5'-P-CCNC: 19 U/L (ref 10–52)
ANION GAP BLDV CALCULATED.4IONS-SCNC: 3 MMOL/L (ref 10–25)
ANION GAP SERPL CALC-SCNC: 14 MMOL/L (ref 10–20)
APPEARANCE UR: CLEAR
AST SERPL W P-5'-P-CCNC: 17 U/L (ref 9–39)
BASE EXCESS BLDV CALC-SCNC: 9 MMOL/L (ref -2–3)
BASOPHILS # BLD AUTO: 0.03 X10*3/UL (ref 0–0.1)
BASOPHILS NFR BLD AUTO: 0.5 %
BILIRUB DIRECT SERPL-MCNC: 0.4 MG/DL (ref 0–0.3)
BILIRUB SERPL-MCNC: 1.3 MG/DL (ref 0–1.2)
BILIRUB UR STRIP.AUTO-MCNC: NEGATIVE MG/DL
BNP SERPL-MCNC: >4700 PG/ML (ref 0–99)
BODY TEMPERATURE: 37 DEGREES CELSIUS
BUN SERPL-MCNC: 39 MG/DL (ref 6–23)
CA-I BLDV-SCNC: 1.13 MMOL/L (ref 1.1–1.33)
CALCIUM SERPL-MCNC: 8.8 MG/DL (ref 8.6–10.3)
CARDIAC TROPONIN I PNL SERPL HS: 25 NG/L (ref 0–20)
CARDIAC TROPONIN I PNL SERPL HS: 27 NG/L (ref 0–20)
CHLORIDE BLDV-SCNC: 102 MMOL/L (ref 98–107)
CHLORIDE SERPL-SCNC: 99 MMOL/L (ref 98–107)
CO2 SERPL-SCNC: 32 MMOL/L (ref 21–32)
COLOR UR: YELLOW
CREAT SERPL-MCNC: 1.74 MG/DL (ref 0.5–1.3)
EGFRCR SERPLBLD CKD-EPI 2021: 38 ML/MIN/1.73M*2
EOSINOPHIL # BLD AUTO: 0.1 X10*3/UL (ref 0–0.4)
EOSINOPHIL NFR BLD AUTO: 1.5 %
ERYTHROCYTE [DISTWIDTH] IN BLOOD BY AUTOMATED COUNT: 16.5 % (ref 11.5–14.5)
FLUAV RNA RESP QL NAA+PROBE: NOT DETECTED
FLUBV RNA RESP QL NAA+PROBE: NOT DETECTED
GLUCOSE BLD MANUAL STRIP-MCNC: 308 MG/DL (ref 74–99)
GLUCOSE BLDV-MCNC: 280 MG/DL (ref 74–99)
GLUCOSE SERPL-MCNC: 312 MG/DL (ref 74–99)
GLUCOSE UR STRIP.AUTO-MCNC: ABNORMAL MG/DL
HCO3 BLDV-SCNC: 36.4 MMOL/L (ref 22–26)
HCT VFR BLD AUTO: 41.4 % (ref 41–52)
HCT VFR BLD EST: 37 % (ref 41–52)
HGB BLD-MCNC: 12.5 G/DL (ref 13.5–17.5)
HGB BLDV-MCNC: 12.4 G/DL (ref 13.5–17.5)
IMM GRANULOCYTES # BLD AUTO: 0.03 X10*3/UL (ref 0–0.5)
IMM GRANULOCYTES NFR BLD AUTO: 0.5 % (ref 0–0.9)
INHALED O2 CONCENTRATION: 21 %
KETONES UR STRIP.AUTO-MCNC: NEGATIVE MG/DL
LACTATE BLDV-SCNC: 1.8 MMOL/L (ref 0.4–2)
LEUKOCYTE ESTERASE UR QL STRIP.AUTO: NEGATIVE
LYMPHOCYTES # BLD AUTO: 0.62 X10*3/UL (ref 0.8–3)
LYMPHOCYTES NFR BLD AUTO: 9.5 %
MAGNESIUM SERPL-MCNC: 2.3 MG/DL (ref 1.6–2.4)
MCH RBC QN AUTO: 27.4 PG (ref 26–34)
MCHC RBC AUTO-ENTMCNC: 30.2 G/DL (ref 32–36)
MCV RBC AUTO: 91 FL (ref 80–100)
MONOCYTES # BLD AUTO: 0.83 X10*3/UL (ref 0.05–0.8)
MONOCYTES NFR BLD AUTO: 12.7 %
NEUTROPHILS # BLD AUTO: 4.93 X10*3/UL (ref 1.6–5.5)
NEUTROPHILS NFR BLD AUTO: 75.3 %
NITRITE UR QL STRIP.AUTO: NEGATIVE
NRBC BLD-RTO: 0 /100 WBCS (ref 0–0)
OXYHGB MFR BLDV: 24.3 % (ref 45–75)
PCO2 BLDV: 63 MM HG (ref 41–51)
PH BLDV: 7.37 PH (ref 7.33–7.43)
PH UR STRIP.AUTO: 5 [PH]
PLATELET # BLD AUTO: 147 X10*3/UL (ref 150–450)
PO2 BLDV: 26 MM HG (ref 35–45)
POTASSIUM BLDV-SCNC: 3.7 MMOL/L (ref 3.5–5.3)
POTASSIUM SERPL-SCNC: 3.7 MMOL/L (ref 3.5–5.3)
PROT SERPL-MCNC: 6.5 G/DL (ref 6.4–8.2)
PROT UR STRIP.AUTO-MCNC: ABNORMAL MG/DL
RBC # BLD AUTO: 4.56 X10*6/UL (ref 4.5–5.9)
RBC # UR STRIP.AUTO: NEGATIVE /UL
RBC #/AREA URNS AUTO: NORMAL /HPF
SAO2 % BLDV: 25 % (ref 45–75)
SARS-COV-2 RNA RESP QL NAA+PROBE: NOT DETECTED
SODIUM BLDV-SCNC: 138 MMOL/L (ref 136–145)
SODIUM SERPL-SCNC: 141 MMOL/L (ref 136–145)
SP GR UR STRIP.AUTO: 1.02
UROBILINOGEN UR STRIP.AUTO-MCNC: <2 MG/DL
WBC # BLD AUTO: 6.5 X10*3/UL (ref 4.4–11.3)
WBC #/AREA URNS AUTO: NORMAL /HPF

## 2024-03-07 PROCEDURE — G0378 HOSPITAL OBSERVATION PER HR: HCPCS

## 2024-03-07 PROCEDURE — 71045 X-RAY EXAM CHEST 1 VIEW: CPT

## 2024-03-07 PROCEDURE — 72125 CT NECK SPINE W/O DYE: CPT | Performed by: RADIOLOGY

## 2024-03-07 PROCEDURE — 99222 1ST HOSP IP/OBS MODERATE 55: CPT | Performed by: PHYSICIAN ASSISTANT

## 2024-03-07 PROCEDURE — 87635 SARS-COV-2 COVID-19 AMP PRB: CPT | Performed by: EMERGENCY MEDICINE

## 2024-03-07 PROCEDURE — 84484 ASSAY OF TROPONIN QUANT: CPT | Performed by: EMERGENCY MEDICINE

## 2024-03-07 PROCEDURE — 84132 ASSAY OF SERUM POTASSIUM: CPT | Mod: 59 | Performed by: EMERGENCY MEDICINE

## 2024-03-07 PROCEDURE — 71045 X-RAY EXAM CHEST 1 VIEW: CPT | Performed by: RADIOLOGY

## 2024-03-07 PROCEDURE — 82947 ASSAY GLUCOSE BLOOD QUANT: CPT | Mod: 59

## 2024-03-07 PROCEDURE — 96361 HYDRATE IV INFUSION ADD-ON: CPT

## 2024-03-07 PROCEDURE — 81001 URINALYSIS AUTO W/SCOPE: CPT | Performed by: EMERGENCY MEDICINE

## 2024-03-07 PROCEDURE — 93005 ELECTROCARDIOGRAM TRACING: CPT

## 2024-03-07 PROCEDURE — 72125 CT NECK SPINE W/O DYE: CPT

## 2024-03-07 PROCEDURE — 36415 COLL VENOUS BLD VENIPUNCTURE: CPT | Performed by: EMERGENCY MEDICINE

## 2024-03-07 PROCEDURE — 2500000004 HC RX 250 GENERAL PHARMACY W/ HCPCS (ALT 636 FOR OP/ED): Performed by: EMERGENCY MEDICINE

## 2024-03-07 PROCEDURE — 83880 ASSAY OF NATRIURETIC PEPTIDE: CPT | Performed by: EMERGENCY MEDICINE

## 2024-03-07 PROCEDURE — 83735 ASSAY OF MAGNESIUM: CPT | Performed by: EMERGENCY MEDICINE

## 2024-03-07 PROCEDURE — 70450 CT HEAD/BRAIN W/O DYE: CPT | Performed by: RADIOLOGY

## 2024-03-07 PROCEDURE — 82248 BILIRUBIN DIRECT: CPT | Performed by: EMERGENCY MEDICINE

## 2024-03-07 PROCEDURE — 85025 COMPLETE CBC W/AUTO DIFF WBC: CPT | Performed by: EMERGENCY MEDICINE

## 2024-03-07 PROCEDURE — 99291 CRITICAL CARE FIRST HOUR: CPT | Performed by: EMERGENCY MEDICINE

## 2024-03-07 PROCEDURE — 70450 CT HEAD/BRAIN W/O DYE: CPT

## 2024-03-07 PROCEDURE — 84132 ASSAY OF SERUM POTASSIUM: CPT | Performed by: EMERGENCY MEDICINE

## 2024-03-07 RX ORDER — TALC
3 POWDER (GRAM) TOPICAL
Status: DISCONTINUED | OUTPATIENT
Start: 2024-03-08 | End: 2024-03-08 | Stop reason: HOSPADM

## 2024-03-07 RX ORDER — DOCUSATE SODIUM 100 MG/1
100 CAPSULE, LIQUID FILLED ORAL 2 TIMES DAILY
Status: DISCONTINUED | OUTPATIENT
Start: 2024-03-07 | End: 2024-03-08 | Stop reason: HOSPADM

## 2024-03-07 RX ORDER — PANTOPRAZOLE SODIUM 40 MG/1
40 TABLET, DELAYED RELEASE ORAL
Status: DISCONTINUED | OUTPATIENT
Start: 2024-03-08 | End: 2024-03-08 | Stop reason: HOSPADM

## 2024-03-07 RX ORDER — UBIDECARENONE 75 MG
500 CAPSULE ORAL DAILY
Status: DISCONTINUED | OUTPATIENT
Start: 2024-03-08 | End: 2024-03-08 | Stop reason: HOSPADM

## 2024-03-07 RX ORDER — ACETAMINOPHEN 325 MG/1
950 TABLET ORAL EVERY 6 HOURS PRN
Status: DISCONTINUED | OUTPATIENT
Start: 2024-03-07 | End: 2024-03-08 | Stop reason: HOSPADM

## 2024-03-07 RX ORDER — DOCUSATE SODIUM 100 MG/1
100 CAPSULE, LIQUID FILLED ORAL 2 TIMES DAILY
Status: DISCONTINUED | OUTPATIENT
Start: 2024-03-07 | End: 2024-03-07 | Stop reason: SDUPTHER

## 2024-03-07 RX ORDER — FINASTERIDE 5 MG/1
5 TABLET, FILM COATED ORAL DAILY
Status: DISCONTINUED | OUTPATIENT
Start: 2024-03-08 | End: 2024-03-08 | Stop reason: HOSPADM

## 2024-03-07 RX ORDER — PANTOPRAZOLE SODIUM 40 MG/10ML
40 INJECTION, POWDER, LYOPHILIZED, FOR SOLUTION INTRAVENOUS
Status: DISCONTINUED | OUTPATIENT
Start: 2024-03-08 | End: 2024-03-08 | Stop reason: HOSPADM

## 2024-03-07 RX ORDER — ATORVASTATIN CALCIUM 20 MG/1
20 TABLET, FILM COATED ORAL NIGHTLY
Status: DISCONTINUED | OUTPATIENT
Start: 2024-03-07 | End: 2024-03-08 | Stop reason: HOSPADM

## 2024-03-07 RX ORDER — METOPROLOL TARTRATE 50 MG/1
150 TABLET ORAL ONCE
Status: DISCONTINUED | OUTPATIENT
Start: 2024-03-07 | End: 2024-03-08 | Stop reason: HOSPADM

## 2024-03-07 RX ORDER — LOSARTAN POTASSIUM 50 MG/1
50 TABLET ORAL DAILY
Status: DISCONTINUED | OUTPATIENT
Start: 2024-03-08 | End: 2024-03-08 | Stop reason: HOSPADM

## 2024-03-07 RX ORDER — FUROSEMIDE 40 MG/1
40 TABLET ORAL 2 TIMES DAILY
Status: DISCONTINUED | OUTPATIENT
Start: 2024-03-07 | End: 2024-03-08 | Stop reason: HOSPADM

## 2024-03-07 RX ORDER — FUROSEMIDE 10 MG/ML
80 INJECTION INTRAMUSCULAR; INTRAVENOUS ONCE
Status: COMPLETED | OUTPATIENT
Start: 2024-03-07 | End: 2024-03-08

## 2024-03-07 RX ORDER — GLIMEPIRIDE 2 MG/1
2 TABLET ORAL
Status: DISCONTINUED | OUTPATIENT
Start: 2024-03-08 | End: 2024-03-08 | Stop reason: HOSPADM

## 2024-03-07 RX ORDER — LOPERAMIDE HYDROCHLORIDE 2 MG/1
2 CAPSULE ORAL 3 TIMES DAILY PRN
Status: DISCONTINUED | OUTPATIENT
Start: 2024-03-07 | End: 2024-03-08 | Stop reason: HOSPADM

## 2024-03-07 RX ORDER — HYDRALAZINE HYDROCHLORIDE 20 MG/ML
10 INJECTION INTRAMUSCULAR; INTRAVENOUS EVERY 6 HOURS PRN
Status: DISCONTINUED | OUTPATIENT
Start: 2024-03-07 | End: 2024-03-08 | Stop reason: HOSPADM

## 2024-03-07 RX ORDER — DOXAZOSIN 2 MG/1
4 TABLET ORAL NIGHTLY
Status: DISCONTINUED | OUTPATIENT
Start: 2024-03-07 | End: 2024-03-08 | Stop reason: HOSPADM

## 2024-03-07 RX ORDER — ACETAMINOPHEN 650 MG/1
650 SUPPOSITORY RECTAL EVERY 4 HOURS PRN
Status: DISCONTINUED | OUTPATIENT
Start: 2024-03-07 | End: 2024-03-08 | Stop reason: HOSPADM

## 2024-03-07 RX ORDER — ACETAMINOPHEN 160 MG/5ML
975 SOLUTION ORAL EVERY 6 HOURS PRN
Status: DISCONTINUED | OUTPATIENT
Start: 2024-03-07 | End: 2024-03-08 | Stop reason: HOSPADM

## 2024-03-07 RX ADMIN — SODIUM CHLORIDE, POTASSIUM CHLORIDE, SODIUM LACTATE AND CALCIUM CHLORIDE 1000 ML: 600; 310; 30; 20 INJECTION, SOLUTION INTRAVENOUS at 17:04

## 2024-03-07 ASSESSMENT — PAIN SCALES - GENERAL
PAINLEVEL_OUTOF10: 0 - NO PAIN

## 2024-03-07 ASSESSMENT — PAIN - FUNCTIONAL ASSESSMENT
PAIN_FUNCTIONAL_ASSESSMENT: 0-10
PAIN_FUNCTIONAL_ASSESSMENT: 0-10

## 2024-03-07 ASSESSMENT — COLUMBIA-SUICIDE SEVERITY RATING SCALE - C-SSRS
2. HAVE YOU ACTUALLY HAD ANY THOUGHTS OF KILLING YOURSELF?: NO
6. HAVE YOU EVER DONE ANYTHING, STARTED TO DO ANYTHING, OR PREPARED TO DO ANYTHING TO END YOUR LIFE?: NO
1. IN THE PAST MONTH, HAVE YOU WISHED YOU WERE DEAD OR WISHED YOU COULD GO TO SLEEP AND NOT WAKE UP?: NO

## 2024-03-07 NOTE — ED TRIAGE NOTES
Pt presents to ED with c/o multiple falls over last few days, loss of balance and new onset hyperglycemia. Pt did hit head during fall today +xarelto -LOC

## 2024-03-07 NOTE — ED NOTES
Assumed pt care at this time. Pt had multiple falls over the last week. Most current fall was today, denies losing consciousness, but hit back of head on the stairs. Pt states he normally uses a walker or cane at home. Denies any changes in vision. Hx of ROSE stage #, CHF, hyperlipidemia. Denies any pain at this time.      Josefina Guzmán RN  03/07/24 8915       Josefina Guzmán RN  03/07/24 0086

## 2024-03-08 VITALS
OXYGEN SATURATION: 97 % | RESPIRATION RATE: 18 BRPM | DIASTOLIC BLOOD PRESSURE: 91 MMHG | TEMPERATURE: 96.8 F | WEIGHT: 204.59 LBS | SYSTOLIC BLOOD PRESSURE: 143 MMHG | HEIGHT: 71 IN | BODY MASS INDEX: 28.64 KG/M2 | HEART RATE: 73 BPM

## 2024-03-08 LAB
ALBUMIN SERPL BCP-MCNC: 3.5 G/DL (ref 3.4–5)
ALP SERPL-CCNC: 78 U/L (ref 33–136)
ALT SERPL W P-5'-P-CCNC: 18 U/L (ref 10–52)
ANION GAP SERPL CALC-SCNC: 14 MMOL/L (ref 10–20)
AST SERPL W P-5'-P-CCNC: 14 U/L (ref 9–39)
ATRIAL RATE: 277 BPM
BASOPHILS # BLD AUTO: 0.04 X10*3/UL (ref 0–0.1)
BASOPHILS NFR BLD AUTO: 0.5 %
BILIRUB SERPL-MCNC: 1.5 MG/DL (ref 0–1.2)
BUN SERPL-MCNC: 37 MG/DL (ref 6–23)
CALCIUM SERPL-MCNC: 9.2 MG/DL (ref 8.6–10.3)
CARDIAC TROPONIN I PNL SERPL HS: 26 NG/L (ref 0–20)
CHLORIDE SERPL-SCNC: 102 MMOL/L (ref 98–107)
CO2 SERPL-SCNC: 31 MMOL/L (ref 21–32)
CREAT SERPL-MCNC: 1.65 MG/DL (ref 0.5–1.3)
EGFRCR SERPLBLD CKD-EPI 2021: 41 ML/MIN/1.73M*2
EOSINOPHIL # BLD AUTO: 0.13 X10*3/UL (ref 0–0.4)
EOSINOPHIL NFR BLD AUTO: 1.8 %
ERYTHROCYTE [DISTWIDTH] IN BLOOD BY AUTOMATED COUNT: 16.5 % (ref 11.5–14.5)
GLUCOSE BLD MANUAL STRIP-MCNC: 308 MG/DL (ref 74–99)
GLUCOSE SERPL-MCNC: 189 MG/DL (ref 74–99)
HCT VFR BLD AUTO: 41.1 % (ref 41–52)
HGB BLD-MCNC: 12.8 G/DL (ref 13.5–17.5)
HOLD SPECIMEN: NORMAL
IMM GRANULOCYTES # BLD AUTO: 0.03 X10*3/UL (ref 0–0.5)
IMM GRANULOCYTES NFR BLD AUTO: 0.4 % (ref 0–0.9)
LYMPHOCYTES # BLD AUTO: 0.72 X10*3/UL (ref 0.8–3)
LYMPHOCYTES NFR BLD AUTO: 9.8 %
MAGNESIUM SERPL-MCNC: 2.1 MG/DL (ref 1.6–2.4)
MCH RBC QN AUTO: 27.9 PG (ref 26–34)
MCHC RBC AUTO-ENTMCNC: 31.1 G/DL (ref 32–36)
MCV RBC AUTO: 90 FL (ref 80–100)
MONOCYTES # BLD AUTO: 0.89 X10*3/UL (ref 0.05–0.8)
MONOCYTES NFR BLD AUTO: 12.2 %
NEUTROPHILS # BLD AUTO: 5.5 X10*3/UL (ref 1.6–5.5)
NEUTROPHILS NFR BLD AUTO: 75.3 %
NRBC BLD-RTO: 0 /100 WBCS (ref 0–0)
P AXIS: 97 DEGREES
PLATELET # BLD AUTO: 142 X10*3/UL (ref 150–450)
POTASSIUM SERPL-SCNC: 3.2 MMOL/L (ref 3.5–5.3)
PROCALCITONIN SERPL-MCNC: 0.39 NG/ML
PROT SERPL-MCNC: 6.5 G/DL (ref 6.4–8.2)
Q ONSET: 213 MS
QRS COUNT: 12 BEATS
QRS DURATION: 144 MS
QT INTERVAL: 432 MS
QTC CALCULATION(BAZETT): 479 MS
QTC FREDERICIA: 463 MS
R AXIS: -26 DEGREES
RBC # BLD AUTO: 4.58 X10*6/UL (ref 4.5–5.9)
SODIUM SERPL-SCNC: 144 MMOL/L (ref 136–145)
T AXIS: 129 DEGREES
T OFFSET: 429 MS
VENTRICULAR RATE: 74 BPM
WBC # BLD AUTO: 7.3 X10*3/UL (ref 4.4–11.3)

## 2024-03-08 PROCEDURE — 2500000002 HC RX 250 W HCPCS SELF ADMINISTERED DRUGS (ALT 637 FOR MEDICARE OP, ALT 636 FOR OP/ED): Performed by: NURSE PRACTITIONER

## 2024-03-08 PROCEDURE — 85025 COMPLETE CBC W/AUTO DIFF WBC: CPT | Performed by: PHYSICIAN ASSISTANT

## 2024-03-08 PROCEDURE — G0378 HOSPITAL OBSERVATION PER HR: HCPCS

## 2024-03-08 PROCEDURE — 36415 COLL VENOUS BLD VENIPUNCTURE: CPT | Performed by: NURSE PRACTITIONER

## 2024-03-08 PROCEDURE — 96374 THER/PROPH/DIAG INJ IV PUSH: CPT

## 2024-03-08 PROCEDURE — 99221 1ST HOSP IP/OBS SF/LOW 40: CPT | Performed by: INTERNAL MEDICINE

## 2024-03-08 PROCEDURE — 80053 COMPREHEN METABOLIC PANEL: CPT | Performed by: PHYSICIAN ASSISTANT

## 2024-03-08 PROCEDURE — 83735 ASSAY OF MAGNESIUM: CPT | Performed by: PHYSICIAN ASSISTANT

## 2024-03-08 PROCEDURE — 84145 PROCALCITONIN (PCT): CPT | Mod: AHULAB | Performed by: NURSE PRACTITIONER

## 2024-03-08 PROCEDURE — 84484 ASSAY OF TROPONIN QUANT: CPT | Performed by: NURSE PRACTITIONER

## 2024-03-08 PROCEDURE — 2500000004 HC RX 250 GENERAL PHARMACY W/ HCPCS (ALT 636 FOR OP/ED): Performed by: PHYSICIAN ASSISTANT

## 2024-03-08 PROCEDURE — 2500000001 HC RX 250 WO HCPCS SELF ADMINISTERED DRUGS (ALT 637 FOR MEDICARE OP): Performed by: PHYSICIAN ASSISTANT

## 2024-03-08 PROCEDURE — 2500000001 HC RX 250 WO HCPCS SELF ADMINISTERED DRUGS (ALT 637 FOR MEDICARE OP): Performed by: NURSE PRACTITIONER

## 2024-03-08 PROCEDURE — 36415 COLL VENOUS BLD VENIPUNCTURE: CPT | Performed by: PHYSICIAN ASSISTANT

## 2024-03-08 PROCEDURE — 99232 SBSQ HOSP IP/OBS MODERATE 35: CPT | Performed by: NURSE PRACTITIONER

## 2024-03-08 PROCEDURE — 82947 ASSAY GLUCOSE BLOOD QUANT: CPT

## 2024-03-08 PROCEDURE — 96375 TX/PRO/DX INJ NEW DRUG ADDON: CPT

## 2024-03-08 PROCEDURE — 2500000004 HC RX 250 GENERAL PHARMACY W/ HCPCS (ALT 636 FOR OP/ED): Performed by: EMERGENCY MEDICINE

## 2024-03-08 PROCEDURE — 96376 TX/PRO/DX INJ SAME DRUG ADON: CPT

## 2024-03-08 RX ORDER — METOPROLOL SUCCINATE 50 MG/1
150 TABLET, EXTENDED RELEASE ORAL 2 TIMES DAILY
Qty: 180 TABLET | Refills: 0 | Status: SHIPPED | OUTPATIENT
Start: 2024-03-08 | End: 2024-04-07

## 2024-03-08 RX ORDER — POTASSIUM CHLORIDE 20 MEQ/1
40 TABLET, EXTENDED RELEASE ORAL
Status: COMPLETED | OUTPATIENT
Start: 2024-03-08 | End: 2024-03-08

## 2024-03-08 RX ORDER — METOPROLOL SUCCINATE 50 MG/1
150 TABLET, EXTENDED RELEASE ORAL 2 TIMES DAILY
Status: DISCONTINUED | OUTPATIENT
Start: 2024-03-08 | End: 2024-03-08 | Stop reason: HOSPADM

## 2024-03-08 RX ADMIN — DOCUSATE SODIUM 100 MG: 100 CAPSULE, LIQUID FILLED ORAL at 00:06

## 2024-03-08 RX ADMIN — POTASSIUM CHLORIDE 40 MEQ: 1500 TABLET, EXTENDED RELEASE ORAL at 08:33

## 2024-03-08 RX ADMIN — POTASSIUM CHLORIDE 40 MEQ: 1500 TABLET, EXTENDED RELEASE ORAL at 10:17

## 2024-03-08 RX ADMIN — LOSARTAN POTASSIUM 50 MG: 50 TABLET, FILM COATED ORAL at 08:33

## 2024-03-08 RX ADMIN — PANTOPRAZOLE SODIUM 40 MG: 40 TABLET, DELAYED RELEASE ORAL at 06:02

## 2024-03-08 RX ADMIN — HYDRALAZINE HYDROCHLORIDE 10 MG: 20 INJECTION INTRAMUSCULAR; INTRAVENOUS at 00:06

## 2024-03-08 RX ADMIN — ATORVASTATIN CALCIUM 20 MG: 20 TABLET, FILM COATED ORAL at 00:06

## 2024-03-08 RX ADMIN — FINASTERIDE 5 MG: 5 TABLET, FILM COATED ORAL at 08:33

## 2024-03-08 RX ADMIN — DOXAZOSIN 4 MG: 2 TABLET ORAL at 00:05

## 2024-03-08 RX ADMIN — APIXABAN 2.5 MG: 2.5 TABLET, FILM COATED ORAL at 08:34

## 2024-03-08 RX ADMIN — EMPAGLIFLOZIN 25 MG: 25 TABLET, FILM COATED ORAL at 08:34

## 2024-03-08 RX ADMIN — CYANOCOBALAMIN TAB 500 MCG 500 MCG: 500 TAB at 08:34

## 2024-03-08 RX ADMIN — APIXABAN 2.5 MG: 2.5 TABLET, FILM COATED ORAL at 00:05

## 2024-03-08 RX ADMIN — FUROSEMIDE 80 MG: 10 INJECTION, SOLUTION INTRAMUSCULAR; INTRAVENOUS at 00:08

## 2024-03-08 RX ADMIN — GLIMEPIRIDE 2 MG: 2 TABLET ORAL at 06:01

## 2024-03-08 RX ADMIN — HYDRALAZINE HYDROCHLORIDE 10 MG: 20 INJECTION INTRAMUSCULAR; INTRAVENOUS at 11:19

## 2024-03-08 RX ADMIN — DOCUSATE SODIUM 100 MG: 100 CAPSULE, LIQUID FILLED ORAL at 08:33

## 2024-03-08 RX ADMIN — FUROSEMIDE 40 MG: 40 TABLET ORAL at 00:06

## 2024-03-08 RX ADMIN — METOPROLOL SUCCINATE 150 MG: 50 TABLET, EXTENDED RELEASE ORAL at 14:32

## 2024-03-08 RX ADMIN — FUROSEMIDE 40 MG: 40 TABLET ORAL at 08:34

## 2024-03-08 SDOH — HEALTH STABILITY: MENTAL HEALTH: HOW OFTEN DO YOU HAVE A DRINK CONTAINING ALCOHOL?: NEVER

## 2024-03-08 SDOH — HEALTH STABILITY: MENTAL HEALTH: HOW MANY STANDARD DRINKS CONTAINING ALCOHOL DO YOU HAVE ON A TYPICAL DAY?: PATIENT DOES NOT DRINK

## 2024-03-08 SDOH — SOCIAL STABILITY: SOCIAL INSECURITY: WERE YOU ABLE TO COMPLETE ALL THE BEHAVIORAL HEALTH SCREENINGS?: YES

## 2024-03-08 SDOH — ECONOMIC STABILITY: INCOME INSECURITY: IN THE LAST 12 MONTHS, WAS THERE A TIME WHEN YOU WERE NOT ABLE TO PAY THE MORTGAGE OR RENT ON TIME?: NO

## 2024-03-08 SDOH — SOCIAL STABILITY: SOCIAL INSECURITY: HAVE YOU HAD THOUGHTS OF HARMING ANYONE ELSE?: NO

## 2024-03-08 SDOH — ECONOMIC STABILITY: TRANSPORTATION INSECURITY
IN THE PAST 12 MONTHS, HAS LACK OF TRANSPORTATION KEPT YOU FROM MEETINGS, WORK, OR FROM GETTING THINGS NEEDED FOR DAILY LIVING?: NO

## 2024-03-08 SDOH — ECONOMIC STABILITY: HOUSING INSECURITY
IN THE LAST 12 MONTHS, WAS THERE A TIME WHEN YOU DID NOT HAVE A STEADY PLACE TO SLEEP OR SLEPT IN A SHELTER (INCLUDING NOW)?: NO

## 2024-03-08 SDOH — ECONOMIC STABILITY: TRANSPORTATION INSECURITY
IN THE PAST 12 MONTHS, HAS THE LACK OF TRANSPORTATION KEPT YOU FROM MEDICAL APPOINTMENTS OR FROM GETTING MEDICATIONS?: NO

## 2024-03-08 ASSESSMENT — COGNITIVE AND FUNCTIONAL STATUS - GENERAL
DAILY ACTIVITIY SCORE: 23
CLIMB 3 TO 5 STEPS WITH RAILING: A LITTLE
DRESSING REGULAR LOWER BODY CLOTHING: A LITTLE
PATIENT BASELINE BEDBOUND: NO
DRESSING REGULAR LOWER BODY CLOTHING: A LITTLE
MOBILITY SCORE: 22
MOBILITY SCORE: 24
WALKING IN HOSPITAL ROOM: A LITTLE
DAILY ACTIVITIY SCORE: 23

## 2024-03-08 ASSESSMENT — PATIENT HEALTH QUESTIONNAIRE - PHQ9
SUM OF ALL RESPONSES TO PHQ9 QUESTIONS 1 & 2: 0
1. LITTLE INTEREST OR PLEASURE IN DOING THINGS: NOT AT ALL
2. FEELING DOWN, DEPRESSED OR HOPELESS: NOT AT ALL

## 2024-03-08 ASSESSMENT — ACTIVITIES OF DAILY LIVING (ADL)
HEARING - LEFT EAR: FUNCTIONAL
TOILETING: NEEDS ASSISTANCE
DRESSING YOURSELF: NEEDS ASSISTANCE
PATIENT'S MEMORY ADEQUATE TO SAFELY COMPLETE DAILY ACTIVITIES?: NO
GROOMING: NEEDS ASSISTANCE
WALKS IN HOME: NEEDS ASSISTANCE
HEARING - RIGHT EAR: FUNCTIONAL
FEEDING YOURSELF: NEEDS ASSISTANCE
JUDGMENT_ADEQUATE_SAFELY_COMPLETE_DAILY_ACTIVITIES: NO
ADEQUATE_TO_COMPLETE_ADL: YES
BATHING: NEEDS ASSISTANCE
LACK_OF_TRANSPORTATION: NO

## 2024-03-08 ASSESSMENT — LIFESTYLE VARIABLES
HOW OFTEN DO YOU HAVE A DRINK CONTAINING ALCOHOL: NEVER
HOW OFTEN DO YOU HAVE 6 OR MORE DRINKS ON ONE OCCASION: NEVER
HOW MANY STANDARD DRINKS CONTAINING ALCOHOL DO YOU HAVE ON A TYPICAL DAY: PATIENT DOES NOT DRINK
AUDIT-C TOTAL SCORE: 0
AUDIT-C TOTAL SCORE: 0
SKIP TO QUESTIONS 9-10: 1

## 2024-03-08 ASSESSMENT — PAIN - FUNCTIONAL ASSESSMENT: PAIN_FUNCTIONAL_ASSESSMENT: 0-10

## 2024-03-08 ASSESSMENT — PAIN SCALES - GENERAL
PAINLEVEL_OUTOF10: 0 - NO PAIN
PAINLEVEL_OUTOF10: 0 - NO PAIN

## 2024-03-08 NOTE — PROGRESS NOTES
03/08/24 1240   Discharge Planning   Patient expects to be discharged to: Home with [facility] home  care for SN, PT, OT     [facility] asked for [facility]--explained that patient is obs and will not have [facility]CO unless upgraded to inpatient.  They asked for ADOD--waiting on cardiology clearance to discharge. Will continue to follow for discharge planning needs.

## 2024-03-08 NOTE — DISCHARGE INSTRUCTIONS
Please check your weight daily and call your cardiologist if your weight is going up  Please call your cardiologist to schedule follow-up visit.  Please continue your current medications.  Your testing at the hospital noted you do have nodules on your thyroid and we recommend further imaging of this which can be scheduled outpatient.  Please talk to your primary care provider to get this scheduled.

## 2024-03-08 NOTE — ED PROVIDER NOTES
"HPI   Chief Complaint   Patient presents with   • Hyperglycemia     Multiple falls at home in last few days, wife states he \"trips and falls\", did hit head +xarelto -LOC,  with no history of DM per EMS        HPI: []  83-year-old white male with a history of hypertension, CKD, diabetes, atrial flutter/fibrillation on Xarelto, on metoprolol 150 twice daily and Lasix today comes in with generalized weakness and multiple falls.  He said for last 2 days ago he has had multiple falls hitting his head on the floor and generalized weakness.]  Remains on.  He denies any chest pain pressure heaviness no fever no chills no cough or congestion no shortness of breath no hematemesis melena or hematochezia.  No hemoptysis no recent travel or hospitalization.  Altered mental status.  No syncope or near syncope no urine frequency urgency or hematuria.    Past history: High BMI, hypertension, diabetes, CKD, A-fib/flutter,  Social: Patient denies current tobacco alcohol drug abuse.  REVIEW OF SYSTEMS:    GENERAL.: No weight loss, positive for fatigue, anorexia, insomnia, fever.  Positive generalized weakness    EYES: No vision loss, double vision, drainage, eye pain.    ENT: No pharyngitis, dry mouth.    CARDIOPULMONARY: No chest pain, palpitations, syncope, near syncope. No shortness of breath, cough, hemoptysis.    GI: No abdominal pain, change in bowel habits, melena, hematemesis, hematochezia, nausea, vomiting, diarrhea.    : No discharge, dysuria, frequency, urgency, hematuria.    MS: No limb pain, joint pain, joint swelling.    SKIN: No rashes.    PSYCH: No depression, anxiety, suicidality, homicidality.    Review of systems is otherwise negative unless stated above or in history of present illness.  Social history, family history, allergies reviewed.  PHYSICAL EXAM:    GENERAL: Vitals noted, no distress. Alert and oriented  x 3. Non-toxic.      EENT: TMs clear. Posterior oropharynx unremarkable. No meningismus. No " LAD.     NECK: Supple. Nontender. No midline tenderness.     CARDIAC: Irregularly irregular rate and rhythm,.  Grade 2 x 6 ejection systolic murmur best at the left lower sternal border, no rubs or gallops. No JVD    PULMONARY: Mild tachypnea coarse bibasilar crackles:. No wheezes rales or rhonchi. No respiratory distress.  Able to speak in full sentences    ABDOMEN: Soft, nonsurgical. Nontender. No peritoneal signs. Normoactive bowel sounds. No pulsatile masses.     EXTREMITIES: 1+ bilateral symmetric nonpitting peripheral edema. Negative Homans bilaterally, no cords.  Patient appears to have a chronic appearing venous dermatitis bilaterally pulses diminished but limbs are warm.    SKIN: No rash. Intact.   Musculoskeletal: Patient no midline C, T, L-spine tenderness.  Pelvis stable good range of motion of both hip knees and ankles.  NEURO: No focal neurologic deficits, NIH score of 0. Cranial nerves normal as tested from II through XII.     MEDICAL DECISION MAKING:  EKG on my interpretation shows atrial flutter normal axis rate mid 70s with nonspecific ST-T wave change.  CBC with differential shows no leukocytosis stable hemoglobin, chemistry shows stable CKD, BNP elevated 4700, troponin remains flat around mid 20s, chest x-ray shows pulm vascular congestion head CT negative CT C-spine negative, swabs negative for influenza and COVID.    Treatment in ED: IV established, placed on a cardiac monitor, he was given IV Lasix 80 mg and oral metoprolol.    ED course: Patient remained stable hemodynamic.    Impression: #1 generalized weakness, #2 recurrent falls, #3 atrial flutter with controlled ventricular, #4 decompensated heart failure    Plan/MDM: 83-year-old white male history of hypertension diabetes CKD atrial flutter on Xarelto comes in with generalized weakness fatigue multiple falls appears to be in atrial flutter with controlled ventricular rate and appears to be in decompensated diastolic heart failure low  concern for pneumonia sepsis ACS or dissection.  Patient will be hospital for further care.                          [place] Coma Scale Score: 15                     Patient History   Past Medical History:   Diagnosis Date   • Diabetes mellitus (CMS/[facility])    • Hypertension    • Mixed hyperlipidemia 02/13/2013   • Paroxysmal atrial fibrillation (CMS/[facility]) 03/08/2018    -s/p cardioversion and ablation   -AC on Eliquis    • S/P ablation of atrial fibrillation 05/03/2019   • Stage 3b chronic kidney disease (CMS/[facility]) 12/29/2023     Past Surgical History:   Procedure Laterality Date   • CARDIAC ELECTROPHYSIOLOGY MAPPING AND ABLATION     • CARDIOVERSION       No family history on file.  Social History     Tobacco Use   • Smoking status: Former     Types: Cigarettes   • Smokeless tobacco: Former   Substance Use Topics   • Alcohol use: Not on file   • Drug use: Not on file       Physical Exam   ED Triage Vitals   Temperature Heart Rate Respirations BP   03/07/24 1538 03/07/24 1538 03/07/24 1538 03/07/24 1538   36.7 °C (98 °F) 96 18 (!) 170/111      Pulse Ox Temp Source Heart Rate Source Patient Position   03/07/24 1538 03/07/24 1704 03/07/24 1600 03/07/24 1704   94 % Oral Monitor Sitting      BP Location FiO2 (%)     03/07/24 1704 --     Left arm        Physical Exam    ED Course & MDM   ED Course as of 03/07/24 2220   Thu Mar 07, 2024   2214 Patient EKG on my interpretation shows atrial flutter with a slow ventricular rate, rate in the mid 60s with nonspecific ST-T wave changes.  Her labs are concerning for mildly elevated troponins BNP more than 4700 chest ray shows some pulm vascular congestion swabs negative for influenza and COVID blood sugar about 300 stable CKD no leukocytosis, venous gas shows no hypercapnia significant, patient was given IV Lasix 80 mg I will give him his oral metoprolol he takes 150 twice daily, patient will be hospitalized for further care.  Imaging reviewed which is negative for traumatic injury.   CT head negative CT C-spine negative. [MT]      ED Course User Index  [MT] Dinah Sarmiento MD         Diagnoses as of 03/07/24 2220   Typical atrial flutter (CMS/HCC)   Acute on chronic diastolic congestive heart failure (CMS/HCC)   Frequent falls   Generalized weakness       Medical Decision Making      Procedure  Procedures     Dinah Sarmiento MD  03/07/24 2221

## 2024-03-08 NOTE — H&P
History Of Present Illness  [place] is a 83 y.o. male presenting with weakness as well as falling more frequently over the last week.    Most recent hospital admission was February 4 for very similar symptoms.    The patient does have significant past medical history for congestive heart failure as well as kidney disease.  His daughter and wife are at bedside and stated that ever since November, December the patient has been fluctuating between problems with his kidneys and heart and the providers have had a very difficult time managing both of them at the same time to keep the levels within normal value.  The daughter did state that he has recently started liquid lasix in within the last 24 hours he has lost about 4 pounds.  He follows Dr. Tolentino at the [facility].      The patient states that he fell today, he did hit his head but did not lose consciousness.  No open wound.  He has no chest pain no change in vision.  He is neurologically intact.    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  The daughter did mention that within his previous hospitalizations he has had to have a sitter present because he will get in and out of bed.  We will put a bed alarm on for him and if a sitter needs to be signed we will make sure that is done.     Past Medical History  He has a past medical history of Diabetes mellitus (CMS/Formerly [facility]), Hypertension, Mixed hyperlipidemia (02/13/2013), Paroxysmal atrial fibrillation (CMS/Formerly [facility]) (03/08/2018), S/P ablation of atrial fibrillation (05/03/2019), and Stage 3b chronic kidney disease (CMS/Formerly [facility]) (12/29/2023).    Surgical History  He has a past surgical history that includes Cardioversion and Cardiac electrophysiology mapping and ablation.     Social History  He reports that he has quit smoking. His smoking use included cigarettes. He has quit using smokeless tobacco. No history on file for alcohol use and drug use.    Family History  No family history on file.      Allergies  Pollen extracts    Review of Systems  I reviewed the complete 30-point review of systems that was documented on the scanned patient intake form.  All other systems are non-contributory except as defined in history of present illness.       Physical Exam  Constitutional: Feels weak  Eyes: no blurred vision and no diplopia.   ENT: no hearing loss, no tinnitus, no earache, no sore throat, no hoarseness and no swollen glands in the neck.   Cardiovascular: no chest pain, no tightness or heavy pressure, no shortness of breath, no palpitations a  Respiratory: Breath  Gastrointestinal: no change in bowel habits, no diarrhea, no constipation, no bloody stools, no nausea, no vomiting, no abdominal pain, no signs and symptoms of ulcer disease, no cindy colored stools and no intolerance to fatty foods.   Genitourinary: no urinary frequency, no dysuria, no hematuria, no burning sensation during urination, urinary stream is not smaller and urinary stream does not start and stop.   Musculoskeletal: no arthralgias, no joint stiffness, no muscle weakness, no back pain and no difficulty walking.   Skin: no rashes, no change in skin color and pigmentation, no skin lesions and no skin lumps.   Neurological: no headaches, no dizziness, no seizures, no tingling, no numbness, no signs and symptoms of stroke and no limb weakness.   Psychiatric: Some memory impairment  Endocrine:  no excessive thirst, no dry skin, no cold intolerance, no heat intolerance and no increased urinary frequency.   Hematologic/Lymphatic: is not slow to heal, does not bleed easily, does not bruise easily, no thrombophlebitis, no anemia and no history of blood transfusion.   All other systems have been reviewed and are negative for complaint.         Last Recorded Vitals  BP (!) 165/130   Pulse 94   Temp 36.7 °C (98 °F) (Oral)   Resp (!) 26   SpO2 (!) 93%     Relevant Results  No current facility-administered medications on file prior to encounter.      Current Outpatient Medications on File Prior to Encounter   Medication Sig Dispense Refill    apixaban (Eliquis) 2.5 mg tablet Take 1 tablet (2.5 mg) by mouth 2 times a day.      atorvastatin (Lipitor) 20 mg tablet Take 1 tablet (20 mg) by mouth once daily at bedtime.      cyanocobalamin (Vitamin B-12) 500 mcg tablet Take 1 tablet (500 mcg) by mouth once daily.      docusate sodium (Colace) 100 mg capsule Take 1 capsule (100 mg) by mouth 2 times a day.      doxazosin (Cardura) 4 mg tablet Take 1 tablet (4 mg) by mouth once daily at bedtime.      empagliflozin (Jardiance) 25 mg Take 1 tablet (25 mg) by mouth once daily.      finasteride (Proscar) 5 mg tablet Take 1 tablet (5 mg) by mouth once daily.      furosemide (Lasix) 40 mg tablet Take 1 tablet (40 mg) by mouth 2 times a day. 60 tablet 0    glimepiride (Amaryl) 2 mg tablet Take 1 tablet (2 mg) by mouth once daily in the morning. Take before meals.      loperamide (Imodium) 2 mg capsule Take 1 capsule (2 mg) by mouth 3 times a day as needed for diarrhea.      losartan (Cozaar) 50 mg tablet Take 1 tablet (50 mg) by mouth once daily. 30 tablet 0    metoprolol succinate XL (Toprol-XL) 50 mg 24 hr tablet Take 3 tablets (150 mg) by mouth twice a day.      omeprazole (PriLOSEC) 20 mg DR capsule Take 1 capsule (20 mg) by mouth once daily in the morning. Take before meals. Do not crush or chew.       Results for orders placed or performed during the hospital encounter of 03/07/24 (from the past 24 hour(s))   POCT GLUCOSE   Result Value Ref Range    POCT Glucose 308 (H) 74 - 99 mg/dL   CBC and Auto Differential   Result Value Ref Range    WBC 6.5 4.4 - 11.3 x10*3/uL    nRBC 0.0 0.0 - 0.0 /100 WBCs    RBC 4.56 4.50 - 5.90 x10*6/uL    Hemoglobin 12.5 (L) 13.5 - 17.5 g/dL    Hematocrit 41.4 41.0 - 52.0 %    MCV 91 80 - 100 fL    MCH 27.4 26.0 - 34.0 pg    MCHC 30.2 (L) 32.0 - 36.0 g/dL    RDW 16.5 (H) 11.5 - 14.5 %    Platelets 147 (L) 150 - 450 x10*3/uL    Neutrophils %  75.3 40.0 - 80.0 %    Immature Granulocytes %, Automated 0.5 0.0 - 0.9 %    Lymphocytes % 9.5 13.0 - 44.0 %    Monocytes % 12.7 2.0 - 10.0 %    Eosinophils % 1.5 0.0 - 6.0 %    Basophils % 0.5 0.0 - 2.0 %    Neutrophils Absolute 4.93 1.60 - 5.50 x10*3/uL    Immature Granulocytes Absolute, Automated 0.03 0.00 - 0.50 x10*3/uL    Lymphocytes Absolute 0.62 (L) 0.80 - 3.00 x10*3/uL    Monocytes Absolute 0.83 (H) 0.05 - 0.80 x10*3/uL    Eosinophils Absolute 0.10 0.00 - 0.40 x10*3/uL    Basophils Absolute 0.03 0.00 - 0.10 x10*3/uL   Basic metabolic panel   Result Value Ref Range    Glucose 312 (H) 74 - 99 mg/dL    Sodium 141 136 - 145 mmol/L    Potassium 3.7 3.5 - 5.3 mmol/L    Chloride 99 98 - 107 mmol/L    Bicarbonate 32 21 - 32 mmol/L    Anion Gap 14 10 - 20 mmol/L    Urea Nitrogen 39 (H) 6 - 23 mg/dL    Creatinine 1.74 (H) 0.50 - 1.30 mg/dL    eGFR 38 (L) >60 mL/min/1.73m*2    Calcium 8.8 8.6 - 10.3 mg/dL   Magnesium   Result Value Ref Range    Magnesium 2.30 1.60 - 2.40 mg/dL   Hepatic function panel   Result Value Ref Range    Albumin 3.6 3.4 - 5.0 g/dL    Bilirubin, Total 1.3 (H) 0.0 - 1.2 mg/dL    Bilirubin, Direct 0.4 (H) 0.0 - 0.3 mg/dL    Alkaline Phosphatase 83 33 - 136 U/L    ALT 19 10 - 52 U/L    AST 17 9 - 39 U/L    Total Protein 6.5 6.4 - 8.2 g/dL   Troponin I, High Sensitivity   Result Value Ref Range    Troponin I, High Sensitivity 25 (H) 0 - 20 ng/L   Blood Gas Venous Full Panel   Result Value Ref Range    POCT pH, Venous 7.37 7.33 - 7.43 pH    POCT pCO2, Venous 63 (H) 41 - 51 mm Hg    POCT pO2, Venous 26 (L) 35 - 45 mm Hg    POCT SO2, Venous 25 (L) 45 - 75 %    POCT Oxy Hemoglobin, Venous 24.3 (L) 45.0 - 75.0 %    POCT Hematocrit Calculated, Venous 37.0 (L) 41.0 - 52.0 %    POCT Sodium, Venous 138 136 - 145 mmol/L    POCT Potassium, Venous 3.7 3.5 - 5.3 mmol/L    POCT Chloride, Venous 102 98 - 107 mmol/L    POCT Ionized Calicum, Venous 1.13 1.10 - 1.33 mmol/L    POCT Glucose, Venous 280 (H) 74 - 99  mg/dL    POCT Lactate, Venous 1.8 0.4 - 2.0 mmol/L    POCT Base Excess, Venous 9.0 (H) -2.0 - 3.0 mmol/L    POCT HCO3 Calculated, Venous 36.4 (H) 22.0 - 26.0 mmol/L    POCT Hemoglobin, Venous 12.4 (L) 13.5 - 17.5 g/dL    POCT Anion Gap, Venous 3.0 (L) 10.0 - 25.0 mmol/L    Patient Temperature 37.0 degrees Celsius    FiO2 21 %   B-Type Natriuretic Peptide   Result Value Ref Range    BNP >4,700 (H) 0 - 99 pg/mL   Troponin I, High Sensitivity   Result Value Ref Range    Troponin I, High Sensitivity 27 (H) 0 - 20 ng/L   Sars-CoV-2 PCR   Result Value Ref Range    Coronavirus 2019, PCR Not Detected Not Detected   Influenza A, and B PCR   Result Value Ref Range    Flu A Result Not Detected Not Detected    Flu B Result Not Detected Not Detected   Urinalysis with Reflex Culture and Microscopic   Result Value Ref Range    Color, Urine Yellow Straw, Yellow    Appearance, Urine Clear Clear    Specific Gravity, Urine 1.020 1.005 - 1.035    pH, Urine 5.0 5.0, 5.5, 6.0, 6.5, 7.0, 7.5, 8.0    Protein, Urine >=500 (3+) (N) NEGATIVE mg/dL    Glucose, Urine >=500 (3+) (A) NEGATIVE mg/dL    Blood, Urine NEGATIVE NEGATIVE    Ketones, Urine NEGATIVE NEGATIVE mg/dL    Bilirubin, Urine NEGATIVE NEGATIVE    Urobilinogen, Urine <2.0 <2.0 mg/dL    Nitrite, Urine NEGATIVE NEGATIVE    Leukocyte Esterase, Urine NEGATIVE NEGATIVE   Urinalysis Microscopic   Result Value Ref Range    WBC, Urine NONE 1-5, NONE /HPF    RBC, Urine 1-2 NONE, 1-2, 3-5 /HPF     CT head wo IV contrast    Result Date: 3/7/2024  Interpreted By:  Korey Ward, STUDY: CT HEAD WO IV CONTRAST; CT CERVICAL SPINE WO IV CONTRAST;  3/7/2024 5:25 pm   INDICATION: Signs/Symptoms:frequent falls on xeralto; Signs/Symptoms:fall.   COMPARISON: None.   ACCESSION NUMBER(S): [id number]; [id number]   ORDERING CLINICIAN: JACINTO GLASS   TECHNIQUE: Noncontrast axial CT scan of head and cervical spine was performed. Angled reformats in brain and bone windows were generated. The images  were reviewed in bone, brain, blood and soft tissue windows.   FINDINGS: Head: There is no intra/extra-axial fluid collection, mass effect, or midline shift. The gray/white matter junction is preserved. Hypoattenuation of periventricular and subcortical white matter suggestive of chronic small vessel ischemic disease. Mild-to-moderate diffuse parenchymal volume loss is noted. There is vascular calcification. The basal cisterns are patent. Visualized paranasal sinuses and mastoid air cells are clear. The calvarium is intact.   Cervical spine: The bones are somewhat osteopenic. There is no acute fracture or traumatic subluxation in the cervical spine. Multilevel cervical spondylosis is seen. Prevertebral soft tissues are unremarkable. Vascular calcification is noted multiple thyroid nodules are seen measuring up to 2.2 cm. Ultrasound may be obtained for further evaluation. There is no apical pneumothorax. Layering right-sided pleural effusion is noted.       No evidence of acute cortical infarct or intracranial hemorrhage.   No acute fracture or subluxation in the cervical spine.   Thyroid nodules, amenable to further evaluation by ultrasound.   Layering right-sided pleural effusion   Signed by: Korey Ward 3/7/2024 6:06 PM Dictation workstation:   [id number]    CT cervical spine wo IV contrast    Result Date: 3/7/2024  Interpreted By:  Korey Ward, STUDY: CT HEAD WO IV CONTRAST; CT CERVICAL SPINE WO IV CONTRAST;  3/7/2024 5:25 pm   INDICATION: Signs/Symptoms:frequent falls on xeralto; Signs/Symptoms:fall.   COMPARISON: None.   ACCESSION NUMBER(S): [id number]; [id number]   ORDERING CLINICIAN: JACINTO GLASS   TECHNIQUE: Noncontrast axial CT scan of head and cervical spine was performed. Angled reformats in brain and bone windows were generated. The images were reviewed in bone, brain, blood and soft tissue windows.   FINDINGS: Head: There is no intra/extra-axial fluid collection, mass effect, or midline  shift. The gray/white matter junction is preserved. Hypoattenuation of periventricular and subcortical white matter suggestive of chronic small vessel ischemic disease. Mild-to-moderate diffuse parenchymal volume loss is noted. There is vascular calcification. The basal cisterns are patent. Visualized paranasal sinuses and mastoid air cells are clear. The calvarium is intact.   Cervical spine: The bones are somewhat osteopenic. There is no acute fracture or traumatic subluxation in the cervical spine. Multilevel cervical spondylosis is seen. Prevertebral soft tissues are unremarkable. Vascular calcification is noted multiple thyroid nodules are seen measuring up to 2.2 cm. Ultrasound may be obtained for further evaluation. There is no apical pneumothorax. Layering right-sided pleural effusion is noted.       No evidence of acute cortical infarct or intracranial hemorrhage.   No acute fracture or subluxation in the cervical spine.   Thyroid nodules, amenable to further evaluation by ultrasound.   Layering right-sided pleural effusion   Signed by: Korey Ward 3/7/2024 6:06 PM Dictation workstation:   [id number]    XR chest 1 view    Result Date: 3/7/2024  Interpreted By:  Tia Dillon, STUDY: XR CHEST 1 VIEW;  3/7/2024 4:14 pm   INDICATION: Signs/Symptoms:weakness fall.   COMPARISON: 02/04/2024   ACCESSION NUMBER(S): [id number]   ORDERING CLINICIAN: JACINTO GLASS   FINDINGS: CARDIOMEDIASTINAL SILHOUETTE: The heart is enlarged.     LUNGS: There is a small wedge-shaped right basilar infiltrate versus crowded vessels, seen previously on 02/02/2024. There is no congestion or pleural fluid.   ABDOMEN: No remarkable upper abdominal findings.     BONES: No acute osseous changes. Status post right shoulder rotator cuff repair.       Equivocal right basilar infiltrate versus crowded pulmonary vessels right lung base medially.   MACRO: None   Signed by: Tia Dillon 3/7/2024 4:24 PM Dictation workstation:    [id number]        Assessment/Plan   Weakness, CHF exacerbation.    We are going to admit the patient for observation.  We are going to continue to monitor his levels overnight.  We are going to continue Lasix and follow his weight.  Cardiology will be consulted.  Patient will continue to take his Eliquis.  Normal meds will be continued.  He will be on a cardiac diet.  [facility] will follow-up with him in the morning.    This note was dictated using speech recognition software and was not corrected for spelling or grammatical errors               Meena Allen PA-C

## 2024-03-08 NOTE — CONSULTS
Inpatient consult to Cardiology  Consult performed by: Althea Simental, APRN-CNP  Consult ordered by: Meena Allen PA-C  Reason for consult: CHF        Cards: Rajan WRIGHT  History Of Present Illness:    New Castano is a 83 y.o. male with  past medical history of Afib s/p ablation (Eliquis/BB), atrial tachycardia, CKD3a, acute on chronic systolic heart failure( recovered LVEF to 50% from 38% on echo 12/2023), HTN, HLD, DM2 who presented to ED with weakness and increased frequency of following over the last week. Cardiology consulted for CHF. (Of note patient just had a recent diet Mirta from February 6 through February 10.  Cardiology was consulted at that time for CHF as well)    When talking to the patient he claims he fell at home tripped.  He denies losing any kind of conscious.  Other than that he does not know why he is here at the hospital.  Somewhat of a poor historian.  Patient claims he is no more short of breath than usual.    Afebrile, heart rate 96, 180s systolic, 95% on room air.  Notable labs on admission, BUN/CR 37/1.6(around baseline), T. bili 1.5, BNP over 4700, high-sensitivity troponin 25/27/26, H&H 12.8/41.1, panel negative, chest x-ray shows equivocal right basilar infiltrate versus crowded pulmonary vessels right lung base medially, CT of the head was nonacute.  EKG showed atrial flutter.  He is received 80 mg of IV Lasix in the ER.    Recent admit [facility] from 2/6/24-2/10/24 admitted for shortness of breath BNP was 3900 IV diuresis DC'd home with Lasix 40 mg oral twice daily.    Recent admit to [facility] from 12/29/2023 - 12/31/2023 where he was admitted for weakness, and hypotension, and diarrhea, found to be dehydrated (overdiuresed at home in the setting of diarrhea), and ROSE.  He was sent home with decreased dose of furosemide 40mg daily, also to stop losartan 50mg daily     Recent admit to [facility] 12/21/2023 - 12/26/2023 for a fib RVR where metoprolol succinate was increased from 100mg  BID to 150mg BID.  Also with confusion and diarrhea (c diff negative), discharged home where he lives with is wife.      Home cardiac medications:  Eliquis 2.5mg BID, Toprol 150mg BID, losartan 50mg daily, Jardiance 25mg daily, furosemide 40mg BID, doxazosin 8mg daily and Lipitor 20 mg daily.       Past Cardiology Tests (Last 3 Years):  Echocardiogram: 12/22/2023   - Technically difficult exam due to suboptimal positioning.   - Exam indication: Sustained atrial fibrillation   - The left ventricle is normal in size. Left ventricular systolic function is   mildly decreased. EF = 50 ± 5% (visual est.) Definity contrast used for   endocardial border detection.   - The right ventricle is normal in size. Right ventricular systolic function is   low normal.   - The left atrial cavity is mildly dilated.   - There is AV sclerosis, no stenosis.   - Exam was compared with the prior  echocardiographic exam performed on 8/20/23.    LVEF has improved on side by side comparison. LV volumes have also improved        Past Medical History:  He has a past medical history of Diabetes mellitus (CMS/HCC), Hypertension, Mixed hyperlipidemia (02/13/2013), Paroxysmal atrial fibrillation (CMS/[facility]) (03/08/2018), S/P ablation of atrial fibrillation (05/03/2019), and Stage 3b chronic kidney disease (CMS/HCC) (12/29/2023).    Past Surgical History:  He has a past surgical history that includes Cardioversion and Cardiac electrophysiology mapping and ablation.      Social History:  He reports that he has quit smoking. His smoking use included cigarettes. He has quit using smokeless tobacco. No history on file for alcohol use and drug use.    Family History:  No family history on file.     Allergies:  Pollen extracts    ROS:  10 point review of systems including (Constitutional, Eyes, ENMT, Respiratory, Cardiac, Gastrointestinal, Neurological, Psychiatric, and Hematologic) was performed and is otherwise negative.    Objective Data:  Last Recorded  "Vitals:  Vitals:    24 0355 24 0400 24 0730 24 0806   BP:  143/89 (!) 166/106    BP Location:   Right arm    Patient Position:   Lying    Pulse:  94 96    Resp:  18 20    Temp:  36.1 °C (97 °F) 36.2 °C (97.1 °F)    TempSrc:  Temporal Temporal    SpO2:  93% 95%    Weight: 93.1 kg (205 lb 4 oz)   92.8 kg (204 lb 9.4 oz)   Height: 1.803 m (5' 10.98\")        Medical Gas Therapy: None (Room air)  Weight  Av kg (204 lb 14.7 oz)  Min: 92.8 kg (204 lb 9.4 oz)  Max: 93.1 kg (205 lb 4 oz)      LABS:  CMP:  Results from last 7 days   Lab Units 24  04124  1633   SODIUM mmol/L 144 141   POTASSIUM mmol/L 3.2* 3.7   CHLORIDE mmol/L 102 99   CO2 mmol/L 31 32   ANION GAP mmol/L 14 14   BUN mg/dL 37* 39*   CREATININE mg/dL 1.65* 1.74*   EGFR mL/min/1.73m*2 41* 38*   MAGNESIUM mg/dL 2.10 2.30   ALBUMIN g/dL 3.5 3.6   ALT U/L 18 19   AST U/L 14 17   BILIRUBIN TOTAL mg/dL 1.5* 1.3*     CBC:  Results from last 7 days   Lab Units 24  04124  1633   WBC AUTO x10*3/uL 7.3 6.5   HEMOGLOBIN g/dL 12.8* 12.5*   HEMATOCRIT % 41.1 41.4   PLATELETS AUTO x10*3/uL 142* 147*   MCV fL 90 91     COAG:     ABO: No results found for: \"ABO\"  HEME/ENDO:     CARDIAC:   Results from last 7 days   Lab Units 24  0839 24  1633   TROPHS ng/L 26* 27* 25*   BNP pg/mL  --   --  >4,700*             Last I/O:    Intake/Output Summary (Last 24 hours) at 3/8/2024 0957  Last data filed at 3/8/2024 0732  Gross per 24 hour   Intake 1000 ml   Output 3100 ml   Net -2100 ml     Net IO Since Admission: -2,100 mL [24 0957]      Imaging Results:  ECG 12 lead    Result Date: 3/8/2024  Atrial flutter with variable AV block Left ventricular hypertrophy with QRS widening and repolarization abnormality ( Holland product ) Abnormal ECG When compared with ECG of 2024 07:40, Atrial flutter has replaced Sinus rhythm QRS duration has increased    CT head wo IV contrast    Result Date: " 3/7/2024  Interpreted By:  Korey Ward, STUDY: CT HEAD WO IV CONTRAST; CT CERVICAL SPINE WO IV CONTRAST;  3/7/2024 5:25 pm   INDICATION: Signs/Symptoms:frequent falls on xeralto; Signs/Symptoms:fall.   COMPARISON: None.   ACCESSION NUMBER(S): [id number]; [id number]   ORDERING CLINICIAN: JACINTO GLASS   TECHNIQUE: Noncontrast axial CT scan of head and cervical spine was performed. Angled reformats in brain and bone windows were generated. The images were reviewed in bone, brain, blood and soft tissue windows.   FINDINGS: Head: There is no intra/extra-axial fluid collection, mass effect, or midline shift. The gray/white matter junction is preserved. Hypoattenuation of periventricular and subcortical white matter suggestive of chronic small vessel ischemic disease. Mild-to-moderate diffuse parenchymal volume loss is noted. There is vascular calcification. The basal cisterns are patent. Visualized paranasal sinuses and mastoid air cells are clear. The calvarium is intact.   Cervical spine: The bones are somewhat osteopenic. There is no acute fracture or traumatic subluxation in the cervical spine. Multilevel cervical spondylosis is seen. Prevertebral soft tissues are unremarkable. Vascular calcification is noted multiple thyroid nodules are seen measuring up to 2.2 cm. Ultrasound may be obtained for further evaluation. There is no apical pneumothorax. Layering right-sided pleural effusion is noted.       No evidence of acute cortical infarct or intracranial hemorrhage.   No acute fracture or subluxation in the cervical spine.   Thyroid nodules, amenable to further evaluation by ultrasound.   Layering right-sided pleural effusion   Signed by: Korey Ward 3/7/2024 6:06 PM Dictation workstation:   [id number]    CT cervical spine wo IV contrast    Result Date: 3/7/2024  Interpreted By:  Korey Ward, STUDY: CT HEAD WO IV CONTRAST; CT CERVICAL SPINE WO IV CONTRAST;  3/7/2024 5:25 pm   INDICATION:  Signs/Symptoms:frequent falls on xeralto; Signs/Symptoms:fall.   COMPARISON: None.   ACCESSION NUMBER(S): [id number]; [id number]   ORDERING CLINICIAN: JACINTO GLASS   TECHNIQUE: Noncontrast axial CT scan of head and cervical spine was performed. Angled reformats in brain and bone windows were generated. The images were reviewed in bone, brain, blood and soft tissue windows.   FINDINGS: Head: There is no intra/extra-axial fluid collection, mass effect, or midline shift. The gray/white matter junction is preserved. Hypoattenuation of periventricular and subcortical white matter suggestive of chronic small vessel ischemic disease. Mild-to-moderate diffuse parenchymal volume loss is noted. There is vascular calcification. The basal cisterns are patent. Visualized paranasal sinuses and mastoid air cells are clear. The calvarium is intact.   Cervical spine: The bones are somewhat osteopenic. There is no acute fracture or traumatic subluxation in the cervical spine. Multilevel cervical spondylosis is seen. Prevertebral soft tissues are unremarkable. Vascular calcification is noted multiple thyroid nodules are seen measuring up to 2.2 cm. Ultrasound may be obtained for further evaluation. There is no apical pneumothorax. Layering right-sided pleural effusion is noted.       No evidence of acute cortical infarct or intracranial hemorrhage.   No acute fracture or subluxation in the cervical spine.   Thyroid nodules, amenable to further evaluation by ultrasound.   Layering right-sided pleural effusion   Signed by: Korey Ward 3/7/2024 6:06 PM Dictation workstation:   [id number]    XR chest 1 view    Result Date: 3/7/2024  Interpreted By:  Tia Dillon, STUDY: XR CHEST 1 VIEW;  3/7/2024 4:14 pm   INDICATION: Signs/Symptoms:weakness fall.   COMPARISON: 02/04/2024   ACCESSION NUMBER(S): [id number]   ORDERING CLINICIAN: JACINTO GLASS   FINDINGS: CARDIOMEDIASTINAL SILHOUETTE: The heart is enlarged.     LUNGS: There is  a small wedge-shaped right basilar infiltrate versus crowded vessels, seen previously on 02/02/2024. There is no congestion or pleural fluid.   ABDOMEN: No remarkable upper abdominal findings.     BONES: No acute osseous changes. Status post right shoulder rotator cuff repair.       Equivocal right basilar infiltrate versus crowded pulmonary vessels right lung base medially.   MACRO: None   Signed by: Tia Dillon 3/7/2024 4:24 PM Dictation workstation:   [id number]      Inpatient Medications:  Scheduled medications   Medication Dose Route Frequency    apixaban  2.5 mg oral BID    atorvastatin  20 mg oral Nightly    cyanocobalamin  500 mcg oral Daily    docusate sodium  100 mg oral BID    doxazosin  4 mg oral Nightly    empagliflozin  25 mg oral Daily    finasteride  5 mg oral Daily    furosemide  40 mg oral BID    glimepiride  2 mg oral Daily before breakfast    [Held by provider] lactated Ringer's  1,000 mL intravenous Once    losartan  50 mg oral Daily    melatonin  3 mg oral Daily    [MAR Hold] metoprolol tartrate  150 mg oral Once    pantoprazole  40 mg oral Daily before breakfast    Or    pantoprazole  40 mg intravenous Daily before breakfast    potassium chloride CR  40 mEq oral q2h     PRN medications   Medication    acetaminophen    Or    acetaminophen    Or    acetaminophen    hydrALAZINE    loperamide     Continuous Medications   Medication Dose Last Rate       Outpatient Medications:  Current Outpatient Medications   Medication Instructions    apixaban (ELIQUIS) 2.5 mg, oral, 2 times daily    atorvastatin (LIPITOR) 20 mg, oral, Nightly    cyanocobalamin (VITAMIN B-12) 500 mcg, oral, Daily    docusate sodium (COLACE) 100 mg, oral, 2 times daily    doxazosin (CARDURA) 4 mg, oral, Nightly    empagliflozin (JARDIANCE) 25 mg, oral, Daily    finasteride (PROSCAR) 5 mg, oral, Daily    furosemide (LASIX) 40 mg, oral, 2 times daily    glimepiride (AMARYL) 2 mg, oral, Daily before breakfast    loperamide  (IMODIUM) 2 mg, oral, 3 times daily PRN    losartan (COZAAR) 50 mg, oral, Daily    metoprolol succinate XL (TOPROL-XL) 150 mg, oral, 2 times daily    omeprazole (PRILOSEC) 20 mg, oral, Daily before breakfast, Do not crush or chew.       Physical Exam:  General:  Patient is awake, alert, and oriented.  Patient is a poor historian  HEENT:  Pupils equal and reactive.  Normocephalic.  Moist mucosa.    Neck:  No thyromegaly.  Elevated jugular Venous Pressure.  Cardiovascular:  Regular rate and rhythm.  Normal S1 and S2.  Pulmonary:  Clear to auscultation bilaterally.  Abdomen:  Soft. Non-tender.   Non-distended.  Positive bowel sounds.  Lower Extremities:  2+ pedal pulses. No LE edema.  Neurologic:  Cranial nerves intact.  No focal deficit.   Skin: Skin warm and dry, normal skin turgor.  Bilateral lower extremities red and  Psychiatric: Normal affect.     Assessment/Plan   Cards: [facility]  History Of Present Illness:    New Castano is a 83 y.o. male with  past medical history of Afib s/p ablation (Eliquis/BB), atrial tachycardia, CKD3a, acute on chronic systolic heart failure( recovered LVEF to 50% from 38% on echo 12/2023), HTN, HLD, DM2 who presented to ED with shortness of breath, cough, weakness, and elevated blood sugars.  Cardiology consulted for CHF. (Of note patient just had a recent diet Mirta from February 6 through February 10.  Cardiology was consulted at that time for CHF as well)      Weight on admit 3/8/2024: 93.1kg  Weight on admit 2/6/2024:  95.6kg  Weight on 12/30/2023 88.6k    # Acute on chronic systolic heart failure ( recovered LVEF to 50% on echo 12/2023)  # Hx of Atrial tachycardia 2:1; atypical atrial flutter.    # CKD  # Hypertension  -Elevated BNP over 4700 (prior BNP 3956)  -chest xray free of congestion; elevated JVD  -EKG currently atrial flutter rates controlled 80s   -c/w home dose of furosemide was 40 mg p.o. twice daily   -Daily weights, strict I/O  -Agree with Eliquis 2.5mg  BID  -Continue with home cardiac medications:  Eliquis 2.5mg BID, Toprol 150mg BID, losartan 50mg daily, Jardiance 25mg daily, furosemide 40mg BID, doxazosin 8mg daily and Lipitor 20 mg daily  -follow up with outpatient cardiologist                Code Status:  Full Code    I spent 30 minutes in the professional and overall care of this patient.        Althea Simental, APRN-CNP

## 2024-03-08 NOTE — DISCHARGE SUMMARY
Discharge Diagnosis  Heart failure (CMS/[facility])    Discharge Meds     Your medication list        CHANGE how you take these medications        Instructions Last Dose Given Next Dose Due   metoprolol succinate XL 50 mg 24 hr tablet  Commonly known as: Toprol-XL  What changed: when to take this      Take 3 tablets (150 mg) by mouth 2 times a day.              CONTINUE taking these medications        Instructions Last Dose Given Next Dose Due   atorvastatin 20 mg tablet  Commonly known as: Lipitor           cyanocobalamin 500 mcg tablet  Commonly known as: Vitamin B-12           docusate sodium 100 mg capsule  Commonly known as: Colace      Take 1 capsule (100 mg) by mouth 2 times a day.       doxazosin 4 mg tablet  Commonly known as: Cardura           Eliquis 2.5 mg tablet  Generic drug: apixaban           empagliflozin 25 mg  Commonly known as: Jardiance           finasteride 5 mg tablet  Commonly known as: Proscar           furosemide 40 mg tablet  Commonly known as: Lasix      Take 1 tablet (40 mg) by mouth 2 times a day.       glimepiride 2 mg tablet  Commonly known as: Amaryl           loperamide 2 mg capsule  Commonly known as: Imodium           losartan 50 mg tablet  Commonly known as: Cozaar      Take 1 tablet (50 mg) by mouth once daily.       omeprazole 20 mg DR capsule  Commonly known as: PriLOSEC                     Where to Get Your Medications        These medications were sent to [facility]/pharmacy #[id number] - [place], [place] - [address] AT [facility] [address] [facility] [address]  [address] [address], [facility] [address]      Phone: [phone]   metoprolol succinate XL 50 mg 24 hr tablet         Test Results Pending At Discharge  Pending Labs       Order Current Status    Extra Urine Gray Tube Collected (03/07/24 2116)    Procalcitonin In process    Urinalysis with Reflex Culture and Microscopic In process            Hospital Course  New Castano is a 83 y.o. male presenting with weakness as well as  falling more frequently over the last week.     Most recent hospital admission was February 4 for very similar symptoms.     The patient does have significant past medical history for congestive heart failure as well as kidney disease.  His daughter and wife are at bedside and stated that ever since November, December the patient has been fluctuating between problems with his kidneys and heart and the providers have had a very difficult time managing both of them at the same time to keep the levels within normal value.  The daughter did state that he has recently started liquid lasix in within the last 24 hours he has lost about 4 pounds.  He follows Dr. Tolentino at the [facility].       The patient states that he fell today, he did hit his head but did not lose consciousness.  No open wound.  He has no chest pain no change in vision.  He is neurologically intact.     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  The daughter did mention that within his previous hospitalizations he has had to have a sitter present because he will get in and out of bed.  We will put a bed alarm on for him and if a sitter needs to be signed we will make sure that is done.    Patient seen and evaluated by cardiology.  Chest x-ray free of congestion, slightly elevated JVD.  EKG with atrial flutter but rate controlled.  Cardiology recommended continue home Lasix 40 mg p.o. twice daily and daily weights.  No other changes to medications at this time recommend follow-up with outpatient cardiologist.  CT noted right pleural effusion and x-ray noted right infiltrate with chronic pulmonary congestion.  Discussed with interventional radiology and no therapeutic or diagnostic drainage recommended at this time.      Pertinent Physical Exam At Time of Discharge  Physical Exam  Constitutional:       General: He is not in acute distress.     Appearance: Normal appearance.   HENT:      Head: Normocephalic and atraumatic.      Mouth/Throat:       Mouth: Mucous membranes are dry.   Eyes:      Extraocular Movements: Extraocular movements intact.      Conjunctiva/sclera: Conjunctivae normal.   Cardiovascular:      Rate and Rhythm: Normal rate and regular rhythm.      Pulses: Normal pulses.      Heart sounds: Normal heart sounds.   Pulmonary:      Effort: Pulmonary effort is normal.      Breath sounds: Normal breath sounds. No wheezing or rhonchi.   Chest:      Chest wall: No tenderness.   Abdominal:      General: Bowel sounds are normal. There is no distension.      Palpations: Abdomen is soft. There is no mass.      Tenderness: There is no abdominal tenderness.   Musculoskeletal:         General: Normal range of motion.      Cervical back: Normal range of motion and neck supple.      Right lower leg: Edema present.      Left lower leg: Edema present.      Comments: 1 edema bl   Skin:     General: Skin is warm and dry.      Capillary Refill: Capillary refill takes less than 2 seconds.   Neurological:      General: No focal deficit present.      Mental Status: He is alert and oriented to person, place, and time.   Psychiatric:         Mood and Affect: Mood normal.         Outpatient Follow-Up  No future appointments.    Follow-up with [facility] primary care and cardiology  Megan Ag, APRN-CNP

## 2024-03-08 NOTE — SIGNIFICANT EVENT
03/08/24 1111   Vital Signs   BP (!) 160/102     Hydralazine has been given. Will check BP in an hour.

## 2024-03-08 NOTE — PROGRESS NOTES
03/08/24 0824   Discharge Planning   Living Arrangements Spouse/significant other   Support Systems Spouse/significant other   Assistance Needed Home uses a cane   Do you have animals or pets at home? Yes   Type of Animals or Pets 2 Dogs   Who is requesting discharge planning? Patient   Home or Post Acute Services In home services   Type of Home Care Services Home PT   Patient expects to be discharged to: Home with [facility] home care for PT   Does the patient need discharge transport arranged? No   Housing Stability   In the last 12 months, was there a time when you were not able to pay the mortgage or rent on time? N   In the last 12 months, was there a time when you did not have a steady place to sleep or slept in a shelter (including now)? N   Transportation Needs   In the past 12 months, has lack of transportation kept you from medical appointments or from getting medications? no   In the past 12 months, has lack of transportation kept you from meetings, work, or from getting things needed for daily living? No     3/8/24 08:25 I met with this patient at bedside, he is [id number] at baseline he is independent with his ADL's , he stated he lives at home with his wife, he does currently still drive, but stated he daughter will pick him up when discharged. He also stated he does have [facility] home care that comes in for PT, he does not have any financial or social concerns at this time.       08:32 sent a return referral to [facility], to confirm patient is still active with them and if they can confirm SOC when patient is discharged.

## 2024-03-08 NOTE — CARE PLAN
Problem: Skin  Goal: Decreased wound size/increased tissue granulation at next dressing change  Outcome: Progressing    Problem: Skin  Goal: Participates in plan/prevention/treatment measures  Outcome: Progressing

## 2024-03-08 NOTE — CARE PLAN
The patient's goals for the shift include  to keep blood pressure down.    The clinical goals for the shift include Patient will remain free from injury and HDS      Problem: Pain - Adult  Goal: Verbalizes/displays adequate comfort level or baseline comfort level  Outcome: Progressing     Problem: Safety - Adult  Goal: Free from fall injury  Outcome: Progressing     Problem: Discharge Planning  Goal: Discharge to home or other facility with appropriate resources  Outcome: Progressing     Problem: Diabetes  Goal: Achieve decreasing blood glucose levels by end of shift  Outcome: Progressing  Goal: Increase stability of blood glucose readings by end of shift  Outcome: Progressing  Goal: Decrease in ketones present in urine by end of shift  Outcome: Progressing  Goal: Maintain electrolyte levels within acceptable range throughout shift  Outcome: Progressing  Goal: Maintain glucose levels >70mg/dl to <250mg/dl throughout shift  Outcome: Progressing  Goal: No changes in neurological exam by end of shift  Outcome: Progressing  Goal: Learn about and adhere to nutrition recommendations by end of shift  Outcome: Progressing  Goal: Vital signs within normal range for age by end of shift  Outcome: Progressing  Goal: Increase self care and/or family involovement by end of shift  Outcome: Progressing  Goal: Receive DSME education by end of shift  Outcome: Progressing     Problem: Heart Failure  Goal: Improved gas exchange this shift  Outcome: Progressing  Goal: Improved urinary output this shift  Outcome: Progressing  Goal: Reduction in peripheral edema within 24 hours  Outcome: Progressing  Goal: Report improvement of dyspnea/breathlessness this shift  Outcome: Progressing  Goal: Weight from fluid excess reduced over 2-3 days, then stabilize  Outcome: Progressing  Goal: Increase self care and/or family involvement in 24 hours  Outcome: Progressing     Problem: Skin  Goal: Decreased wound size/increased tissue granulation at next  dressing change  Outcome: Progressing  Goal: Participates in plan/prevention/treatment measures  Outcome: Progressing  Goal: Prevent/manage excess moisture  Outcome: Progressing  Goal: Prevent/minimize sheer/friction injuries  Outcome: Progressing  Goal: Promote/optimize nutrition  Outcome: Progressing  Goal: Promote skin healing  Outcome: Progressing     Problem: Fall/Injury  Goal: Not fall by end of shift  Outcome: Progressing  Goal: Be free from injury by end of the shift  Outcome: Progressing  Goal: Verbalize understanding of personal risk factors for fall in the hospital  Outcome: Progressing  Goal: Verbalize understanding of risk factor reduction measures to prevent injury from fall in the home  Outcome: Progressing  Goal: Use assistive devices by end of the shift  Outcome: Progressing  Goal: Pace activities to prevent fatigue by end of the shift  Outcome: Progressing

## 2024-03-08 NOTE — ED PROCEDURE NOTE
Procedure  Critical Care    Performed by: Dinah Sarmiento MD  Authorized by: Dinah Sarmiento MD    Critical care provider statement:     Critical care time (minutes):  55    Critical care time was exclusive of:  Separately billable procedures and treating other patients    Critical care was necessary to treat or prevent imminent or life-threatening deterioration of the following conditions:  Cardiac failure    Critical care was time spent personally by me on the following activities:  Blood draw for specimens, discussions with primary provider, evaluation of patient's response to treatment, examination of patient, review of old charts, re-evaluation of patient's condition, pulse oximetry, ordering and review of radiographic studies, ordering and review of laboratory studies and ordering and performing treatments and interventions    Care discussed with: admitting provider                 Dinah Sarmiento MD  03/07/24 8430

## 2024-03-14 NOTE — DISCHARGE SUMMARY
Inpatient Discharge Summary    BRIEF OVERVIEW  Admitting Provider: Rossana Castillo MD  Discharge Provider: No att. providers found  Primary Care Physician at Discharge: Mejia Almanza [phone]     Treatment Team:   Consulting Physician: Juan Spangler DO  Registered Nurse: Kaleigh Luna, RN  Patient Care Technician: Beltran Botello      Admission Date: 2/4/2024     Discharge Date: 02/10/2024    Primary Discharge Diagnosis  Principal Problem:    CHF (congestive heart failure), NYHA class I, acute on chronic, combined (CMS/HCC)        Discharge Disposition  Home  Code Status at Discharge: Full     Active Issues Requiring Follow-up  See chart     Outpatient Follow-Up  No future appointments.    [unfilled]    Test Results Pending at Discharge  Pending Labs       No current pending labs.                  DETAILS OF HOSPITAL STAY    Presenting Problem/History of Present Illness  CHF (congestive heart failure), NYHA class I, acute on chronic, combined (CMS/HCC) [I50.43]        Hospital Course     New Castano is a 83 y.o. male   Weakness, Gen     Hyperglycemia        Patient with a past medical history of hypertension diabetes mellitus dyslipidemia paroxysmal atrial fibrillation s/p ablation history of chronic systolic congestive heart failure chronic kidney disease stage IIIb who has had multiple admissions in the last few months for congestive heart failure returns with worsening shortness of breath and cough along with generalized weakness and elevated blood sugars  The patient is not a particularly good historian and I had to take out information from the patient regarding symptoms        Assessment/Plan   #Acute on chronic systolic congestive heart failure  Cont with IV Lasix  Daily weights and electrolytes  Cardiology consulted  BNP 3900   Ot and pt  Response to diuresis ok  Will plan for discharge to home once cleared by cardio  On lasix 40 twice daily   Will need followup with cardio at [facility]      #Community-acquired pneumonia   IV Rocephin/Zithromax  However lung bases on the CT abdo are consistent with edema  Will change to po antibiotics     #CKD 3B  Monitor with diuresing  .creatinine 1.6  Cont with jardiance  Losartan renal following         #Diabetes mellitus  Sliding scale insulin coverage  Hba1c 7.5     #Hypertension  Cont with current     #Atrial fibrillation s/p ablation  Continue Eliquis     #Dyslipidemia  Continue statins and maintain target LDL less than 70     Principal Problem:    CHF (congestive heart failure), NYHA class I, acute on chronic, combined (CMS/[facility])     Code status dw pt and he has not made a decisoin  Dc to home today   See ordres  Noted input from cardio    Discharge summary completed on behalf of attending          Your medication list        START taking these medications        Instructions Last Dose Given Next Dose Due   amoxicillin-pot clavulanate 875-125 mg tablet  Commonly known as: Augmentin      Take 1 tablet by mouth 2 times a day for 3 days.       docusate sodium 100 mg capsule  Commonly known as: Colace      Take 1 capsule (100 mg) by mouth 2 times a day.       losartan 50 mg tablet  Commonly known as: Cozaar      Take 1 tablet (50 mg) by mouth once daily.              CHANGE how you take these medications        Instructions Last Dose Given Next Dose Due   furosemide 40 mg tablet  Commonly known as: Lasix  What changed: when to take this      Take 1 tablet (40 mg) by mouth 2 times a day.              CONTINUE taking these medications        Instructions Last Dose Given Next Dose Due   atorvastatin 20 mg tablet  Commonly known as: Lipitor           cyanocobalamin 500 mcg tablet  Commonly known as: Vitamin B-12           doxazosin 4 mg tablet  Commonly known as: Cardura           Eliquis 2.5 mg tablet  Generic drug: apixaban           empagliflozin 25 mg  Commonly known as: Jardiance           finasteride 5 mg tablet  Commonly known as: Proscar           glimepiride  2 mg tablet  Commonly known as: Amaryl           loperamide 2 mg capsule  Commonly known as: Imodium           metoprolol succinate XL 50 mg 24 hr tablet  Commonly known as: Toprol-XL           omeprazole 20 mg DR capsule  Commonly known as: PriLOSEC                  STOP taking these medications      naproxen 500 mg tablet  Commonly known as: Naprosyn                  Where to Get Your Medications        These medications [id number] [place] [facility] [address]  [address] [address] [address]      Phone: [phone]   amoxicillin-pot clavulanate 875-125 mg tablet  furosemide 40 mg tablet  losartan 50 mg tablet       Information about where to get these medications is not yet available    Ask your nurse or doctor about these medications  docusate sodium 100 mg capsule             Physical Exam at Discharge  Discharge Condition: good  Heart Rate: 81  Resp: 18  BP: 128/77  Temp: 36.5 °C (97.7 °F)  Weight: 93.1 kg (205 lb 4 oz)  agree [facility] discharge summary , reviewd and dw NP  Kevon Gunderson MD

## 2024-03-22 ENCOUNTER — APPOINTMENT (OUTPATIENT)
Dept: RADIOLOGY | Facility: HOSPITAL | Age: 84
DRG: 193 | End: 2024-03-22
Payer: MEDICARE

## 2024-03-22 ENCOUNTER — APPOINTMENT (OUTPATIENT)
Dept: CARDIOLOGY | Facility: HOSPITAL | Age: 84
DRG: 193 | End: 2024-03-22
Payer: MEDICARE

## 2024-03-22 ENCOUNTER — HOSPITAL ENCOUNTER (INPATIENT)
Facility: HOSPITAL | Age: 84
LOS: 18 days | Discharge: NURSING, REHAB OR LONG-TERM CARE | DRG: 193 | End: 2024-04-09
Attending: EMERGENCY MEDICINE | Admitting: INTERNAL MEDICINE
Payer: MEDICARE

## 2024-03-22 DIAGNOSIS — I50.43 ACUTE ON CHRONIC COMBINED SYSTOLIC AND DIASTOLIC HEART FAILURE (MULTI): ICD-10-CM

## 2024-03-22 DIAGNOSIS — I10 PRIMARY HYPERTENSION: ICD-10-CM

## 2024-03-22 DIAGNOSIS — R06.02 SOB (SHORTNESS OF BREATH): ICD-10-CM

## 2024-03-22 DIAGNOSIS — I50.812 CHRONIC RIGHT-SIDED CONGESTIVE HEART FAILURE (MULTI): ICD-10-CM

## 2024-03-22 DIAGNOSIS — J18.9 PNEUMONIA OF RIGHT LUNG DUE TO INFECTIOUS ORGANISM, UNSPECIFIED PART OF LUNG: Primary | ICD-10-CM

## 2024-03-22 DIAGNOSIS — G93.40 ENCEPHALOPATHY: ICD-10-CM

## 2024-03-22 DIAGNOSIS — K59.04 CHRONIC IDIOPATHIC CONSTIPATION: ICD-10-CM

## 2024-03-22 LAB
ALBUMIN SERPL BCP-MCNC: 3.4 G/DL (ref 3.4–5)
ALP SERPL-CCNC: 87 U/L (ref 33–136)
ALT SERPL W P-5'-P-CCNC: 24 U/L (ref 10–52)
ANION GAP SERPL CALC-SCNC: 12 MMOL/L (ref 10–20)
APPEARANCE UR: CLEAR
AST SERPL W P-5'-P-CCNC: 22 U/L (ref 9–39)
ATRIAL RATE: 278 BPM
BASOPHILS # BLD AUTO: 0.02 X10*3/UL (ref 0–0.1)
BASOPHILS NFR BLD AUTO: 0.3 %
BILIRUB SERPL-MCNC: 1 MG/DL (ref 0–1.2)
BILIRUB UR STRIP.AUTO-MCNC: NEGATIVE MG/DL
BNP SERPL-MCNC: >4700 PG/ML (ref 0–99)
BUN SERPL-MCNC: 38 MG/DL (ref 6–23)
CALCIUM SERPL-MCNC: 8.3 MG/DL (ref 8.6–10.3)
CARDIAC TROPONIN I PNL SERPL HS: 19 NG/L (ref 0–20)
CHLORIDE SERPL-SCNC: 101 MMOL/L (ref 98–107)
CO2 SERPL-SCNC: 32 MMOL/L (ref 21–32)
COLOR UR: ABNORMAL
CREAT SERPL-MCNC: 2.08 MG/DL (ref 0.5–1.3)
D DIMER PPP FEU-MCNC: 4865 NG/ML FEU
EGFRCR SERPLBLD CKD-EPI 2021: 31 ML/MIN/1.73M*2
EOSINOPHIL # BLD AUTO: 0.05 X10*3/UL (ref 0–0.4)
EOSINOPHIL NFR BLD AUTO: 0.8 %
ERYTHROCYTE [DISTWIDTH] IN BLOOD BY AUTOMATED COUNT: 17 % (ref 11.5–14.5)
FLUAV RNA RESP QL NAA+PROBE: NOT DETECTED
FLUBV RNA RESP QL NAA+PROBE: NOT DETECTED
GLUCOSE SERPL-MCNC: 191 MG/DL (ref 74–99)
GLUCOSE UR STRIP.AUTO-MCNC: ABNORMAL MG/DL
HCT VFR BLD AUTO: 42.2 % (ref 41–52)
HGB BLD-MCNC: 12.6 G/DL (ref 13.5–17.5)
IMM GRANULOCYTES # BLD AUTO: 0.02 X10*3/UL (ref 0–0.5)
IMM GRANULOCYTES NFR BLD AUTO: 0.3 % (ref 0–0.9)
KETONES UR STRIP.AUTO-MCNC: NEGATIVE MG/DL
LEUKOCYTE ESTERASE UR QL STRIP.AUTO: NEGATIVE
LYMPHOCYTES # BLD AUTO: 0.6 X10*3/UL (ref 0.8–3)
LYMPHOCYTES NFR BLD AUTO: 9.5 %
MCH RBC QN AUTO: 27.4 PG (ref 26–34)
MCHC RBC AUTO-ENTMCNC: 29.9 G/DL (ref 32–36)
MCV RBC AUTO: 92 FL (ref 80–100)
MONOCYTES # BLD AUTO: 0.98 X10*3/UL (ref 0.05–0.8)
MONOCYTES NFR BLD AUTO: 15.5 %
NEUTROPHILS # BLD AUTO: 4.66 X10*3/UL (ref 1.6–5.5)
NEUTROPHILS NFR BLD AUTO: 73.6 %
NITRITE UR QL STRIP.AUTO: NEGATIVE
NRBC BLD-RTO: 0 /100 WBCS (ref 0–0)
P AXIS: 129 DEGREES
PH UR STRIP.AUTO: 5.5 [PH]
PLATELET # BLD AUTO: 144 X10*3/UL (ref 150–450)
POTASSIUM SERPL-SCNC: 4.3 MMOL/L (ref 3.5–5.3)
PROT SERPL-MCNC: 6 G/DL (ref 6.4–8.2)
PROT UR STRIP.AUTO-MCNC: ABNORMAL MG/DL
Q ONSET: 215 MS
QRS COUNT: 12 BEATS
QRS DURATION: 144 MS
QT INTERVAL: 428 MS
QTC CALCULATION(BAZETT): 471 MS
QTC FREDERICIA: 457 MS
R AXIS: -26 DEGREES
RBC # BLD AUTO: 4.6 X10*6/UL (ref 4.5–5.9)
RBC # UR STRIP.AUTO: NEGATIVE /UL
RBC #/AREA URNS AUTO: NORMAL /HPF
SARS-COV-2 RNA RESP QL NAA+PROBE: NOT DETECTED
SODIUM SERPL-SCNC: 141 MMOL/L (ref 136–145)
SP GR UR STRIP.AUTO: 1.02
SQUAMOUS #/AREA URNS AUTO: NORMAL /HPF
T AXIS: 145 DEGREES
T OFFSET: 429 MS
T4 FREE SERPL-MCNC: 0.89 NG/DL (ref 0.61–1.12)
TSH SERPL-ACNC: 4.67 MIU/L (ref 0.44–3.98)
UROBILINOGEN UR STRIP.AUTO-MCNC: NORMAL MG/DL
VENTRICULAR RATE: 73 BPM
WBC # BLD AUTO: 6.3 X10*3/UL (ref 4.4–11.3)
WBC #/AREA URNS AUTO: NORMAL /HPF

## 2024-03-22 PROCEDURE — 93970 EXTREMITY STUDY: CPT

## 2024-03-22 PROCEDURE — 71046 X-RAY EXAM CHEST 2 VIEWS: CPT

## 2024-03-22 PROCEDURE — 96375 TX/PRO/DX INJ NEW DRUG ADDON: CPT

## 2024-03-22 PROCEDURE — 84439 ASSAY OF FREE THYROXINE: CPT | Performed by: EMERGENCY MEDICINE

## 2024-03-22 PROCEDURE — 93005 ELECTROCARDIOGRAM TRACING: CPT

## 2024-03-22 PROCEDURE — 3430000001 HC RX 343 DIAGNOSTIC RADIOPHARMACEUTICALS: Performed by: EMERGENCY MEDICINE

## 2024-03-22 PROCEDURE — 71046 X-RAY EXAM CHEST 2 VIEWS: CPT | Performed by: RADIOLOGY

## 2024-03-22 PROCEDURE — 96365 THER/PROPH/DIAG IV INF INIT: CPT

## 2024-03-22 PROCEDURE — 85025 COMPLETE CBC W/AUTO DIFF WBC: CPT | Performed by: EMERGENCY MEDICINE

## 2024-03-22 PROCEDURE — 87636 SARSCOV2 & INF A&B AMP PRB: CPT | Performed by: EMERGENCY MEDICINE

## 2024-03-22 PROCEDURE — 36415 COLL VENOUS BLD VENIPUNCTURE: CPT | Performed by: EMERGENCY MEDICINE

## 2024-03-22 PROCEDURE — 2500000001 HC RX 250 WO HCPCS SELF ADMINISTERED DRUGS (ALT 637 FOR MEDICARE OP): Performed by: INTERNAL MEDICINE

## 2024-03-22 PROCEDURE — 81001 URINALYSIS AUTO W/SCOPE: CPT | Performed by: EMERGENCY MEDICINE

## 2024-03-22 PROCEDURE — 2500000004 HC RX 250 GENERAL PHARMACY W/ HCPCS (ALT 636 FOR OP/ED)

## 2024-03-22 PROCEDURE — 78830 RP LOCLZJ TUM SPECT W/CT 1: CPT

## 2024-03-22 PROCEDURE — 1200000002 HC GENERAL ROOM WITH TELEMETRY DAILY

## 2024-03-22 PROCEDURE — 2500000004 HC RX 250 GENERAL PHARMACY W/ HCPCS (ALT 636 FOR OP/ED): Performed by: EMERGENCY MEDICINE

## 2024-03-22 PROCEDURE — 83880 ASSAY OF NATRIURETIC PEPTIDE: CPT | Performed by: EMERGENCY MEDICINE

## 2024-03-22 PROCEDURE — A9540 TC99M MAA: HCPCS | Performed by: EMERGENCY MEDICINE

## 2024-03-22 PROCEDURE — 85379 FIBRIN DEGRADATION QUANT: CPT | Performed by: EMERGENCY MEDICINE

## 2024-03-22 PROCEDURE — 84484 ASSAY OF TROPONIN QUANT: CPT | Performed by: EMERGENCY MEDICINE

## 2024-03-22 PROCEDURE — 78830 RP LOCLZJ TUM SPECT W/CT 1: CPT | Performed by: NUCLEAR MEDICINE

## 2024-03-22 PROCEDURE — 84443 ASSAY THYROID STIM HORMONE: CPT | Performed by: EMERGENCY MEDICINE

## 2024-03-22 PROCEDURE — 96366 THER/PROPH/DIAG IV INF ADDON: CPT

## 2024-03-22 PROCEDURE — 93971 EXTREMITY STUDY: CPT | Performed by: RADIOLOGY

## 2024-03-22 PROCEDURE — 99285 EMERGENCY DEPT VISIT HI MDM: CPT | Mod: 25

## 2024-03-22 PROCEDURE — 84075 ASSAY ALKALINE PHOSPHATASE: CPT | Performed by: EMERGENCY MEDICINE

## 2024-03-22 RX ORDER — ACETAMINOPHEN 650 MG/1
650 SUPPOSITORY RECTAL EVERY 4 HOURS PRN
Status: DISCONTINUED | OUTPATIENT
Start: 2024-03-22 | End: 2024-04-09 | Stop reason: HOSPADM

## 2024-03-22 RX ORDER — ACETAMINOPHEN 160 MG/5ML
650 SOLUTION ORAL EVERY 4 HOURS PRN
Status: DISCONTINUED | OUTPATIENT
Start: 2024-03-22 | End: 2024-04-09 | Stop reason: HOSPADM

## 2024-03-22 RX ORDER — DOXAZOSIN 2 MG/1
4 TABLET ORAL NIGHTLY
Status: DISCONTINUED | OUTPATIENT
Start: 2024-03-22 | End: 2024-03-27

## 2024-03-22 RX ORDER — METFORMIN HYDROCHLORIDE EXTENDED-RELEASE TABLETS 1000 MG/1
1000 TABLET, FILM COATED, EXTENDED RELEASE ORAL
COMMUNITY
End: 2024-04-09 | Stop reason: HOSPADM

## 2024-03-22 RX ORDER — DOCUSATE SODIUM 100 MG/1
100 CAPSULE, LIQUID FILLED ORAL 2 TIMES DAILY
Status: DISCONTINUED | OUTPATIENT
Start: 2024-03-22 | End: 2024-04-09 | Stop reason: HOSPADM

## 2024-03-22 RX ORDER — ACETAMINOPHEN 325 MG/1
650 TABLET ORAL EVERY 4 HOURS PRN
Status: DISCONTINUED | OUTPATIENT
Start: 2024-03-22 | End: 2024-04-09 | Stop reason: HOSPADM

## 2024-03-22 RX ORDER — METOPROLOL SUCCINATE 50 MG/1
150 TABLET, EXTENDED RELEASE ORAL 2 TIMES DAILY
Status: DISCONTINUED | OUTPATIENT
Start: 2024-03-22 | End: 2024-04-09 | Stop reason: HOSPADM

## 2024-03-22 RX ORDER — PANTOPRAZOLE SODIUM 40 MG/1
40 TABLET, DELAYED RELEASE ORAL
Status: DISCONTINUED | OUTPATIENT
Start: 2024-03-23 | End: 2024-04-09 | Stop reason: HOSPADM

## 2024-03-22 RX ORDER — FUROSEMIDE 10 MG/ML
20 INJECTION INTRAMUSCULAR; INTRAVENOUS ONCE
Status: COMPLETED | OUTPATIENT
Start: 2024-03-22 | End: 2024-03-22

## 2024-03-22 RX ORDER — ATORVASTATIN CALCIUM 20 MG/1
20 TABLET, FILM COATED ORAL NIGHTLY
Status: DISCONTINUED | OUTPATIENT
Start: 2024-03-22 | End: 2024-04-09 | Stop reason: HOSPADM

## 2024-03-22 RX ORDER — UBIDECARENONE 75 MG
500 CAPSULE ORAL DAILY
Status: DISCONTINUED | OUTPATIENT
Start: 2024-03-22 | End: 2024-04-09 | Stop reason: HOSPADM

## 2024-03-22 RX ORDER — GLIMEPIRIDE 2 MG/1
2 TABLET ORAL
Status: DISCONTINUED | OUTPATIENT
Start: 2024-03-23 | End: 2024-04-09 | Stop reason: HOSPADM

## 2024-03-22 RX ORDER — FINASTERIDE 5 MG/1
5 TABLET, FILM COATED ORAL DAILY
Status: DISCONTINUED | OUTPATIENT
Start: 2024-03-22 | End: 2024-04-09 | Stop reason: HOSPADM

## 2024-03-22 RX ORDER — LOSARTAN POTASSIUM 50 MG/1
50 TABLET ORAL DAILY
Status: DISCONTINUED | OUTPATIENT
Start: 2024-03-22 | End: 2024-04-09 | Stop reason: HOSPADM

## 2024-03-22 RX ORDER — POLYETHYLENE GLYCOL 3350 17 G/17G
17 POWDER, FOR SOLUTION ORAL DAILY
Status: DISCONTINUED | OUTPATIENT
Start: 2024-03-22 | End: 2024-04-09 | Stop reason: HOSPADM

## 2024-03-22 RX ORDER — LOPERAMIDE HYDROCHLORIDE 2 MG/1
2 CAPSULE ORAL 3 TIMES DAILY PRN
Status: DISCONTINUED | OUTPATIENT
Start: 2024-03-22 | End: 2024-04-09 | Stop reason: HOSPADM

## 2024-03-22 RX ORDER — HEPARIN SODIUM 5000 [USP'U]/ML
5000 INJECTION, SOLUTION INTRAVENOUS; SUBCUTANEOUS EVERY 8 HOURS SCHEDULED
Status: DISCONTINUED | OUTPATIENT
Start: 2024-03-22 | End: 2024-03-22

## 2024-03-22 RX ADMIN — ATORVASTATIN CALCIUM 20 MG: 20 TABLET, FILM COATED ORAL at 23:17

## 2024-03-22 RX ADMIN — EMPAGLIFLOZIN 25 MG: 10 TABLET, FILM COATED ORAL at 23:17

## 2024-03-22 RX ADMIN — METOPROLOL SUCCINATE 150 MG: 50 TABLET, EXTENDED RELEASE ORAL at 23:17

## 2024-03-22 RX ADMIN — FINASTERIDE 5 MG: 5 TABLET, FILM COATED ORAL at 23:17

## 2024-03-22 RX ADMIN — VANCOMYCIN HYDROCHLORIDE 1500 MG: 1.5 INJECTION, POWDER, LYOPHILIZED, FOR SOLUTION INTRAVENOUS at 16:53

## 2024-03-22 RX ADMIN — APIXABAN 2.5 MG: 2.5 TABLET, FILM COATED ORAL at 23:17

## 2024-03-22 RX ADMIN — FUROSEMIDE 20 MG: 10 INJECTION, SOLUTION INTRAMUSCULAR; INTRAVENOUS at 18:30

## 2024-03-22 RX ADMIN — LOSARTAN POTASSIUM 50 MG: 50 TABLET, FILM COATED ORAL at 23:18

## 2024-03-22 RX ADMIN — TAZOBACTAM SODIUM AND PIPERACILLIN SODIUM 3.38 G: 375; 3 INJECTION, SOLUTION INTRAVENOUS at 19:20

## 2024-03-22 RX ADMIN — KIT FOR THE PREPARATION OF TECHNETIUM TC 99M ALBUMIN AGGREGATED 4.3 MILLICURIE: 2.5 INJECTION, POWDER, FOR SOLUTION INTRAVENOUS at 14:34

## 2024-03-22 RX ADMIN — DOXAZOSIN 4 MG: 2 TABLET ORAL at 23:17

## 2024-03-22 RX ADMIN — CYANOCOBALAMIN TAB 500 MCG 500 MCG: 500 TAB at 23:18

## 2024-03-22 RX ADMIN — DOCUSATE SODIUM 100 MG: 100 CAPSULE, LIQUID FILLED ORAL at 23:18

## 2024-03-22 SDOH — SOCIAL STABILITY: SOCIAL INSECURITY: DO YOU FEEL ANYONE HAS EXPLOITED OR TAKEN ADVANTAGE OF YOU FINANCIALLY OR OF YOUR PERSONAL PROPERTY?: NO

## 2024-03-22 SDOH — SOCIAL STABILITY: SOCIAL INSECURITY: WERE YOU ABLE TO COMPLETE ALL THE BEHAVIORAL HEALTH SCREENINGS?: YES

## 2024-03-22 SDOH — SOCIAL STABILITY: SOCIAL INSECURITY: ARE THERE ANY APPARENT SIGNS OF INJURIES/BEHAVIORS THAT COULD BE RELATED TO ABUSE/NEGLECT?: NO

## 2024-03-22 SDOH — SOCIAL STABILITY: SOCIAL INSECURITY: DOES ANYONE TRY TO KEEP YOU FROM HAVING/CONTACTING OTHER FRIENDS OR DOING THINGS OUTSIDE YOUR HOME?: NO

## 2024-03-22 SDOH — SOCIAL STABILITY: SOCIAL INSECURITY: ARE YOU OR HAVE YOU BEEN THREATENED OR ABUSED PHYSICALLY, EMOTIONALLY, OR SEXUALLY BY ANYONE?: NO

## 2024-03-22 SDOH — SOCIAL STABILITY: SOCIAL INSECURITY: DO YOU FEEL UNSAFE GOING BACK TO THE PLACE WHERE YOU ARE LIVING?: NO

## 2024-03-22 SDOH — SOCIAL STABILITY: SOCIAL INSECURITY: HAS ANYONE EVER THREATENED TO HURT YOUR FAMILY OR YOUR PETS?: NO

## 2024-03-22 SDOH — SOCIAL STABILITY: SOCIAL INSECURITY: HAVE YOU HAD THOUGHTS OF HARMING ANYONE ELSE?: NO

## 2024-03-22 SDOH — SOCIAL STABILITY: SOCIAL INSECURITY: ABUSE: ADULT

## 2024-03-22 ASSESSMENT — COLUMBIA-SUICIDE SEVERITY RATING SCALE - C-SSRS
6. HAVE YOU EVER DONE ANYTHING, STARTED TO DO ANYTHING, OR PREPARED TO DO ANYTHING TO END YOUR LIFE?: NO
1. IN THE PAST MONTH, HAVE YOU WISHED YOU WERE DEAD OR WISHED YOU COULD GO TO SLEEP AND NOT WAKE UP?: NO
2. HAVE YOU ACTUALLY HAD ANY THOUGHTS OF KILLING YOURSELF?: NO

## 2024-03-22 ASSESSMENT — COGNITIVE AND FUNCTIONAL STATUS - GENERAL
STANDING UP FROM CHAIR USING ARMS: A LITTLE
DAILY ACTIVITIY SCORE: 19
CLIMB 3 TO 5 STEPS WITH RAILING: A LOT
MOBILITY SCORE: 18
HELP NEEDED FOR BATHING: A LITTLE
PERSONAL GROOMING: A LITTLE
DRESSING REGULAR LOWER BODY CLOTHING: A LITTLE
DRESSING REGULAR UPPER BODY CLOTHING: A LITTLE
WALKING IN HOSPITAL ROOM: A LITTLE
PATIENT BASELINE BEDBOUND: NO
TURNING FROM BACK TO SIDE WHILE IN FLAT BAD: A LITTLE
TOILETING: A LITTLE
MOVING TO AND FROM BED TO CHAIR: A LITTLE

## 2024-03-22 ASSESSMENT — ACTIVITIES OF DAILY LIVING (ADL)
HEARING - LEFT EAR: FUNCTIONAL
GROOMING: NEEDS ASSISTANCE
ADEQUATE_TO_COMPLETE_ADL: YES
FEEDING YOURSELF: NEEDS ASSISTANCE
HEARING - RIGHT EAR: FUNCTIONAL
JUDGMENT_ADEQUATE_SAFELY_COMPLETE_DAILY_ACTIVITIES: NO
LACK_OF_TRANSPORTATION: NO
ASSISTIVE_DEVICE: CANE;WALKER
WALKS IN HOME: NEEDS ASSISTANCE
BATHING: NEEDS ASSISTANCE
DRESSING YOURSELF: NEEDS ASSISTANCE
PATIENT'S MEMORY ADEQUATE TO SAFELY COMPLETE DAILY ACTIVITIES?: NO
TOILETING: NEEDS ASSISTANCE

## 2024-03-22 ASSESSMENT — LIFESTYLE VARIABLES
HOW MANY STANDARD DRINKS CONTAINING ALCOHOL DO YOU HAVE ON A TYPICAL DAY: PATIENT DOES NOT DRINK
HOW OFTEN DO YOU HAVE 6 OR MORE DRINKS ON ONE OCCASION: NEVER
AUDIT-C TOTAL SCORE: 0
HOW OFTEN DO YOU HAVE A DRINK CONTAINING ALCOHOL: NEVER
SKIP TO QUESTIONS 9-10: 1
AUDIT-C TOTAL SCORE: 0
SUBSTANCE_ABUSE_PAST_12_MONTHS: NO
PRESCIPTION_ABUSE_PAST_12_MONTHS: NO

## 2024-03-22 ASSESSMENT — PAIN - FUNCTIONAL ASSESSMENT: PAIN_FUNCTIONAL_ASSESSMENT: 0-10

## 2024-03-22 ASSESSMENT — PATIENT HEALTH QUESTIONNAIRE - PHQ9
1. LITTLE INTEREST OR PLEASURE IN DOING THINGS: NOT AT ALL
2. FEELING DOWN, DEPRESSED OR HOPELESS: NOT AT ALL
SUM OF ALL RESPONSES TO PHQ9 QUESTIONS 1 & 2: 0

## 2024-03-22 ASSESSMENT — PAIN SCALES - GENERAL: PAINLEVEL_OUTOF10: 0 - NO PAIN

## 2024-03-22 NOTE — Clinical Note
750L Clear yellow fluid removed during right thoracentesis. Uneventful. Pt tolerated procedure well. Labs sent. Floor nurse called report to.

## 2024-03-22 NOTE — ED TRIAGE NOTES
PT TO ED VIA EMS WITH C/O SOB AND GENERAL WEAKNESS X 2 DAYS. PT LIVES AT HOME AND FAMILY STATES HE HAS BEEN UNABLE TO GET OUT OF HIS CHAIR. PT HAS HX HYPERTENSION. EMS PLACED PT ON 4 L.

## 2024-03-23 LAB
ANION GAP BLDA CALCULATED.4IONS-SCNC: 10 MMO/L (ref 10–25)
ANION GAP SERPL CALC-SCNC: 14 MMOL/L (ref 10–20)
BASE EXCESS BLDA CALC-SCNC: 2.8 MMOL/L (ref -2–3)
BODY TEMPERATURE: 37 DEGREES CELSIUS
BUN SERPL-MCNC: 35 MG/DL (ref 6–23)
CA-I BLDA-SCNC: 1.17 MMOL/L (ref 1.1–1.33)
CALCIUM SERPL-MCNC: 8.3 MG/DL (ref 8.6–10.3)
CHLORIDE BLDA-SCNC: 101 MMOL/L (ref 98–107)
CHLORIDE SERPL-SCNC: 101 MMOL/L (ref 98–107)
CO2 SERPL-SCNC: 29 MMOL/L (ref 21–32)
CREAT SERPL-MCNC: 2.08 MG/DL (ref 0.5–1.3)
EGFRCR SERPLBLD CKD-EPI 2021: 31 ML/MIN/1.73M*2
ERYTHROCYTE [DISTWIDTH] IN BLOOD BY AUTOMATED COUNT: 16.9 % (ref 11.5–14.5)
GLUCOSE BLDA-MCNC: 232 MG/DL (ref 74–99)
GLUCOSE SERPL-MCNC: 168 MG/DL (ref 74–99)
HCO3 BLDA-SCNC: 28.9 MMOL/L (ref 22–26)
HCT VFR BLD AUTO: 40.4 % (ref 41–52)
HCT VFR BLD EST: 37 % (ref 41–52)
HGB BLD-MCNC: 11.9 G/DL (ref 13.5–17.5)
HGB BLDA-MCNC: 12.2 G/DL (ref 13.5–17.5)
INHALED O2 CONCENTRATION: 32 %
LACTATE BLDA-SCNC: 1.1 MMOL/L (ref 0.4–2)
MCH RBC QN AUTO: 26.3 PG (ref 26–34)
MCHC RBC AUTO-ENTMCNC: 29.5 G/DL (ref 32–36)
MCV RBC AUTO: 89 FL (ref 80–100)
NRBC BLD-RTO: 0 /100 WBCS (ref 0–0)
OXYHGB MFR BLDA: 95.8 % (ref 94–98)
PCO2 BLDA: 50 MM HG (ref 38–42)
PH BLDA: 7.37 PH (ref 7.38–7.42)
PLATELET # BLD AUTO: 129 X10*3/UL (ref 150–450)
PO2 BLDA: 106 MM HG (ref 85–95)
POTASSIUM BLDA-SCNC: 4 MMOL/L (ref 3.5–5.3)
POTASSIUM SERPL-SCNC: 3.9 MMOL/L (ref 3.5–5.3)
RBC # BLD AUTO: 4.52 X10*6/UL (ref 4.5–5.9)
SAO2 % BLDA: 99 % (ref 94–100)
SODIUM BLDA-SCNC: 136 MMOL/L (ref 136–145)
SODIUM SERPL-SCNC: 140 MMOL/L (ref 136–145)
WBC # BLD AUTO: 7.5 X10*3/UL (ref 4.4–11.3)

## 2024-03-23 PROCEDURE — 2500000004 HC RX 250 GENERAL PHARMACY W/ HCPCS (ALT 636 FOR OP/ED): Performed by: INTERNAL MEDICINE

## 2024-03-23 PROCEDURE — 97161 PT EVAL LOW COMPLEX 20 MIN: CPT | Mod: GP

## 2024-03-23 PROCEDURE — 97535 SELF CARE MNGMENT TRAINING: CPT | Mod: GO

## 2024-03-23 PROCEDURE — 85027 COMPLETE CBC AUTOMATED: CPT | Performed by: INTERNAL MEDICINE

## 2024-03-23 PROCEDURE — 84132 ASSAY OF SERUM POTASSIUM: CPT | Performed by: INTERNAL MEDICINE

## 2024-03-23 PROCEDURE — 36415 COLL VENOUS BLD VENIPUNCTURE: CPT | Performed by: INTERNAL MEDICINE

## 2024-03-23 PROCEDURE — 97530 THERAPEUTIC ACTIVITIES: CPT | Mod: GO

## 2024-03-23 PROCEDURE — 97165 OT EVAL LOW COMPLEX 30 MIN: CPT | Mod: GO

## 2024-03-23 PROCEDURE — 1200000002 HC GENERAL ROOM WITH TELEMETRY DAILY

## 2024-03-23 PROCEDURE — 2500000001 HC RX 250 WO HCPCS SELF ADMINISTERED DRUGS (ALT 637 FOR MEDICARE OP): Performed by: INTERNAL MEDICINE

## 2024-03-23 PROCEDURE — 82374 ASSAY BLOOD CARBON DIOXIDE: CPT | Performed by: INTERNAL MEDICINE

## 2024-03-23 RX ORDER — FUROSEMIDE 10 MG/ML
40 INJECTION INTRAMUSCULAR; INTRAVENOUS ONCE
Status: COMPLETED | OUTPATIENT
Start: 2024-03-23 | End: 2024-03-23

## 2024-03-23 RX ADMIN — METOPROLOL SUCCINATE 150 MG: 50 TABLET, EXTENDED RELEASE ORAL at 09:00

## 2024-03-23 RX ADMIN — PANTOPRAZOLE SODIUM 40 MG: 40 TABLET, DELAYED RELEASE ORAL at 06:06

## 2024-03-23 RX ADMIN — EMPAGLIFLOZIN 25 MG: 10 TABLET, FILM COATED ORAL at 09:00

## 2024-03-23 RX ADMIN — PIPERACILLIN SODIUM AND TAZOBACTAM SODIUM 3.38 G: 3; .375 INJECTION, SOLUTION INTRAVENOUS at 02:12

## 2024-03-23 RX ADMIN — DOCUSATE SODIUM 100 MG: 100 CAPSULE, LIQUID FILLED ORAL at 09:00

## 2024-03-23 RX ADMIN — PIPERACILLIN SODIUM AND TAZOBACTAM SODIUM 3.38 G: 3; .375 INJECTION, SOLUTION INTRAVENOUS at 20:10

## 2024-03-23 RX ADMIN — ATORVASTATIN CALCIUM 20 MG: 20 TABLET, FILM COATED ORAL at 20:11

## 2024-03-23 RX ADMIN — METOPROLOL SUCCINATE 150 MG: 50 TABLET, EXTENDED RELEASE ORAL at 20:10

## 2024-03-23 RX ADMIN — PIPERACILLIN SODIUM AND TAZOBACTAM SODIUM 3.38 G: 3; .375 INJECTION, SOLUTION INTRAVENOUS at 09:05

## 2024-03-23 RX ADMIN — PIPERACILLIN SODIUM AND TAZOBACTAM SODIUM 3.38 G: 3; .375 INJECTION, SOLUTION INTRAVENOUS at 16:36

## 2024-03-23 RX ADMIN — LOSARTAN POTASSIUM 50 MG: 50 TABLET, FILM COATED ORAL at 09:01

## 2024-03-23 RX ADMIN — FINASTERIDE 5 MG: 5 TABLET, FILM COATED ORAL at 09:01

## 2024-03-23 RX ADMIN — DOCUSATE SODIUM 100 MG: 100 CAPSULE, LIQUID FILLED ORAL at 20:11

## 2024-03-23 RX ADMIN — CYANOCOBALAMIN TAB 500 MCG 500 MCG: 500 TAB at 09:00

## 2024-03-23 RX ADMIN — DOXAZOSIN 4 MG: 2 TABLET ORAL at 20:11

## 2024-03-23 RX ADMIN — FUROSEMIDE 40 MG: 10 INJECTION, SOLUTION INTRAMUSCULAR; INTRAVENOUS at 15:48

## 2024-03-23 RX ADMIN — APIXABAN 2.5 MG: 2.5 TABLET, FILM COATED ORAL at 20:10

## 2024-03-23 RX ADMIN — GLIMEPIRIDE 2 MG: 2 TABLET ORAL at 06:06

## 2024-03-23 RX ADMIN — APIXABAN 2.5 MG: 2.5 TABLET, FILM COATED ORAL at 09:00

## 2024-03-23 ASSESSMENT — ENCOUNTER SYMPTOMS: WEAKNESS: 1

## 2024-03-23 ASSESSMENT — COGNITIVE AND FUNCTIONAL STATUS - GENERAL
TURNING FROM BACK TO SIDE WHILE IN FLAT BAD: A LOT
TOILETING: A LOT
PERSONAL GROOMING: A LITTLE
TURNING FROM BACK TO SIDE WHILE IN FLAT BAD: A LOT
MOVING TO AND FROM BED TO CHAIR: A LITTLE
MOVING FROM LYING ON BACK TO SITTING ON SIDE OF FLAT BED WITH BEDRAILS: A LOT
MOBILITY SCORE: 15
STANDING UP FROM CHAIR USING ARMS: A LITTLE
DRESSING REGULAR LOWER BODY CLOTHING: A LOT
CLIMB 3 TO 5 STEPS WITH RAILING: A LOT
DRESSING REGULAR LOWER BODY CLOTHING: A LITTLE
WALKING IN HOSPITAL ROOM: A LITTLE
WALKING IN HOSPITAL ROOM: A LITTLE
HELP NEEDED FOR BATHING: A LOT
MOBILITY SCORE: 15
MOVING FROM LYING ON BACK TO SITTING ON SIDE OF FLAT BED WITH BEDRAILS: A LOT
DRESSING REGULAR UPPER BODY CLOTHING: A LITTLE
DRESSING REGULAR UPPER BODY CLOTHING: A LITTLE
CLIMB 3 TO 5 STEPS WITH RAILING: A LOT
EATING MEALS: A LITTLE
DAILY ACTIVITIY SCORE: 19
HELP NEEDED FOR BATHING: A LITTLE
PERSONAL GROOMING: A LITTLE
STANDING UP FROM CHAIR USING ARMS: A LITTLE
MOVING TO AND FROM BED TO CHAIR: A LITTLE
TOILETING: A LITTLE
DAILY ACTIVITIY SCORE: 15

## 2024-03-23 ASSESSMENT — ACTIVITIES OF DAILY LIVING (ADL): BATHING_ASSISTANCE: MODERATE

## 2024-03-23 ASSESSMENT — PAIN SCALES - GENERAL
PAINLEVEL_OUTOF10: 0 - NO PAIN

## 2024-03-23 ASSESSMENT — PAIN - FUNCTIONAL ASSESSMENT
PAIN_FUNCTIONAL_ASSESSMENT: 0-10

## 2024-03-23 NOTE — PROGRESS NOTES
Occupational Therapy    Evaluation/Treatment    Patient Name: New Castano  MRN: [id number]  : 1940  Today's Date: 24  Time Calculation  Start Time: 132  Stop Time: 3  Time Calculation (min): 45 min       Assessment:  OT Assessment: Patient is deconditioned and would benefit from skilled OT to maximize functional potential.  Prognosis: Good  Evaluation/Treatment Tolerance: Patient limited by fatigue  Medical Staff Made Aware: Yes  End of Session Communication: Bedside nurse  End of Session Patient Position: Bed, 2 rail up, Alarm on (Family present.  All needs within reach.)  OT Assessment Results: Decreased ADL status, Decreased endurance, Decreased functional mobility  Prognosis: Good  Evaluation/Treatment Tolerance: Patient limited by fatigue  Medical Staff Made Aware: Yes  Strengths: Attitude of self, Premorbid level of function    Plan:  Treatment Interventions: ADL retraining, Functional transfer training, Endurance training, Equipment evaluation/education  OT Frequency: 3 times per week  OT Discharge Recommendations: Moderate intensity level of continued care  OT Recommended Transfer Status: Assist of 1, Minimal assist  OT - OK to Discharge: Yes (OT POC established this date)  Treatment Interventions: ADL retraining, Functional transfer training, Endurance training, Equipment evaluation/education    Subjective   General:   OT Received On: 24  General  Reason for Referral: Pneumonia; hypoxia  Past Medical History Relevant to Rehab: Presented to ED due to c/o SOB and weakness; s/p recent fall per wife; PMH: DM, HTN, Hyperlipidemia, Paroxysmal atrial fibrillation s/p ablation of atrial fibrillation, CKD  Family/Caregiver Present: Yes (Wife and daughter present)  Prior to Session Communication: Bedside nurse  Patient Position Received: Bed, 2 rail up, Alarm on  General Comment: Agreeable to eval    Precautions:  Medical Precautions: Fall precautions, Oxygen therapy device and L/min (2L  O2)  Precautions Comment: IV, tele       Pain:  Pain Assessment  Pain Assessment: 0-10  Pain Score: 0 - No pain    Objective   Cognition:  Overall Cognitive Status: Impaired  Arousal/Alertness:  (Lethargic even while sitting eob)  Orientation Level: Disoriented to time  Following Commands: Follows one step commands with increased time (Repetition req'd)  Insight: Mild     Home Living:  Type of Home: House  Lives With: Spouse  Home Adaptive Equipment: Walker rolling or standard, Cane, Long-handled shoehorn (Chair lift to 2nd floor)  Home Layout: 1/2 bath on main level, Full bath main level  Alternate Level Stairs-Number of Steps: 1 step up from main level (family room and 1/2 bathroom) to access kitchen; up 2 stairs from kitchen level to access chair lift  Bathroom Toilet: Standard  Home Living Comments: Has been sponge bathing only    Prior Function:  Level of [place]: Independent with ADLs and functional transfers, Needs assistance with homemaking  Hand Dominance: Right       ADL:  Eating Assistance: Stand by  Eating Deficit: Supervision/safety, Verbal cueing, Setup  Grooming Assistance: Minimal  Grooming Deficit: Setup, Verbal cueing, Increased time to complete  Bathing Assistance: Moderate  Bathing Deficit: Increased time to complete   LE Dressing Assistance: Maximal  LE Dressing Deficit: Setup, Verbal cueing, Increased time to complete (Able to partially doff socks; unable to initiate over toes to don, but able to pull up with increased time and effort)  Toileting Assistance with Device: Maximal  Toileting Deficit:  (Wife reported patient demo'd difficulty using urinal; bed soaked with urine)    Activities of Daily Living:    LE Dressing  LE Dressing: Yes  Sock Level of Assistance: Moderate assistance, Minimal verbal cues  LE Dressing Where Assessed: Edge of bed       Activity Tolerance:  Endurance: Decreased tolerance for upright activites, Tolerates less than 10 min exercise, no significant change in  vital signs  Activity Tolerance Comments: SOB after LB ADL while seated eob    Functional Standing Tolerance:  Functional Standing Tolerance Comments: Patient able to tolerate standing at bedside to allow for completion of bed change and to side step toward HOB for improved positioning upon return to supine.    Bed Mobility/Transfers: Bed Mobility  Bed Mobility: Yes  Bed Mobility 1  Bed Mobility 1: Supine to sitting  Level of Assistance 1: Minimum assistance, Minimal verbal cues (Increased time req'd; HOB elevated)  Bed Mobility 2  Bed Mobility  2: Sitting to supine  Level of Assistance 2: Minimum assistance, Minimal verbal cues  Bed Mobility Comments 2: flat bed position    Transfers  Transfer: Yes  Transfer 1  Transfer to 1: Bed, Sit, Stand  Technique 1: Sit to stand, Stand to sit  Transfer Device 1: Walker  Transfer Level of Assistance 1: Minimum assistance, Minimal verbal cues  Transfers 2  Technique 2: To left  Transfer Device 2: Walker  Transfer Level of Assistance 2: Minimum assistance, Moderate verbal cues, Minimal tactile cues  Trials/Comments 2: Side step toward hob; increased time req'd; cues req'd to advance BLE    Sitting Balance:  Static Sitting Balance  Static Sitting-Level of Assistance: Close supervision    Standing Balance:  Static Standing Balance  Static Standing-Level of Assistance: Contact guard       Therapy/Activity: Therapeutic Activity  Therapeutic Activity Performed: Yes  Therapeutic Activity 1: Instructed on safe technique to perform bed mobility and transfers.  Therapeutic Activity 2: Encouraged oob activity and standing and facilitated safe participation in ADL and functional mobility at bedside.        Vision:Vision - Basic Assessment  Patient Visual Report:  (WFL)    Sensation:  Sensation Comment: WFL    Strength:  Strength Comments: BUE WFL     Coordination:  Movements are Fluid and Coordinated: Yes     Hand Function:  Hand Function  Gross Grasp: Functional  Coordination:  Functional    Extremities: RUE   RUE : Within Functional Limits and LUE   LUE: Within Functional Limits      Outcome Measures: [facility] Daily Activity  Putting on and taking off regular lower body clothing: A lot  Bathing (including washing, rinsing, drying): A lot  Putting on and taking off regular upper body clothing: A little  Toileting, which includes using toilet, bedpan or urinal: A lot  Taking care of personal grooming such as brushing teeth: A little  Eating Meals: A little  Daily Activity - Total Score: 15    Education Documentation  Precautions, taught by Braeden Rudolph OT at 3/23/2024  4:27 PM.  Learner: Family, Patient  Readiness: Acceptance  Method: Explanation, Demonstration  Response: Verbalizes Understanding, Needs Reinforcement    ADL Training, taught by Braeden Rudolph OT at 3/23/2024  4:27 PM.  Learner: Family, Patient  Readiness: Acceptance  Method: Explanation, Demonstration  Response: Verbalizes Understanding, Needs Reinforcement    Education Comments  No comments found.      Goals:  Encounter Problems       Encounter Problems (Active)       Bathing       STG - Patient will bathe body Setup/Sup       Start:  03/23/24    Expected End:  04/06/24               Dressings Lower Extremities       STG - Patient to complete lower body dressing Setup/Sup       Start:  03/23/24    Expected End:  04/06/24               Grooming       STG - Patient completes grooming Mod I       Start:  03/23/24    Expected End:  04/06/24               OT Transfers       STG - Patient will perform toilet transfer SBA       Start:  03/23/24    Expected End:  04/06/24               Toileting       STG - Patient will complete toileting tasks with Mod I       Start:  03/23/24    Expected End:  04/06/24

## 2024-03-23 NOTE — ED NOTES
RN attempted to call report to the 6th floor and there was no answer.      Page Thompson RN  03/22/24 8626

## 2024-03-23 NOTE — NURSING NOTE
0800 assumed care. Alert resting in bed. Disoriented to time. Denies any pain at this time. Due meds tolerated well. MD notified for RLE wound, in to see. (Dressed per mar). B/L LE ace wrapped elevated on pillows. All safety measures in place.

## 2024-03-23 NOTE — PROGRESS NOTES
Pharmacy Medication History Review    New Castano is a 83 y.o. male admitted for Pneumonia of right lung due to infectious organism, unspecified part of lung. Pharmacy reviewed the patient's [id number] medications and allergies for accuracy.    The list below reflectives the updated PTA list. Please review each medication in order reconciliation for additional clarification and justification.  Prior to Admission medications    Medication Sig Start Date End Date Taking? Authorizing Provider                                                                                                                        The list below reflectives the updated allergy list. Please review each documented allergy for additional clarification and justification.  Allergies  Reviewed by Priscilla Guzmán RN on 3/7/2024        Severity Reactions Comments    Pollen Extracts Not Specified Itching             Below are additional concerns with the patient's PTA list.   Per patient - says he doesn't take vit. B-12 , docusate 100 mg, loperamide 2 mg,    Patient thinks he takes atorvastatin 20 mg but not positive.    Anita Shelby

## 2024-03-23 NOTE — H&P
Chief Complaint:  Weakness.     History Of Present Illness  New Castano is a 83 y.o. male presenting with the above complaints from home. He normally uses a walker for ambulation, was noting worsening weakness. He came in for further eval, was noted with pneumonia on imaging on admit as well as hypoxia. He was placed on 4L nasal cannula, started on IV atbs and admitted for further eval of the above.      Past Medical History  He has a past medical history of Diabetes mellitus (CMS/[facility]), Hypertension, Mixed hyperlipidemia (02/13/2013), Paroxysmal atrial fibrillation (CMS/[facility]) (03/08/2018), S/P ablation of atrial fibrillation (05/03/2019), and Stage 3b chronic kidney disease (CMS/[facility]) (12/29/2023).    Surgical History  He has a past surgical history that includes Cardioversion and Cardiac electrophysiology mapping and ablation.     Social History  He reports that he has quit smoking. His smoking use included cigarettes. He has quit using smokeless tobacco. No history on file for alcohol use and drug use.    Family History  No family history on file.     Allergies  Pollen extracts    Review of Systems   Neurological:  Positive for weakness.   All other systems reviewed and are negative.       Physical Exam  Constitutional:       Appearance: Normal appearance.   HENT:      Head: Normocephalic and atraumatic.   Cardiovascular:      Rate and Rhythm: Normal rate and regular rhythm.   Pulmonary:      Effort: Pulmonary effort is normal.   Abdominal:      General: Abdomen is flat.   Musculoskeletal:         General: Normal range of motion.      Cervical back: Normal range of motion and neck supple.   Skin:     General: Skin is warm and dry.   Neurological:      General: No focal deficit present.      Mental Status: He is alert and oriented to person, place, and time.          Last Recorded Vitals  /87 (BP Location: Right arm, Patient Position: Lying)   Pulse 93   Temp 36.4 °C (97.5 °F) (Temporal)   Resp 17   SpO2  94%        Assessment/Plan     Acute respiratory failure with hypoxia d/t pneumonia-IV atbs, weaned oxygen as tolerated, monitor for improvement in s/s.   Weakness-PT/OT to see, may need SNF on dc.   HTN-Stable on current regimen.        Juve Brown MD

## 2024-03-24 LAB
ANION GAP SERPL CALC-SCNC: 15 MMOL/L (ref 10–20)
BASOPHILS # BLD AUTO: 0.03 X10*3/UL (ref 0–0.1)
BASOPHILS NFR BLD AUTO: 0.5 %
BUN SERPL-MCNC: 37 MG/DL (ref 6–23)
CALCIUM SERPL-MCNC: 8.5 MG/DL (ref 8.6–10.3)
CHLORIDE SERPL-SCNC: 102 MMOL/L (ref 98–107)
CO2 SERPL-SCNC: 27 MMOL/L (ref 21–32)
CREAT SERPL-MCNC: 2.17 MG/DL (ref 0.5–1.3)
EGFRCR SERPLBLD CKD-EPI 2021: 29 ML/MIN/1.73M*2
EOSINOPHIL # BLD AUTO: 0.11 X10*3/UL (ref 0–0.4)
EOSINOPHIL NFR BLD AUTO: 1.7 %
ERYTHROCYTE [DISTWIDTH] IN BLOOD BY AUTOMATED COUNT: 16.8 % (ref 11.5–14.5)
GLUCOSE SERPL-MCNC: 167 MG/DL (ref 74–99)
HCT VFR BLD AUTO: 40.6 % (ref 41–52)
HGB BLD-MCNC: 11.8 G/DL (ref 13.5–17.5)
HOLD SPECIMEN: NORMAL
IMM GRANULOCYTES # BLD AUTO: 0.02 X10*3/UL (ref 0–0.5)
IMM GRANULOCYTES NFR BLD AUTO: 0.3 % (ref 0–0.9)
LYMPHOCYTES # BLD AUTO: 0.66 X10*3/UL (ref 0.8–3)
LYMPHOCYTES NFR BLD AUTO: 10.2 %
MCH RBC QN AUTO: 26.6 PG (ref 26–34)
MCHC RBC AUTO-ENTMCNC: 29.1 G/DL (ref 32–36)
MCV RBC AUTO: 92 FL (ref 80–100)
MONOCYTES # BLD AUTO: 0.91 X10*3/UL (ref 0.05–0.8)
MONOCYTES NFR BLD AUTO: 14.1 %
NEUTROPHILS # BLD AUTO: 4.73 X10*3/UL (ref 1.6–5.5)
NEUTROPHILS NFR BLD AUTO: 73.2 %
NRBC BLD-RTO: 0 /100 WBCS (ref 0–0)
PLATELET # BLD AUTO: 135 X10*3/UL (ref 150–450)
POTASSIUM SERPL-SCNC: 3.9 MMOL/L (ref 3.5–5.3)
RBC # BLD AUTO: 4.43 X10*6/UL (ref 4.5–5.9)
SODIUM SERPL-SCNC: 140 MMOL/L (ref 136–145)
WBC # BLD AUTO: 6.5 X10*3/UL (ref 4.4–11.3)

## 2024-03-24 PROCEDURE — 2500000005 HC RX 250 GENERAL PHARMACY W/O HCPCS: Performed by: INTERNAL MEDICINE

## 2024-03-24 PROCEDURE — 2500000001 HC RX 250 WO HCPCS SELF ADMINISTERED DRUGS (ALT 637 FOR MEDICARE OP): Performed by: INTERNAL MEDICINE

## 2024-03-24 PROCEDURE — 85025 COMPLETE CBC W/AUTO DIFF WBC: CPT | Performed by: INTERNAL MEDICINE

## 2024-03-24 PROCEDURE — 94640 AIRWAY INHALATION TREATMENT: CPT

## 2024-03-24 PROCEDURE — 1200000002 HC GENERAL ROOM WITH TELEMETRY DAILY

## 2024-03-24 PROCEDURE — 36415 COLL VENOUS BLD VENIPUNCTURE: CPT | Performed by: INTERNAL MEDICINE

## 2024-03-24 PROCEDURE — 2500000004 HC RX 250 GENERAL PHARMACY W/ HCPCS (ALT 636 FOR OP/ED): Performed by: INTERNAL MEDICINE

## 2024-03-24 PROCEDURE — 80048 BASIC METABOLIC PNL TOTAL CA: CPT | Performed by: INTERNAL MEDICINE

## 2024-03-24 PROCEDURE — 2500000002 HC RX 250 W HCPCS SELF ADMINISTERED DRUGS (ALT 637 FOR MEDICARE OP, ALT 636 FOR OP/ED): Mod: MUE | Performed by: INTERNAL MEDICINE

## 2024-03-24 RX ORDER — LORAZEPAM 0.5 MG/1
0.5 TABLET ORAL EVERY 6 HOURS PRN
Status: DISCONTINUED | OUTPATIENT
Start: 2024-03-24 | End: 2024-03-26

## 2024-03-24 RX ORDER — IPRATROPIUM BROMIDE AND ALBUTEROL SULFATE 2.5; .5 MG/3ML; MG/3ML
3 SOLUTION RESPIRATORY (INHALATION)
Status: DISCONTINUED | OUTPATIENT
Start: 2024-03-24 | End: 2024-04-09 | Stop reason: HOSPADM

## 2024-03-24 RX ORDER — IPRATROPIUM BROMIDE AND ALBUTEROL SULFATE 2.5; .5 MG/3ML; MG/3ML
3 SOLUTION RESPIRATORY (INHALATION) EVERY 4 HOURS PRN
Status: DISCONTINUED | OUTPATIENT
Start: 2024-03-24 | End: 2024-04-09 | Stop reason: HOSPADM

## 2024-03-24 RX ORDER — FUROSEMIDE 10 MG/ML
20 INJECTION INTRAMUSCULAR; INTRAVENOUS EVERY 24 HOURS
Status: DISCONTINUED | OUTPATIENT
Start: 2024-03-24 | End: 2024-03-26

## 2024-03-24 RX ADMIN — METOPROLOL SUCCINATE 150 MG: 50 TABLET, EXTENDED RELEASE ORAL at 08:38

## 2024-03-24 RX ADMIN — ACETAMINOPHEN 650 MG: 325 TABLET ORAL at 08:38

## 2024-03-24 RX ADMIN — LORAZEPAM 0.5 MG: 0.5 TABLET ORAL at 15:09

## 2024-03-24 RX ADMIN — FUROSEMIDE 20 MG: 10 INJECTION, SOLUTION INTRAMUSCULAR; INTRAVENOUS at 11:50

## 2024-03-24 RX ADMIN — IPRATROPIUM BROMIDE AND ALBUTEROL SULFATE 3 ML: 2.5; .5 SOLUTION RESPIRATORY (INHALATION) at 18:47

## 2024-03-24 RX ADMIN — Medication 2 L/MIN: at 20:00

## 2024-03-24 RX ADMIN — DOCUSATE SODIUM 100 MG: 100 CAPSULE, LIQUID FILLED ORAL at 08:39

## 2024-03-24 RX ADMIN — POLYETHYLENE GLYCOL 3350 17 G: 17 POWDER, FOR SOLUTION ORAL at 08:37

## 2024-03-24 RX ADMIN — EMPAGLIFLOZIN 25 MG: 10 TABLET, FILM COATED ORAL at 08:38

## 2024-03-24 RX ADMIN — APIXABAN 2.5 MG: 2.5 TABLET, FILM COATED ORAL at 08:39

## 2024-03-24 RX ADMIN — APIXABAN 2.5 MG: 2.5 TABLET, FILM COATED ORAL at 20:42

## 2024-03-24 RX ADMIN — CYANOCOBALAMIN TAB 500 MCG 500 MCG: 500 TAB at 08:38

## 2024-03-24 RX ADMIN — PIPERACILLIN SODIUM AND TAZOBACTAM SODIUM 3.38 G: 3; .375 INJECTION, SOLUTION INTRAVENOUS at 15:09

## 2024-03-24 RX ADMIN — DOXAZOSIN 4 MG: 2 TABLET ORAL at 20:41

## 2024-03-24 RX ADMIN — PIPERACILLIN SODIUM AND TAZOBACTAM SODIUM 3.38 G: 3; .375 INJECTION, SOLUTION INTRAVENOUS at 20:41

## 2024-03-24 RX ADMIN — PANTOPRAZOLE SODIUM 40 MG: 40 TABLET, DELAYED RELEASE ORAL at 06:33

## 2024-03-24 RX ADMIN — PIPERACILLIN SODIUM AND TAZOBACTAM SODIUM 3.38 G: 3; .375 INJECTION, SOLUTION INTRAVENOUS at 01:40

## 2024-03-24 RX ADMIN — ATORVASTATIN CALCIUM 20 MG: 20 TABLET, FILM COATED ORAL at 20:42

## 2024-03-24 RX ADMIN — LOSARTAN POTASSIUM 50 MG: 50 TABLET, FILM COATED ORAL at 08:38

## 2024-03-24 RX ADMIN — DOCUSATE SODIUM 100 MG: 100 CAPSULE, LIQUID FILLED ORAL at 20:42

## 2024-03-24 RX ADMIN — FINASTERIDE 5 MG: 5 TABLET, FILM COATED ORAL at 08:39

## 2024-03-24 RX ADMIN — METOPROLOL SUCCINATE 150 MG: 50 TABLET, EXTENDED RELEASE ORAL at 20:41

## 2024-03-24 RX ADMIN — GLIMEPIRIDE 2 MG: 2 TABLET ORAL at 06:33

## 2024-03-24 RX ADMIN — PIPERACILLIN SODIUM AND TAZOBACTAM SODIUM 3.38 G: 3; .375 INJECTION, SOLUTION INTRAVENOUS at 08:37

## 2024-03-24 ASSESSMENT — COGNITIVE AND FUNCTIONAL STATUS - GENERAL
DRESSING REGULAR UPPER BODY CLOTHING: A LITTLE
MOVING TO AND FROM BED TO CHAIR: A LITTLE
EATING MEALS: A LITTLE
TOILETING: A LOT
TURNING FROM BACK TO SIDE WHILE IN FLAT BAD: A LITTLE
CLIMB 3 TO 5 STEPS WITH RAILING: A LOT
PERSONAL GROOMING: A LITTLE
STANDING UP FROM CHAIR USING ARMS: A LITTLE
WALKING IN HOSPITAL ROOM: A LITTLE
HELP NEEDED FOR BATHING: A LOT
DRESSING REGULAR LOWER BODY CLOTHING: A LOT
MOBILITY SCORE: 17
MOVING FROM LYING ON BACK TO SITTING ON SIDE OF FLAT BED WITH BEDRAILS: A LITTLE
DAILY ACTIVITIY SCORE: 15

## 2024-03-24 ASSESSMENT — ACTIVITIES OF DAILY LIVING (ADL): LACK_OF_TRANSPORTATION: NO

## 2024-03-24 ASSESSMENT — PAIN SCALES - GENERAL
PAINLEVEL_OUTOF10: 1
PAINLEVEL_OUTOF10: 0 - NO PAIN
PAINLEVEL_OUTOF10: 0 - NO PAIN

## 2024-03-24 ASSESSMENT — PAIN - FUNCTIONAL ASSESSMENT
PAIN_FUNCTIONAL_ASSESSMENT: 0-10

## 2024-03-24 NOTE — PROGRESS NOTES
"   03/24/24 0816   Discharge Planning   Living Arrangements Spouse/significant other   Support Systems Spouse/significant other   Type of Residence Private residence   Home or Post Acute Services In home services;Post acute facilities (Rehab/SNF/etc)   Patient expects to be discharged to: SNF vs HHC   Does the patient need discharge transport arranged? Yes   RoundTrip coordination needed? Yes   Has discharge transport been arranged? No   Financial Resource Strain   How hard is it for you to pay for the very basics like food, housing, medical care, and heating? Not hard   Housing Stability   In the last 12 months, was there a time when you were not able to pay the mortgage or rent on time? N   In the last 12 months, how many places have you lived? 1   In the last 12 months, was there a time when you did not have a steady place to sleep or slept in a shelter (including now)? N   Transportation Needs   In the past 12 months, has lack of transportation kept you from medical appointments or from getting medications? no   In the past 12 months, has lack of transportation kept you from meetings, work, or from getting things needed for daily living? No   Patient Choice   Provider Choice list and CMS website (https://medicare.gov/care-compare#search) for post-acute Quality and Resource Measure Data were provided and reviewed with: Patient     I met with patient at bedside to discuss rec of mod. Patient stated he really did not want to go to snf, did not think he felt more weak than his norm. I also explained hhc to him. He lives in a house with his wife and uses a cane at home. I asked multiple times if I could speak to his family about dc plan and he answered \"I don't understand how its a question if you are going to dictate the answer\". He agreed to see how he is feeling tomorrow and discuss a plan then.   "

## 2024-03-24 NOTE — NURSING NOTE
Patient refusing to wear telemetry and keep it on; notified Dr. Brown. 1:1 given to patient with no effect.

## 2024-03-24 NOTE — PROGRESS NOTES
New Castano is a 83 y.o. male on day 2 of admission presenting with Pneumonia of right lung due to infectious organism, unspecified part of lung.      Subjective   Agitation and confusion noted last night, since improved.        Objective     Last Recorded Vitals  BP (!) 174/116 (BP Location: Left arm, Patient Position: Lying)   Pulse 94   Temp 36.6 °C (97.9 °F) (Temporal)   Resp 22   SpO2 99%   Intake/Output last 3 Shifts:    Intake/Output Summary (Last 24 hours) at 3/24/2024 1133  Last data filed at 3/24/2024 0818  Gross per 24 hour   Intake 100 ml   Output 1015 ml   Net -915 ml       Admission Weight       Daily Weight  03/08/24 : 92.8 kg (204 lb 9.4 oz)    Image Results  NM Lung perfusion with spect/ct  Narrative: Interpreted By:  Cameron Howard and Osman Sena   STUDY:  NM LUNG PERFUSION WITH SPECT/CT;  3/22/2024 3:02 pm      INDICATION:  Signs/Symptoms: 83-year-old male with hypoxia elevated d dimer. Chest  x-ray on 03/22/2024 demonstrate right basilar airspace consolidation.      COMPARISON:  Chest x-ray from 03/22/2024      ACCESSION NUMBER(S):  [id number]      ORDERING CLINICIAN:  RANDAL VALENTIN      TECHNIQUE:  DIVISION OF NUCLEAR MEDICINE  PERFUSION LUNG SCANS      Multiple perfusion images of the lungs were acquired after the  intravenous administration of 4.0 mCi of Tc-99m macroaggregated  albumin (MAA). In addition, SPECT/CT of the chest was performed.      FINDINGS:  Perfusion images of both lungs demonstrate mild heterogeneity  throughout the lung fields bilaterally. There are no distinct  segmental perfusion defects seen. A large perfusion defect at right  lower lung matching chest x-ray finding of right basilar airspace  consolidation.      Impression: There is nonspecific heterogeneity in perfusion of both lungs  indicating low probability for acute pulmonary embolism.          The interpretation above is based on modified PISAPED criteria.      This study was analyzed and  interpreted at [place], [place].      MACRO:  None      Signed by: Cameron Howard 3/22/2024 4:10 PM  Dictation workstation:   [id number]  ECG 12 lead  See ED provider note for full interpretation and clinical correlation  Confirmed by Megan Hatfield ([id number]) on 3/22/2024 4:02:11 PM  Vascular US Lower Extremity Venous Duplex Bilateral  Narrative: Interpreted By:  Filippo Ngo,   STUDY:  VASC US LOWER EXTREMITY VENOUS DUPLEX BILATERAL;  3/22/2024 2:32 pm      INDICATION:  Signs/Symptoms:swelling.      COMPARISON:  None.      ACCESSION NUMBER(S):  [id number]      ORDERING CLINICIAN:  RANDAL VALENTIN      TECHNIQUE:  Vascular ultrasound of the bilateral lower extremities was performed.  Real-time compression views as well as Gray scale, color Doppler and  spectral Doppler waveform analysis was performed.      FINDINGS:  Evaluation of the visualized portions of the bilateral common  femoral, proximal, mid, and distal femoral, and popliteal veins was  performed.  Evaluation of the visualized portions of the posterior  tibial and peroneal veins was also performed.      Limitations: None      The evaluated veins demonstrate normal compressibility. There is  intact venous flow demonstrating normal respiratory variability and  normal augmentation of flow with calf compression.          Impression: No sonographic evidence for deep vein thrombosis within the evaluated  veins of the bilateral lower extremities.      MACRO:  None      Signed by: Filippo Ngo 3/22/2024 2:44 PM  Dictation workstation:   [id number]  XR chest 2 views  Narrative: Interpreted By:  Contreras Rdz,   STUDY:  XR CHEST 2 VIEWS  3/22/2024 1:49 pm      INDICATION:  Signs/Symptoms:sob      COMPARISON:  None.      ACCESSION NUMBER(S):  [id number]      ORDERING CLINICIAN:  RANDAL VALENTIN      TECHNIQUE:  PA and lateral views of the chest were obtained.      FINDINGS:  Cardiac monitoring leads are seen over the  chest. Right basilar  airspace consolidation is seen and may represent atelectasis and/or  pneumonia. No pleural effusion or pneumothorax is identified. The  cardiac silhouette is within normal limits for size.  Mild discogenic  degenerative changes are seen throughout the thoracic spine.      Impression: Right basilar airspace consolidation, as above. Clinical correlation  and continued follow-up until clearing is recommended.      MACRO:  None.      Signed by: Contreras Rdz 3/22/2024 2:09 PM  Dictation workstation:   [id number]      Physical Exam  Constitutional:       Appearance: Normal appearance.   HENT:      Head: Normocephalic and atraumatic.   Cardiovascular:      Rate and Rhythm: Normal rate and regular rhythm.   Pulmonary:      Effort: Pulmonary effort is normal.   Abdominal:      General: Abdomen is flat.   Musculoskeletal:         General: Normal range of motion.      Cervical back: Normal range of motion and neck supple.   Skin:     General: Skin is warm and dry.   Neurological:      General: No focal deficit present.      Mental Status: He is alert and oriented to person, place, and time.              Assessment/Plan      Acute respiratory failure with hypoxia d/t pneumonia-IV atbs, s/s improving daily. Monitor respiratory status closely.   Weakness-PT/OT following.   HTN-Elevated, repeat later today. If remains elevated, will need to make further adjustments to current regimen for better control.   Agitation-At HS, add prn Ativan.          Juve Brown MD

## 2024-03-24 NOTE — SIGNIFICANT EVENT
External urinary catheter fell off.   Perianal care given.  New appliance placed.   Patient tolerated new placement well.

## 2024-03-24 NOTE — CARE PLAN
The patient's goals for the shift include Good night's sleep    The clinical goals for the shift include Comfort and rest    Over the shift, the patient did not make progress toward the following goals. Barriers to progression include chronic illness. Recommendations to address these barriers include administer sleeping aid and provide a quiet environment.

## 2024-03-25 LAB
ANION GAP SERPL CALC-SCNC: 12 MMOL/L (ref 10–20)
BASOPHILS # BLD AUTO: 0.04 X10*3/UL (ref 0–0.1)
BASOPHILS NFR BLD AUTO: 0.8 %
BUN SERPL-MCNC: 36 MG/DL (ref 6–23)
CALCIUM SERPL-MCNC: 8.2 MG/DL (ref 8.6–10.3)
CHLORIDE SERPL-SCNC: 103 MMOL/L (ref 98–107)
CO2 SERPL-SCNC: 32 MMOL/L (ref 21–32)
CREAT SERPL-MCNC: 2.08 MG/DL (ref 0.5–1.3)
EGFRCR SERPLBLD CKD-EPI 2021: 31 ML/MIN/1.73M*2
EOSINOPHIL # BLD AUTO: 0.14 X10*3/UL (ref 0–0.4)
EOSINOPHIL NFR BLD AUTO: 2.7 %
ERYTHROCYTE [DISTWIDTH] IN BLOOD BY AUTOMATED COUNT: 16.7 % (ref 11.5–14.5)
GLUCOSE SERPL-MCNC: 113 MG/DL (ref 74–99)
HCT VFR BLD AUTO: 44.7 % (ref 41–52)
HGB BLD-MCNC: 12.7 G/DL (ref 13.5–17.5)
IMM GRANULOCYTES # BLD AUTO: 0.02 X10*3/UL (ref 0–0.5)
IMM GRANULOCYTES NFR BLD AUTO: 0.4 % (ref 0–0.9)
LYMPHOCYTES # BLD AUTO: 0.55 X10*3/UL (ref 0.8–3)
LYMPHOCYTES NFR BLD AUTO: 10.7 %
MCH RBC QN AUTO: 26.2 PG (ref 26–34)
MCHC RBC AUTO-ENTMCNC: 28.4 G/DL (ref 32–36)
MCV RBC AUTO: 92 FL (ref 80–100)
MONOCYTES # BLD AUTO: 0.8 X10*3/UL (ref 0.05–0.8)
MONOCYTES NFR BLD AUTO: 15.6 %
NEUTROPHILS # BLD AUTO: 3.59 X10*3/UL (ref 1.6–5.5)
NEUTROPHILS NFR BLD AUTO: 69.8 %
NRBC BLD-RTO: 0 /100 WBCS (ref 0–0)
PLATELET # BLD AUTO: 126 X10*3/UL (ref 150–450)
POTASSIUM SERPL-SCNC: 3.6 MMOL/L (ref 3.5–5.3)
RBC # BLD AUTO: 4.85 X10*6/UL (ref 4.5–5.9)
SODIUM SERPL-SCNC: 143 MMOL/L (ref 136–145)
WBC # BLD AUTO: 5.1 X10*3/UL (ref 4.4–11.3)

## 2024-03-25 PROCEDURE — 2500000004 HC RX 250 GENERAL PHARMACY W/ HCPCS (ALT 636 FOR OP/ED): Performed by: INTERNAL MEDICINE

## 2024-03-25 PROCEDURE — 97530 THERAPEUTIC ACTIVITIES: CPT | Mod: GP

## 2024-03-25 PROCEDURE — 2500000002 HC RX 250 W HCPCS SELF ADMINISTERED DRUGS (ALT 637 FOR MEDICARE OP, ALT 636 FOR OP/ED): Performed by: INTERNAL MEDICINE

## 2024-03-25 PROCEDURE — 1200000002 HC GENERAL ROOM WITH TELEMETRY DAILY

## 2024-03-25 PROCEDURE — 80048 BASIC METABOLIC PNL TOTAL CA: CPT | Performed by: INTERNAL MEDICINE

## 2024-03-25 PROCEDURE — 2500000005 HC RX 250 GENERAL PHARMACY W/O HCPCS: Performed by: INTERNAL MEDICINE

## 2024-03-25 PROCEDURE — 2500000001 HC RX 250 WO HCPCS SELF ADMINISTERED DRUGS (ALT 637 FOR MEDICARE OP): Performed by: INTERNAL MEDICINE

## 2024-03-25 PROCEDURE — 94640 AIRWAY INHALATION TREATMENT: CPT | Mod: MUE

## 2024-03-25 PROCEDURE — 85025 COMPLETE CBC W/AUTO DIFF WBC: CPT | Performed by: INTERNAL MEDICINE

## 2024-03-25 PROCEDURE — 36415 COLL VENOUS BLD VENIPUNCTURE: CPT | Performed by: INTERNAL MEDICINE

## 2024-03-25 RX ADMIN — POLYETHYLENE GLYCOL 3350 17 G: 17 POWDER, FOR SOLUTION ORAL at 09:51

## 2024-03-25 RX ADMIN — FINASTERIDE 5 MG: 5 TABLET, FILM COATED ORAL at 09:52

## 2024-03-25 RX ADMIN — EMPAGLIFLOZIN 25 MG: 10 TABLET, FILM COATED ORAL at 09:51

## 2024-03-25 RX ADMIN — IPRATROPIUM BROMIDE AND ALBUTEROL SULFATE 3 ML: 2.5; .5 SOLUTION RESPIRATORY (INHALATION) at 07:25

## 2024-03-25 RX ADMIN — METOPROLOL SUCCINATE 150 MG: 50 TABLET, EXTENDED RELEASE ORAL at 09:52

## 2024-03-25 RX ADMIN — DOCUSATE SODIUM 100 MG: 100 CAPSULE, LIQUID FILLED ORAL at 09:52

## 2024-03-25 RX ADMIN — DOXAZOSIN 4 MG: 2 TABLET ORAL at 21:45

## 2024-03-25 RX ADMIN — IPRATROPIUM BROMIDE AND ALBUTEROL SULFATE 3 ML: 2.5; .5 SOLUTION RESPIRATORY (INHALATION) at 13:07

## 2024-03-25 RX ADMIN — APIXABAN 2.5 MG: 2.5 TABLET, FILM COATED ORAL at 09:52

## 2024-03-25 RX ADMIN — CYANOCOBALAMIN TAB 500 MCG 500 MCG: 500 TAB at 09:52

## 2024-03-25 RX ADMIN — FUROSEMIDE 20 MG: 10 INJECTION, SOLUTION INTRAMUSCULAR; INTRAVENOUS at 11:11

## 2024-03-25 RX ADMIN — PIPERACILLIN SODIUM AND TAZOBACTAM SODIUM 3.38 G: 3; .375 INJECTION, SOLUTION INTRAVENOUS at 21:45

## 2024-03-25 RX ADMIN — LORAZEPAM 0.5 MG: 0.5 TABLET ORAL at 00:33

## 2024-03-25 RX ADMIN — ATORVASTATIN CALCIUM 20 MG: 20 TABLET, FILM COATED ORAL at 21:45

## 2024-03-25 RX ADMIN — APIXABAN 2.5 MG: 2.5 TABLET, FILM COATED ORAL at 21:45

## 2024-03-25 RX ADMIN — PIPERACILLIN SODIUM AND TAZOBACTAM SODIUM 3.38 G: 3; .375 INJECTION, SOLUTION INTRAVENOUS at 09:51

## 2024-03-25 RX ADMIN — LOSARTAN POTASSIUM 50 MG: 50 TABLET, FILM COATED ORAL at 09:54

## 2024-03-25 RX ADMIN — PANTOPRAZOLE SODIUM 40 MG: 40 TABLET, DELAYED RELEASE ORAL at 06:23

## 2024-03-25 RX ADMIN — METOPROLOL SUCCINATE 150 MG: 50 TABLET, EXTENDED RELEASE ORAL at 21:45

## 2024-03-25 RX ADMIN — PIPERACILLIN SODIUM AND TAZOBACTAM SODIUM 3.38 G: 3; .375 INJECTION, SOLUTION INTRAVENOUS at 02:25

## 2024-03-25 RX ADMIN — Medication: at 20:00

## 2024-03-25 RX ADMIN — PIPERACILLIN SODIUM AND TAZOBACTAM SODIUM 3.38 G: 3; .375 INJECTION, SOLUTION INTRAVENOUS at 14:35

## 2024-03-25 RX ADMIN — IPRATROPIUM BROMIDE AND ALBUTEROL SULFATE 3 ML: 2.5; .5 SOLUTION RESPIRATORY (INHALATION) at 20:24

## 2024-03-25 RX ADMIN — DOCUSATE SODIUM 100 MG: 100 CAPSULE, LIQUID FILLED ORAL at 21:45

## 2024-03-25 RX ADMIN — GLIMEPIRIDE 2 MG: 2 TABLET ORAL at 06:23

## 2024-03-25 ASSESSMENT — COGNITIVE AND FUNCTIONAL STATUS - GENERAL
MOBILITY SCORE: 11
TURNING FROM BACK TO SIDE WHILE IN FLAT BAD: A LITTLE
MOVING FROM LYING ON BACK TO SITTING ON SIDE OF FLAT BED WITH BEDRAILS: A LITTLE
HELP NEEDED FOR BATHING: A LOT
MOVING TO AND FROM BED TO CHAIR: A LITTLE
EATING MEALS: A LITTLE
CLIMB 3 TO 5 STEPS WITH RAILING: A LOT
DRESSING REGULAR LOWER BODY CLOTHING: A LOT
MOVING TO AND FROM BED TO CHAIR: A LOT
WALKING IN HOSPITAL ROOM: A LOT
MOVING FROM LYING ON BACK TO SITTING ON SIDE OF FLAT BED WITH BEDRAILS: A LOT
TOILETING: A LOT
DRESSING REGULAR UPPER BODY CLOTHING: A LITTLE
CLIMB 3 TO 5 STEPS WITH RAILING: TOTAL
STANDING UP FROM CHAIR USING ARMS: A LOT
DAILY ACTIVITIY SCORE: 15
STANDING UP FROM CHAIR USING ARMS: A LITTLE
TURNING FROM BACK TO SIDE WHILE IN FLAT BAD: A LOT
MOBILITY SCORE: 17
PERSONAL GROOMING: A LITTLE
WALKING IN HOSPITAL ROOM: A LITTLE

## 2024-03-25 ASSESSMENT — PAIN SCALES - GENERAL
PAINLEVEL_OUTOF10: 0 - NO PAIN

## 2024-03-25 ASSESSMENT — PAIN - FUNCTIONAL ASSESSMENT: PAIN_FUNCTIONAL_ASSESSMENT: 0-10

## 2024-03-25 NOTE — CARE PLAN
The patient's goals for the shift include Good night's sleep    The clinical goals for the shift include remain safe and free from fall    Over the shift, the patient did not make progress toward the following goals. Barriers to progression include   Problem: Skin  Goal: Promote/optimize nutrition  Outcome: Progressing  Flowsheets (Taken 3/25/2024 1321)  Promote/optimize nutrition: Assist with feeding  Goal: Promote skin healing  Outcome: Progressing     Problem: Fall/Injury  Goal: Not fall by end of shift  Outcome: Progressing  Goal: Be free from injury by end of the shift  Outcome: Progressing   . Recommendations to address these barriers include .

## 2024-03-25 NOTE — PROGRESS NOTES
Physical Therapy    Physical Therapy Treatment    Patient Name: New Castano  MRN: [id number]  Today's Date: 3/25/2024  Time Calculation  Start Time: 1500  Stop Time: 1516  Time Calculation (min): 16 min       Assessment/Plan   PT Assessment  PT Assessment Results: Decreased endurance, Impaired balance, Decreased mobility, Decreased cognition, Decreased safety awareness  Rehab Prognosis: Good  Evaluation/Treatment Tolerance: Patient limited by fatigue  Medical Staff Made Aware: Yes  Strengths: Premorbid level of function  Barriers to Participation: Comorbidities  End of Session Communication: Bedside nurse  Assessment Comment: Pt continues to be weak and deconditioned, and would benefit from moderate intensity PT at d/c. He requires mod-max A for all transfers.  End of Session Patient Position: Up in chair, Alarm on  PT Plan  Inpatient/Swing Bed or Outpatient: Inpatient  PT Plan  Treatment/Interventions: Bed mobility, Transfer training, Gait training, Stair training, Balance training, Therapeutic exercise, Therapeutic activity  PT Plan: Skilled PT  PT Frequency: 3 times per week  PT Discharge Recommendations: Moderate intensity level of continued care  Equipment Recommended upon Discharge: Wheeled walker (patient reports having rolling walker)  PT Recommended Transfer Status: Assist x2  PT - OK to Discharge: Yes (PT POC established)      General Visit Information:   PT  Visit  PT Received On: 03/25/24  General  Reason for Referral: Pneumonia; hypoxia  Prior to Session Communication: Bedside nurse  Patient Position Received: Bed, 3 rail up, Alarm on  Preferred Learning Style: auditory  General Comment: Pt is pleasant and agreeable to PT session. Pt is deconditioned and SOB with minimal activity.    Subjective   Precautions:  Precautions  Medical Precautions: Fall precautions, Oxygen therapy device and L/min    Objective   Pain:  Pain Assessment  Pain Assessment: 0-10  Pain Score: 0 - No  pain  Cognition:  Cognition  Overall Cognitive Status: Impaired  Orientation Level: Disoriented to time, Disoriented to situation  Following Commands: Follows one step commands with repetition  Insight: Mild  Impulsive: Within functional limits  Postural Control:  Static Sitting Balance  Static Sitting-Balance Support: Bilateral upper extremity supported, Feet supported  Static Sitting-Level of Assistance: Contact guard  Static Sitting-Comment/Number of Minutes: 4  Dynamic Sitting Balance  Dynamic Sitting-Balance Support: Feet unsupported, Bilateral upper extremity supported  Dynamic Sitting-Balance: Trunk control activities (LE TE)  Dynamic Sitting-Comments: min A due to posterior lean  Static Standing Balance  Static Standing-Balance Support: Bilateral upper extremity supported  Static Standing-Level of Assistance: Moderate assistance  Static Standing-Comment/Number of Minutes: posterior lean, flexed knees  Dynamic Standing Balance  Dynamic Standing-Balance Support: Bilateral upper extremity supported  Dynamic Standing-Balance: Turning  Dynamic Standing-Comments: mod A    Activity Tolerance:  Activity Tolerance  Endurance: Tolerates 10 - 20 min exercise with multiple rests  Activity Tolerance Comments: SOB with minimal mobility  Treatments:  Therapeutic Exercise  Therapeutic Exercise Performed: Yes  Therapeutic Exercise Activity 1: B AP, LAQ, seated hip flexion x 10 reps      Balance/Neuromuscular Re-Education  Balance/Neuromuscular Re-Education Activity Performed: Yes  Balance/Neuromuscular Re-Education Activity 1: To increase standing balance, core strength, functional endurance, and facilitate postural/trunk control, pt stood at ww with mod A. Max verbal cues for upright posture, midline orientation, cervical extension and increased duration. Duration limited d/t fatigue, tolerated 1 min standing.  Balance/Neuromuscular Re-Education Activity 2: Increased time sitting at edge of bed, encouraging increased  adaption to upright positioning as well as upright static and dynamic trunk challenges Pt demonstrates posterior LOB with BLE exercises, x 4 minutes.    Bed Mobility  Bed Mobility: Yes  Bed Mobility 1  Bed Mobility 1: Supine to sitting  Level of Assistance 1: Maximum assistance, Maximum verbal cues, Maximum tactile cues  Bed Mobility Comments 1: Pt educated in bed mobility technique moving supine->sit via sidelying; pt requires max VC's for technique and proper UE placements for upper body initiate rolling and to use R UE to reach over for opposite bedrail, and to use BUE to push up into sitting from sidelying.Assist needed with trunk and BLE.    Ambulation/Gait Training  Ambulation/Gait Training Performed: Yes  Ambulation/Gait Training 1  Surface 1: Level tile  Device 1: Rolling walker  Gait Support Devices: Gait belt  Assistance 1: Moderate assistance, Moderate verbal cues, Moderate tactile cues  Quality of Gait 1:  (flexed knees, posterior lean, WBOS)  Comments/Distance (ft) 1: 3' to chair  Transfers  Transfer: Yes  Transfer 1  Technique 1: Sit to stand, Stand to sit  Transfer Device 1: Walker  Transfer Level of Assistance 1: Moderate assistance, Moderate verbal cues, Moderate tactile cues  Trials/Comments 1: Cues to push from surface of the bed, shift weight forward.    Outcome Measures:  [facility] Basic Mobility  Turning from your back to your side while in a flat bed without using bedrails: A lot  Moving from lying on your back to sitting on the side of a flat bed without using bedrails: A lot  Moving to and from bed to chair (including a wheelchair): A lot  Standing up from a chair using your arms (e.g. wheelchair or bedside chair): A lot  To walk in hospital room: A lot  Climbing 3-5 steps with railing: Total  Basic Mobility - Total Score: 11    Education Documentation  No documentation found.  Education Comments  No comments found.    OP EDUCATION:  Outpatient Education  Individual(s) Educated:  Patient  Education Provided: Fall Risk, Home Exercise Program  Patient Response to Education: Patient/Caregiver Verbalized Understanding of Information    Encounter Problems       Encounter Problems (Active)       Mobility       STG - Patient will ambulate 150 ft with rolling walker Mod I`` (Not Progressing)       Start:  03/23/24    Expected End:  04/06/24            STG - Patient will ascend and descend 3 stairs with 1 rail and cane PRN with supervision (Not Progressing)       Start:  03/23/24    Expected End:  04/06/24               PT Transfers       STG - Patient will perform bed mobility mod I (Not Progressing)       Start:  03/23/24    Expected End:  04/06/24            STG - Patient will transfer sit to and from stand mod I (Not Progressing)       Start:  03/23/24    Expected End:  04/06/24                    anxiety anxiety/depression

## 2024-03-25 NOTE — DISCHARGE INSTRUCTIONS
Wound care recommendations:  Right lower leg (pretibial): weeping ulceration  Every other day: Cleanse with Vashe wash (saturate 4x4 and allow to sit on ulceration for 3 minutes) and pat dry.  Apply no-sting barrier film to periwound skin.  Apply Aquacel Ag to wound base (cut to oval shape).   Cover with Mepilex foam border dressing.  Apply Tubigrip F to bilateral lower legs and elevate legs with pillows and offload heels while in bed.

## 2024-03-25 NOTE — CONSULTS
Wound Care Consult     Visit Date: 3/25/2024      Patient Name: New Castano         MRN: [id number]           YOB: 1940     Reason for Consult: patient presents with a weeping ulceration to the right lower leg.          Wound History: patient was not able to share history of the wound but states that he has worn compression stockings in the past at home.      Pertinent Labs:   Albumin   Date Value Ref Range Status   03/22/2024 3.4 3.4 - 5.0 g/dL Final       Wound Assessment:  Wound 03/22/24 Skin Tear Pretibial Right (Active)   Wound Image   03/25/24 1155   Site Assessment Pink;Sloughing;Yellow;Bleeding 03/25/24 1155   Nidia-Wound Assessment Macerated;Maceration;Yellow-brown (Hemosiderin staining);Blanchable erythema;Edema 03/25/24 1155   Non-staged Wound Description Partial thickness 03/25/24 1155   Shape oval 03/25/24 1155   Wound Length (cm) 8 cm 03/25/24 1155   Wound Width (cm) 5 cm 03/25/24 1155   Wound Surface Area (cm^2) 40 cm^2 03/25/24 1155   Wound Depth (cm) 0.2 cm 03/25/24 1155   Wound Volume (cm^3) 8 cm^3 03/25/24 1155   State of Healing Slough;Early/partial granulation 03/25/24 1155   Margins Attached edges;Well-defined edges 03/25/24 1155   Drainage Description Serosanguineous 03/25/24 1155   Drainage Amount Moderate 03/25/24 1155   Dressing Hydrofiber;Foam 03/25/24 1155   Dressing Changed Changed 03/25/24 1155   Dressing Status Clean;Dry 03/25/24 1155       Wound Team Summary Assessment: assessment complete and recommendations below.  3 sets of dressings at the bedside and Dr. Brown notified.  Anticipating patient to be discharged to SNF per care coordinator note- please re-consult if family teaching is needed.    Right lower leg (pretibial): weeping ulceration  Every other day: Bedside RN/LPN to complete wound care.  Cleanse with Vashe wash (saturate 4x4 and allow to sit on ulceration for 3 minutes) and pat dry.  Apply no-sting barrier film to periwound skin.  Apply Aquacel Ag to  wound base (cut to oval shape).   Cover with Mepilex foam border dressing.  Apply Tubigrip F to bilateral lower legs and elevate legs with pillows and offload heels while in bed.    While in bed patient should only be on one fitted sheet, and one chux. Please, do not use brief while patient is resting in bed. Elevate heels off the bed surface at all times. Turn and reposition at least every 2 hours.    Wound Team Plan: [facility] nurse will follow up with patient next week if still admitted.  Thank you for this consultation, while inpatient please contact with any questions or changes in condition.     Savita Rivera RN, BSN, [facility]  Phone: [phone]  3/25/2024  3:01 PM

## 2024-03-25 NOTE — CARE PLAN
Problem: Mobility  Goal: STG - Patient will ambulate 150 ft with rolling walker Mod I``  Outcome: Not Progressing  Goal: STG - Patient will ascend and descend 3 stairs with 1 rail and cane PRN with supervision  Outcome: Not Progressing     Problem: PT Transfers  Goal: STG - Patient will perform bed mobility mod I  Outcome: Not Progressing  Goal: STG - Patient will transfer sit to and from stand mod I  Outcome: Not Progressing

## 2024-03-25 NOTE — PROGRESS NOTES
3/25/2024 11:45 AM I met with patient. He said he has home care from [facility]. I discussed that home care does not provide daily rehab service and PT recommended daily rehab services. I provided a SNF list. I spoke with patient's daughter. She requests referrals sent to [facility] and [facility]. Angelina Garcia [facility]

## 2024-03-26 LAB
ANION GAP SERPL CALC-SCNC: 12 MMOL/L (ref 10–20)
BASOPHILS # BLD AUTO: 0.04 X10*3/UL (ref 0–0.1)
BASOPHILS NFR BLD AUTO: 0.6 %
BUN SERPL-MCNC: 36 MG/DL (ref 6–23)
CALCIUM SERPL-MCNC: 8.7 MG/DL (ref 8.6–10.3)
CHLORIDE SERPL-SCNC: 101 MMOL/L (ref 98–107)
CO2 SERPL-SCNC: 33 MMOL/L (ref 21–32)
CREAT SERPL-MCNC: 2 MG/DL (ref 0.5–1.3)
EGFRCR SERPLBLD CKD-EPI 2021: 32 ML/MIN/1.73M*2
EOSINOPHIL # BLD AUTO: 0.22 X10*3/UL (ref 0–0.4)
EOSINOPHIL NFR BLD AUTO: 3.3 %
ERYTHROCYTE [DISTWIDTH] IN BLOOD BY AUTOMATED COUNT: 16.7 % (ref 11.5–14.5)
GLUCOSE SERPL-MCNC: 154 MG/DL (ref 74–99)
HCT VFR BLD AUTO: 42.5 % (ref 41–52)
HGB BLD-MCNC: 12.6 G/DL (ref 13.5–17.5)
IMM GRANULOCYTES # BLD AUTO: 0.03 X10*3/UL (ref 0–0.5)
IMM GRANULOCYTES NFR BLD AUTO: 0.4 % (ref 0–0.9)
LYMPHOCYTES # BLD AUTO: 0.54 X10*3/UL (ref 0.8–3)
LYMPHOCYTES NFR BLD AUTO: 8.1 %
MCH RBC QN AUTO: 26.8 PG (ref 26–34)
MCHC RBC AUTO-ENTMCNC: 29.6 G/DL (ref 32–36)
MCV RBC AUTO: 90 FL (ref 80–100)
MONOCYTES # BLD AUTO: 0.9 X10*3/UL (ref 0.05–0.8)
MONOCYTES NFR BLD AUTO: 13.4 %
NEUTROPHILS # BLD AUTO: 4.97 X10*3/UL (ref 1.6–5.5)
NEUTROPHILS NFR BLD AUTO: 74.2 %
NRBC BLD-RTO: 0 /100 WBCS (ref 0–0)
PLATELET # BLD AUTO: 137 X10*3/UL (ref 150–450)
POTASSIUM SERPL-SCNC: 3.5 MMOL/L (ref 3.5–5.3)
RBC # BLD AUTO: 4.7 X10*6/UL (ref 4.5–5.9)
SODIUM SERPL-SCNC: 142 MMOL/L (ref 136–145)
WBC # BLD AUTO: 6.7 X10*3/UL (ref 4.4–11.3)

## 2024-03-26 PROCEDURE — 2500000002 HC RX 250 W HCPCS SELF ADMINISTERED DRUGS (ALT 637 FOR MEDICARE OP, ALT 636 FOR OP/ED): Performed by: INTERNAL MEDICINE

## 2024-03-26 PROCEDURE — 36415 COLL VENOUS BLD VENIPUNCTURE: CPT | Performed by: INTERNAL MEDICINE

## 2024-03-26 PROCEDURE — 94640 AIRWAY INHALATION TREATMENT: CPT | Mod: MUE

## 2024-03-26 PROCEDURE — 80048 BASIC METABOLIC PNL TOTAL CA: CPT | Performed by: INTERNAL MEDICINE

## 2024-03-26 PROCEDURE — 85025 COMPLETE CBC W/AUTO DIFF WBC: CPT | Performed by: INTERNAL MEDICINE

## 2024-03-26 PROCEDURE — 2500000004 HC RX 250 GENERAL PHARMACY W/ HCPCS (ALT 636 FOR OP/ED): Performed by: INTERNAL MEDICINE

## 2024-03-26 PROCEDURE — 2500000004 HC RX 250 GENERAL PHARMACY W/ HCPCS (ALT 636 FOR OP/ED): Performed by: FAMILY MEDICINE

## 2024-03-26 PROCEDURE — 2500000001 HC RX 250 WO HCPCS SELF ADMINISTERED DRUGS (ALT 637 FOR MEDICARE OP): Performed by: INTERNAL MEDICINE

## 2024-03-26 PROCEDURE — 2500000005 HC RX 250 GENERAL PHARMACY W/O HCPCS: Performed by: INTERNAL MEDICINE

## 2024-03-26 PROCEDURE — 1200000002 HC GENERAL ROOM WITH TELEMETRY DAILY

## 2024-03-26 PROCEDURE — 94640 AIRWAY INHALATION TREATMENT: CPT

## 2024-03-26 RX ORDER — FUROSEMIDE 10 MG/ML
40 INJECTION INTRAMUSCULAR; INTRAVENOUS ONCE
Status: DISCONTINUED | OUTPATIENT
Start: 2024-03-26 | End: 2024-03-26

## 2024-03-26 RX ORDER — FUROSEMIDE 10 MG/ML
20 INJECTION INTRAMUSCULAR; INTRAVENOUS ONCE
Status: COMPLETED | OUTPATIENT
Start: 2024-03-26 | End: 2024-03-26

## 2024-03-26 RX ORDER — FUROSEMIDE 10 MG/ML
40 INJECTION INTRAMUSCULAR; INTRAVENOUS
Status: DISCONTINUED | OUTPATIENT
Start: 2024-03-27 | End: 2024-03-27

## 2024-03-26 RX ORDER — HALOPERIDOL 5 MG/ML
0.5 INJECTION INTRAMUSCULAR EVERY 6 HOURS PRN
Status: DISCONTINUED | OUTPATIENT
Start: 2024-03-26 | End: 2024-03-29

## 2024-03-26 RX ADMIN — APIXABAN 2.5 MG: 2.5 TABLET, FILM COATED ORAL at 21:13

## 2024-03-26 RX ADMIN — DOXAZOSIN 4 MG: 2 TABLET ORAL at 21:13

## 2024-03-26 RX ADMIN — ATORVASTATIN CALCIUM 20 MG: 20 TABLET, FILM COATED ORAL at 21:13

## 2024-03-26 RX ADMIN — FUROSEMIDE 20 MG: 10 INJECTION, SOLUTION INTRAMUSCULAR; INTRAVENOUS at 18:16

## 2024-03-26 RX ADMIN — PIPERACILLIN SODIUM AND TAZOBACTAM SODIUM 3.38 G: 3; .375 INJECTION, SOLUTION INTRAVENOUS at 21:14

## 2024-03-26 RX ADMIN — Medication: at 20:00

## 2024-03-26 RX ADMIN — DOCUSATE SODIUM 100 MG: 100 CAPSULE, LIQUID FILLED ORAL at 10:06

## 2024-03-26 RX ADMIN — PIPERACILLIN SODIUM AND TAZOBACTAM SODIUM 3.38 G: 3; .375 INJECTION, SOLUTION INTRAVENOUS at 14:25

## 2024-03-26 RX ADMIN — LORAZEPAM 0.5 MG: 0.5 TABLET ORAL at 04:12

## 2024-03-26 RX ADMIN — EMPAGLIFLOZIN 25 MG: 10 TABLET, FILM COATED ORAL at 10:06

## 2024-03-26 RX ADMIN — IPRATROPIUM BROMIDE AND ALBUTEROL SULFATE 3 ML: 2.5; .5 SOLUTION RESPIRATORY (INHALATION) at 20:08

## 2024-03-26 RX ADMIN — APIXABAN 2.5 MG: 2.5 TABLET, FILM COATED ORAL at 10:06

## 2024-03-26 RX ADMIN — LOSARTAN POTASSIUM 50 MG: 50 TABLET, FILM COATED ORAL at 10:06

## 2024-03-26 RX ADMIN — PANTOPRAZOLE SODIUM 40 MG: 40 TABLET, DELAYED RELEASE ORAL at 07:02

## 2024-03-26 RX ADMIN — PIPERACILLIN SODIUM AND TAZOBACTAM SODIUM 3.38 G: 3; .375 INJECTION, SOLUTION INTRAVENOUS at 04:12

## 2024-03-26 RX ADMIN — Medication: at 08:00

## 2024-03-26 RX ADMIN — FINASTERIDE 5 MG: 5 TABLET, FILM COATED ORAL at 10:06

## 2024-03-26 RX ADMIN — GLIMEPIRIDE 2 MG: 2 TABLET ORAL at 07:02

## 2024-03-26 RX ADMIN — POLYETHYLENE GLYCOL 3350 17 G: 17 POWDER, FOR SOLUTION ORAL at 10:06

## 2024-03-26 RX ADMIN — DOCUSATE SODIUM 100 MG: 100 CAPSULE, LIQUID FILLED ORAL at 21:13

## 2024-03-26 RX ADMIN — PIPERACILLIN SODIUM AND TAZOBACTAM SODIUM 3.38 G: 3; .375 INJECTION, SOLUTION INTRAVENOUS at 10:06

## 2024-03-26 RX ADMIN — METOPROLOL SUCCINATE 150 MG: 50 TABLET, EXTENDED RELEASE ORAL at 21:13

## 2024-03-26 RX ADMIN — IPRATROPIUM BROMIDE AND ALBUTEROL SULFATE 3 ML: 2.5; .5 SOLUTION RESPIRATORY (INHALATION) at 07:27

## 2024-03-26 RX ADMIN — FUROSEMIDE 20 MG: 10 INJECTION, SOLUTION INTRAMUSCULAR; INTRAVENOUS at 13:07

## 2024-03-26 RX ADMIN — METOPROLOL SUCCINATE 150 MG: 50 TABLET, EXTENDED RELEASE ORAL at 10:06

## 2024-03-26 RX ADMIN — CYANOCOBALAMIN TAB 500 MCG 500 MCG: 500 TAB at 10:06

## 2024-03-26 ASSESSMENT — COGNITIVE AND FUNCTIONAL STATUS - GENERAL
TOILETING: A LOT
MOVING FROM LYING ON BACK TO SITTING ON SIDE OF FLAT BED WITH BEDRAILS: A LOT
EATING MEALS: A LITTLE
CLIMB 3 TO 5 STEPS WITH RAILING: TOTAL
DRESSING REGULAR LOWER BODY CLOTHING: A LOT
MOBILITY SCORE: 11
DAILY ACTIVITIY SCORE: 15
TURNING FROM BACK TO SIDE WHILE IN FLAT BAD: A LOT
STANDING UP FROM CHAIR USING ARMS: A LOT
WALKING IN HOSPITAL ROOM: A LOT
MOBILITY SCORE: 11
DRESSING REGULAR UPPER BODY CLOTHING: A LITTLE
TOILETING: A LOT
DRESSING REGULAR UPPER BODY CLOTHING: A LITTLE
MOVING TO AND FROM BED TO CHAIR: A LOT
DRESSING REGULAR LOWER BODY CLOTHING: A LOT
TURNING FROM BACK TO SIDE WHILE IN FLAT BAD: A LOT
DRESSING REGULAR UPPER BODY CLOTHING: A LITTLE
CLIMB 3 TO 5 STEPS WITH RAILING: TOTAL
HELP NEEDED FOR BATHING: A LOT
TURNING FROM BACK TO SIDE WHILE IN FLAT BAD: A LOT
EATING MEALS: A LITTLE
CLIMB 3 TO 5 STEPS WITH RAILING: TOTAL
STANDING UP FROM CHAIR USING ARMS: A LOT
HELP NEEDED FOR BATHING: A LOT
DAILY ACTIVITIY SCORE: 15
MOBILITY SCORE: 11
EATING MEALS: A LITTLE
DRESSING REGULAR LOWER BODY CLOTHING: A LOT
STANDING UP FROM CHAIR USING ARMS: A LOT
WALKING IN HOSPITAL ROOM: A LOT
DAILY ACTIVITIY SCORE: 15
HELP NEEDED FOR BATHING: A LOT
TOILETING: A LOT
MOVING FROM LYING ON BACK TO SITTING ON SIDE OF FLAT BED WITH BEDRAILS: A LOT
MOVING FROM LYING ON BACK TO SITTING ON SIDE OF FLAT BED WITH BEDRAILS: A LOT
WALKING IN HOSPITAL ROOM: A LOT
MOVING TO AND FROM BED TO CHAIR: A LOT
PERSONAL GROOMING: A LITTLE
PERSONAL GROOMING: A LITTLE
MOVING TO AND FROM BED TO CHAIR: A LOT
PERSONAL GROOMING: A LITTLE

## 2024-03-26 ASSESSMENT — PAIN SCALES - GENERAL: PAINLEVEL_OUTOF10: 0 - NO PAIN

## 2024-03-26 ASSESSMENT — PAIN - FUNCTIONAL ASSESSMENT: PAIN_FUNCTIONAL_ASSESSMENT: 0-10

## 2024-03-26 NOTE — CARE PLAN
"0500: This nurse in pt room for rounds and notices pt O2 laying on side of bed, when this nurse previously placed O2 back on pt. This nurse asks pt if he took O2 off. Pt replies \"yes\". Education provided, and this nurse asks \" are you aware of what could happen if your O2 sats drop?\" Pt replies \" yeah, but you can resuscitate me\". Pt insists on having O2 off. Physician made aware. This nurse will watch pt closely. Call light within reach. Bed alarm on.      The patient's goals for the shift include Good night's sleep    The clinical goals for the shift include Pt will maintain safety, free from falls and injuries throughout this shift      Problem: Skin  Goal: Decreased wound size/increased tissue granulation at next dressing change  3/26/2024 0554 by Maria Del Carmen Candelario RN  Outcome: Progressing  3/26/2024 0554 by Maria Del Carmen Candelario RN  Flowsheets (Taken 3/26/2024 0554)  Decreased wound size/increased tissue granulation at next dressing change: Utilize specialty bed per algorithm  Goal: Participates in plan/prevention/treatment measures  Outcome: Progressing  Goal: Prevent/manage excess moisture  Outcome: Progressing  Goal: Prevent/minimize sheer/friction injuries  Outcome: Progressing  Goal: Promote/optimize nutrition  Outcome: Progressing  Goal: Promote skin healing  Outcome: Progressing     Problem: Diabetes  Goal: Achieve decreasing blood glucose levels by end of shift  Outcome: Progressing  Goal: Increase stability of blood glucose readings by end of shift  Outcome: Progressing  Goal: Decrease in ketones present in urine by end of shift  Outcome: Progressing  Goal: Maintain electrolyte levels within acceptable range throughout shift  Outcome: Progressing  Goal: Maintain glucose levels >70mg/dl to <250mg/dl throughout shift  Outcome: Progressing  Goal: No changes in neurological exam by end of shift  Outcome: Progressing  Goal: Learn about and adhere to nutrition recommendations by end of shift  Outcome: Progressing  Goal: " Vital signs within normal range for age by end of shift  Outcome: Progressing  Goal: Increase self care and/or family involovement by end of shift  Outcome: Progressing  Goal: Receive DSME education by end of shift  Outcome: Progressing     Problem: Fall/Injury  Goal: Not fall by end of shift  Outcome: Progressing  Goal: Be free from injury by end of the shift  Outcome: Progressing  Goal: Verbalize understanding of personal risk factors for fall in the hospital  Outcome: Progressing  Goal: Verbalize understanding of risk factor reduction measures to prevent injury from fall in the home  Outcome: Progressing  Goal: Use assistive devices by end of the shift  Outcome: Progressing  Goal: Pace activities to prevent fatigue by end of the shift  Outcome: Progressing     Problem: Bathing  Goal: STG - Patient will bathe body Setup/Sup  Outcome: Progressing     Problem: Dressings Lower Extremities  Goal: STG - Patient to complete lower body dressing Setup/Sup  Outcome: Progressing     Problem: Grooming  Goal: STG - Patient completes grooming Mod I  Outcome: Progressing     Problem: Toileting  Goal: STG - Patient will complete toileting tasks with Mod I  Outcome: Progressing

## 2024-03-26 NOTE — PROGRESS NOTES
New Castano is a 83 y.o. male on day 4 of admission presenting with Pneumonia of right lung due to infectious organism, unspecified part of lung.      Subjective   More cooperative today. Reports no SOB at rest.       Objective     Last Recorded Vitals  BP (!) 140/99 Comment: recheck  Pulse 99   Temp 36.6 °C (97.9 °F)   Resp 18   SpO2 98%   Intake/Output last 3 Shifts:    Intake/Output Summary (Last 24 hours) at 3/26/2024 0648  Last data filed at 3/25/2024 1849  Gross per 24 hour   Intake 240 ml   Output 1200 ml   Net -960 ml         Admission Weight       Daily Weight  03/08/24 : 92.8 kg (204 lb 9.4 oz)    Image Results  NM Lung perfusion with spect/ct  Narrative: Interpreted By:  Cameron Howard and Osman Sena   STUDY:  NM LUNG PERFUSION WITH SPECT/CT;  3/22/2024 3:02 pm      INDICATION:  Signs/Symptoms: 83-year-old male with hypoxia elevated d dimer. Chest  x-ray on 03/22/2024 demonstrate right basilar airspace consolidation.      COMPARISON:  Chest x-ray from 03/22/2024      ACCESSION NUMBER(S):  [id number]      ORDERING CLINICIAN:  RANDAL VALENTIN      TECHNIQUE:  DIVISION OF NUCLEAR MEDICINE  PERFUSION LUNG SCANS      Multiple perfusion images of the lungs were acquired after the  intravenous administration of 4.0 mCi of Tc-99m macroaggregated  albumin (MAA). In addition, SPECT/CT of the chest was performed.      FINDINGS:  Perfusion images of both lungs demonstrate mild heterogeneity  throughout the lung fields bilaterally. There are no distinct  segmental perfusion defects seen. A large perfusion defect at right  lower lung matching chest x-ray finding of right basilar airspace  consolidation.      Impression: There is nonspecific heterogeneity in perfusion of both lungs  indicating low probability for acute pulmonary embolism.          The interpretation above is based on modified PISAPED criteria.      This study was analyzed and interpreted at [place], [place].       MACRO:  None      Signed by: Cameron Howard 3/22/2024 4:10 PM  Dictation workstation:   [id number]  ECG 12 lead  See ED provider note for full interpretation and clinical correlation  Confirmed by Megan Hatfield ([id number]) on 3/22/2024 4:02:11 PM  Vascular US Lower Extremity Venous Duplex Bilateral  Narrative: Interpreted By:  Filippo Ngo,   STUDY:  VASC US LOWER EXTREMITY VENOUS DUPLEX BILATERAL;  3/22/2024 2:32 pm      INDICATION:  Signs/Symptoms:swelling.      COMPARISON:  None.      ACCESSION NUMBER(S):  [id number]      ORDERING CLINICIAN:  RANDAL VALENTIN      TECHNIQUE:  Vascular ultrasound of the bilateral lower extremities was performed.  Real-time compression views as well as Gray scale, color Doppler and  spectral Doppler waveform analysis was performed.      FINDINGS:  Evaluation of the visualized portions of the bilateral common  femoral, proximal, mid, and distal femoral, and popliteal veins was  performed.  Evaluation of the visualized portions of the posterior  tibial and peroneal veins was also performed.      Limitations: None      The evaluated veins demonstrate normal compressibility. There is  intact venous flow demonstrating normal respiratory variability and  normal augmentation of flow with calf compression.          Impression: No sonographic evidence for deep vein thrombosis within the evaluated  veins of the bilateral lower extremities.      MACRO:  None      Signed by: Filippo Ngo 3/22/2024 2:44 PM  Dictation workstation:   [id number]  XR chest 2 views  Narrative: Interpreted By:  Contreras Rdz,   STUDY:  XR CHEST 2 VIEWS  3/22/2024 1:49 pm      INDICATION:  Signs/Symptoms:sob      COMPARISON:  None.      ACCESSION NUMBER(S):  [id number]      ORDERING CLINICIAN:  RANDAL VALENTIN      TECHNIQUE:  PA and lateral views of the chest were obtained.      FINDINGS:  Cardiac monitoring leads are seen over the chest. Right basilar  airspace consolidation is seen and  may represent atelectasis and/or  pneumonia. No pleural effusion or pneumothorax is identified. The  cardiac silhouette is within normal limits for size.  Mild discogenic  degenerative changes are seen throughout the thoracic spine.      Impression: Right basilar airspace consolidation, as above. Clinical correlation  and continued follow-up until clearing is recommended.      MACRO:  None.      Signed by: Contreras Rdz 3/22/2024 2:09 PM  Dictation workstation:   [id number]      Physical Exam  Constitutional:       Appearance: Normal appearance.   HENT:      Head: Normocephalic and atraumatic.   Cardiovascular:      Rate and Rhythm: Normal rate and regular rhythm.   Pulmonary:      Effort: Pulmonary effort is normal.   Abdominal:      General: Abdomen is flat.   Musculoskeletal:         General: Normal range of motion.      Cervical back: Normal range of motion and neck supple.   Skin:     General: Skin is warm and dry.   Neurological:      General: No focal deficit present.      Mental Status: A and O x 1-2, no focal deficits          Assessment/Plan      Acute respiratory failure with hypoxia d/t pneumonia-IV as per orders, better  Weakness-PT/OT following, will need SNF.   HTN- better with less agitation  Agitation- ativan as needed       Juve Brown MD

## 2024-03-26 NOTE — PROGRESS NOTES
New Castano is a 83 y.o. male on day 4 of admission presenting with Pneumonia of right lung due to infectious organism, unspecified part of lung.      Subjective   More cooperative today. Reports no SOB at rest.  However more confused, lethargic  No chest pain  Does have BM       Objective     Last Recorded Vitals  BP (!) 152/110 (BP Location: Left arm, Patient Position: Lying)   Pulse 96   Temp 36 °C (96.8 °F) (Temporal)   Resp 17   SpO2 90%   Intake/Output last 3 Shifts:    Intake/Output Summary (Last 24 hours) at 3/26/2024 1426  Last data filed at 3/25/2024 1849  Gross per 24 hour   Intake 240 ml   Output 700 ml   Net -460 ml         Admission Weight       Daily Weight  03/08/24 : 92.8 kg (204 lb 9.4 oz)    Image Results  NM Lung perfusion with spect/ct  Narrative: Interpreted By:  Cameron Howard and Osman Sena   STUDY:  NM LUNG PERFUSION WITH SPECT/CT;  3/22/2024 3:02 pm      INDICATION:  Signs/Symptoms: 83-year-old male with hypoxia elevated d dimer. Chest  x-ray on 03/22/2024 demonstrate right basilar airspace consolidation.      COMPARISON:  Chest x-ray from 03/22/2024      ACCESSION NUMBER(S):  [id number]      ORDERING CLINICIAN:  RANDAL VALENTIN      TECHNIQUE:  DIVISION OF NUCLEAR MEDICINE  PERFUSION LUNG SCANS      Multiple perfusion images of the lungs were acquired after the  intravenous administration of 4.0 mCi of Tc-99m macroaggregated  albumin (MAA). In addition, SPECT/CT of the chest was performed.      FINDINGS:  Perfusion images of both lungs demonstrate mild heterogeneity  throughout the lung fields bilaterally. There are no distinct  segmental perfusion defects seen. A large perfusion defect at right  lower lung matching chest x-ray finding of right basilar airspace  consolidation.      Impression: There is nonspecific heterogeneity in perfusion of both lungs  indicating low probability for acute pulmonary embolism.          The interpretation above is based on modified  PISAPED criteria.      This study was analyzed and interpreted at [place], [place].      MACRO:  None      Signed by: Cameron Howard 3/22/2024 4:10 PM  Dictation workstation:   [id number]  ECG 12 lead  See ED provider note for full interpretation and clinical correlation  Confirmed by Megan Hatfield ([id number]) on 3/22/2024 4:02:11 PM  Vascular US Lower Extremity Venous Duplex Bilateral  Narrative: Interpreted By:  Filippo Ngo,   STUDY:  VASC US LOWER EXTREMITY VENOUS DUPLEX BILATERAL;  3/22/2024 2:32 pm      INDICATION:  Signs/Symptoms:swelling.      COMPARISON:  None.      ACCESSION NUMBER(S):  [id number]      ORDERING CLINICIAN:  RANDAL VALENTIN      TECHNIQUE:  Vascular ultrasound of the bilateral lower extremities was performed.  Real-time compression views as well as Gray scale, color Doppler and  spectral Doppler waveform analysis was performed.      FINDINGS:  Evaluation of the visualized portions of the bilateral common  femoral, proximal, mid, and distal femoral, and popliteal veins was  performed.  Evaluation of the visualized portions of the posterior  tibial and peroneal veins was also performed.      Limitations: None      The evaluated veins demonstrate normal compressibility. There is  intact venous flow demonstrating normal respiratory variability and  normal augmentation of flow with calf compression.          Impression: No sonographic evidence for deep vein thrombosis within the evaluated  veins of the bilateral lower extremities.      MACRO:  None      Signed by: Filippo Ngo 3/22/2024 2:44 PM  Dictation workstation:   [id number]  XR chest 2 views  Narrative: Interpreted By:  Contreras Rdz,   STUDY:  XR CHEST 2 VIEWS  3/22/2024 1:49 pm      INDICATION:  Signs/Symptoms:sob      COMPARISON:  None.      ACCESSION NUMBER(S):  [id number]      ORDERING CLINICIAN:  RANDAL VALENTIN      TECHNIQUE:  PA and lateral views of the chest were obtained.       FINDINGS:  Cardiac monitoring leads are seen over the chest. Right basilar  airspace consolidation is seen and may represent atelectasis and/or  pneumonia. No pleural effusion or pneumothorax is identified. The  cardiac silhouette is within normal limits for size.  Mild discogenic  degenerative changes are seen throughout the thoracic spine.      Impression: Right basilar airspace consolidation, as above. Clinical correlation  and continued follow-up until clearing is recommended.      MACRO:  None.      Signed by: Cnotreras Rdz 3/22/2024 2:09 PM  Dictation workstation:   [id number]      Physical Exam  Constitutional:       Appearance: Normal appearance. lethargic  HENT:      Head: Normocephalic and atraumatic.   Cardiovascular:      Rate and Rhythm: Normal rate and regular rhythm.   Pulmonary:      Effort: Pulmonary effort is normal.   Abdominal:      General: Abdomen obese, bs presetn   Musculoskeletal:         General: Normal range of motion.      Cervical back: Normal range of motion and neck supple.   Skin:     General: Skin is warm and dry.   Neurological:      General: No focal deficit present.      Mental Status: A and O x 1-2, no focal deficits    Edema ++ on thighs and abdomen          Assessment/Plan      Acute respiratory failure with hypoxia d/t pneumonia-IV as per orders, better  Weakness-PT/OT following, will need SNF.   HTN- better with less agitation  Agitation- ativan as needed  Acute on chronic diastolic heart failure  A fib on eliquis, rate controlled  EF 50 in 12/23  Dc ativan  Will use haldol  Check bladder scan  Resume losartan        Kevon Gunderson MD

## 2024-03-26 NOTE — CARE PLAN
Problem: Skin  Goal: Decreased wound size/increased tissue granulation at next dressing change  Outcome: Progressing  Goal: Participates in plan/prevention/treatment measures  Outcome: Progressing  Goal: Prevent/manage excess moisture  Outcome: Progressing  Goal: Prevent/minimize sheer/friction injuries  Outcome: Progressing  Goal: Promote/optimize nutrition  Outcome: Progressing  Goal: Promote skin healing  Outcome: Progressing     Problem: Diabetes  Goal: Achieve decreasing blood glucose levels by end of shift  Outcome: Progressing  Goal: Increase stability of blood glucose readings by end of shift  Outcome: Progressing  Goal: Decrease in ketones present in urine by end of shift  Outcome: Progressing  Goal: Maintain electrolyte levels within acceptable range throughout shift  Outcome: Progressing  Goal: Maintain glucose levels >70mg/dl to <250mg/dl throughout shift  Outcome: Progressing  Goal: No changes in neurological exam by end of shift  Outcome: Progressing  Goal: Learn about and adhere to nutrition recommendations by end of shift  Outcome: Progressing  Goal: Vital signs within normal range for age by end of shift  Outcome: Progressing  Goal: Increase self care and/or family involovement by end of shift  Outcome: Progressing  Goal: Receive DSME education by end of shift  Outcome: Progressing     Problem: Fall/Injury  Goal: Not fall by end of shift  Outcome: Progressing  Goal: Be free from injury by end of the shift  Outcome: Progressing  Goal: Verbalize understanding of personal risk factors for fall in the hospital  Outcome: Progressing  Goal: Verbalize understanding of risk factor reduction measures to prevent injury from fall in the home  Outcome: Progressing  Goal: Use assistive devices by end of the shift  Outcome: Progressing  Goal: Pace activities to prevent fatigue by end of the shift  Outcome: Progressing     Problem: Bathing  Goal: STG - Patient will bathe body Setup/Sup  Outcome: Progressing      Problem: Dressings Lower Extremities  Goal: STG - Patient to complete lower body dressing Setup/Sup  Outcome: Progressing     Problem: Grooming  Goal: STG - Patient completes grooming Mod I  Outcome: Progressing     Problem: Toileting  Goal: STG - Patient will complete toileting tasks with Mod I  Outcome: Progressing   The patient's goals for the shift include Good night's sleep    The clinical goals for the shift include Pt will maintain safety, free from falls and injuries throughout this shift

## 2024-03-27 LAB
ANION GAP SERPL CALC-SCNC: 13 MMOL/L (ref 10–20)
BUN SERPL-MCNC: 31 MG/DL (ref 6–23)
CALCIUM SERPL-MCNC: 8.1 MG/DL (ref 8.6–10.3)
CHLORIDE SERPL-SCNC: 103 MMOL/L (ref 98–107)
CO2 SERPL-SCNC: 33 MMOL/L (ref 21–32)
CREAT SERPL-MCNC: 2.05 MG/DL (ref 0.5–1.3)
EGFRCR SERPLBLD CKD-EPI 2021: 31 ML/MIN/1.73M*2
GLUCOSE BLD MANUAL STRIP-MCNC: 213 MG/DL (ref 74–99)
GLUCOSE SERPL-MCNC: 189 MG/DL (ref 74–99)
HOLD SPECIMEN: NORMAL
POTASSIUM SERPL-SCNC: 3.5 MMOL/L (ref 3.5–5.3)
SODIUM SERPL-SCNC: 145 MMOL/L (ref 136–145)

## 2024-03-27 PROCEDURE — 2500000004 HC RX 250 GENERAL PHARMACY W/ HCPCS (ALT 636 FOR OP/ED): Performed by: FAMILY MEDICINE

## 2024-03-27 PROCEDURE — 2500000005 HC RX 250 GENERAL PHARMACY W/O HCPCS: Performed by: INTERNAL MEDICINE

## 2024-03-27 PROCEDURE — 2500000004 HC RX 250 GENERAL PHARMACY W/ HCPCS (ALT 636 FOR OP/ED): Performed by: INTERNAL MEDICINE

## 2024-03-27 PROCEDURE — 2500000002 HC RX 250 W HCPCS SELF ADMINISTERED DRUGS (ALT 637 FOR MEDICARE OP, ALT 636 FOR OP/ED): Performed by: INTERNAL MEDICINE

## 2024-03-27 PROCEDURE — 99223 1ST HOSP IP/OBS HIGH 75: CPT | Performed by: INTERNAL MEDICINE

## 2024-03-27 PROCEDURE — 97530 THERAPEUTIC ACTIVITIES: CPT | Mod: GP,CQ

## 2024-03-27 PROCEDURE — 94640 AIRWAY INHALATION TREATMENT: CPT | Mod: MUE

## 2024-03-27 PROCEDURE — 1200000002 HC GENERAL ROOM WITH TELEMETRY DAILY

## 2024-03-27 PROCEDURE — 80048 BASIC METABOLIC PNL TOTAL CA: CPT | Performed by: FAMILY MEDICINE

## 2024-03-27 PROCEDURE — 97129 THER IVNTJ 1ST 15 MIN: CPT | Mod: GO

## 2024-03-27 PROCEDURE — 2500000001 HC RX 250 WO HCPCS SELF ADMINISTERED DRUGS (ALT 637 FOR MEDICARE OP): Performed by: INTERNAL MEDICINE

## 2024-03-27 PROCEDURE — 82947 ASSAY GLUCOSE BLOOD QUANT: CPT

## 2024-03-27 PROCEDURE — 97110 THERAPEUTIC EXERCISES: CPT | Mod: GO

## 2024-03-27 PROCEDURE — 36415 COLL VENOUS BLD VENIPUNCTURE: CPT | Performed by: FAMILY MEDICINE

## 2024-03-27 PROCEDURE — 2500000002 HC RX 250 W HCPCS SELF ADMINISTERED DRUGS (ALT 637 FOR MEDICARE OP, ALT 636 FOR OP/ED): Performed by: NURSE PRACTITIONER

## 2024-03-27 RX ORDER — DOXAZOSIN 2 MG/1
8 TABLET ORAL NIGHTLY
Status: DISCONTINUED | OUTPATIENT
Start: 2024-03-27 | End: 2024-04-09 | Stop reason: HOSPADM

## 2024-03-27 RX ORDER — POTASSIUM CHLORIDE 20 MEQ/1
20 TABLET, EXTENDED RELEASE ORAL 2 TIMES DAILY
Status: ACTIVE | OUTPATIENT
Start: 2024-03-27 | End: 2024-03-28

## 2024-03-27 RX ORDER — FUROSEMIDE 10 MG/ML
80 INJECTION INTRAMUSCULAR; INTRAVENOUS
Status: DISCONTINUED | OUTPATIENT
Start: 2024-03-27 | End: 2024-03-30

## 2024-03-27 RX ADMIN — IPRATROPIUM BROMIDE AND ALBUTEROL SULFATE 3 ML: 2.5; .5 SOLUTION RESPIRATORY (INHALATION) at 07:25

## 2024-03-27 RX ADMIN — IPRATROPIUM BROMIDE AND ALBUTEROL SULFATE 3 ML: 2.5; .5 SOLUTION RESPIRATORY (INHALATION) at 19:29

## 2024-03-27 RX ADMIN — FUROSEMIDE 40 MG: 10 INJECTION, SOLUTION INTRAMUSCULAR; INTRAVENOUS at 08:43

## 2024-03-27 RX ADMIN — IPRATROPIUM BROMIDE AND ALBUTEROL SULFATE 3 ML: 2.5; .5 SOLUTION RESPIRATORY (INHALATION) at 15:11

## 2024-03-27 RX ADMIN — EMPAGLIFLOZIN 25 MG: 10 TABLET, FILM COATED ORAL at 08:44

## 2024-03-27 RX ADMIN — PIPERACILLIN SODIUM AND TAZOBACTAM SODIUM 3.38 G: 3; .375 INJECTION, SOLUTION INTRAVENOUS at 14:41

## 2024-03-27 RX ADMIN — DOCUSATE SODIUM 100 MG: 100 CAPSULE, LIQUID FILLED ORAL at 08:44

## 2024-03-27 RX ADMIN — CYANOCOBALAMIN TAB 500 MCG 500 MCG: 500 TAB at 08:44

## 2024-03-27 RX ADMIN — GLIMEPIRIDE 2 MG: 2 TABLET ORAL at 06:56

## 2024-03-27 RX ADMIN — Medication 2 L/MIN: at 19:29

## 2024-03-27 RX ADMIN — PIPERACILLIN SODIUM AND TAZOBACTAM SODIUM 3.38 G: 3; .375 INJECTION, SOLUTION INTRAVENOUS at 22:39

## 2024-03-27 RX ADMIN — PIPERACILLIN SODIUM AND TAZOBACTAM SODIUM 3.38 G: 3; .375 INJECTION, SOLUTION INTRAVENOUS at 02:05

## 2024-03-27 RX ADMIN — FINASTERIDE 5 MG: 5 TABLET, FILM COATED ORAL at 08:44

## 2024-03-27 RX ADMIN — METOPROLOL SUCCINATE 150 MG: 50 TABLET, EXTENDED RELEASE ORAL at 08:45

## 2024-03-27 RX ADMIN — PANTOPRAZOLE SODIUM 40 MG: 40 TABLET, DELAYED RELEASE ORAL at 06:56

## 2024-03-27 RX ADMIN — LOSARTAN POTASSIUM 50 MG: 50 TABLET, FILM COATED ORAL at 08:44

## 2024-03-27 RX ADMIN — FUROSEMIDE 80 MG: 10 INJECTION, SOLUTION INTRAVENOUS at 17:00

## 2024-03-27 RX ADMIN — PIPERACILLIN SODIUM AND TAZOBACTAM SODIUM 3.38 G: 3; .375 INJECTION, SOLUTION INTRAVENOUS at 08:44

## 2024-03-27 RX ADMIN — POTASSIUM CHLORIDE 20 MEQ: 1500 TABLET, EXTENDED RELEASE ORAL at 14:41

## 2024-03-27 RX ADMIN — APIXABAN 2.5 MG: 2.5 TABLET, FILM COATED ORAL at 08:44

## 2024-03-27 ASSESSMENT — COGNITIVE AND FUNCTIONAL STATUS - GENERAL
HELP NEEDED FOR BATHING: TOTAL
DRESSING REGULAR LOWER BODY CLOTHING: TOTAL
MOVING TO AND FROM BED TO CHAIR: A LOT
TOILETING: A LOT
PERSONAL GROOMING: A LITTLE
MOBILITY SCORE: 11
CLIMB 3 TO 5 STEPS WITH RAILING: TOTAL
PERSONAL GROOMING: A LITTLE
CLIMB 3 TO 5 STEPS WITH RAILING: TOTAL
DAILY ACTIVITIY SCORE: 15
WALKING IN HOSPITAL ROOM: TOTAL
EATING MEALS: A LITTLE
MOBILITY SCORE: 10
TOILETING: TOTAL
TURNING FROM BACK TO SIDE WHILE IN FLAT BAD: A LOT
MOVING TO AND FROM BED TO CHAIR: A LOT
HELP NEEDED FOR BATHING: A LOT
MOVING FROM LYING ON BACK TO SITTING ON SIDE OF FLAT BED WITH BEDRAILS: A LOT
DAILY ACTIVITIY SCORE: 11
DRESSING REGULAR UPPER BODY CLOTHING: A LOT
STANDING UP FROM CHAIR USING ARMS: A LOT
EATING MEALS: A LITTLE
TURNING FROM BACK TO SIDE WHILE IN FLAT BAD: A LOT
DRESSING REGULAR UPPER BODY CLOTHING: A LITTLE
DRESSING REGULAR LOWER BODY CLOTHING: A LOT
WALKING IN HOSPITAL ROOM: A LOT
STANDING UP FROM CHAIR USING ARMS: A LOT
MOVING FROM LYING ON BACK TO SITTING ON SIDE OF FLAT BED WITH BEDRAILS: A LOT

## 2024-03-27 ASSESSMENT — PAIN - FUNCTIONAL ASSESSMENT
PAIN_FUNCTIONAL_ASSESSMENT: 0-10
PAIN_FUNCTIONAL_ASSESSMENT: 0-10

## 2024-03-27 ASSESSMENT — PAIN SCALES - GENERAL
PAINLEVEL_OUTOF10: 0 - NO PAIN

## 2024-03-27 NOTE — PROGRESS NOTES
Occupational Therapy    Occupational Therapy Treatment    Name: New Castano  MRN: [id number]  : 1940  Date: 24  Time Calculation  Start Time: 1415  Stop Time: 1434  Time Calculation (min): 19 min    Assessment:  OT Assessment: Pt seen for OT tx. Pt demonstrates with decreased endurance, strength and cognition assist withmobility and ADLS ,  Prognosis: Good  Barriers to Discharge: None  Evaluation/Treatment Tolerance: Patient limited by fatigue  Medical Staff Made Aware: Yes  End of Session Communication: Bedside nurse  End of Session Patient Position: Bed, 3 rail up, Alarm on  Plan:  Treatment Interventions: ADL retraining, Functional transfer training, UE strengthening/ROM, Endurance training, Cognitive reorientation  OT Frequency: 3 times per week  OT Discharge Recommendations: Moderate intensity level of continued care  OT Recommended Transfer Status: Assist of 1, Minimal assist  OT - OK to Discharge: Yes    Subjective   Previous Visit Info:  OT Last Visit  OT Received On: 24  General:  General  Reason for Referral: Pneumonia; hypoxia  Precautions:  Medical Precautions: Fall precautions, Oxygen therapy device and L/min  Precautions Comment: IV, tele  Vitals:     Pain Assessment:  Pain Assessment  Pain Assessment: 0-10  Pain Score: 0 - No pain     Objective   Activities of Daily Living: Feeding  Feeding Level of Assistance: Setup  Feeding Where Assessed: Bed level  Feeding Comments: pt requires set up with meals, pt with decreased endurance this date difficulty staying awake to eat     Bed Mobility/Transfers: Bed Mobility  Bed Mobility: Yes  Bed Mobility 1  Bed Mobility 1: Supine to sitting, Sitting to supine  Level of Assistance 1: Moderate assistance, Moderate verbal cues, Moderate tactile cues  Bed Mobility Comments 1: VCs required to complete reaching for rail and pushing to sit up  Bed Mobility 2  Bed Mobility  2: Scooting  Level of Assistance 2: Maximum assistance, Maximum verbal cues,  Moderate tactile cues  Bed Mobility Comments 2: Max x2 to scoot to head of bed    Transfers  Transfer: Yes  Transfer 1  Technique 1: Sit to stand, Stand to sit  Transfer Device 1: Walker, Gait belt  Transfer Level of Assistance 1: Moderate assistance, +2, Moderate verbal cues, Moderate tactile cues  Transfers 2  Technique 2: Sit to stand, Stand to sit  Transfer Device 2: Walker  Transfer Level of Assistance 2: Maximum assistance, Maximum verbal cues, Maximum tactile cues, +2    Sitting Balance:  Static Sitting Balance  Static Sitting-Balance Support: Feet supported  Static Sitting-Level of Assistance:  (at start of sitting EOB close supervision increased as sitting to max x1)     Therapy/Activity: Therapeutic Exercise  Therapeutic Exercise Performed: Yes  Therapeutic Exercise Activity 1: Pt completed B UE exercises 10 x each with increased encouragement and tactile assist       Sensory:     Cognitive Skill Development:  Cognitive Skill Development  Cognitive Skill Development Activity 1: Pt oriented to person, place and time reoriented to situation  Cognitive Skill Development Activity 2: Pt with difficulty recalling events of session. Pt closing eyes equired cuing to stay awake.Able to recall 3/5, 4/5, 3/5 and then declined further tx     Outcome Measures:  [facility] Daily Activity  Putting on and taking off regular lower body clothing: Total  Bathing (including washing, rinsing, drying): Total  Putting on and taking off regular upper body clothing: A lot  Toileting, which includes using toilet, bedpan or urinal: Total  Taking care of personal grooming such as brushing teeth: A little  Eating Meals: A little  Daily Activity - Total Score: 11        Education Documentation  Precautions, taught by Lisa Jansen, OT at 3/27/2024  3:17 PM.  Learner: Patient  Readiness: Acceptance  Method: Explanation  Response: Verbalizes Understanding, No Evidence of Learning, Needs Reinforcement    ADL Training, taught by Lisa TORRES  Inderjit, OT at 3/27/2024  3:17 PM.  Learner: Patient  Readiness: Acceptance  Method: Explanation  Response: Verbalizes Understanding, No Evidence of Learning, Needs Reinforcement    Education Comments  No comments found.      Goals:  Encounter Problems       Encounter Problems (Active)       Bathing       STG - Patient will bathe body Setup/Sup (Progressing)       Start:  03/23/24    Expected End:  04/06/24               Dressings Lower Extremities       STG - Patient to complete lower body dressing Setup/Sup (Progressing)       Start:  03/23/24    Expected End:  04/06/24               Grooming       STG - Patient completes grooming Mod I (Progressing)       Start:  03/23/24    Expected End:  04/06/24               OT Transfers       STG - Patient will perform toilet transfer SBA (Progressing)       Start:  03/23/24    Expected End:  04/06/24               Toileting       STG - Patient will complete toileting tasks with Mod I (Progressing)       Start:  03/23/24    Expected End:  04/06/24

## 2024-03-27 NOTE — PROGRESS NOTES
New Castano is a 83 y.o. male on day 5 of admission presenting with Pneumonia of right lung due to infectious organism, unspecified part of lung.      Subjective   No chest pain  Does have BM  Pt seen this morning   He is more alert  No pain        Objective     Last Recorded Vitals  BP (!) 152/105 (BP Location: Left arm, Patient Position: Lying) Comment: nurse aware  Pulse 97   Temp 36.9 °C (98.5 °F) (Temporal)   Resp 18   Wt 104 kg (230 lb)   SpO2 90%   Intake/Output last 3 Shifts:  No intake or output data in the 24 hours ending 03/27/24 1518    Admission Weight  Weight: 104 kg (230 lb) (03/27/24 1300)    Daily Weight  03/27/24 : 104 kg (230 lb)    Image Results  NM Lung perfusion with spect/ct  Narrative: Interpreted By:  Cameron Howard and Osman Sena   STUDY:  NM LUNG PERFUSION WITH SPECT/CT;  3/22/2024 3:02 pm      INDICATION:  Signs/Symptoms: 83-year-old male with hypoxia elevated d dimer. Chest  x-ray on 03/22/2024 demonstrate right basilar airspace consolidation.      COMPARISON:  Chest x-ray from 03/22/2024      ACCESSION NUMBER(S):  [id number]      ORDERING CLINICIAN:  RANDAL VALENTIN      TECHNIQUE:  DIVISION OF NUCLEAR MEDICINE  PERFUSION LUNG SCANS      Multiple perfusion images of the lungs were acquired after the  intravenous administration of 4.0 mCi of Tc-99m macroaggregated  albumin (MAA). In addition, SPECT/CT of the chest was performed.      FINDINGS:  Perfusion images of both lungs demonstrate mild heterogeneity  throughout the lung fields bilaterally. There are no distinct  segmental perfusion defects seen. A large perfusion defect at right  lower lung matching chest x-ray finding of right basilar airspace  consolidation.      Impression: There is nonspecific heterogeneity in perfusion of both lungs  indicating low probability for acute pulmonary embolism.          The interpretation above is based on modified PISAPED criteria.      This study was analyzed and  interpreted at [place], [place].      MACRO:  None      Signed by: Cameron Howard 3/22/2024 4:10 PM  Dictation workstation:   [id number]  ECG 12 lead  See ED provider note for full interpretation and clinical correlation  Confirmed by Megan Hatfield ([id number]) on 3/22/2024 4:02:11 PM  Vascular US Lower Extremity Venous Duplex Bilateral  Narrative: Interpreted By:  Filippo Ngo,   STUDY:  VASC US LOWER EXTREMITY VENOUS DUPLEX BILATERAL;  3/22/2024 2:32 pm      INDICATION:  Signs/Symptoms:swelling.      COMPARISON:  None.      ACCESSION NUMBER(S):  [id number]      ORDERING CLINICIAN:  RANDAL VALENTIN      TECHNIQUE:  Vascular ultrasound of the bilateral lower extremities was performed.  Real-time compression views as well as Gray scale, color Doppler and  spectral Doppler waveform analysis was performed.      FINDINGS:  Evaluation of the visualized portions of the bilateral common  femoral, proximal, mid, and distal femoral, and popliteal veins was  performed.  Evaluation of the visualized portions of the posterior  tibial and peroneal veins was also performed.      Limitations: None      The evaluated veins demonstrate normal compressibility. There is  intact venous flow demonstrating normal respiratory variability and  normal augmentation of flow with calf compression.          Impression: No sonographic evidence for deep vein thrombosis within the evaluated  veins of the bilateral lower extremities.      MACRO:  None      Signed by: Filippo Ngo 3/22/2024 2:44 PM  Dictation workstation:   [id number]  XR chest 2 views  Narrative: Interpreted By:  Contreras Rdz,   STUDY:  XR CHEST 2 VIEWS  3/22/2024 1:49 pm      INDICATION:  Signs/Symptoms:sob      COMPARISON:  None.      ACCESSION NUMBER(S):  [id number]      ORDERING CLINICIAN:  RANDAL VALENTIN      TECHNIQUE:  PA and lateral views of the chest were obtained.      FINDINGS:  Cardiac monitoring leads are seen over the  chest. Right basilar  airspace consolidation is seen and may represent atelectasis and/or  pneumonia. No pleural effusion or pneumothorax is identified. The  cardiac silhouette is within normal limits for size.  Mild discogenic  degenerative changes are seen throughout the thoracic spine.      Impression: Right basilar airspace consolidation, as above. Clinical correlation  and continued follow-up until clearing is recommended.      MACRO:  None.      Signed by: Contreras Rdz 3/22/2024 2:09 PM  Dictation workstation:   [id number]      Physical Exam  Constitutional:       Appearance: Normal appearance. More alert  HENT:      Head: Normocephalic and atraumatic.   Cardiovascular:      Rate and Rhythm: Normal rate and regular rhythm.   Pulmonary:      Effort: Pulmonary effort is normal.   Abdominal:      General: Abdomen obese, bs presetn   Musculoskeletal:         General: Normal range of motion.      Cervical back: Normal range of motion and neck supple.   Skin:     General: Skin is warm and dry.   Neurological:      General: No focal deficit present.      Mental Status: A and O x 1-2, no focal deficits    Edema ++ on thighs and abdomen          Assessment/Plan      Acute respiratory failure with hypoxia d/t pneumonia-IV as per orders, better  Weakness-PT/OT following, will need SNF.   HTN- better with less agitation  Agitation- ativan as needed  Acute on chronic diastolic heart failure  A fib on eliquis, rate controlled  EF 50 in 12/23  Dc ativan  Will use haldol  Check bladder scan  Resume losartan        Cardio consulted  Input noted   Cont current regimen  Lasix has been increasd to 80 twice daily   Will monitor renal fucntion   Check io      -Continue current treatment as ordered. Will make adjustments as necessary.    Plan of care discussed with: Provider, RN, Patient    ##Transcribed for Dr. EMELIA Gunderson##       Shared note for service. Note including A&P is not complete till signed by Attending MD   Above note  addended, pt seen this morning   Will cont to varinder  Cardio has been consulted  Dw nursing   Kevon Gunderson MD

## 2024-03-27 NOTE — PROGRESS NOTES
Physical Therapy    Physical Therapy Treatment    Patient Name: New Castano  MRN: [id number]  Today's Date: 3/27/2024  Time Calculation  Start Time: 1405  Stop Time: 1428  Time Calculation (min): 23 min       Assessment/Plan   PT Assessment  End of Session Communication: Bedside nurse  End of Session Patient Position: Alarm on, Bed, 3 rail up  PT Plan  Inpatient/Swing Bed or Outpatient: Inpatient  PT Plan  Treatment/Interventions: Bed mobility, Transfer training  PT Plan: Skilled PT  PT Frequency: 3 times per week  PT Discharge Recommendations: Moderate intensity level of continued care  Equipment Recommended upon Discharge: Wheeled walker (patient reports having rolling walker)  PT Recommended Transfer Status: Assist x2  PT - OK to Discharge: Yes (per POC)      General Visit Information:   PT  Visit  PT Received On: 03/27/24  General  Reason for Referral: Pneumonia; hypoxia  Referred By: Dr. Brown  Past Medical History Relevant to Rehab: Presented to ED due to c/o SOB and weakness; s/p recent fall per wife; PMH: DM, HTN, Hyperlipidemia, Paroxysmal atrial fibrillation s/p ablation of atrial fibrillation, CKD  Prior to Session Communication: Bedside nurse  Patient Position Received: Bed, 3 rail up, Alarm on  General Comment: pt requires increased encourgement, slow to complete all tasks    Subjective   Precautions:  Precautions  Medical Precautions: Fall precautions  Vital Signs:       Objective   Pain:  Pain Assessment  Pain Score: 0 - No pain  Cognition:  Cognition  Overall Cognitive Status: Impaired  Orientation Level: Disoriented to situation  Postural Control:     Extremity/Trunk Assessments:    Activity Tolerance:     Treatments:       Therapeutic Activity  Therapeutic Activity Performed: Yes  Therapeutic Activity 1: pt performed static sititng balance at EOB for extended period of time with  varing levels of assist from SBA<> Max.  pt also able to perform static standing balance with mod assist x2.  pt  performed standing x2 trials.  seated rest break between trials.    Bed Mobility  Bed Mobility: Yes  Bed Mobility 1  Bed Mobility 1: Supine to sitting, Sitting to supine  Level of Assistance 1: Moderate assistance (x2)  Bed Mobility Comments 1: HOB elevated. pt req assist to reach for bed rail, and assist to bring BLE over EOB as well as truck control for all bed mobility       Transfer 1  Technique 1: Sit to stand, Stand to sit  Transfer Device 1: Walker  Transfer Level of Assistance 1: Moderate assistance, +2  Trials/Comments 1: vc/tc for hand placment on solid sitting surface and not RW. x2 trials with bed elevated to complete    Outcome Measures:  [facility] Basic Mobility  Turning from your back to your side while in a flat bed without using bedrails: A lot  Moving from lying on your back to sitting on the side of a flat bed without using bedrails: A lot  Moving to and from bed to chair (including a wheelchair): A lot  Standing up from a chair using your arms (e.g. wheelchair or bedside chair): A lot  To walk in hospital room: Total  Climbing 3-5 steps with railing: Total  Basic Mobility - Total Score: 10    Education Documentation  Mobility Training, taught by Bill Felder PTA at 3/27/2024  4:31 PM.  Learner: Patient  Readiness: Acceptance  Method: Explanation  Response: Needs Reinforcement    Education Comments  No comments found.        OP EDUCATION:       Encounter Problems       Encounter Problems (Active)       Mobility       STG - Patient will ambulate 150 ft with rolling walker Mod I`` (Progressing)       Start:  03/23/24    Expected End:  04/06/24            STG - Patient will ascend and descend 3 stairs with 1 rail and cane PRN with supervision (Not Progressing)       Start:  03/23/24    Expected End:  04/06/24               PT Transfers       STG - Patient will perform bed mobility mod I (Progressing)       Start:  03/23/24    Expected End:  04/06/24            STG - Patient will transfer sit to and  from stand mod I (Progressing)       Start:  03/23/24    Expected End:  04/06/24

## 2024-03-27 NOTE — PROGRESS NOTES
03/27/24 0848   Discharge Planning   Patient expects to be discharged to: [facility]-   Does the patient need discharge transport arranged? Yes   RoundTrip coordination needed? Yes   Has discharge transport been arranged? No

## 2024-03-27 NOTE — CONSULTS
Inpatient consult to Cardiology  Consult performed by: Althea Simental, VANCE-CNP  Consult ordered by: VANCE Almendarez-CNP  Reason for consult: CHF          Cards: Rajan WRIGHT  History Of Present Illness:    New Castano is a 83 y.o. male with  past medical history of Afib s/p ablation (Eliquis/BB), atrial tachycardia, CKD3a, acute on chronic systolic heart failure( recovered LVEF to 50% from 38% on echo 12/2023), HTN, HLD, DM2 who presents to [facility] ED for worsening weakness and shortness of breath.  Cardiology consulted for CHF.     Recent admit [facility] from 2/6/24-2/10/24 admitted for shortness of breath BNP was 3900 IV diuresis DC'd home with Lasix 40 mg oral twice daily.  We hospitalized 3/7-3/8 were cardiology was consulted for CHF-> volume status looked acceptable at that time and BNP was likely elevated 2/2 CKD and atrial arrhythmia.     When asked the patient why he was readmitted to the hospital patient was unsure.  Seems like he is a poor historian.  Does claim he is only short of breath though.  Complains of overall lethargic.  Normally on room air at home-> currently on 2L nasal cannula. WWP, trace LE edema bilateral.      Afebrile, heart rate 97, blood pressure 152/105, 93% on 2L nasal cannula.  Notable labs on admission BUNs/CR 31/2.05(baseline looks around 1.6-1.8), H&H stable 12.6/42.5, lactate on admit 1.1, chest x-ray shows right basilar airspace consolidation, VQ scan showed there is nonspecific heterogeneity in perfusion of both lungs indicating low probability for acute pulmonary embolism, lower extremity duplex negative, BNP over 4700(similar to prior BNP).  EKG currently shows atrial flutter rate controlled in the 70s.    Home cardiac medications:  Eliquis 2.5mg BID, Toprol 150mg BID, losartan 50mg daily, Jardiance 25mg daily, furosemide 40mg BID, doxazosin 8mg daily and Lipitor 20 mg daily.     Past Cardiology Tests (Last 3 Years):  Echocardiogram: 12/22/2023   - Technically difficult exam  due to suboptimal positioning.   - Exam indication: Sustained atrial fibrillation   - The left ventricle is normal in size. Left ventricular systolic function is   mildly decreased. EF = 50 ± 5% (visual est.) Definity contrast used for   endocardial border detection.   - The right ventricle is normal in size. Right ventricular systolic function is   low normal.   - The left atrial cavity is mildly dilated.   - There is AV sclerosis, no stenosis.   - Exam was compared with the prior  echocardiographic exam performed on 8/20/23.    LVEF has improved on side by side comparison. LV volumes have also improved       Past Medical History:  He has a past medical history of Diabetes mellitus (CMS/HCC), Hypertension, Mixed hyperlipidemia (02/13/2013), Paroxysmal atrial fibrillation (CMS/HCC) (03/08/2018), S/P ablation of atrial fibrillation (05/03/2019), and Stage 3b chronic kidney disease (CMS/HCC) (12/29/2023).    Past Surgical History:  He has a past surgical history that includes Cardioversion and Cardiac electrophysiology mapping and ablation.      Social History:  He reports that he has quit smoking. His smoking use included cigarettes. He has quit using smokeless tobacco. No history on file for alcohol use and drug use.    Family History:  No family history on file.     Allergies:  Pollen extracts    ROS:  10 point review of systems including (Constitutional, Eyes, ENMT, Respiratory, Cardiac, Gastrointestinal, Neurological, Psychiatric, and Hematologic) was performed and is otherwise negative.    Objective Data:  Last Recorded Vitals:  Vitals:    03/26/24 2024 03/27/24 0411 03/27/24 0725 03/27/24 0852   BP: (!) 169/111 (!) 154/112  (!) 152/105   BP Location: Left arm Left arm  Left arm   Patient Position: Lying Lying  Lying   Pulse: 94 94  97   Resp: 18 18  18   Temp: 36.6 °C (97.8 °F) 36.6 °C (97.9 °F)  36.9 °C (98.5 °F)   TempSrc: Temporal Temporal  Temporal   SpO2: 93% 98% 98% 93%     Medical Gas Therapy:  "Supplemental oxygen  O2 Delivery Method: Nasal cannula  No data recorded      LABS:  CMP:  Results from last 7 days   Lab Units 03/27/24  0546 03/26/24  0712 03/25/24  0721 03/24/24  0535 03/23/24  0615 03/22/24  1222   SODIUM mmol/L 145 142 143 140 140 141   POTASSIUM mmol/L 3.5 3.5 3.6 3.9 3.9 4.3   CHLORIDE mmol/L 103 101 103 102 101 101   CO2 mmol/L 33* 33* 32 27 29 32   ANION GAP mmol/L 13 12 12 15 14 12   BUN mg/dL 31* 36* 36* 37* 35* 38*   CREATININE mg/dL 2.05* 2.00* 2.08* 2.17* 2.08* 2.08*   EGFR mL/min/1.73m*2 31* 32* 31* 29* 31* 31*   ALBUMIN g/dL  --   --   --   --   --  3.4   ALT U/L  --   --   --   --   --  24   AST U/L  --   --   --   --   --  22   BILIRUBIN TOTAL mg/dL  --   --   --   --   --  1.0     CBC:  Results from last 7 days   Lab Units 03/26/24  0712 03/25/24  0721 03/24/24  0535 03/23/24  0615 03/22/24  1222   WBC AUTO x10*3/uL 6.7 5.1 6.5 7.5 6.3   HEMOGLOBIN g/dL 12.6* 12.7* 11.8* 11.9* 12.6*   HEMATOCRIT % 42.5 44.7 40.6* 40.4* 42.2   PLATELETS AUTO x10*3/uL 137* 126* 135* 129* 144*   MCV fL 90 92 92 89 92     COAG:     ABO: No results found for: \"ABO\"  HEME/ENDO:  Results from last 7 days   Lab Units 03/22/24  1222   TSH mIU/L 4.67*      CARDIAC:   Results from last 7 days   Lab Units 03/22/24  1222   TROPHS ng/L 19   BNP pg/mL >4,700*             Last I/O:  No intake or output data in the 24 hours ending 03/27/24 1258  Net IO Since Admission: -2,675 mL [03/27/24 1258]      Imaging Results:  NM Lung perfusion with spect/ct    Result Date: 3/22/2024  Interpreted By:  Cameron Howard and Osman Sena STUDY: NM LUNG PERFUSION WITH SPECT/CT;  3/22/2024 3:02 pm   INDICATION: Signs/Symptoms: 83-year-old male with hypoxia elevated d dimer. Chest x-ray on 03/22/2024 demonstrate right basilar airspace consolidation.   COMPARISON: Chest x-ray from 03/22/2024   ACCESSION NUMBER(S): [id number]   ORDERING CLINICIAN: RANDAL VALENTIN   TECHNIQUE: DIVISION OF NUCLEAR MEDICINE PERFUSION LUNG " SCANS   Multiple perfusion images of the lungs were acquired after the intravenous administration of 4.0 mCi of Tc-99m macroaggregated albumin (MAA). In addition, SPECT/CT of the chest was performed.   FINDINGS: Perfusion images of both lungs demonstrate mild heterogeneity throughout the lung fields bilaterally. There are no distinct segmental perfusion defects seen. A large perfusion defect at right lower lung matching chest x-ray finding of right basilar airspace consolidation.       There is nonspecific heterogeneity in perfusion of both lungs indicating low probability for acute pulmonary embolism.     The interpretation above is based on modified PISAPED criteria.   This study was analyzed and interpreted at [place], [place].   MACRO: None   Signed by: Cameron Howard 3/22/2024 4:10 PM Dictation workstation:   [id number]    ECG 12 lead    Result Date: 3/22/2024  See ED provider note for full interpretation and clinical correlation Confirmed by Megan Hatfield ([id number]) on 3/22/2024 4:02:11 PM    Vascular US Lower Extremity Venous Duplex Bilateral    Result Date: 3/22/2024  Interpreted By:  Filippo Ngo, STUDY: [facility] US LOWER EXTREMITY VENOUS DUPLEX BILATERAL;  3/22/2024 2:32 pm   INDICATION: Signs/Symptoms:swelling.   COMPARISON: None.   ACCESSION NUMBER(S): [id number]   ORDERING CLINICIAN: RANDAL VALENTIN   TECHNIQUE: Vascular ultrasound of the bilateral lower extremities was performed. Real-time compression views as well as Gray scale, color Doppler and spectral Doppler waveform analysis was performed.   FINDINGS: Evaluation of the visualized portions of the bilateral common femoral, proximal, mid, and distal femoral, and popliteal veins was performed.  Evaluation of the visualized portions of the posterior tibial and peroneal veins was also performed.   Limitations: None   The evaluated veins demonstrate normal compressibility. There is intact venous flow demonstrating normal  respiratory variability and normal augmentation of flow with calf compression.         No sonographic evidence for deep vein thrombosis within the evaluated veins of the bilateral lower extremities.   MACRO: None   Signed by: Filippo Ngo 3/22/2024 2:44 PM Dictation workstation:   [id number]    XR chest 2 views    Result Date: 3/22/2024  Interpreted By:  Contreras Rdz, STUDY: XR CHEST 2 VIEWS  3/22/2024 1:49 pm   INDICATION: Signs/Symptoms:sob   COMPARISON: None.   ACCESSION NUMBER(S): [id number]   ORDERING CLINICIAN: RANDAL VALENTIN   TECHNIQUE: PA and lateral views of the chest were obtained.   FINDINGS: Cardiac monitoring leads are seen over the chest. Right basilar airspace consolidation is seen and may represent atelectasis and/or pneumonia. No pleural effusion or pneumothorax is identified. The cardiac silhouette is within normal limits for size.  Mild discogenic degenerative changes are seen throughout the thoracic spine.       Right basilar airspace consolidation, as above. Clinical correlation and continued follow-up until clearing is recommended.   MACRO: None.   Signed by: Contreras Rdz 3/22/2024 2:09 PM Dictation workstation:   [id number]      Inpatient Medications:  Scheduled medications   Medication Dose Route Frequency    apixaban  2.5 mg oral BID    atorvastatin  20 mg oral Nightly    cyanocobalamin  500 mcg oral Daily    docusate sodium  100 mg oral BID    doxazosin  4 mg oral Nightly    empagliflozin  25 mg oral Daily    finasteride  5 mg oral Daily    furosemide  40 mg intravenous BID    glimepiride  2 mg oral Daily before breakfast    ipratropium-albuteroL  3 mL nebulization TID    losartan  50 mg oral Daily    metoprolol succinate XL  150 mg oral BID    oxygen   inhalation Continuous - Inhalation    pantoprazole  40 mg oral Daily before breakfast    piperacillin-tazobactam  3.375 g intravenous q6h    polyethylene glycol  17 g oral Daily    potassium chloride CR  20 mEq oral BID      PRN medications   Medication    acetaminophen    Or    acetaminophen    Or    acetaminophen    haloperidol lactate    ipratropium-albuteroL    loperamide     Continuous Medications   Medication Dose Last Rate       Outpatient Medications:  Current Outpatient Medications   Medication Instructions    apixaban (ELIQUIS) 2.5 mg, oral, 2 times daily    atorvastatin (LIPITOR) 20 mg, oral, Nightly    doxazosin (CARDURA) 4 mg, oral, Nightly    empagliflozin (JARDIANCE) 25 mg, oral, Daily    finasteride (PROSCAR) 5 mg, oral, Daily    furosemide (LASIX) 40 mg, oral, 2 times daily    glimepiride (AMARYL) 2 mg, oral, Daily before breakfast    losartan (COZAAR) 50 mg, oral, Daily    metFORMIN (OSM) (FORTAMET) 1,000 mg, oral, 2 times daily with meals, Do not crush, chew, or split.    metoprolol succinate XL (TOPROL-XL) 150 mg, oral, 2 times daily    omeprazole (PRILOSEC) 20 mg, oral, Daily before breakfast, Do not crush or chew.       Physical Exam:  General: Alert and oriented x 2, poor historian  HEENT:  Pupils equal and reactive.  Normocephalic.  Moist mucosa.    Neck:  No thyromegaly.    Cardiovascular:  Regular rate and rhythm.  Normal S1 and S2.  Pulmonary: Diminished  Abdomen:  Soft. Non-tender.   Non-distended.  Positive bowel sounds.  Lower Extremities:  2+ pedal pulses.  Trace lower extremity bilaterally, reddened lower extremities  Neurologic:  Cranial nerves intact.  No focal deficit.   Skin: Skin warm and dry, normal skin turgor.   Psychiatric: Normal affect.     Assessment/Plan   Cards: Rajan WRIGHT    New Castano is a 83 y.o. male with  past medical history of Afib s/p ablation (Eliquis/BB), atrial tachycardia, CKD3a, acute on chronic systolic heart failure( recovered LVEF to 50% from 38% on echo 12/2023), HTN, HLD, DM2 who presents to [facility] ED for worsening weakness and shortness of breath.  Cardiology consulted for CHF.     #Acute on chronic systolic heart failure ( recovered LVEF to 50% on echo 12/2023) vs  CAP   #Hx of Atrial tachycardia 2:1; atypical atrial flutter.    #CKD  #Hypertension-elevated 150s to 160s  -Elevated BNP over 4700 (prior BNP 4700)  -chest xray free of congestion->right basilar airspace consolidation   -EKG currently atrial flutter rates controlled 80s   -IV dose 80mg IV BID   -home dose of furosemide was 40 mg p.o. twice daily   -Daily weights, strict I/O  -Agree with Eliquis 2.5mg BID  -Continue with home cardiac medications:  Eliquis 2.5mg BID, Toprol 150mg BID, losartan 50mg daily, Jardiance 25mg daily, furosemide 40mg BID, doxazosin 8mg daily and Lipitor 20 mg daily  -follow up with outpatient cardiologist   -Will get chest chest CT     RECS:  -WWP on exam, trace lower extremity edema bilateral, chest x-ray free of fluid, IV diuresis. Will get chest CT to better assess lung parenchyma.     Code Status:  Full Code    I spent 40 minutes in the professional and overall care of this patient.        VANCE Jarvis-CNP

## 2024-03-27 NOTE — CARE PLAN
The patient's goals for the shift include injury free    The clinical goals for the shift include pt will remain safe throughout shift    Over the shift, the patient did not make progress toward the following goals. Barriers to progression include discomfort. Recommendations to address these barriers include increase comfort.

## 2024-03-28 ENCOUNTER — APPOINTMENT (OUTPATIENT)
Dept: CARDIOLOGY | Facility: HOSPITAL | Age: 84
DRG: 193 | End: 2024-03-28
Payer: MEDICARE

## 2024-03-28 ENCOUNTER — APPOINTMENT (OUTPATIENT)
Dept: RADIOLOGY | Facility: HOSPITAL | Age: 84
DRG: 193 | End: 2024-03-28
Payer: MEDICARE

## 2024-03-28 LAB
ANION GAP BLDA CALCULATED.4IONS-SCNC: 4 MMO/L (ref 10–25)
ANION GAP SERPL CALC-SCNC: 15 MMOL/L (ref 10–20)
BASE EXCESS BLDA CALC-SCNC: 5.2 MMOL/L (ref -2–3)
BODY TEMPERATURE: 37 DEGREES CELSIUS
BUN SERPL-MCNC: 34 MG/DL (ref 6–23)
CA-I BLDA-SCNC: 1.09 MMOL/L (ref 1.1–1.33)
CALCIUM SERPL-MCNC: 8.7 MG/DL (ref 8.6–10.3)
CHLORIDE BLDA-SCNC: 106 MMOL/L (ref 98–107)
CHLORIDE SERPL-SCNC: 103 MMOL/L (ref 98–107)
CO2 SERPL-SCNC: 29 MMOL/L (ref 21–32)
CREAT SERPL-MCNC: 2.06 MG/DL (ref 0.5–1.3)
EGFRCR SERPLBLD CKD-EPI 2021: 31 ML/MIN/1.73M*2
ERYTHROCYTE [DISTWIDTH] IN BLOOD BY AUTOMATED COUNT: 17.1 % (ref 11.5–14.5)
GLUCOSE BLDA-MCNC: 218 MG/DL (ref 74–99)
GLUCOSE SERPL-MCNC: 208 MG/DL (ref 74–99)
HCO3 BLDA-SCNC: 31.5 MMOL/L (ref 22–26)
HCT VFR BLD AUTO: 44.2 % (ref 41–52)
HCT VFR BLD EST: 42 % (ref 41–52)
HGB BLD-MCNC: 13.4 G/DL (ref 13.5–17.5)
HGB BLDA-MCNC: 14.1 G/DL (ref 13.5–17.5)
INHALED O2 CONCENTRATION: 36 %
LACTATE BLDA-SCNC: 1.7 MMOL/L (ref 0.4–2)
MAGNESIUM SERPL-MCNC: 2.5 MG/DL (ref 1.6–2.4)
MCH RBC QN AUTO: 26.1 PG (ref 26–34)
MCHC RBC AUTO-ENTMCNC: 30.3 G/DL (ref 32–36)
MCV RBC AUTO: 86 FL (ref 80–100)
NRBC BLD-RTO: 0 /100 WBCS (ref 0–0)
OXYHGB MFR BLDA: 94.9 % (ref 94–98)
PCO2 BLDA: 52 MM HG (ref 38–42)
PH BLDA: 7.39 PH (ref 7.38–7.42)
PLATELET # BLD AUTO: 166 X10*3/UL (ref 150–450)
PO2 BLDA: 99 MM HG (ref 85–95)
POTASSIUM BLDA-SCNC: 3.8 MMOL/L (ref 3.5–5.3)
POTASSIUM SERPL-SCNC: 4.2 MMOL/L (ref 3.5–5.3)
RBC # BLD AUTO: 5.14 X10*6/UL (ref 4.5–5.9)
SAO2 % BLDA: 98 % (ref 94–100)
SODIUM BLDA-SCNC: 138 MMOL/L (ref 136–145)
SODIUM SERPL-SCNC: 143 MMOL/L (ref 136–145)
WBC # BLD AUTO: 7.1 X10*3/UL (ref 4.4–11.3)

## 2024-03-28 PROCEDURE — 36415 COLL VENOUS BLD VENIPUNCTURE: CPT | Performed by: NURSE PRACTITIONER

## 2024-03-28 PROCEDURE — 1200000002 HC GENERAL ROOM WITH TELEMETRY DAILY

## 2024-03-28 PROCEDURE — 93005 ELECTROCARDIOGRAM TRACING: CPT

## 2024-03-28 PROCEDURE — 71250 CT THORAX DX C-: CPT

## 2024-03-28 PROCEDURE — 82374 ASSAY BLOOD CARBON DIOXIDE: CPT | Performed by: NURSE PRACTITIONER

## 2024-03-28 PROCEDURE — 2500000001 HC RX 250 WO HCPCS SELF ADMINISTERED DRUGS (ALT 637 FOR MEDICARE OP): Performed by: NURSE PRACTITIONER

## 2024-03-28 PROCEDURE — 2500000004 HC RX 250 GENERAL PHARMACY W/ HCPCS (ALT 636 FOR OP/ED): Performed by: INTERNAL MEDICINE

## 2024-03-28 PROCEDURE — 85027 COMPLETE CBC AUTOMATED: CPT | Performed by: NURSE PRACTITIONER

## 2024-03-28 PROCEDURE — 2500000001 HC RX 250 WO HCPCS SELF ADMINISTERED DRUGS (ALT 637 FOR MEDICARE OP): Performed by: INTERNAL MEDICINE

## 2024-03-28 PROCEDURE — 2500000005 HC RX 250 GENERAL PHARMACY W/O HCPCS: Performed by: INTERNAL MEDICINE

## 2024-03-28 PROCEDURE — 84132 ASSAY OF SERUM POTASSIUM: CPT | Performed by: FAMILY MEDICINE

## 2024-03-28 PROCEDURE — 83735 ASSAY OF MAGNESIUM: CPT | Performed by: NURSE PRACTITIONER

## 2024-03-28 PROCEDURE — 2500000002 HC RX 250 W HCPCS SELF ADMINISTERED DRUGS (ALT 637 FOR MEDICARE OP, ALT 636 FOR OP/ED): Performed by: INTERNAL MEDICINE

## 2024-03-28 PROCEDURE — 94640 AIRWAY INHALATION TREATMENT: CPT | Mod: MUE

## 2024-03-28 PROCEDURE — 71250 CT THORAX DX C-: CPT | Performed by: RADIOLOGY

## 2024-03-28 RX ORDER — HYDRALAZINE HYDROCHLORIDE 25 MG/1
25 TABLET, FILM COATED ORAL 2 TIMES DAILY
Status: DISCONTINUED | OUTPATIENT
Start: 2024-03-28 | End: 2024-03-29

## 2024-03-28 RX ADMIN — HYDRALAZINE HYDROCHLORIDE 25 MG: 25 TABLET ORAL at 12:52

## 2024-03-28 RX ADMIN — PIPERACILLIN SODIUM AND TAZOBACTAM SODIUM 3.38 G: 3; .375 INJECTION, SOLUTION INTRAVENOUS at 11:21

## 2024-03-28 RX ADMIN — PANTOPRAZOLE SODIUM 40 MG: 40 TABLET, DELAYED RELEASE ORAL at 06:01

## 2024-03-28 RX ADMIN — FUROSEMIDE 80 MG: 10 INJECTION, SOLUTION INTRAVENOUS at 11:15

## 2024-03-28 RX ADMIN — LOSARTAN POTASSIUM 50 MG: 50 TABLET, FILM COATED ORAL at 11:22

## 2024-03-28 RX ADMIN — IPRATROPIUM BROMIDE AND ALBUTEROL SULFATE 3 ML: 2.5; .5 SOLUTION RESPIRATORY (INHALATION) at 07:59

## 2024-03-28 RX ADMIN — PIPERACILLIN SODIUM AND TAZOBACTAM SODIUM 3.38 G: 3; .375 INJECTION, SOLUTION INTRAVENOUS at 18:03

## 2024-03-28 RX ADMIN — Medication: at 20:00

## 2024-03-28 RX ADMIN — APIXABAN 2.5 MG: 2.5 TABLET, FILM COATED ORAL at 22:19

## 2024-03-28 RX ADMIN — EMPAGLIFLOZIN 25 MG: 10 TABLET, FILM COATED ORAL at 11:21

## 2024-03-28 RX ADMIN — METOPROLOL SUCCINATE 150 MG: 50 TABLET, EXTENDED RELEASE ORAL at 11:22

## 2024-03-28 RX ADMIN — IPRATROPIUM BROMIDE AND ALBUTEROL SULFATE 3 ML: 2.5; .5 SOLUTION RESPIRATORY (INHALATION) at 13:34

## 2024-03-28 RX ADMIN — PIPERACILLIN SODIUM AND TAZOBACTAM SODIUM 3.38 G: 3; .375 INJECTION, SOLUTION INTRAVENOUS at 03:18

## 2024-03-28 RX ADMIN — APIXABAN 2.5 MG: 2.5 TABLET, FILM COATED ORAL at 11:22

## 2024-03-28 RX ADMIN — PIPERACILLIN SODIUM AND TAZOBACTAM SODIUM 3.38 G: 3; .375 INJECTION, SOLUTION INTRAVENOUS at 22:31

## 2024-03-28 RX ADMIN — GLIMEPIRIDE 2 MG: 2 TABLET ORAL at 06:01

## 2024-03-28 RX ADMIN — CYANOCOBALAMIN TAB 500 MCG 500 MCG: 500 TAB at 11:22

## 2024-03-28 RX ADMIN — FUROSEMIDE 80 MG: 10 INJECTION, SOLUTION INTRAVENOUS at 22:18

## 2024-03-28 ASSESSMENT — COGNITIVE AND FUNCTIONAL STATUS - GENERAL
CLIMB 3 TO 5 STEPS WITH RAILING: TOTAL
TURNING FROM BACK TO SIDE WHILE IN FLAT BAD: A LOT
PERSONAL GROOMING: A LOT
TOILETING: TOTAL
MOVING TO AND FROM BED TO CHAIR: A LOT
WALKING IN HOSPITAL ROOM: TOTAL
STANDING UP FROM CHAIR USING ARMS: TOTAL
HELP NEEDED FOR BATHING: A LOT
DAILY ACTIVITIY SCORE: 12
PERSONAL GROOMING: A LITTLE
EATING MEALS: A LITTLE
DRESSING REGULAR UPPER BODY CLOTHING: A LOT
HELP NEEDED FOR BATHING: A LOT
MOVING FROM LYING ON BACK TO SITTING ON SIDE OF FLAT BED WITH BEDRAILS: A LOT
TOILETING: A LOT
WALKING IN HOSPITAL ROOM: TOTAL
EATING MEALS: A LITTLE
DAILY ACTIVITIY SCORE: 13
MOBILITY SCORE: 10
DRESSING REGULAR LOWER BODY CLOTHING: A LOT
MOVING TO AND FROM BED TO CHAIR: A LOT
TURNING FROM BACK TO SIDE WHILE IN FLAT BAD: A LOT
DRESSING REGULAR LOWER BODY CLOTHING: A LOT
STANDING UP FROM CHAIR USING ARMS: A LOT
DRESSING REGULAR UPPER BODY CLOTHING: TOTAL
MOBILITY SCORE: 9
MOVING FROM LYING ON BACK TO SITTING ON SIDE OF FLAT BED WITH BEDRAILS: A LOT
CLIMB 3 TO 5 STEPS WITH RAILING: TOTAL

## 2024-03-28 ASSESSMENT — PAIN SCALES - GENERAL
PAINLEVEL_OUTOF10: 0 - NO PAIN
PAINLEVEL_OUTOF10: 0 - NO PAIN

## 2024-03-28 ASSESSMENT — PAIN - FUNCTIONAL ASSESSMENT: PAIN_FUNCTIONAL_ASSESSMENT: 0-10

## 2024-03-28 NOTE — PROGRESS NOTES
New Castano is a 83 y.o. male on day 6 of admission presenting with Pneumonia of right lung due to infectious organism, unspecified part of lung.      Subjective   No chest pain  Does have BM  Pt seen this morning   He is more alert  Not wearing tele        Objective     Last Recorded Vitals  BP (!) 163/118 (BP Location: Left arm, Patient Position: Lying)   Pulse 88   Temp 36 °C (96.8 °F) (Temporal)   Resp 18   Wt 104 kg (230 lb)   SpO2 94% Comment: SpO2=97% post tx on 2LNC  Intake/Output last 3 Shifts:    Intake/Output Summary (Last 24 hours) at 3/28/2024 1042  Last data filed at 3/28/2024 0600  Gross per 24 hour   Intake --   Output 950 ml   Net -950 ml       Admission Weight  Weight: 104 kg (230 lb) (03/27/24 1300)    Daily Weight  03/28/24 : 104 kg (230 lb)    Image Results  NM Lung perfusion with spect/ct  Narrative: Interpreted By:  Cameron Howard and Osman Sena   STUDY:  NM LUNG PERFUSION WITH SPECT/CT;  3/22/2024 3:02 pm      INDICATION:  Signs/Symptoms: 83-year-old male with hypoxia elevated d dimer. Chest  x-ray on 03/22/2024 demonstrate right basilar airspace consolidation.      COMPARISON:  Chest x-ray from 03/22/2024      ACCESSION NUMBER(S):  [id number]      ORDERING CLINICIAN:  RANDAL VALENTIN      TECHNIQUE:  DIVISION OF NUCLEAR MEDICINE  PERFUSION LUNG SCANS      Multiple perfusion images of the lungs were acquired after the  intravenous administration of 4.0 mCi of Tc-99m macroaggregated  albumin (MAA). In addition, SPECT/CT of the chest was performed.      FINDINGS:  Perfusion images of both lungs demonstrate mild heterogeneity  throughout the lung fields bilaterally. There are no distinct  segmental perfusion defects seen. A large perfusion defect at right  lower lung matching chest x-ray finding of right basilar airspace  consolidation.      Impression: There is nonspecific heterogeneity in perfusion of both lungs  indicating low probability for acute pulmonary  embolism.          The interpretation above is based on modified PISAPED criteria.      This study was analyzed and interpreted at [place], [place].      MACRO:  None      Signed by: Cameron Howard 3/22/2024 4:10 PM  Dictation workstation:   [id number]  ECG 12 lead  See ED provider note for full interpretation and clinical correlation  Confirmed by Megan Hatfield ([id number]) on 3/22/2024 4:02:11 PM  Vascular US Lower Extremity Venous Duplex Bilateral  Narrative: Interpreted By:  Filippo Ngo,   STUDY:  VASC US LOWER EXTREMITY VENOUS DUPLEX BILATERAL;  3/22/2024 2:32 pm      INDICATION:  Signs/Symptoms:swelling.      COMPARISON:  None.      ACCESSION NUMBER(S):  [id number]      ORDERING CLINICIAN:  RANDAL VALENTIN      TECHNIQUE:  Vascular ultrasound of the bilateral lower extremities was performed.  Real-time compression views as well as Gray scale, color Doppler and  spectral Doppler waveform analysis was performed.      FINDINGS:  Evaluation of the visualized portions of the bilateral common  femoral, proximal, mid, and distal femoral, and popliteal veins was  performed.  Evaluation of the visualized portions of the posterior  tibial and peroneal veins was also performed.      Limitations: None      The evaluated veins demonstrate normal compressibility. There is  intact venous flow demonstrating normal respiratory variability and  normal augmentation of flow with calf compression.          Impression: No sonographic evidence for deep vein thrombosis within the evaluated  veins of the bilateral lower extremities.      MACRO:  None      Signed by: Filippo Ngo 3/22/2024 2:44 PM  Dictation workstation:   [id number]  XR chest 2 views  Narrative: Interpreted By:  Contreras Rdz,   STUDY:  XR CHEST 2 VIEWS  3/22/2024 1:49 pm      INDICATION:  Signs/Symptoms:sob      COMPARISON:  None.      ACCESSION NUMBER(S):  [id number]      ORDERING CLINICIAN:  RANDAL VALENTIN       TECHNIQUE:  PA and lateral views of the chest were obtained.      FINDINGS:  Cardiac monitoring leads are seen over the chest. Right basilar  airspace consolidation is seen and may represent atelectasis and/or  pneumonia. No pleural effusion or pneumothorax is identified. The  cardiac silhouette is within normal limits for size.  Mild discogenic  degenerative changes are seen throughout the thoracic spine.      Impression: Right basilar airspace consolidation, as above. Clinical correlation  and continued follow-up until clearing is recommended.      MACRO:  None.      Signed by: Contreras Rdz 3/22/2024 2:09 PM  Dictation workstation:   [id number]      Physical Exam    Constitutional:       Appearance: Normal appearance. More alert  HENT:      Head: Normocephalic and atraumatic.   Cardiovascular:      Rate and Rhythm: Normal rate and regular rhythm.   Pulmonary:      Effort: Pulmonary effort is normal.   Abdominal:      General: Abdomen obese, bs presetn   Musculoskeletal:         General: Normal range of motion.      Cervical back: Normal range of motion and neck supple.   Skin:     General: Skin is warm and dry.   Neurological:      General: No focal deficit present.      Mental Status: A and O x 1-2, no focal deficits    Edema ++ on thighs and abdomen        No current facility-administered medications on file prior to encounter.     Current Outpatient Medications on File Prior to Encounter   Medication Sig Dispense Refill    apixaban (Eliquis) 2.5 mg tablet Take 1 tablet (2.5 mg) by mouth 2 times a day.      atorvastatin (Lipitor) 20 mg tablet Take 1 tablet (20 mg) by mouth once daily at bedtime.      doxazosin (Cardura) 4 mg tablet Take 1 tablet (4 mg) by mouth once daily at bedtime.      empagliflozin (Jardiance) 25 mg Take 1 tablet (25 mg) by mouth once daily.      finasteride (Proscar) 5 mg tablet Take 1 tablet (5 mg) by mouth once daily.      furosemide (Lasix) 40 mg tablet Take 1 tablet (40 mg) by mouth  2 times a day. 60 tablet 0    glimepiride (Amaryl) 2 mg tablet Take 1 tablet (2 mg) by mouth once daily in the morning. Take before meals.      losartan (Cozaar) 50 mg tablet Take 1 tablet (50 mg) by mouth once daily. 30 tablet 0    metFORMIN, OSM, (Fortamet) 1,000 mg 24 hr tablet Take 1 tablet (1,000 mg) by mouth 2 times a day with meals. Do not crush, chew, or split.      metoprolol succinate XL (Toprol-XL) 50 mg 24 hr tablet Take 3 tablets (150 mg) by mouth 2 times a day. 180 tablet 0    omeprazole (PriLOSEC) 20 mg DR capsule Take 1 capsule (20 mg) by mouth once daily in the morning. Take before meals. Do not crush or chew.      [DISCONTINUED] cyanocobalamin (Vitamin B-12) 500 mcg tablet Take 1 tablet (500 mcg) by mouth once daily.      [DISCONTINUED] docusate sodium (Colace) 100 mg capsule Take 1 capsule (100 mg) by mouth 2 times a day. (Patient not taking: Reported on 3/22/2024)      [DISCONTINUED] loperamide (Imodium) 2 mg capsule Take 1 capsule (2 mg) by mouth 3 times a day as needed for diarrhea.         Results for orders placed or performed during the hospital encounter of 03/22/24 (from the past 24 hour(s))   POCT GLUCOSE   Result Value Ref Range    POCT Glucose 213 (H) 74 - 99 mg/dL   Basic Metabolic Panel   Result Value Ref Range    Glucose 208 (H) 74 - 99 mg/dL    Sodium 143 136 - 145 mmol/L    Potassium 4.2 3.5 - 5.3 mmol/L    Chloride 103 98 - 107 mmol/L    Bicarbonate 29 21 - 32 mmol/L    Anion Gap 15 10 - 20 mmol/L    Urea Nitrogen 34 (H) 6 - 23 mg/dL    Creatinine 2.06 (H) 0.50 - 1.30 mg/dL    eGFR 31 (L) >60 mL/min/1.73m*2    Calcium 8.7 8.6 - 10.3 mg/dL   CBC   Result Value Ref Range    WBC 7.1 4.4 - 11.3 x10*3/uL    nRBC 0.0 0.0 - 0.0 /100 WBCs    RBC 5.14 4.50 - 5.90 x10*6/uL    Hemoglobin 13.4 (L) 13.5 - 17.5 g/dL    Hematocrit 44.2 41.0 - 52.0 %    MCV 86 80 - 100 fL    MCH 26.1 26.0 - 34.0 pg    MCHC 30.3 (L) 32.0 - 36.0 g/dL    RDW 17.1 (H) 11.5 - 14.5 %    Platelets 166 150 - 450 x10*3/uL    Magnesium   Result Value Ref Range    Magnesium 2.50 (H) 1.60 - 2.40 mg/dL       Vitals:    03/28/24 0600   Weight: 104 kg (230 lb)         Intake/Output Summary (Last 24 hours) at 3/28/2024 1042  Last data filed at 3/28/2024 0600  Gross per 24 hour   Intake --   Output 950 ml   Net -950 ml         Assessment/Plan      Acute respiratory failure with hypoxia d/t pneumonia-IV as per orders, better  Weakness-PT/OT following, will need SNF.   HTN- better with less agitation  Agitation- ativan as needed  Acute on chronic diastolic heart failure  A fib on eliquis, rate controlled  EF 50 in 12/23  Dc ativan  Will use haldol  Check bladder scan  Resume losartan        Cardio consulted  Input noted   Cont current regimen  Cont with diuresis    Will monitor renal fucntion   Check io      -Continue current treatment as ordered. Will make adjustments as necessary.    Plan of care discussed with: Provider, RN, Patient      Patient case and plan of care discussed with Dr. EMELIA Gunderson.    VANCE Lieberman - CNP  -In collaboration with Dr. EMELIA Gunderson    [facility]  Office: [phone]  Fax: [phone]  I have reviewed the above note obtained and documented by the NP/PA and I personally participated in the key components. I have discussed the case and management of the patient's care. Changes made to the note, and all key components of history and physical/progress note done by me.  dw nursing  Pt seen this afternoon  Confusion +ve  However responds appropriately on talking   Chest decreased air entry camila  Edema ++ on the thighs and lower abdo   A/p ct chest pending   Cont with diuresis  Antibitoics,   Will follow  Kevon Gunderson MD

## 2024-03-28 NOTE — PROGRESS NOTES
Subjective Data:  -BP elevated patient/family apparently refused BP pill last PM. Patient not taken to CT per nursing for agitation  -Agitated at the bedside right now  -CT chest ok to go  -Lower extremity leg swelling improved       Objective Data:  Last Recorded Vitals:  Vitals:    24 0343 24 0600 24 0759 24 1108   BP: (!) 163/118   (!) 165/112   BP Location: Left arm   Left arm   Patient Position: Lying   Lying   Pulse: 88   106   Resp: 18   20   Temp: 36 °C (96.8 °F)   36.5 °C (97.7 °F)   TempSrc: Temporal   Temporal   SpO2: 100%  94% 93%   Weight:  104 kg (230 lb)       Medical Gas Therapy: Supplemental oxygen  O2 Delivery Method: Nasal cannula  Weight  Av kg (230 lb)  Min: 104 kg (230 lb)  Max: 104 kg (230 lb)    LABS:  CMP:  Results from last 7 days   Lab Units 24  0555 24  0546 24  0712 24  0721 24  0535 24  0615 24  1222   SODIUM mmol/L 143 145 142 143 140 140 141   POTASSIUM mmol/L 4.2 3.5 3.5 3.6 3.9 3.9 4.3   CHLORIDE mmol/L 103 103 101 103 102 101 101   CO2 mmol/L 29 33* 33* 32 27 29 32   ANION GAP mmol/L 15 13 12 12 15 14 12   BUN mg/dL 34* 31* 36* 36* 37* 35* 38*   CREATININE mg/dL 2.06* 2.05* 2.00* 2.08* 2.17* 2.08* 2.08*   EGFR mL/min/1.73m*2 31* 31* 32* 31* 29* 31* 31*   MAGNESIUM mg/dL 2.50*  --   --   --   --   --   --    ALBUMIN g/dL  --   --   --   --   --   --  3.4   ALT U/L  --   --   --   --   --   --  24   AST U/L  --   --   --   --   --   --  22   BILIRUBIN TOTAL mg/dL  --   --   --   --   --   --  1.0     CBC:  Results from last 7 days   Lab Units 24  0555 24  0712 24  0721 24  0535 24  0615 24  1222   WBC AUTO x10*3/uL 7.1 6.7 5.1 6.5 7.5 6.3   HEMOGLOBIN g/dL 13.4* 12.6* 12.7* 11.8* 11.9* 12.6*   HEMATOCRIT % 44.2 42.5 44.7 40.6* 40.4* 42.2   PLATELETS AUTO x10*3/uL 166 137* 126* 135* 129* 144*   MCV fL 86 90 92 92 89 92     COAG:     ABO: No results found for:  "\"ABO\"  HEME/ENDO:  Results from last 7 days   Lab Units 03/22/24  1222   TSH mIU/L 4.67*      CARDIAC:   Results from last 7 days   Lab Units 03/22/24  1222   TROPHS ng/L 19   BNP pg/mL >4,700*             Last I/O:    Intake/Output Summary (Last 24 hours) at 3/28/2024 1201  Last data filed at 3/28/2024 0600  Gross per 24 hour   Intake --   Output 950 ml   Net -950 ml     Net IO Since Admission: -3,625 mL [03/28/24 1201]      Imaging Results:  NM Lung perfusion with spect/ct    Result Date: 3/22/2024  Interpreted By:  Cameron Howard and Osman Sena STUDY: NM LUNG PERFUSION WITH SPECT/CT;  3/22/2024 3:02 pm   INDICATION: Signs/Symptoms: 83-year-old male with hypoxia elevated d dimer. Chest x-ray on 03/22/2024 demonstrate right basilar airspace consolidation.   COMPARISON: Chest x-ray from 03/22/2024   ACCESSION NUMBER(S): [id number]   ORDERING CLINICIAN: RANDAL VALENTIN   TECHNIQUE: DIVISION OF NUCLEAR MEDICINE PERFUSION LUNG SCANS   Multiple perfusion images of the lungs were acquired after the intravenous administration of 4.0 mCi of Tc-99m macroaggregated albumin (MAA). In addition, SPECT/CT of the chest was performed.   FINDINGS: Perfusion images of both lungs demonstrate mild heterogeneity throughout the lung fields bilaterally. There are no distinct segmental perfusion defects seen. A large perfusion defect at right lower lung matching chest x-ray finding of right basilar airspace consolidation.       There is nonspecific heterogeneity in perfusion of both lungs indicating low probability for acute pulmonary embolism.     The interpretation above is based on modified PISAPED criteria.   This study was analyzed and interpreted at [place], [place].   MACRO: None   Signed by: Cameron Howard 3/22/2024 4:10 PM Dictation workstation:   [id number]    ECG 12 lead    Result Date: 3/22/2024  See ED provider note for full interpretation and clinical correlation Confirmed by Megan Hatfield " ([id number]) on 3/22/2024 4:02:11 PM    Vascular US Lower Extremity Venous Duplex Bilateral    Result Date: 3/22/2024  Interpreted By:  Filippo Ngo, STUDY: VAS US LOWER EXTREMITY VENOUS DUPLEX BILATERAL;  3/22/2024 2:32 pm   INDICATION: Signs/Symptoms:swelling.   COMPARISON: None.   ACCESSION NUMBER(S): [id number]   ORDERING CLINICIAN: RANDAL VALENTIN   TECHNIQUE: Vascular ultrasound of the bilateral lower extremities was performed. Real-time compression views as well as Gray scale, color Doppler and spectral Doppler waveform analysis was performed.   FINDINGS: Evaluation of the visualized portions of the bilateral common femoral, proximal, mid, and distal femoral, and popliteal veins was performed.  Evaluation of the visualized portions of the posterior tibial and peroneal veins was also performed.   Limitations: None   The evaluated veins demonstrate normal compressibility. There is intact venous flow demonstrating normal respiratory variability and normal augmentation of flow with calf compression.         No sonographic evidence for deep vein thrombosis within the evaluated veins of the bilateral lower extremities.   MACRO: None   Signed by: Filippo Ngo 3/22/2024 2:44 PM Dictation workstation:   [id number]    XR chest 2 views    Result Date: 3/22/2024  Interpreted By:  Contreras Rdz, STUDY: XR CHEST 2 VIEWS  3/22/2024 1:49 pm   INDICATION: Signs/Symptoms:sob   COMPARISON: None.   ACCESSION NUMBER(S): [id number]   ORDERING CLINICIAN: RANDAL VALENTIN   TECHNIQUE: PA and lateral views of the chest were obtained.   FINDINGS: Cardiac monitoring leads are seen over the chest. Right basilar airspace consolidation is seen and may represent atelectasis and/or pneumonia. No pleural effusion or pneumothorax is identified. The cardiac silhouette is within normal limits for size.  Mild discogenic degenerative changes are seen throughout the thoracic spine.       Right basilar airspace  consolidation, as above. Clinical correlation and continued follow-up until clearing is recommended.   MACRO: None.   Signed by: Contreras Rdz 3/22/2024 2:09 PM Dictation workstation:   [id number]          Past Cardiology Tests (Last 3 Years):  EKG:  Results for orders placed during the hospital encounter of 03/22/24    ECG 12 lead    Narrative  See ED provider note for full interpretation and clinical correlation  Confirmed by Megan Hatfield ([id number]) on 3/22/2024 4:02:11 PM      Results for orders placed during the hospital encounter of 03/07/24    ECG 12 lead    Narrative  Atrial flutter with variable AV block  Left ventricular hypertrophy with QRS widening and repolarization abnormality ( [place] product )  Abnormal ECG  When compared with ECG of 07-FEB-2024 07:40,  Atrial flutter has replaced Sinus rhythm  QRS duration has increased  See ED provider note for full interpretation and clinical correlation  Confirmed by Megan Hatfield ([id number]) on 3/8/2024 12:03:18 PM      Results for orders placed during the hospital encounter of 02/04/24    Electrocardiogram, 12-lead PRN ACS symptoms    Narrative  Sinus tachycardia  Nonspecific intraventricular conduction delay  ST & T wave abnormality, consider inferolateral ischemia  Abnormal ECG  When compared with ECG of 06-FEB-2024 06:54,  Previous ECG has undetermined rhythm, needs review  QRS axis Shifted right  Confirmed by Sadiq Watts ([id number]) on 2/7/2024 8:50:06 PM      Electrocardiogram, 12-lead PRN ACS symptoms    Narrative  Slow atrial flutter  Left axis deviation  Nonspecific intraventricular conduction delay  ST & T wave abnormality, consider lateral ischemia  Abnormal ECG  Confirmed by Ankit Connolly ([id number]) on 2/6/2024 5:07:41 PM      ECG 12 lead    Narrative  Sinus tachycardia with occasional Premature ventricular complexes  Left axis deviation  Pulmonary disease pattern  Left ventricular hypertrophy with QRS widening and repolarization abnormality ( R in aVL ,  "[place] product , [place] )  Inferior infarct , age undetermined  Abnormal ECG  When compared with ECG of 28-DEC-2023 14:04,  Premature ventricular complexes are now Present  Inferior infarct is now Present  See ED provider note for full interpretation and clinical correlation  Confirmed by Yanick Solorzano ([id number]) on 2/6/2024 6:15:51 PM      Results for orders placed during the hospital encounter of 12/28/23    ECG 12 lead    Narrative  Sinus tachycardia  Left axis deviation  Left ventricular hypertrophy with QRS widening and repolarization abnormality ( R in aVL , [place] product )  Abnormal ECG  No previous ECGs available  See ED provider note for full interpretation and clinical correlation  Confirmed by Yanick Solorzano ([id number]) on 12/29/2023 10:56:33 PM    Echo:  No results found for this or any previous visit.    Ejection Fractions:  No results found for: \"EF\"  Cath:  No results found for this or any previous visit.    Stress Test:  No results found for this or any previous visit.    Cardiac Imaging:  No results found for this or any previous visit.      Inpatient Medications:  Scheduled medications   Medication Dose Route Frequency    apixaban  2.5 mg oral BID    atorvastatin  20 mg oral Nightly    cyanocobalamin  500 mcg oral Daily    docusate sodium  100 mg oral BID    doxazosin  8 mg oral Nightly    empagliflozin  25 mg oral Daily    finasteride  5 mg oral Daily    furosemide  80 mg intravenous BID    glimepiride  2 mg oral Daily before breakfast    ipratropium-albuteroL  3 mL nebulization TID    losartan  50 mg oral Daily    metoprolol succinate XL  150 mg oral BID    oxygen   inhalation Continuous - Inhalation    pantoprazole  40 mg oral Daily before breakfast    piperacillin-tazobactam  3.375 g intravenous q6h    polyethylene glycol  17 g oral Daily     PRN medications   Medication    acetaminophen    Or    acetaminophen    Or    acetaminophen    haloperidol lactate    ipratropium-albuteroL    " loperamide     Continuous Medications   Medication Dose Last Rate       Physical Exam:  General:  Patient is awake, alert, and oriented.  Patient is in no acute distress.  HEENT:  Pupils equal and reactive.  Normocephalic.  Moist mucosa.    Neck:  No thyromegaly.  Normal Jugular Venous Pressure.  Cardiovascular:  Regular rate and rhythm.  Normal S1 and S2.  Pulmonary:  Clear to auscultation bilaterally.  Abdomen:  Soft. Non-tender.   Non-distended.  Positive bowel sounds.  Lower Extremities:  2+ pedal pulses. No LE edema.  Neurologic:  Cranial nerves intact.  No focal deficit.   Skin: Skin warm and dry, normal skin turgor.   Psychiatric: Normal affect.     Assessment/Plan   Cards: [facility]     [place] Castano is a 83 y.o. male with  past medical history of Afib s/p ablation (Eliquis/BB), atrial tachycardia, CKD3a, acute on chronic systolic heart failure( recovered LVEF to 50% from 38% on echo 12/2023), HTN, HLD, DM2 who presents to [facility] ED for worsening weakness and shortness of breath.  Cardiology consulted for CHF.      #Acute on chronic systolic heart failure ( recovered LVEF to 50% on echo 12/2023) vs CAP   #Hx of Atrial tachycardia 2:1; atypical atrial flutter.    #CKD  #Hypertension-elevated 150s to 160s  -Elevated BNP over 4700 (prior BNP 4700)  -chest xray free of congestion->right basilar airspace consolidation   -EKG currently atrial flutter rates controlled 80s -> . May benefit from restoration of sinus rhythm at some point. Defer to his outpatient cardiologist.   -IV dose 80mg IV BID   -home dose of furosemide was 40 mg p.o. twice daily   -Daily weights, strict I/O  -Agree with Eliquis 2.5mg BID  -Continue with home cardiac medications:  Eliquis 2.5mg BID, Toprol 150mg BID, losartan 50mg daily, Jardiance 25mg daily, furosemide 40mg BID, doxazosin 8mg daily and Lipitor 20 mg daily  -follow up with outpatient cardiologist   -Will get chest chest CT -still pending     RECS:  -WWP on exam, trace lower  extremity edema bilateral-> improving, chest x-ray free of fluid, c/w IV diuresis.  -Will get chest CT to better assess lung parenchyma.   -possible transition to home Lasix 40mg BID tomorrow   -will add some PO Hydralazine on today 25mg BID   Code Status:  Full Code    I spent 40 minutes in the professional and overall care of this patient.        Althea Simental, APRN-CNP

## 2024-03-28 NOTE — CARE PLAN
The patient's goals for the shift include Good night's sleep    The clinical goals for the shift include pt will remain safe throughout shift    Over the shift, the patient did not make progress toward the following goals. Barriers to progression include . Recommendations to address these barriers include   Problem: Skin  Goal: Decreased wound size/increased tissue granulation at next dressing change  Outcome: Progressing  Goal: Participates in plan/prevention/treatment measures  Outcome: Progressing  Goal: Prevent/manage excess moisture  Outcome: Progressing  Goal: Prevent/minimize sheer/friction injuries  Outcome: Progressing  Goal: Promote/optimize nutrition  Outcome: Progressing  Goal: Promote skin healing  Outcome: Progressing     Problem: Diabetes  Goal: Achieve decreasing blood glucose levels by end of shift  Outcome: Progressing  Goal: Increase stability of blood glucose readings by end of shift  Outcome: Progressing  Goal: Decrease in ketones present in urine by end of shift  Outcome: Progressing  Goal: Maintain electrolyte levels within acceptable range throughout shift  Outcome: Progressing  Goal: Maintain glucose levels >70mg/dl to <250mg/dl throughout shift  Outcome: Progressing  Goal: No changes in neurological exam by end of shift  Outcome: Progressing  Goal: Learn about and adhere to nutrition recommendations by end of shift  Outcome: Progressing  Goal: Vital signs within normal range for age by end of shift  Outcome: Progressing  Goal: Increase self care and/or family involovement by end of shift  Outcome: Progressing  Goal: Receive DSME education by end of shift  Outcome: Progressing     Problem: Fall/Injury  Goal: Not fall by end of shift  Outcome: Progressing  Goal: Be free from injury by end of the shift  Outcome: Progressing  Goal: Verbalize understanding of personal risk factors for fall in the hospital  Outcome: Progressing  Goal: Verbalize understanding of risk factor reduction measures to  prevent injury from fall in the home  Outcome: Progressing  Goal: Use assistive devices by end of the shift  Outcome: Progressing  Goal: Pace activities to prevent fatigue by end of the shift  Outcome: Progressing     Problem: Bathing  Goal: STG - Patient will bathe body Setup/Sup  Outcome: Progressing     Problem: Dressings Lower Extremities  Goal: STG - Patient to complete lower body dressing Setup/Sup  Outcome: Progressing     Problem: Grooming  Goal: STG - Patient completes grooming Mod I  Outcome: Progressing     Problem: Toileting  Goal: STG - Patient will complete toileting tasks with Mod I  Outcome: Progressing   .

## 2024-03-29 ENCOUNTER — APPOINTMENT (OUTPATIENT)
Dept: RADIOLOGY | Facility: HOSPITAL | Age: 84
DRG: 193 | End: 2024-03-29
Payer: MEDICARE

## 2024-03-29 LAB
ANION GAP SERPL CALC-SCNC: 17 MMOL/L (ref 10–20)
BUN SERPL-MCNC: 41 MG/DL (ref 6–23)
CALCIUM SERPL-MCNC: 8.3 MG/DL (ref 8.6–10.3)
CHLORIDE SERPL-SCNC: 105 MMOL/L (ref 98–107)
CO2 SERPL-SCNC: 28 MMOL/L (ref 21–32)
CREAT SERPL-MCNC: 2.32 MG/DL (ref 0.5–1.3)
EGFRCR SERPLBLD CKD-EPI 2021: 27 ML/MIN/1.73M*2
ERYTHROCYTE [DISTWIDTH] IN BLOOD BY AUTOMATED COUNT: 17.2 % (ref 11.5–14.5)
GLUCOSE SERPL-MCNC: 171 MG/DL (ref 74–99)
HCT VFR BLD AUTO: 47.7 % (ref 41–52)
HGB BLD-MCNC: 13.7 G/DL (ref 13.5–17.5)
MAGNESIUM SERPL-MCNC: 2.5 MG/DL (ref 1.6–2.4)
MCH RBC QN AUTO: 26.3 PG (ref 26–34)
MCHC RBC AUTO-ENTMCNC: 28.7 G/DL (ref 32–36)
MCV RBC AUTO: 92 FL (ref 80–100)
NRBC BLD-RTO: 0.4 /100 WBCS (ref 0–0)
PLATELET # BLD AUTO: 154 X10*3/UL (ref 150–450)
POTASSIUM SERPL-SCNC: 3.8 MMOL/L (ref 3.5–5.3)
PROCALCITONIN SERPL-MCNC: 0.16 NG/ML
RBC # BLD AUTO: 5.2 X10*6/UL (ref 4.5–5.9)
SODIUM SERPL-SCNC: 146 MMOL/L (ref 136–145)
WBC # BLD AUTO: 7.9 X10*3/UL (ref 4.4–11.3)

## 2024-03-29 PROCEDURE — 2500000001 HC RX 250 WO HCPCS SELF ADMINISTERED DRUGS (ALT 637 FOR MEDICARE OP): Performed by: NURSE PRACTITIONER

## 2024-03-29 PROCEDURE — 74230 X-RAY XM SWLNG FUNCJ C+: CPT | Performed by: RADIOLOGY

## 2024-03-29 PROCEDURE — 74230 X-RAY XM SWLNG FUNCJ C+: CPT

## 2024-03-29 PROCEDURE — 2500000002 HC RX 250 W HCPCS SELF ADMINISTERED DRUGS (ALT 637 FOR MEDICARE OP, ALT 636 FOR OP/ED): Performed by: INTERNAL MEDICINE

## 2024-03-29 PROCEDURE — 94640 AIRWAY INHALATION TREATMENT: CPT | Mod: MUE

## 2024-03-29 PROCEDURE — 3430000001 HC RX 343 DIAGNOSTIC RADIOPHARMACEUTICALS: Performed by: FAMILY MEDICINE

## 2024-03-29 PROCEDURE — 2500000001 HC RX 250 WO HCPCS SELF ADMINISTERED DRUGS (ALT 637 FOR MEDICARE OP)

## 2024-03-29 PROCEDURE — 2500000004 HC RX 250 GENERAL PHARMACY W/ HCPCS (ALT 636 FOR OP/ED): Performed by: INTERNAL MEDICINE

## 2024-03-29 PROCEDURE — 36415 COLL VENOUS BLD VENIPUNCTURE: CPT | Performed by: NURSE PRACTITIONER

## 2024-03-29 PROCEDURE — 80048 BASIC METABOLIC PNL TOTAL CA: CPT | Performed by: NURSE PRACTITIONER

## 2024-03-29 PROCEDURE — 92611 MOTION FLUOROSCOPY/SWALLOW: CPT | Mod: GN | Performed by: SPEECH-LANGUAGE PATHOLOGIST

## 2024-03-29 PROCEDURE — 84145 PROCALCITONIN (PCT): CPT | Mod: AHULAB | Performed by: FAMILY MEDICINE

## 2024-03-29 PROCEDURE — 85027 COMPLETE CBC AUTOMATED: CPT | Performed by: NURSE PRACTITIONER

## 2024-03-29 PROCEDURE — 2500000005 HC RX 250 GENERAL PHARMACY W/O HCPCS: Performed by: FAMILY MEDICINE

## 2024-03-29 PROCEDURE — 2500000005 HC RX 250 GENERAL PHARMACY W/O HCPCS: Performed by: INTERNAL MEDICINE

## 2024-03-29 PROCEDURE — 99232 SBSQ HOSP IP/OBS MODERATE 35: CPT

## 2024-03-29 PROCEDURE — 1200000002 HC GENERAL ROOM WITH TELEMETRY DAILY

## 2024-03-29 PROCEDURE — 83735 ASSAY OF MAGNESIUM: CPT | Performed by: NURSE PRACTITIONER

## 2024-03-29 PROCEDURE — 2500000001 HC RX 250 WO HCPCS SELF ADMINISTERED DRUGS (ALT 637 FOR MEDICARE OP): Performed by: INTERNAL MEDICINE

## 2024-03-29 PROCEDURE — 2500000001 HC RX 250 WO HCPCS SELF ADMINISTERED DRUGS (ALT 637 FOR MEDICARE OP): Performed by: FAMILY MEDICINE

## 2024-03-29 PROCEDURE — 92610 EVALUATE SWALLOWING FUNCTION: CPT | Mod: GN

## 2024-03-29 PROCEDURE — 36415 COLL VENOUS BLD VENIPUNCTURE: CPT | Performed by: FAMILY MEDICINE

## 2024-03-29 RX ORDER — HYDRALAZINE HYDROCHLORIDE 25 MG/1
25 TABLET, FILM COATED ORAL 3 TIMES DAILY
Status: DISCONTINUED | OUTPATIENT
Start: 2024-03-29 | End: 2024-03-30

## 2024-03-29 RX ADMIN — PIPERACILLIN SODIUM AND TAZOBACTAM SODIUM 3.38 G: 3; .375 INJECTION, SOLUTION INTRAVENOUS at 21:50

## 2024-03-29 RX ADMIN — METOPROLOL SUCCINATE 150 MG: 50 TABLET, EXTENDED RELEASE ORAL at 09:07

## 2024-03-29 RX ADMIN — BARIUM SULFATE 50 ML: 400 SUSPENSION ORAL at 13:21

## 2024-03-29 RX ADMIN — Medication: at 14:29

## 2024-03-29 RX ADMIN — PIPERACILLIN SODIUM AND TAZOBACTAM SODIUM 3.38 G: 3; .375 INJECTION, SOLUTION INTRAVENOUS at 17:08

## 2024-03-29 RX ADMIN — POLYETHYLENE GLYCOL 3350 17 G: 17 POWDER, FOR SOLUTION ORAL at 09:08

## 2024-03-29 RX ADMIN — IPRATROPIUM BROMIDE AND ALBUTEROL SULFATE 3 ML: 2.5; .5 SOLUTION RESPIRATORY (INHALATION) at 08:22

## 2024-03-29 RX ADMIN — PIPERACILLIN SODIUM AND TAZOBACTAM SODIUM 3.38 G: 3; .375 INJECTION, SOLUTION INTRAVENOUS at 04:00

## 2024-03-29 RX ADMIN — Medication: at 20:00

## 2024-03-29 RX ADMIN — LOSARTAN POTASSIUM 50 MG: 50 TABLET, FILM COATED ORAL at 09:08

## 2024-03-29 RX ADMIN — APIXABAN 2.5 MG: 2.5 TABLET, FILM COATED ORAL at 09:06

## 2024-03-29 RX ADMIN — FUROSEMIDE 80 MG: 10 INJECTION, SOLUTION INTRAVENOUS at 17:08

## 2024-03-29 RX ADMIN — FINASTERIDE 5 MG: 5 TABLET, FILM COATED ORAL at 09:06

## 2024-03-29 RX ADMIN — PIPERACILLIN SODIUM AND TAZOBACTAM SODIUM 3.38 G: 3; .375 INJECTION, SOLUTION INTRAVENOUS at 09:06

## 2024-03-29 RX ADMIN — DOCUSATE SODIUM 100 MG: 100 CAPSULE, LIQUID FILLED ORAL at 20:54

## 2024-03-29 RX ADMIN — EMPAGLIFLOZIN 25 MG: 10 TABLET, FILM COATED ORAL at 09:06

## 2024-03-29 RX ADMIN — IPRATROPIUM BROMIDE AND ALBUTEROL SULFATE 3 ML: 2.5; .5 SOLUTION RESPIRATORY (INHALATION) at 20:04

## 2024-03-29 RX ADMIN — IPRATROPIUM BROMIDE AND ALBUTEROL SULFATE 3 ML: 2.5; .5 SOLUTION RESPIRATORY (INHALATION) at 14:23

## 2024-03-29 RX ADMIN — DOCUSATE SODIUM 100 MG: 100 CAPSULE, LIQUID FILLED ORAL at 09:10

## 2024-03-29 RX ADMIN — FUROSEMIDE 80 MG: 10 INJECTION, SOLUTION INTRAVENOUS at 09:06

## 2024-03-29 RX ADMIN — BARIUM SULFATE 15 ML: 400 PASTE ORAL at 13:21

## 2024-03-29 RX ADMIN — APIXABAN 2.5 MG: 2.5 TABLET, FILM COATED ORAL at 20:54

## 2024-03-29 RX ADMIN — HYDRALAZINE HYDROCHLORIDE 25 MG: 25 TABLET ORAL at 09:06

## 2024-03-29 RX ADMIN — HYDRALAZINE HYDROCHLORIDE 25 MG: 25 TABLET ORAL at 16:05

## 2024-03-29 RX ADMIN — ATORVASTATIN CALCIUM 20 MG: 20 TABLET, FILM COATED ORAL at 20:54

## 2024-03-29 RX ADMIN — DOXAZOSIN 8 MG: 2 TABLET ORAL at 20:53

## 2024-03-29 RX ADMIN — BARIUM SULFATE 150 ML: 0.81 POWDER, FOR SUSPENSION ORAL at 13:21

## 2024-03-29 RX ADMIN — GLIMEPIRIDE 2 MG: 2 TABLET ORAL at 06:56

## 2024-03-29 RX ADMIN — HYDRALAZINE HYDROCHLORIDE 25 MG: 25 TABLET ORAL at 20:54

## 2024-03-29 RX ADMIN — IPRATROPIUM BROMIDE AND ALBUTEROL SULFATE 3 ML: 2.5; .5 SOLUTION RESPIRATORY (INHALATION) at 11:41

## 2024-03-29 RX ADMIN — METOPROLOL SUCCINATE 150 MG: 50 TABLET, EXTENDED RELEASE ORAL at 20:54

## 2024-03-29 RX ADMIN — CYANOCOBALAMIN TAB 500 MCG 500 MCG: 500 TAB at 09:08

## 2024-03-29 ASSESSMENT — PAIN SCALES - GENERAL
PAINLEVEL_OUTOF10: 0 - NO PAIN

## 2024-03-29 ASSESSMENT — PAIN - FUNCTIONAL ASSESSMENT
PAIN_FUNCTIONAL_ASSESSMENT: 0-10

## 2024-03-29 NOTE — PROCEDURES
"Speech-Language Pathology        Modified Barium Swallow Study     Patient Name: New Castano  MRN: [id number]  : 1940  Today's Date: 24  Time Calculation  Start Time: 1300  Stop Time: 1316  Time Calculation (min): 16 min       Recommendations:  Regular diet/thin liquids  -upright posture for PO intake  -slow rate of intake    - If pt demonstrates s/s aspiration or develops worsening respiratory functioning, make NPO and contact SLP       Assessment/Impression:    Full detailed SLP/Radiologist Modified Barium Swallow study report can be found under Chart Review tab, Imaging tab and  titled \"FL Modified Barium Swallow Study\"      Pt. Presenting with Mild oropharyngeal dysphagia 2/2 delayed swallow onset and poor participation/positioning for PO     Plan:  Treatment/Interventions: Pharyngeal exercises, Oral motor exercises, Patient/family education, Bolus trials  SLP Plan: Skilled SLP warranted  SLP Frequency: 2x per week  Duration: 30 days    Discussed POC: Patient, Nursing   Discussed Risks/Benefits: Yes  Patient/Caregiver Agreeable: Yes        GOALS:  Pt. to tolerate least restrictive diet without pulmonary compromise, Pt. to use safe swallow strategies independently in all observed trials     Education:   Pt. Educated on results of MBS study, recommended diet and recommended safe swallow strategies       "

## 2024-03-29 NOTE — PROGRESS NOTES
Occupational Therapy                 Therapy Communication Note    Patient Name: New Castano  MRN: [id number]  Today's Date: 3/29/2024     Discipline: Occupational Therapy    Missed Visit Reason: Missed Visit Reason:  (Attempted to see pt for OT tx.  Pt orienred to self and year falling asleep. attempted several times to work with pt but pt falling asleep and unable to keep awake)    Missed Time: Attempt     Urine collected and sent to lab. No needs at this time.

## 2024-03-29 NOTE — PROGRESS NOTES
03/29/24 1541   Discharge Planning   Assistance Needed c/w IV diuretic therapy and monitor response-Cardiology following   Patient expects to be discharged to: [facility]; [facility]   Does the patient need discharge transport arranged? Yes   RoundTrip coordination needed? Yes   Has discharge transport been arranged? No

## 2024-03-29 NOTE — PROGRESS NOTES
New Castano is a 83 y.o. male on day 7 of admission presenting with Pneumonia of right lung due to infectious organism, unspecified part of lung.      Subjective   No chest pain  Seen this morning   More awake,   Feels better  Noted nursing concern about aspiration   'speech consulted       Objective     Last Recorded Vitals  BP (!) 165/112 (BP Location: Right arm, Patient Position: Lying)   Pulse 106   Temp 36.9 °C (98.4 °F) (Temporal)   Resp 20   Wt 102 kg (224 lb 3.3 oz)   SpO2 96%   Intake/Output last 3 Shifts:    Intake/Output Summary (Last 24 hours) at 3/29/2024 1658  Last data filed at 3/29/2024 0500  Gross per 24 hour   Intake 160 ml   Output 1425 ml   Net -1265 ml         Admission Weight  Weight: 104 kg (230 lb) (03/27/24 1300)    Daily Weight  03/29/24 : 102 kg (224 lb 3.3 oz)    Image Results  FL modified barium swallow study  Narrative: Interpreted By:  Juve Barrios,  and Amber Benjamin   STUDY:  FL MODIFIED BARIUM SWALLOW STUDY;; 3/29/2024 1:01 pm      INDICATION:  Signs/Symptoms:r/o aspiration.      COMPARISON:  None.      ACCESSION NUMBER(S):  [id number]      ORDERING CLINICIAN:  OTF DEL ROSARIO      TECHNIQUE:  MBSS completed. Informed verbal consent obtained prior to completion  of exam. Trials of thin, nectar thick, honey thick, puree, and  regular solids given. Fluoroscopy time :  2 minutes, 29 seconds.      SLP: Cassidy Clark  Phone/Pager:      SPEECH FINDINGS:  Reason for referral: Concern for aspiration  Patient hx: DM, HTN, HLD  Respiratory status: WFL  Previous diet: Reg/Th      FINAL SPEECH RECOMMENDATIONS      Diet recommendations/feeding strategies: Regular diet/thin liquids -  upright posture for PO, slow rate, small bites/sips      Follow-up speech therapy recommended: Yes.          Education provided: Yes.      Mechanics of the swallow summary:  *Oral phase: Decreased labial seal with anterior spillage. Poor  cohesive bolus hold with liquid pooling in sulci and  spillage  anteriorly after swallow completed. Delayed swallow onset with  spillage over base of tongue. Mildly prolonged mastication 2/2 with  eventual oral clearance. *Pharyngeal phase: Poor visualization of  pharynx 2/2 pt positioning in chair and decreased participation in  attempt at repositioning.  Delayed swallow with spillage to the  pyriform with thin liquids prior to swallow initiation and to the  valleculae with all other trials. Functional hyolaryngeal  elevation/excursion. Delayed but complete epiglottic inversion and  laryngeal vestibule closure. No observed penetration or aspiration,  however poor visualization of laryngeal vestibule and trachea, though  seemingly no column of contrast coating posterior epiglottis. Mild  pooling in valleculae and pyriform after swallow, cleared with  subsequent swallows or cued double swallow. *Esophageal phase:  Retention in esophagus noted      SLP impressions with severity rating: Mild oropharyngeal dysphagia      Thin Liquids (MBSS)  Rosenbek's Penetration Aspiration Scale, Thin Liquids (MBSS):  1. NO ASPIRATION & NO PENETRATION - no aspiration, contrast does  not enter airway          Nectar Thick Liquids (MBSS)  Rosenbek's Penetration Aspiration Scale, Nectar thick liquids (MBSS):  1. NO ASPIRATION & NO PENETRATION - no aspiration, contrast does  not enter airway          Purees (MBSS)  Rosenbek's Penetration Aspiration Scale, Purees (MBSS):  1. NO ASPIRATION & NO PENETRATION - no aspiration, contrast does  not enter airway      Solids (MBSS)  Rosenbek's Penetration Aspiration Scale, Solids (MBSS):  1. NO ASPIRATION & NO PENETRATION - no aspiration, contrast does  not enter airway      Speech Therapy section of this report signed by Cassidy Clark MA.CCC-SLP on 3/29/2024 at 3:41 pm.      RADIOLOGY FINDINGS:  As above.      Radiology section of this report signed by Juve Barrios MD.      Impression: No aspiration or penetration of the airway. Please see above  for  details.      MACRO:  None      Signed by: Juve Barrios 3/29/2024 4:15 PM  Dictation workstation:   [id number]      Physical Exam    Constitutional:       Appearance: Normal appearance. More alert  HENT:      Head: Normocephalic and atraumatic.   Cardiovascular:      Rate and Rhythm: Normal rate and regular rhythm.   Pulmonary:      Effort: Pulmonary effort is normal.   Abdominal:      General: Abdomen obese, bs presetn   Musculoskeletal:         General: Normal range of motion.      Cervical back: Normal range of motion and neck supple.   Skin:     General: Skin is warm and dry.   Neurological:      General: No focal deficit present.      Mental Status: A and O x 1-2, no focal deficits    Edema ++ on thighs and abdomen        No current facility-administered medications on file prior to encounter.     Current Outpatient Medications on File Prior to Encounter   Medication Sig Dispense Refill    apixaban (Eliquis) 2.5 mg tablet Take 1 tablet (2.5 mg) by mouth 2 times a day.      atorvastatin (Lipitor) 20 mg tablet Take 1 tablet (20 mg) by mouth once daily at bedtime.      doxazosin (Cardura) 4 mg tablet Take 1 tablet (4 mg) by mouth once daily at bedtime.      empagliflozin (Jardiance) 25 mg Take 1 tablet (25 mg) by mouth once daily.      finasteride (Proscar) 5 mg tablet Take 1 tablet (5 mg) by mouth once daily.      furosemide (Lasix) 40 mg tablet Take 1 tablet (40 mg) by mouth 2 times a day. 60 tablet 0    glimepiride (Amaryl) 2 mg tablet Take 1 tablet (2 mg) by mouth once daily in the morning. Take before meals.      losartan (Cozaar) 50 mg tablet Take 1 tablet (50 mg) by mouth once daily. 30 tablet 0    metFORMIN, OSM, (Fortamet) 1,000 mg 24 hr tablet Take 1 tablet (1,000 mg) by mouth 2 times a day with meals. Do not crush, chew, or split.      metoprolol succinate XL (Toprol-XL) 50 mg 24 hr tablet Take 3 tablets (150 mg) by mouth 2 times a day. 180 tablet 0    omeprazole (PriLOSEC) 20 mg DR capsule Take  1 capsule (20 mg) by mouth once daily in the morning. Take before meals. Do not crush or chew.      [DISCONTINUED] cyanocobalamin (Vitamin B-12) 500 mcg tablet Take 1 tablet (500 mcg) by mouth once daily.      [DISCONTINUED] docusate sodium (Colace) 100 mg capsule Take 1 capsule (100 mg) by mouth 2 times a day. (Patient not taking: Reported on 3/22/2024)      [DISCONTINUED] loperamide (Imodium) 2 mg capsule Take 1 capsule (2 mg) by mouth 3 times a day as needed for diarrhea.         Results for orders placed or performed during the hospital encounter of 03/22/24 (from the past 24 hour(s))   Basic Metabolic Panel   Result Value Ref Range    Glucose 171 (H) 74 - 99 mg/dL    Sodium 146 (H) 136 - 145 mmol/L    Potassium 3.8 3.5 - 5.3 mmol/L    Chloride 105 98 - 107 mmol/L    Bicarbonate 28 21 - 32 mmol/L    Anion Gap 17 10 - 20 mmol/L    Urea Nitrogen 41 (H) 6 - 23 mg/dL    Creatinine 2.32 (H) 0.50 - 1.30 mg/dL    eGFR 27 (L) >60 mL/min/1.73m*2    Calcium 8.3 (L) 8.6 - 10.3 mg/dL   CBC   Result Value Ref Range    WBC 7.9 4.4 - 11.3 x10*3/uL    nRBC 0.4 (H) 0.0 - 0.0 /100 WBCs    RBC 5.20 4.50 - 5.90 x10*6/uL    Hemoglobin 13.7 13.5 - 17.5 g/dL    Hematocrit 47.7 41.0 - 52.0 %    MCV 92 80 - 100 fL    MCH 26.3 26.0 - 34.0 pg    MCHC 28.7 (L) 32.0 - 36.0 g/dL    RDW 17.2 (H) 11.5 - 14.5 %    Platelets 154 150 - 450 x10*3/uL   Magnesium   Result Value Ref Range    Magnesium 2.50 (H) 1.60 - 2.40 mg/dL       Vitals:    03/29/24 0430   Weight: 102 kg (224 lb 3.3 oz)         Intake/Output Summary (Last 24 hours) at 3/29/2024 1658  Last data filed at 3/29/2024 0500  Gross per 24 hour   Intake 160 ml   Output 1425 ml   Net -1265 ml           Assessment/Plan      Acute respiratory failure with hypoxia d/t pneumonia-IV as per orders, better  Weakness-PT/OT following, will need SNF.   HTN- better with less agitation  Agitation- ativan as needed  Acute on chronic diastolic heart failure  A fib on eliquis, rate controlled  EF 50 in  12/23  Check bladder scan  Resume losartan        Cardio consulted  Input noted   Cont current regimen  Cont with diuresis    Will monitor renal fucntion   Consulted speech  Updated daughter     -Continue current treatment as ordered. Will make adjustments as necessary.    Plan of care discussed with: Provider, RN, Patient      Kevon Gunderson MD

## 2024-03-29 NOTE — PROGRESS NOTES
Subjective Data:  Patient reports c/o dyspnea at rest, oxygen was not on per his family and was recently replaced.  Optimal urinary response to IV diuretic therapy   Supp via nc 1 liter  Net neg 1440 ml    Overnight Events:    None reported     Objective Data:  Last Recorded Vitals:  Vitals:    24 0118 24 0430 24 0500 24 0822   BP: (!) 133/94  140/89 (!) 168/116   BP Location:   Right arm Right arm   Patient Position:   Lying Lying   Pulse: 98  84 99   Resp:    20   Temp: 36.4 °C (97.6 °F)  36.3 °C (97.3 °F) 37.1 °C (98.7 °F)   TempSrc: Axillary  Temporal Temporal   SpO2: 100%  96% 97%   Weight:  102 kg (224 lb 3.3 oz)       Medical Gas Therapy: Supplemental oxygen (Simultaneous filing. User may not have seen previous data.)  O2 Delivery Method: Nasal cannula (Simultaneous filing. User may not have seen previous data.)  Weight  Av kg (228 lb 1.1 oz)  Min: 102 kg (224 lb 3.3 oz)  Max: 104 kg (230 lb)    LABS:  CMP:  Results from last 7 days   Lab Units 24  0523 24  0555 24  0546 24  0712 24  0721 24  0535 24  0615 24  1222   SODIUM mmol/L 146* 143 145 142 143 140 140 141   POTASSIUM mmol/L 3.8 4.2 3.5 3.5 3.6 3.9 3.9 4.3   CHLORIDE mmol/L 105 103 103 101 103 102 101 101   CO2 mmol/L 28 29 33* 33* 32 27 29 32   ANION GAP mmol/L 17 15 13 12 12 15 14 12   BUN mg/dL 41* 34* 31* 36* 36* 37* 35* 38*   CREATININE mg/dL 2.32* 2.06* 2.05* 2.00* 2.08* 2.17* 2.08* 2.08*   EGFR mL/min/1.73m*2 27* 31* 31* 32* 31* 29* 31* 31*   MAGNESIUM mg/dL 2.50* 2.50*  --   --   --   --   --   --    ALBUMIN g/dL  --   --   --   --   --   --   --  3.4   ALT U/L  --   --   --   --   --   --   --  24   AST U/L  --   --   --   --   --   --   --  22   BILIRUBIN TOTAL mg/dL  --   --   --   --   --   --   --  1.0     CBC:  Results from last 7 days   Lab Units 24  0523 24  0555 24  0712 24  0721 24  0535 24  0615 24  1222   WBC AUTO  "x10*3/uL 7.9 7.1 6.7 5.1 6.5 7.5 6.3   HEMOGLOBIN g/dL 13.7 13.4* 12.6* 12.7* 11.8* 11.9* 12.6*   HEMATOCRIT % 47.7 44.2 42.5 44.7 40.6* 40.4* 42.2   PLATELETS AUTO x10*3/uL 154 166 137* 126* 135* 129* 144*   MCV fL 92 86 90 92 92 89 92     COAG:     ABO: No results found for: \"ABO\"  HEME/ENDO:  Results from last 7 days   Lab Units 03/22/24  1222   TSH mIU/L 4.67*      CARDIAC:   Results from last 7 days   Lab Units 03/22/24  1222   TROPHS ng/L 19   BNP pg/mL >4,700*             Last I/O:    Intake/Output Summary (Last 24 hours) at 3/29/2024 1134  Last data filed at 3/29/2024 0500  Gross per 24 hour   Intake 210 ml   Output 1650 ml   Net -1440 ml     Net IO Since Admission: -5,740 mL [03/29/24 1134]         Inpatient Medications:  Scheduled medications   Medication Dose Route Frequency    apixaban  2.5 mg oral BID    atorvastatin  20 mg oral Nightly    cyanocobalamin  500 mcg oral Daily    docusate sodium  100 mg oral BID    doxazosin  8 mg oral Nightly    empagliflozin  25 mg oral Daily    finasteride  5 mg oral Daily    furosemide  80 mg intravenous BID    glimepiride  2 mg oral Daily before breakfast    hydrALAZINE  25 mg oral BID    ipratropium-albuteroL  3 mL nebulization TID    losartan  50 mg oral Daily    metoprolol succinate XL  150 mg oral BID    oxygen   inhalation Continuous - Inhalation    pantoprazole  40 mg oral Daily before breakfast    piperacillin-tazobactam  3.375 g intravenous q6h    polyethylene glycol  17 g oral Daily     PRN medications   Medication    acetaminophen    Or    acetaminophen    Or    acetaminophen    ipratropium-albuteroL    loperamide     Continuous Medications   Medication Dose Last Rate     Echocardiogram 12/22/23:  CONCLUSIONS:   - Technically difficult exam due to suboptimal positioning.   - Exam indication: Sustained atrial fibrillation   - The left ventricle is normal in size. Left ventricular systolic function is   mildly decreased. EF = 50 ± 5% (visual est.) Definity " contrast used for   endocardial border detection.   - The right ventricle is normal in size. Right ventricular systolic function is   low normal.   - The left atrial cavity is mildly dilated.   - There is AV sclerosis, no stenosis.   - Exam was compared with the prior  echocardiographic exam performed on 8/20/23.    LVEF has improved on side by side comparison. LV volumes have also improved     Physical Exam:  General:  Patient is awake, alert, and oriented.  Patient is in no acute distress.  HEENT:  Pupils equal and reactive.  Normocephalic.  Moist mucosa.    Neck:  No thyromegaly.  Normal Jugular Venous Pressure.  Cardiovascular:  Irregularly Irregular rhythm.   Pulmonary:  Non prod cough, + congestion, supp oxygen in place  Abdomen: Abd distended;  Positive bowel sounds.  Lower Extremities:  2+ pedal pulses. No LE edema.  Neurologic:  Cranial nerves intact.  No focal deficit.   Skin: Skin warm and dry, normal skin turgor.   Psychiatric: Normal affect.        Assessment/Plan   Cards: [facility]     New Castano is a 83 y.o. male with  past medical history of Afib s/p ablation (Eliquis/SAMIRA), atrial tachycardia, CKD3a, acute on chronic systolic heart failure( recovered LVEF to 50% from 38% on echo 12/2023), HTN, HLD, DM2 who presents to [facility] ED for worsening weakness and shortness of breath.  Cardiology consulted for CHF.     I reviewed telemetry Paroxysmal Atrial Flutter  with frequent PVC's     #Acute on chronic systolic heart failure ( recovered LVEF to 50% on echo 12/2023) in the sett of CAP   -Elevated BNP over 4700 (prior BNP 4700)  -chest xray free of congestion->right basilar airspace consolidation   --IV dose 80mg IV BID   -home dose of furosemide was 40 mg p.o. twice daily     #Hx of Atrial tachycardia 2:1; atypical atrial flutter.    -EKG currently atrial flutter rates controlled 80s -> . May benefit from restoration of sinus rhythm at some point. Defer to his outpatient cardiologist.   -Agree with  Eliquis 2.5mg BID    #CKD    #Hypertension-elevated SBP 150s to 160s  - continue regimen and will increase hydralazine to TID> continue to uptitrate as needed          RECS:  -c/w IV diuretic therapy and monitor response  - CT of chest w/o contrast reveals bilat pleural effusions R>L, consistent with HF; Superimposed PNA not excluded  -Continue with home cardiac medications:  Eliquis 2.5mg BID, Toprol 150mg BID, losartan 50mg daily, Jardiance 25mg daily, doxazosin 8mg daily and Lipitor 20 mg daily  - Added PO Hydralazine  3/28/24; 25 mg BID > will increase to TID today  -Post Discharge patient will benefit from follow up with his outpatient cardiologist at the [facility]        Code Status:  Full Code    I spent 40 minutes in the professional and overall care of this patient.        Josefina Beckwith, APRN-CNP

## 2024-03-29 NOTE — CARE PLAN
Problem: Skin  Goal: Decreased wound size/increased tissue granulation at next dressing change  Outcome: Progressing  Flowsheets (Taken 3/29/2024 0527)  Decreased wound size/increased tissue granulation at next dressing change: Promote sleep for wound healing  Goal: Participates in plan/prevention/treatment measures  Outcome: Progressing  Flowsheets (Taken 3/29/2024 0527)  Participates in plan/prevention/treatment measures: Elevate heels

## 2024-03-29 NOTE — PROGRESS NOTES
Physical Therapy                 Therapy Communication Note    Patient Name: New Castano  MRN: [id number]  Today's Date: 3/29/2024     Discipline: Physical Therapy    Missed Visit Reason: Missed Visit Reason:  (pt unable to keep eyes open despite multiple attempts with  vc/tc)    Missed Time: Attempt    Comment: Participated in performance of tx and documentation under the direct supervision of CI, student Kristen CHRISTIANSON

## 2024-03-29 NOTE — PROGRESS NOTES
Speech-Language Pathology    .Speech-Language Pathology Clinical Swallow Evaluation    Patient Name: New Castano  MRN: [id number]  : 1940  Today's Date: 24  Start time: Start Time: 935  Stop time: Stop Time: 1002  Time calculation (min) : Time Calculation (min): 27 min      ASSESSMENT  Impressions:  WFl oral phase and mild suspected pharyngeal phase dysphagia based on clinical swallow evaluation.  Prognosis: Good    PLAN  Recommendations:  MBSS recommended: Yes; MBSS is recommended in order to objectively assess for aspiration risk, safest/least restrictive diet, and any effective compensatory strategies.  Solid consistency: Regular (IDDSI level 7)  Liquid consistency: Thin (IDDSI 0)  Medication administration: Whole, in puree  Compensatory swallow strategies: Upright positioning for all PO intake, Slow rate of intake, Small bites, and Small sips    Recommended frequency/duration:  Skilled SLP services recommended: Yes  Frequency: 2x/week  Duration: 1 week  Discharge recommendation: Recommend MODERATE intensity ST upon DC in order to ensure safety with least restrictive diet.  Treatment/Interventions: Assess diet tolerance  Strengths: Family/caregiver support  Barriers to participation in tx: Cognition and Motivation    Goals (start date 3/29/24. Anticipated time frame for goal attainment: 1 week):  Pt will consume prescribed diet (current diet is regular diet and thin liquids) without overt s/sx aspiration/penetration in 95% of observed trials.  Pt will demonstrate follow-through of trained compensatory strategies during a meal/snack with 80% acc independently.  Pt/family will demonstrate understanding of education related to dysphagia independently.  Pt will participate in MBSS to assess for safest/least restrictive diet and any effective compensatory strategies.   Status: Goal initiated this date   Progress this date: N/A       SUBJECTIVE    PMHx relevant to rehab: DM, HTN, HLD    Chief complaint:  Pt was admitted on 3/22/24 due to pneumonia    Relevant imaging results:  CXR notable for RLL infiltrate    General Visit Information:  SLP Received On: 03/29/24  Patient Class: Inpatient  Living Environment: Home  Ordering Physician: Neptali  Reason for Referral: dysphagia assessment  Prior to Session Communication: Bedside nurse    RN cleared pt to participate in session and reported appropriate for assessment    Pt reported willing to participate in assessment          BaseLine Diet: Regular/thin  Current Diet : regular/thin    Pain Assessment  Pain Assessment: 0-10  Pain Score: 0 - No pain  Response to Interventions: no further s/sx of pain at this time    Pt was alert and pleasantly confused for session.  Orientation: Oriented to self and Did not assess orientation to place or time  Ability to follow functional commands: Follows one-step commands appropriately  Nutritional status: Appears well-nourished/no concerns    Respiratory status: Supplemental oxygen via NC  Baseline Vocal Quality: Normal  Volitional Cough: Strong  Volitional Swallow: Within Functional Limits  Patient positioning: Upright in bed      OBJECTIVE  Clinical swallow evaluation completed and consisted of interview, oral motor assessment, and PO trials (pureed, soft and hard solids, thin liquids).  ORAL PHASE: Full dentures in good condition. Oral mucosa were pink, moist, and free of obvious lesions. Lingual strength and ROM were grossly WFL. Labial strength/ROM were WFL. Labial seal was adequate. Mastication of regular solids was WFL. A/P transit and oral clearance were reduced.  PHARYNGEAL PHASE: Laryngeal elevation was visualized or palpated with all trials, however adequacy of hyolaryngeal elevation/excursion cannot be determined at bedside. No immediate or delayed s/sx aspiration/penetration were observed with any consistencies.    Was 3oz challenge administered: No, deferred due to safety concerns.      Treatment/Education:  Results and  recommendations were relayed to: Patient, Family, and Bedside nurse  Education provided: Yes   Learner: Patient and Family   Barriers to learning: Acuteness of illness barrier and Cognitive limitations barrier   Method of teaching: Verbal   Topic: role of ST, results of assessment, risk for aspiration, recommendation for MBSS, recommended safe swallow strategies, and recommendation for dysphagia follow-up   Outcome of teaching: Caregiver demonstrated good understanding, Pt verbalized understanding, and Education will be reinforced during follow-up visits, as appropriate  Treatment provided: No  Next Treatment Priority: 3/29

## 2024-03-30 ENCOUNTER — APPOINTMENT (OUTPATIENT)
Dept: RADIOLOGY | Facility: HOSPITAL | Age: 84
DRG: 193 | End: 2024-03-30
Payer: MEDICARE

## 2024-03-30 LAB
ANION GAP BLDA CALCULATED.4IONS-SCNC: 9 MMO/L (ref 10–25)
ANION GAP SERPL CALC-SCNC: 13 MMOL/L (ref 10–20)
BASE EXCESS BLDA CALC-SCNC: 8.2 MMOL/L (ref -2–3)
BODY TEMPERATURE: 37 DEGREES CELSIUS
BUN SERPL-MCNC: 44 MG/DL (ref 6–23)
CA-I BLDA-SCNC: 1.18 MMOL/L (ref 1.1–1.33)
CALCIUM SERPL-MCNC: 7.9 MG/DL (ref 8.6–10.3)
CHLORIDE BLDA-SCNC: 104 MMOL/L (ref 98–107)
CHLORIDE SERPL-SCNC: 105 MMOL/L (ref 98–107)
CHLORIDE UR-SCNC: 68 MMOL/L
CHLORIDE/CREATININE (MMOL/G) IN URINE: 191 MMOL/G CREAT (ref 23–275)
CO2 SERPL-SCNC: 37 MMOL/L (ref 21–32)
CREAT SERPL-MCNC: 2.4 MG/DL (ref 0.5–1.3)
CREAT UR-MCNC: 35.6 MG/DL (ref 20–370)
EGFRCR SERPLBLD CKD-EPI 2021: 26 ML/MIN/1.73M*2
ERYTHROCYTE [DISTWIDTH] IN BLOOD BY AUTOMATED COUNT: 17.3 % (ref 11.5–14.5)
GLUCOSE BLDA-MCNC: 141 MG/DL (ref 74–99)
GLUCOSE SERPL-MCNC: 148 MG/DL (ref 74–99)
HCO3 BLDA-SCNC: 39 MMOL/L (ref 22–26)
HCT VFR BLD AUTO: 48.8 % (ref 41–52)
HCT VFR BLD EST: 43 % (ref 41–52)
HGB BLD-MCNC: 13.6 G/DL (ref 13.5–17.5)
HGB BLDA-MCNC: 14.2 G/DL (ref 13.5–17.5)
INHALED O2 CONCENTRATION: 36 %
LACTATE BLDA-SCNC: 0.9 MMOL/L (ref 0.4–2)
MAGNESIUM SERPL-MCNC: 2.6 MG/DL (ref 1.6–2.4)
MAGNESIUM SERPL-MCNC: 2.7 MG/DL (ref 1.6–2.4)
MCH RBC QN AUTO: 25.9 PG (ref 26–34)
MCHC RBC AUTO-ENTMCNC: 27.9 G/DL (ref 32–36)
MCV RBC AUTO: 93 FL (ref 80–100)
NRBC BLD-RTO: 0.3 /100 WBCS (ref 0–0)
OXYHGB MFR BLDA: 94.7 % (ref 94–98)
PCO2 BLDA: 87 MM HG (ref 38–42)
PH BLDA: 7.26 PH (ref 7.38–7.42)
PLATELET # BLD AUTO: 131 X10*3/UL (ref 150–450)
PO2 BLDA: 92 MM HG (ref 85–95)
POTASSIUM BLDA-SCNC: 3.5 MMOL/L (ref 3.5–5.3)
POTASSIUM SERPL-SCNC: 3.5 MMOL/L (ref 3.5–5.3)
POTASSIUM UR-SCNC: 34 MMOL/L
POTASSIUM/CREAT UR-RTO: 96 MMOL/G CREAT
PROT UR-ACNC: 51 MG/DL (ref 5–25)
RBC # BLD AUTO: 5.25 X10*6/UL (ref 4.5–5.9)
SAO2 % BLDA: 97 % (ref 94–100)
SODIUM BLDA-SCNC: 148 MMOL/L (ref 136–145)
SODIUM SERPL-SCNC: 151 MMOL/L (ref 136–145)
SODIUM UR-SCNC: 55 MMOL/L
SODIUM/CREAT UR-RTO: 154 MMOL/G CREAT
WBC # BLD AUTO: 7.4 X10*3/UL (ref 4.4–11.3)

## 2024-03-30 PROCEDURE — 82374 ASSAY BLOOD CARBON DIOXIDE: CPT | Performed by: NURSE PRACTITIONER

## 2024-03-30 PROCEDURE — 84166 PROTEIN E-PHORESIS/URINE/CSF: CPT | Performed by: INTERNAL MEDICINE

## 2024-03-30 PROCEDURE — 82436 ASSAY OF URINE CHLORIDE: CPT | Performed by: INTERNAL MEDICINE

## 2024-03-30 PROCEDURE — 2500000004 HC RX 250 GENERAL PHARMACY W/ HCPCS (ALT 636 FOR OP/ED): Performed by: INTERNAL MEDICINE

## 2024-03-30 PROCEDURE — 94660 CPAP INITIATION&MGMT: CPT

## 2024-03-30 PROCEDURE — 94640 AIRWAY INHALATION TREATMENT: CPT | Mod: MUE

## 2024-03-30 PROCEDURE — 2500000002 HC RX 250 W HCPCS SELF ADMINISTERED DRUGS (ALT 637 FOR MEDICARE OP, ALT 636 FOR OP/ED): Mod: MUE | Performed by: INTERNAL MEDICINE

## 2024-03-30 PROCEDURE — 1200000002 HC GENERAL ROOM WITH TELEMETRY DAILY

## 2024-03-30 PROCEDURE — 99232 SBSQ HOSP IP/OBS MODERATE 35: CPT | Performed by: INTERNAL MEDICINE

## 2024-03-30 PROCEDURE — 36600 WITHDRAWAL OF ARTERIAL BLOOD: CPT

## 2024-03-30 PROCEDURE — 2500000005 HC RX 250 GENERAL PHARMACY W/O HCPCS: Performed by: FAMILY MEDICINE

## 2024-03-30 PROCEDURE — 84166 PROTEIN E-PHORESIS/URINE/CSF: CPT | Mod: AHULAB | Performed by: INTERNAL MEDICINE

## 2024-03-30 PROCEDURE — 2500000001 HC RX 250 WO HCPCS SELF ADMINISTERED DRUGS (ALT 637 FOR MEDICARE OP): Performed by: INTERNAL MEDICINE

## 2024-03-30 PROCEDURE — 83735 ASSAY OF MAGNESIUM: CPT | Performed by: NURSE PRACTITIONER

## 2024-03-30 PROCEDURE — 84156 ASSAY OF PROTEIN URINE: CPT | Mod: AHULAB | Performed by: INTERNAL MEDICINE

## 2024-03-30 PROCEDURE — 76770 US EXAM ABDO BACK WALL COMP: CPT

## 2024-03-30 PROCEDURE — 76770 US EXAM ABDO BACK WALL COMP: CPT | Performed by: RADIOLOGY

## 2024-03-30 PROCEDURE — 84165 PROTEIN E-PHORESIS SERUM: CPT | Performed by: PATHOLOGY

## 2024-03-30 PROCEDURE — 83735 ASSAY OF MAGNESIUM: CPT | Performed by: INTERNAL MEDICINE

## 2024-03-30 PROCEDURE — 84132 ASSAY OF SERUM POTASSIUM: CPT | Performed by: FAMILY MEDICINE

## 2024-03-30 PROCEDURE — 85027 COMPLETE CBC AUTOMATED: CPT | Performed by: NURSE PRACTITIONER

## 2024-03-30 PROCEDURE — 36415 COLL VENOUS BLD VENIPUNCTURE: CPT | Performed by: NURSE PRACTITIONER

## 2024-03-30 PROCEDURE — 86325 OTHER IMMUNOELECTROPHORESIS: CPT | Performed by: INTERNAL MEDICINE

## 2024-03-30 PROCEDURE — 86320 SERUM IMMUNOELECTROPHORESIS: CPT | Performed by: PATHOLOGY

## 2024-03-30 PROCEDURE — 86334 IMMUNOFIX E-PHORESIS SERUM: CPT | Mod: AHULAB | Performed by: INTERNAL MEDICINE

## 2024-03-30 PROCEDURE — 84155 ASSAY OF PROTEIN SERUM: CPT | Mod: AHULAB | Performed by: INTERNAL MEDICINE

## 2024-03-30 PROCEDURE — 36415 COLL VENOUS BLD VENIPUNCTURE: CPT | Performed by: INTERNAL MEDICINE

## 2024-03-30 RX ORDER — HYDRALAZINE HYDROCHLORIDE 50 MG/1
50 TABLET, FILM COATED ORAL 2 TIMES DAILY
Status: DISCONTINUED | OUTPATIENT
Start: 2024-03-30 | End: 2024-04-09 | Stop reason: HOSPADM

## 2024-03-30 RX ORDER — POTASSIUM CHLORIDE 1.5 G/1.58G
20 POWDER, FOR SOLUTION ORAL 3 TIMES DAILY
Status: DISCONTINUED | OUTPATIENT
Start: 2024-03-30 | End: 2024-04-09 | Stop reason: HOSPADM

## 2024-03-30 RX ORDER — FUROSEMIDE 10 MG/ML
60 INJECTION INTRAMUSCULAR; INTRAVENOUS
Status: DISCONTINUED | OUTPATIENT
Start: 2024-03-30 | End: 2024-04-09 | Stop reason: HOSPADM

## 2024-03-30 RX ADMIN — METOPROLOL SUCCINATE 150 MG: 50 TABLET, EXTENDED RELEASE ORAL at 09:45

## 2024-03-30 RX ADMIN — PIPERACILLIN SODIUM AND TAZOBACTAM SODIUM 3.38 G: 3; .375 INJECTION, SOLUTION INTRAVENOUS at 04:11

## 2024-03-30 RX ADMIN — HYDRALAZINE HYDROCHLORIDE 50 MG: 50 TABLET ORAL at 21:00

## 2024-03-30 RX ADMIN — POLYETHYLENE GLYCOL 3350 17 G: 17 POWDER, FOR SOLUTION ORAL at 09:45

## 2024-03-30 RX ADMIN — APIXABAN 2.5 MG: 2.5 TABLET, FILM COATED ORAL at 21:00

## 2024-03-30 RX ADMIN — LOSARTAN POTASSIUM 50 MG: 50 TABLET, FILM COATED ORAL at 09:45

## 2024-03-30 RX ADMIN — PIPERACILLIN SODIUM AND TAZOBACTAM SODIUM 3.38 G: 3; .375 INJECTION, SOLUTION INTRAVENOUS at 21:00

## 2024-03-30 RX ADMIN — FUROSEMIDE 80 MG: 10 INJECTION, SOLUTION INTRAVENOUS at 09:46

## 2024-03-30 RX ADMIN — IPRATROPIUM BROMIDE AND ALBUTEROL SULFATE 3 ML: 2.5; .5 SOLUTION RESPIRATORY (INHALATION) at 13:35

## 2024-03-30 RX ADMIN — ATORVASTATIN CALCIUM 20 MG: 20 TABLET, FILM COATED ORAL at 21:00

## 2024-03-30 RX ADMIN — EMPAGLIFLOZIN 25 MG: 10 TABLET, FILM COATED ORAL at 09:45

## 2024-03-30 RX ADMIN — FUROSEMIDE 60 MG: 10 INJECTION, SOLUTION INTRAMUSCULAR; INTRAVENOUS at 16:58

## 2024-03-30 RX ADMIN — IPRATROPIUM BROMIDE AND ALBUTEROL SULFATE 3 ML: 2.5; .5 SOLUTION RESPIRATORY (INHALATION) at 07:12

## 2024-03-30 RX ADMIN — GLIMEPIRIDE 2 MG: 2 TABLET ORAL at 09:45

## 2024-03-30 RX ADMIN — POTASSIUM CHLORIDE 20 MEQ: 1.5 POWDER, FOR SOLUTION ORAL at 21:00

## 2024-03-30 RX ADMIN — APIXABAN 2.5 MG: 2.5 TABLET, FILM COATED ORAL at 09:45

## 2024-03-30 RX ADMIN — IPRATROPIUM BROMIDE AND ALBUTEROL SULFATE 3 ML: 2.5; .5 SOLUTION RESPIRATORY (INHALATION) at 21:11

## 2024-03-30 RX ADMIN — DOCUSATE SODIUM 100 MG: 100 CAPSULE, LIQUID FILLED ORAL at 09:45

## 2024-03-30 RX ADMIN — PIPERACILLIN SODIUM AND TAZOBACTAM SODIUM 3.38 G: 3; .375 INJECTION, SOLUTION INTRAVENOUS at 09:45

## 2024-03-30 RX ADMIN — FINASTERIDE 5 MG: 5 TABLET, FILM COATED ORAL at 09:45

## 2024-03-30 RX ADMIN — DOXAZOSIN 8 MG: 2 TABLET ORAL at 21:00

## 2024-03-30 RX ADMIN — CYANOCOBALAMIN TAB 500 MCG 500 MCG: 500 TAB at 09:45

## 2024-03-30 RX ADMIN — Medication: at 20:00

## 2024-03-30 RX ADMIN — PIPERACILLIN SODIUM AND TAZOBACTAM SODIUM 3.38 G: 3; .375 INJECTION, SOLUTION INTRAVENOUS at 17:03

## 2024-03-30 NOTE — CONSULTS
Reason For Consult  Worsening hypernatremia with diuresis    History Of Present Illness  New Castano is a 83 y.o. male presenting with worsening shortness of breath and generalized weakness.  Prior to admission patient used to do walking with ambulation started having worsening weakness and shortness of breath present to ED where he was found to have pneumonia with hypoxic was placed on 4 L oxygen and started on antibiotics.  Evaluated by cardiology was found to have acute on chronic HFpEF and started on diuresis with Lasix 80 mg 3 times daily  Patient also had persistent atrial flutter followed by cardiology and hypertension.  Nephrology was consulted for hyponatremia in the context of diuresis, I reviewed his fluid allowed volume per 24 hours close 2 L/day  Patient had yesterday 1900 mL urine output  ROSE noted on top of CKD     Past Medical History  He has a past medical history of Diabetes mellitus (CMS/HCC), Hypertension, Mixed hyperlipidemia (02/13/2013), Paroxysmal atrial fibrillation (CMS/[facility]) (03/08/2018), S/P ablation of atrial fibrillation (05/03/2019), and Stage 3b chronic kidney disease (CMS/HCC) (12/29/2023).    Surgical History  He has a past surgical history that includes Cardioversion and Cardiac electrophysiology mapping and ablation.     Social History  He reports that he has quit smoking. His smoking use included cigarettes. He has quit using smokeless tobacco. No history on file for alcohol use and drug use.    Family History  No family history on file.     Allergies  Pollen extracts    Review of Systems  All systems were reviewed     Physical Exam  General appearance: Comfortable no acute distress  Head and ENT: Normocephalic/atraumatic/supple neck/no JVD  Lungs: Anterior rhonchi  Heart: RRR  Abdomen; soft tenderness  Extremities: Bilateral lower extremity edema +2  Neurologic: Physiologic         I&O 24HR    Intake/Output Summary (Last 24 hours) at 3/30/2024 1436  Last data filed at 3/30/2024  0300  Gross per 24 hour   Intake --   Output 1900 ml   Net -1900 ml       Vitals 24HR  Heart Rate:  []   Temp:  [36.4 °C (97.5 °F)-36.9 °C (98.4 °F)]   Resp:  [16-22]   BP: (107-165)/()   SpO2:  [92 %-99 %]         Relevant Results  Results from last 7 days   Lab Units 03/30/24  0636 03/29/24  0523 03/28/24  0555   SODIUM mmol/L 151* 146* 143   POTASSIUM mmol/L 3.5 3.8 4.2   CHLORIDE mmol/L 105 105 103   CO2 mmol/L 37* 28 29   BUN mg/dL 44* 41* 34*   CREATININE mg/dL 2.40* 2.32* 2.06*   GLUCOSE mg/dL 148* 171* 208*   CALCIUM mg/dL 7.9* 8.3* 8.7      Results from last 7 days   Lab Units 03/30/24  0636 03/29/24  0523 03/28/24  0555   WBC AUTO x10*3/uL 7.4 7.9 7.1   HEMOGLOBIN g/dL 13.6 13.7 13.4*   HEMATOCRIT % 48.8 47.7 44.2   PLATELETS AUTO x10*3/uL 131* 154 166   FL modified barium swallow study    Result Date: 3/29/2024  Interpreted By:  Juve Barrios and Paulus Lindsey STUDY: FL MODIFIED BARIUM SWALLOW STUDY;; 3/29/2024 1:01 pm   INDICATION: Signs/Symptoms:r/o aspiration.   COMPARISON: None.   ACCESSION NUMBER(S): [id number]   ORDERING CLINICIAN: OTF DEL ROSARIO   TECHNIQUE: MBSS completed. Informed verbal consent obtained prior to completion of exam. Trials of thin, nectar thick, honey thick, puree, and regular solids given. Fluoroscopy time :  2 minutes, 29 seconds.   SLP: Cassidy Clark Phone/Pager:   SPEECH FINDINGS: Reason for referral: Concern for aspiration Patient hx: DM, HTN, HLD Respiratory status: WFL Previous diet: Reg/Th   FINAL SPEECH RECOMMENDATIONS   Diet recommendations/feeding strategies: Regular diet/thin liquids - upright posture for PO, slow rate, small bites/sips   Follow-up speech therapy recommended: Yes.     Education provided: Yes.   Mechanics of the swallow summary: *Oral phase: Decreased labial seal with anterior spillage. Poor cohesive bolus hold with liquid pooling in sulci and spillage anteriorly after swallow completed. Delayed swallow onset with spillage over base of  tongue. Mildly prolonged mastication 2/2 with eventual oral clearance. *Pharyngeal phase: Poor visualization of pharynx 2/2 pt positioning in chair and decreased participation in attempt at repositioning.  Delayed swallow with spillage to the pyriform with thin liquids prior to swallow initiation and to the valleculae with all other trials. Functional hyolaryngeal elevation/excursion. Delayed but complete epiglottic inversion and laryngeal vestibule closure. No observed penetration or aspiration, however poor visualization of laryngeal vestibule and trachea, though seemingly no column of contrast coating posterior epiglottis. Mild pooling in valleculae and pyriform after swallow, cleared with subsequent swallows or cued double swallow. *Esophageal phase: Retention in esophagus noted   SLP impressions with severity rating: Mild oropharyngeal dysphagia   Thin Liquids (MBSS) Rosenbek's Penetration Aspiration Scale, Thin Liquids (MBSS): 1. NO ASPIRATION & NO PENETRATION - no aspiration, contrast does not enter airway     Nectar Thick Liquids (MBSS) Rosenbek's Penetration Aspiration Scale, Nectar thick liquids (MBSS): 1. NO ASPIRATION & NO PENETRATION - no aspiration, contrast does not enter airway     Purees (MBSS) Rosenbek's Penetration Aspiration Scale, Purees (MBSS): 1. NO ASPIRATION & NO PENETRATION - no aspiration, contrast does not enter airway   Solids (MBSS) Rosenbek's Penetration Aspiration Scale, Solids (MBSS): 1. NO ASPIRATION & NO PENETRATION - no aspiration, contrast does not enter airway   Speech Therapy section of this report signed by Cassidy Clark MA.CCC-SLP on 3/29/2024 at 3:41 pm.   RADIOLOGY FINDINGS: As above.   Radiology section of this report signed by Juve Barrios MD.       No aspiration or penetration of the airway. Please see above for details.   MACRO: None   Signed by: Juve Barrios 3/29/2024 4:15 PM Dictation workstation:   [id number]    Electrocardiogram, 12-lead PRN ACS  symptoms    Result Date: 3/28/2024  Sinus rhythm with short GA with occasional Premature ventricular complexes and Fusion complexes Nonspecific intraventricular block T wave abnormality, consider lateral ischemia Abnormal ECG When compared with ECG of 22-MAR-2024 12:03, Previous ECG has undetermined rhythm, needs review    CT chest wo IV contrast    Result Date: 3/28/2024  Interpreted By:  Aimee Dc, STUDY: CT CHEST WO IV CONTRAST;  3/28/2024 3:37 pm   INDICATION: Signs/Symptoms:SOB.   COMPARISON: None.   ACCESSION NUMBER(S): [id number]   ORDERING CLINICIAN: LEA CASTRO   TECHNIQUE: CT of the chest was performed. Sagittal and coronal reconstructions were generated.  No intravenous contrast given for the examination.   FINDINGS: Hilar, vessel, and solid organ evaluation limited without IV contrast. Artifact related to motion and patient's body habitus limits evaluation.   CHEST WALL AND LOWER NECK: Edema in the lower chest wall bilaterally, right-greater-than-left. 4.6 x 2.1 cm fat attenuation lesion containing fine septation anterior to the left clavicular head. Additional incompletely included 2 cm fat attenuation nodule lateral to the right scapula and another subcentimeter fat attenuation nodule along the inferomedial left scapula. 2.0 x 1.3 cm hypodense nodule in the right lobe of the thyroid. Subcentimeter hypodense nodule in the left lobe of the thyroid.   MEDIASTINUM AND ELLE:  Limited hilar assessment on unenhanced exam. Subcentimeter lymph nodes in the anterior and middle mediastinum. Small hiatal hernia.   HEART AND VESSELS:  Lack of IV contrast precludes vascular luminal assessment. The heart is enlarged. No significant pericardial effusion. Multifocal atherosclerotic calcifications including the coronary arteries and aortic valve region.   LUNGS, PLEURA, LARGE AIRWAYS:  Respiratory motion artifact limits evaluation. Uneven bilateral interstitial opacities including subtle ground-glass and  reticular densities in both upper lobes, bandlike densities in both lung bases and patchy opacities in the posterior aspect of both lungs. Moderate to large right pleural effusion. Moderate left pleural effusion. Adjacent presumed compressive atelectasis of both lungs. No definite pneumothorax. The central airways are grossly patent allowing for motion artifact.   UPPER ABDOMEN:  Moderate free fluid. Stranding in the mesentery. Cholecystectomy clips near the liver hilum. Partially imaged soft tissue attenuation 4.4 cm rounded opacity in the region of the upper pole of the right kidney on the most caudal image.   BONES:  Kyphosis of the midthoracic spine. Multifocal presumed degenerative changes.       Cardiomegaly with bilateral infiltrates, right-greater-than-left pleural effusions, soft tissue edema, and free fluid. Findings are consistent with fluid overload/CHF. Superimposed pneumonia not excluded. Clinical correlation and follow-up to ensure resolution after appropriate treatment recommended.   Partially imaged indeterminate masslike area in the upper pole of the right kidney. Further evaluation which could include ultrasound or abdominal CT recommended.   2 cm hypodense nodule in the right lobe of the thyroid, can not be further characterized by CT. Further evaluation with ultrasound suggested.   Bilateral chest wall fat attenuation lesions, possible lipomas; largest lesion in the left anterior chest wall is slightly complex with thin septation. Low-grade liposarcoma can not be excluded based on imaging appearance. Clinical follow-up suggested.   Additional findings as described above.   MACRO: None.   Signed by: Aimee Dc 3/28/2024 3:55 PM Dictation workstation:   [id number]    NM Lung perfusion with spect/ct    Result Date: 3/22/2024  Interpreted By:  Cameron Howard and Osman Sena STUDY: NM LUNG PERFUSION WITH SPECT/CT;  3/22/2024 3:02 pm   INDICATION: Signs/Symptoms: 83-year-old male with hypoxia  elevated d dimer. Chest x-ray on 03/22/2024 demonstrate right basilar airspace consolidation.   COMPARISON: Chest x-ray from 03/22/2024   ACCESSION NUMBER(S): [id number]   ORDERING CLINICIAN: RANDAL VALENTIN   TECHNIQUE: DIVISION OF NUCLEAR MEDICINE PERFUSION LUNG SCANS   Multiple perfusion images of the lungs were acquired after the intravenous administration of 4.0 mCi of Tc-99m macroaggregated albumin (MAA). In addition, SPECT/CT of the chest was performed.   FINDINGS: Perfusion images of both lungs demonstrate mild heterogeneity throughout the lung fields bilaterally. There are no distinct segmental perfusion defects seen. A large perfusion defect at right lower lung matching chest x-ray finding of right basilar airspace consolidation.       There is nonspecific heterogeneity in perfusion of both lungs indicating low probability for acute pulmonary embolism.     The interpretation above is based on modified PISAPED criteria.   This study was analyzed and interpreted at [place], [place].   MACRO: None   Signed by: Cameron Howard 3/22/2024 4:10 PM Dictation workstation:   [id number]    ECG 12 lead    Result Date: 3/22/2024  See ED provider note for full interpretation and clinical correlation Confirmed by Megan Hatfield ([id number]) on 3/22/2024 4:02:11 PM    Vascular US Lower Extremity Venous Duplex Bilateral    Result Date: 3/22/2024  Interpreted By:  Filippo Ngo, STUDY: [facility] US LOWER EXTREMITY VENOUS DUPLEX BILATERAL;  3/22/2024 2:32 pm   INDICATION: Signs/Symptoms:swelling.   COMPARISON: None.   ACCESSION NUMBER(S): [id number]   ORDERING CLINICIAN: RANDAL VALENTIN   TECHNIQUE: Vascular ultrasound of the bilateral lower extremities was performed. Real-time compression views as well as Gray scale, color Doppler and spectral Doppler waveform analysis was performed.   FINDINGS: Evaluation of the visualized portions of the bilateral common femoral, proximal, mid, and  distal femoral, and popliteal veins was performed.  Evaluation of the visualized portions of the posterior tibial and peroneal veins was also performed.   Limitations: None   The evaluated veins demonstrate normal compressibility. There is intact venous flow demonstrating normal respiratory variability and normal augmentation of flow with calf compression.         No sonographic evidence for deep vein thrombosis within the evaluated veins of the bilateral lower extremities.   MACRO: None   Signed by: Filippo Ngo 3/22/2024 2:44 PM Dictation workstation:   [id number]    XR chest 2 views    Result Date: 3/22/2024  Interpreted By:  Contreras Rdz, STUDY: XR CHEST 2 VIEWS  3/22/2024 1:49 pm   INDICATION: Signs/Symptoms:sob   COMPARISON: None.   ACCESSION NUMBER(S): [id number]   ORDERING CLINICIAN: RANDAL VALENTIN   TECHNIQUE: PA and lateral views of the chest were obtained.   FINDINGS: Cardiac monitoring leads are seen over the chest. Right basilar airspace consolidation is seen and may represent atelectasis and/or pneumonia. No pleural effusion or pneumothorax is identified. The cardiac silhouette is within normal limits for size.  Mild discogenic degenerative changes are seen throughout the thoracic spine.       Right basilar airspace consolidation, as above. Clinical correlation and continued follow-up until clearing is recommended.   MACRO: None.   Signed by: Contreras Rdz 3/22/2024 2:09 PM Dictation workstation:   [id number]    ECG 12 lead    Result Date: 3/8/2024  Atrial flutter with variable AV block Left ventricular hypertrophy with QRS widening and repolarization abnormality ( Alan product ) Abnormal ECG When compared with ECG of 07-FEB-2024 07:40, Atrial flutter has replaced Sinus rhythm QRS duration has increased See ED provider note for full interpretation and clinical correlation Confirmed by Megan Hatfield ([id number]) on 3/8/2024 12:03:18 PM    CT head wo IV contrast    Result Date:  3/7/2024  Interpreted By:  Korey Ward, STUDY: CT HEAD WO IV CONTRAST; CT CERVICAL SPINE WO IV CONTRAST;  3/7/2024 5:25 pm   INDICATION: Signs/Symptoms:frequent falls on xeralto; Signs/Symptoms:fall.   COMPARISON: None.   ACCESSION NUMBER(S): [id number]; [id number]   ORDERING CLINICIAN: JACINTO GLASS   TECHNIQUE: Noncontrast axial CT scan of head and cervical spine was performed. Angled reformats in brain and bone windows were generated. The images were reviewed in bone, brain, blood and soft tissue windows.   FINDINGS: Head: There is no intra/extra-axial fluid collection, mass effect, or midline shift. The gray/white matter junction is preserved. Hypoattenuation of periventricular and subcortical white matter suggestive of chronic small vessel ischemic disease. Mild-to-moderate diffuse parenchymal volume loss is noted. There is vascular calcification. The basal cisterns are patent. Visualized paranasal sinuses and mastoid air cells are clear. The calvarium is intact.   Cervical spine: The bones are somewhat osteopenic. There is no acute fracture or traumatic subluxation in the cervical spine. Multilevel cervical spondylosis is seen. Prevertebral soft tissues are unremarkable. Vascular calcification is noted multiple thyroid nodules are seen measuring up to 2.2 cm. Ultrasound may be obtained for further evaluation. There is no apical pneumothorax. Layering right-sided pleural effusion is noted.       No evidence of acute cortical infarct or intracranial hemorrhage.   No acute fracture or subluxation in the cervical spine.   Thyroid nodules, amenable to further evaluation by ultrasound.   Layering right-sided pleural effusion   Signed by: Korey Ward 3/7/2024 6:06 PM Dictation workstation:   [id number]    CT cervical spine wo IV contrast    Result Date: 3/7/2024  Interpreted By:  Korey Ward, STUDY: CT HEAD WO IV CONTRAST; CT CERVICAL SPINE WO IV CONTRAST;  3/7/2024 5:25 pm   INDICATION:  Signs/Symptoms:frequent falls on xeralto; Signs/Symptoms:fall.   COMPARISON: None.   ACCESSION NUMBER(S): [id number]; [id number]   ORDERING CLINICIAN: JACINTO GLASS   TECHNIQUE: Noncontrast axial CT scan of head and cervical spine was performed. Angled reformats in brain and bone windows were generated. The images were reviewed in bone, brain, blood and soft tissue windows.   FINDINGS: Head: There is no intra/extra-axial fluid collection, mass effect, or midline shift. The gray/white matter junction is preserved. Hypoattenuation of periventricular and subcortical white matter suggestive of chronic small vessel ischemic disease. Mild-to-moderate diffuse parenchymal volume loss is noted. There is vascular calcification. The basal cisterns are patent. Visualized paranasal sinuses and mastoid air cells are clear. The calvarium is intact.   Cervical spine: The bones are somewhat osteopenic. There is no acute fracture or traumatic subluxation in the cervical spine. Multilevel cervical spondylosis is seen. Prevertebral soft tissues are unremarkable. Vascular calcification is noted multiple thyroid nodules are seen measuring up to 2.2 cm. Ultrasound may be obtained for further evaluation. There is no apical pneumothorax. Layering right-sided pleural effusion is noted.       No evidence of acute cortical infarct or intracranial hemorrhage.   No acute fracture or subluxation in the cervical spine.   Thyroid nodules, amenable to further evaluation by ultrasound.   Layering right-sided pleural effusion   Signed by: Korey Ward 3/7/2024 6:06 PM Dictation workstation:   [id number]    XR chest 1 view    Result Date: 3/7/2024  Interpreted By:  Tia Dillon, STUDY: XR CHEST 1 VIEW;  3/7/2024 4:14 pm   INDICATION: Signs/Symptoms:weakness fall.   COMPARISON: 02/04/2024   ACCESSION NUMBER(S): [id number]   ORDERING CLINICIAN: JAICNTO GLASS   FINDINGS: CARDIOMEDIASTINAL SILHOUETTE: The heart is enlarged.     LUNGS: There is  a small wedge-shaped right basilar infiltrate versus crowded vessels, seen previously on 02/02/2024. There is no congestion or pleural fluid.   ABDOMEN: No remarkable upper abdominal findings.     BONES: No acute osseous changes. Status post right shoulder rotator cuff repair.       Equivocal right basilar infiltrate versus crowded pulmonary vessels right lung base medially.   MACRO: None   Signed by: Tia Dillon 3/7/2024 4:24 PM Dictation workstation:   [id number]        Assessment/Plan   1.  ROSE on top of CKD, probably worsening from diuresis  We will get urine lites , ultrasound kidneys portable, urine protein after pheresis and serum pheresis.  Avoid nonsteroidal inflammatory medications/SSRI/hydrochlorothiazide/contrast dye/all meds as per pharmacy dosage lungs  2.  Patient still has element of volume overload on top of his pneumonia, with diuresis is using more free water making him hyponatremic.  Will modify the dosage of diuretics and attempt to decrease it gradually.  He  3.  Hypertension continue current meds  4.  Hypokalemia will replace today    Will follow patient daily reviewing BMP and CBC and clinical status and reviewing other consultants input.  Principal Problem:    Pneumonia of right lung due to infectious organism, unspecified part of lung      I spent 54 minutes in the professional and overall care of this patient.      Johanny Evangelista MD

## 2024-03-30 NOTE — NURSING NOTE
0745- Went in to introduce myself to pt as his nurse for the day. Pt very drowsy and hard to arouse.  1000- Med tech went in to give pt meds. Pt is minimally responsive with sternal rubbing, no words just groaning. PCNA and med tech both say that he was responsive and talking yesterday. When this nurse received report this morning, night shift staff stated that he was also minimally responsive last night as well. Notified Dr Gunderson and he is going to order a blood gas.   1020- Contacted daughter Nena to get a baseline or last well known for pt. Daughter also states that he was hard to arouse yesterday as well but was able to spoon feed pt. Daughter knows that he is deconditioning and is not concerned. We did discuss the options of palliative care and she would like a consult just to go over the options that would be best for the pt. Dr Gunderson notified and Palliative care consult ordered.

## 2024-03-30 NOTE — PROGRESS NOTES
[place] is a 83 y.o. male on day 8 of admission presenting with Pneumonia of right lung due to infectious organism, unspecified part of lung.      Subjective   No chest pain  Seen this morning   sleeping  arousable       Objective     Last Recorded Vitals  /72 (BP Location: Left arm, Patient Position: Lying)   Pulse 98   Temp 36.7 °C (98.1 °F) (Temporal)   Resp 18   Wt 102 kg (224 lb 3.3 oz)   SpO2 94%   Intake/Output last 3 Shifts:    Intake/Output Summary (Last 24 hours) at 3/30/2024 1009  Last data filed at 3/30/2024 0300  Gross per 24 hour   Intake --   Output 1900 ml   Net -1900 ml         Admission Weight  Weight: 104 kg (230 lb) (03/27/24 1300)    Daily Weight  03/29/24 : 102 kg (224 lb 3.3 oz)    Image Results  FL modified barium swallow study  Narrative: Interpreted By:  Juve Barrios  and Amber Benjamin   STUDY:  FL MODIFIED BARIUM SWALLOW STUDY;; 3/29/2024 1:01 pm      INDICATION:  Signs/Symptoms:r/o aspiration.      COMPARISON:  None.      ACCESSION NUMBER(S):  [id number]      ORDERING CLINICIAN:  OTF DEL ROSARIO      TECHNIQUE:  MBSS completed. Informed verbal consent obtained prior to completion  of exam. Trials of thin, nectar thick, honey thick, puree, and  regular solids given. Fluoroscopy time :  2 minutes, 29 seconds.      SLP: Cassidy Clark  Phone/Pager:      SPEECH FINDINGS:  Reason for referral: Concern for aspiration  Patient hx: DM, HTN, HLD  Respiratory status: WFL  Previous diet: Reg/Th      FINAL SPEECH RECOMMENDATIONS      Diet recommendations/feeding strategies: Regular diet/thin liquids -  upright posture for PO, slow rate, small bites/sips      Follow-up speech therapy recommended: Yes.          Education provided: Yes.      Mechanics of the swallow summary:  *Oral phase: Decreased labial seal with anterior spillage. Poor  cohesive bolus hold with liquid pooling in sulci and spillage  anteriorly after swallow completed. Delayed swallow onset with  spillage over  base of tongue. Mildly prolonged mastication 2/2 with  eventual oral clearance. *Pharyngeal phase: Poor visualization of  pharynx 2/2 pt positioning in chair and decreased participation in  attempt at repositioning.  Delayed swallow with spillage to the  pyriform with thin liquids prior to swallow initiation and to the  valleculae with all other trials. Functional hyolaryngeal  elevation/excursion. Delayed but complete epiglottic inversion and  laryngeal vestibule closure. No observed penetration or aspiration,  however poor visualization of laryngeal vestibule and trachea, though  seemingly no column of contrast coating posterior epiglottis. Mild  pooling in valleculae and pyriform after swallow, cleared with  subsequent swallows or cued double swallow. *Esophageal phase:  Retention in esophagus noted      SLP impressions with severity rating: Mild oropharyngeal dysphagia      Thin Liquids (MBSS)  Rosenbek's Penetration Aspiration Scale, Thin Liquids (MBSS):  1. NO ASPIRATION & NO PENETRATION - no aspiration, contrast does  not enter airway          Nectar Thick Liquids (MBSS)  Rosenbek's Penetration Aspiration Scale, Nectar thick liquids (MBSS):  1. NO ASPIRATION & NO PENETRATION - no aspiration, contrast does  not enter airway          Purees (MBSS)  Rosenbek's Penetration Aspiration Scale, Purees (MBSS):  1. NO ASPIRATION & NO PENETRATION - no aspiration, contrast does  not enter airway      Solids (MBSS)  Rosenbek's Penetration Aspiration Scale, Solids (MBSS):  1. NO ASPIRATION & NO PENETRATION - no aspiration, contrast does  not enter airway      Speech Therapy section of this report signed by Cassidy Clark MA.CCC-SLP on 3/29/2024 at 3:41 pm.      RADIOLOGY FINDINGS:  As above.      Radiology section of this report signed by Juve Barrios MD.      Impression: No aspiration or penetration of the airway. Please see above for  details.      MACRO:  None      Signed by: Juve Barrios 3/29/2024 4:15 PM  Dictation  workstation:   [id number]      Physical Exam    Constitutional:       Appearance: Normal appearance. Sleeping, no distress  HENT:      Head: Normocephalic and atraumatic.   Cardiovascular:      Rate and Rhythm: Normal rate and regular rhythm.   Pulmonary:      Effort: Pulmonary effort is normal.   Abdominal:      General: Abdomen obese, bs presetn   Musculoskeletal:           General: Skin is warm and dry.   Neurological:   sleepy    Edema ++ on thighs and abdomen        No current facility-administered medications on file prior to encounter.     Current Outpatient Medications on File Prior to Encounter   Medication Sig Dispense Refill    apixaban (Eliquis) 2.5 mg tablet Take 1 tablet (2.5 mg) by mouth 2 times a day.      atorvastatin (Lipitor) 20 mg tablet Take 1 tablet (20 mg) by mouth once daily at bedtime.      doxazosin (Cardura) 4 mg tablet Take 1 tablet (4 mg) by mouth once daily at bedtime.      empagliflozin (Jardiance) 25 mg Take 1 tablet (25 mg) by mouth once daily.      finasteride (Proscar) 5 mg tablet Take 1 tablet (5 mg) by mouth once daily.      furosemide (Lasix) 40 mg tablet Take 1 tablet (40 mg) by mouth 2 times a day. 60 tablet 0    glimepiride (Amaryl) 2 mg tablet Take 1 tablet (2 mg) by mouth once daily in the morning. Take before meals.      losartan (Cozaar) 50 mg tablet Take 1 tablet (50 mg) by mouth once daily. 30 tablet 0    metFORMIN, OSM, (Fortamet) 1,000 mg 24 hr tablet Take 1 tablet (1,000 mg) by mouth 2 times a day with meals. Do not crush, chew, or split.      metoprolol succinate XL (Toprol-XL) 50 mg 24 hr tablet Take 3 tablets (150 mg) by mouth 2 times a day. 180 tablet 0    omeprazole (PriLOSEC) 20 mg DR capsule Take 1 capsule (20 mg) by mouth once daily in the morning. Take before meals. Do not crush or chew.      [DISCONTINUED] cyanocobalamin (Vitamin B-12) 500 mcg tablet Take 1 tablet (500 mcg) by mouth once daily.      [DISCONTINUED] docusate sodium (Colace) 100 mg capsule  Take 1 capsule (100 mg) by mouth 2 times a day. (Patient not taking: Reported on 3/22/2024)      [DISCONTINUED] loperamide (Imodium) 2 mg capsule Take 1 capsule (2 mg) by mouth 3 times a day as needed for diarrhea.         Results for orders placed or performed during the hospital encounter of 03/22/24 (from the past 24 hour(s))   Basic Metabolic Panel   Result Value Ref Range    Glucose 148 (H) 74 - 99 mg/dL    Sodium 151 (H) 136 - 145 mmol/L    Potassium 3.5 3.5 - 5.3 mmol/L    Chloride 105 98 - 107 mmol/L    Bicarbonate 37 (H) 21 - 32 mmol/L    Anion Gap 13 10 - 20 mmol/L    Urea Nitrogen 44 (H) 6 - 23 mg/dL    Creatinine 2.40 (H) 0.50 - 1.30 mg/dL    eGFR 26 (L) >60 mL/min/1.73m*2    Calcium 7.9 (L) 8.6 - 10.3 mg/dL   CBC   Result Value Ref Range    WBC 7.4 4.4 - 11.3 x10*3/uL    nRBC 0.3 (H) 0.0 - 0.0 /100 WBCs    RBC 5.25 4.50 - 5.90 x10*6/uL    Hemoglobin 13.6 13.5 - 17.5 g/dL    Hematocrit 48.8 41.0 - 52.0 %    MCV 93 80 - 100 fL    MCH 25.9 (L) 26.0 - 34.0 pg    MCHC 27.9 (L) 32.0 - 36.0 g/dL    RDW 17.3 (H) 11.5 - 14.5 %    Platelets 131 (L) 150 - 450 x10*3/uL   Magnesium   Result Value Ref Range    Magnesium 2.60 (H) 1.60 - 2.40 mg/dL       Vitals:    03/29/24 0430   Weight: 102 kg (224 lb 3.3 oz)         Intake/Output Summary (Last 24 hours) at 3/30/2024 1009  Last data filed at 3/30/2024 0300  Gross per 24 hour   Intake --   Output 1900 ml   Net -1900 ml           Assessment/Plan      Acute respiratory failure with hypoxia d/t pneumonia-IV as per orders, better  Weakness-PT/OT following, will need SNF.   HTN- better with less agitation  Agitation- ativan as needed  Acute on chronic diastolic heart failure  A fib on eliquis, rate controlled  EF 50 in 12/23  Check bladder scan  Resume losartan        Cardio consulted  Input noted   Cont current regimen  Cont with diuresis    Will monitor renal fucntion   Consulted speech  Check abg    -Continue current treatment as ordered. Will make adjustments as  necessary.    Plan of care discussed with: Provider, RN, Patient      Kevon Gunderson MD    Hypernatremia noted consulted renal

## 2024-03-30 NOTE — PROGRESS NOTES
"Subjective Data:  No events. No complaints.        Objective Data:  Last Recorded Vitals:  Vitals:    24 0411 24 0712 24 0801 24 1120   BP: 109/65  107/72 108/70   BP Location: Left arm  Left arm Left arm   Patient Position: Lying  Lying Lying   Pulse: 96  98 93   Resp: 16  18 18   Temp: 36.5 °C (97.7 °F)  36.7 °C (98.1 °F) 36.4 °C (97.5 °F)   TempSrc: Temporal  Temporal Temporal   SpO2: 93% 94% 94% 96%   Weight:         Medical Gas Therapy: Supplemental oxygen  O2 Delivery Method: Nasal cannula  Weight  Av kg (228 lb 1.1 oz)  Min: 102 kg (224 lb 3.3 oz)  Max: 104 kg (230 lb)    LABS:  CMP:  Results from last 7 days   Lab Units 24  0636 24  0523 24  0555 24  0546 24  0712 24  0721 24  0535   SODIUM mmol/L 151* 146* 143 145 142 143 140   POTASSIUM mmol/L 3.5 3.8 4.2 3.5 3.5 3.6 3.9   CHLORIDE mmol/L 105 105 103 103 101 103 102   CO2 mmol/L 37* 28 29 33* 33* 32 27   ANION GAP mmol/L 13 17 15 13 12 12 15   BUN mg/dL 44* 41* 34* 31* 36* 36* 37*   CREATININE mg/dL 2.40* 2.32* 2.06* 2.05* 2.00* 2.08* 2.17*   EGFR mL/min/1.73m*2 26* 27* 31* 31* 32* 31* 29*   MAGNESIUM mg/dL 2.60* 2.50* 2.50*  --   --   --   --      CBC:  Results from last 7 days   Lab Units 24  0636 24  0523 24  0555 24  0712 24  0721 24  0535   WBC AUTO x10*3/uL 7.4 7.9 7.1 6.7 5.1 6.5   HEMOGLOBIN g/dL 13.6 13.7 13.4* 12.6* 12.7* 11.8*   HEMATOCRIT % 48.8 47.7 44.2 42.5 44.7 40.6*   PLATELETS AUTO x10*3/uL 131* 154 166 137* 126* 135*   MCV fL 93 92 86 90 92 92     COAG:     ABO: No results found for: \"ABO\"  HEME/ENDO:     CARDIAC:       No lab exists for component: \"CK\", \"CKMBP\"          Last I/O:    Intake/Output Summary (Last 24 hours) at 3/30/2024 1142  Last data filed at 3/30/2024 0300  Gross per 24 hour   Intake --   Output 1900 ml   Net -1900 ml     Net IO Since Admission: -7,640 mL [24 1142]            Inpatient Medications:  Scheduled " medications   Medication Dose Route Frequency    apixaban  2.5 mg oral BID    atorvastatin  20 mg oral Nightly    cyanocobalamin  500 mcg oral Daily    docusate sodium  100 mg oral BID    doxazosin  8 mg oral Nightly    empagliflozin  25 mg oral Daily    finasteride  5 mg oral Daily    furosemide  80 mg intravenous BID    glimepiride  2 mg oral Daily before breakfast    hydrALAZINE  25 mg oral TID    ipratropium-albuteroL  3 mL nebulization TID    losartan  50 mg oral Daily    metoprolol succinate XL  150 mg oral BID    oxygen   inhalation Continuous - Inhalation    pantoprazole  40 mg oral Daily before breakfast    piperacillin-tazobactam  3.375 g intravenous q6h    polyethylene glycol  17 g oral Daily     PRN medications   Medication    acetaminophen    Or    acetaminophen    Or    acetaminophen    ipratropium-albuteroL    loperamide    oxygen     Continuous Medications   Medication Dose Last Rate           Physical Exam:  Gen Well appearing elderly male lying in bed in no apparent distress. Body mass index is 31.28 kg/m².   CV rrr. No m/r/g appreciated. No JVD. 1-2+ bilateral leg edema. Pulses 2+ and symmetric.    Pulm Lungs clear with normal respiratory effort.  Neuro Alert and conversant. Grossly nonfocal.          Assessment:  Acute on chronic HFpEF  Adequate diuresis thus far. Remains somewhat volume+  Persistent atrial flutter.   Rates acceptable. On anticoagulation. May benefit from restoration of sinus rhythm at some point. Defer to his outpatient cardiologist.  Hypertension   BP considerably improved.      Recommendations   Con't IV diuresis. Close monitoring of renal function. Consider right thoracentesis.      Mundo Holguin MD    No

## 2024-03-31 LAB
ANION GAP SERPL CALC-SCNC: 10 MMOL/L (ref 10–20)
BUN SERPL-MCNC: 40 MG/DL (ref 6–23)
CALCIUM SERPL-MCNC: 8 MG/DL (ref 8.6–10.3)
CHLORIDE SERPL-SCNC: 109 MMOL/L (ref 98–107)
CO2 SERPL-SCNC: 39 MMOL/L (ref 21–32)
CREAT SERPL-MCNC: 2.09 MG/DL (ref 0.5–1.3)
EGFRCR SERPLBLD CKD-EPI 2021: 31 ML/MIN/1.73M*2
ERYTHROCYTE [DISTWIDTH] IN BLOOD BY AUTOMATED COUNT: 17.2 % (ref 11.5–14.5)
GLUCOSE SERPL-MCNC: 63 MG/DL (ref 74–99)
HCT VFR BLD AUTO: 45.9 % (ref 41–52)
HGB BLD-MCNC: 13 G/DL (ref 13.5–17.5)
MAGNESIUM SERPL-MCNC: 2.6 MG/DL (ref 1.6–2.4)
MCH RBC QN AUTO: 26 PG (ref 26–34)
MCHC RBC AUTO-ENTMCNC: 28.3 G/DL (ref 32–36)
MCV RBC AUTO: 92 FL (ref 80–100)
NRBC BLD-RTO: 0 /100 WBCS (ref 0–0)
PLATELET # BLD AUTO: 119 X10*3/UL (ref 150–450)
POTASSIUM SERPL-SCNC: 3.1 MMOL/L (ref 3.5–5.3)
PROT SERPL-MCNC: 5.7 G/DL (ref 6.4–8.2)
RBC # BLD AUTO: 5 X10*6/UL (ref 4.5–5.9)
SODIUM SERPL-SCNC: 155 MMOL/L (ref 136–145)
WBC # BLD AUTO: 8 X10*3/UL (ref 4.4–11.3)

## 2024-03-31 PROCEDURE — 2500000004 HC RX 250 GENERAL PHARMACY W/ HCPCS (ALT 636 FOR OP/ED): Performed by: INTERNAL MEDICINE

## 2024-03-31 PROCEDURE — 94640 AIRWAY INHALATION TREATMENT: CPT | Mod: MUE

## 2024-03-31 PROCEDURE — 94660 CPAP INITIATION&MGMT: CPT

## 2024-03-31 PROCEDURE — 2500000002 HC RX 250 W HCPCS SELF ADMINISTERED DRUGS (ALT 637 FOR MEDICARE OP, ALT 636 FOR OP/ED): Mod: MUE | Performed by: INTERNAL MEDICINE

## 2024-03-31 PROCEDURE — 2500000004 HC RX 250 GENERAL PHARMACY W/ HCPCS (ALT 636 FOR OP/ED): Performed by: FAMILY MEDICINE

## 2024-03-31 PROCEDURE — 99223 1ST HOSP IP/OBS HIGH 75: CPT | Performed by: CLINICAL NURSE SPECIALIST

## 2024-03-31 PROCEDURE — 80048 BASIC METABOLIC PNL TOTAL CA: CPT | Performed by: NURSE PRACTITIONER

## 2024-03-31 PROCEDURE — 99232 SBSQ HOSP IP/OBS MODERATE 35: CPT | Performed by: INTERNAL MEDICINE

## 2024-03-31 PROCEDURE — 2500000001 HC RX 250 WO HCPCS SELF ADMINISTERED DRUGS (ALT 637 FOR MEDICARE OP): Performed by: INTERNAL MEDICINE

## 2024-03-31 PROCEDURE — 36415 COLL VENOUS BLD VENIPUNCTURE: CPT | Performed by: NURSE PRACTITIONER

## 2024-03-31 PROCEDURE — 1200000002 HC GENERAL ROOM WITH TELEMETRY DAILY

## 2024-03-31 PROCEDURE — 85027 COMPLETE CBC AUTOMATED: CPT | Performed by: NURSE PRACTITIONER

## 2024-03-31 PROCEDURE — 83735 ASSAY OF MAGNESIUM: CPT | Performed by: NURSE PRACTITIONER

## 2024-03-31 PROCEDURE — 2500000005 HC RX 250 GENERAL PHARMACY W/O HCPCS: Performed by: FAMILY MEDICINE

## 2024-03-31 RX ORDER — SODIUM CHLORIDE 450 MG/100ML
50 INJECTION, SOLUTION INTRAVENOUS CONTINUOUS
Status: DISCONTINUED | OUTPATIENT
Start: 2024-03-31 | End: 2024-03-31

## 2024-03-31 RX ORDER — DEXTROSE MONOHYDRATE 50 MG/ML
100 INJECTION, SOLUTION INTRAVENOUS CONTINUOUS
Status: DISCONTINUED | OUTPATIENT
Start: 2024-03-31 | End: 2024-04-04

## 2024-03-31 RX ADMIN — IPRATROPIUM BROMIDE AND ALBUTEROL SULFATE 3 ML: 2.5; .5 SOLUTION RESPIRATORY (INHALATION) at 13:39

## 2024-03-31 RX ADMIN — IPRATROPIUM BROMIDE AND ALBUTEROL SULFATE 3 ML: 2.5; .5 SOLUTION RESPIRATORY (INHALATION) at 20:15

## 2024-03-31 RX ADMIN — DOCUSATE SODIUM 100 MG: 100 CAPSULE, LIQUID FILLED ORAL at 08:48

## 2024-03-31 RX ADMIN — HYDRALAZINE HYDROCHLORIDE 50 MG: 50 TABLET ORAL at 22:51

## 2024-03-31 RX ADMIN — SODIUM CHLORIDE 50 ML/HR: 4.5 INJECTION, SOLUTION INTRAVENOUS at 09:05

## 2024-03-31 RX ADMIN — METOPROLOL SUCCINATE 150 MG: 50 TABLET, EXTENDED RELEASE ORAL at 08:48

## 2024-03-31 RX ADMIN — APIXABAN 2.5 MG: 2.5 TABLET, FILM COATED ORAL at 22:51

## 2024-03-31 RX ADMIN — DEXTROSE MONOHYDRATE 50 ML/HR: 50 INJECTION, SOLUTION INTRAVENOUS at 15:27

## 2024-03-31 RX ADMIN — CYANOCOBALAMIN TAB 500 MCG 500 MCG: 500 TAB at 08:48

## 2024-03-31 RX ADMIN — DOXAZOSIN 8 MG: 2 TABLET ORAL at 22:51

## 2024-03-31 RX ADMIN — HYDRALAZINE HYDROCHLORIDE 50 MG: 50 TABLET ORAL at 08:47

## 2024-03-31 RX ADMIN — METOPROLOL SUCCINATE 150 MG: 50 TABLET, EXTENDED RELEASE ORAL at 22:51

## 2024-03-31 RX ADMIN — LOSARTAN POTASSIUM 50 MG: 50 TABLET, FILM COATED ORAL at 08:48

## 2024-03-31 RX ADMIN — FINASTERIDE 5 MG: 5 TABLET, FILM COATED ORAL at 08:48

## 2024-03-31 RX ADMIN — POLYETHYLENE GLYCOL 3350 17 G: 17 POWDER, FOR SOLUTION ORAL at 08:48

## 2024-03-31 RX ADMIN — IPRATROPIUM BROMIDE AND ALBUTEROL SULFATE 3 ML: 2.5; .5 SOLUTION RESPIRATORY (INHALATION) at 07:59

## 2024-03-31 RX ADMIN — POTASSIUM CHLORIDE 20 MEQ: 1.5 POWDER, FOR SOLUTION ORAL at 15:26

## 2024-03-31 RX ADMIN — PIPERACILLIN SODIUM AND TAZOBACTAM SODIUM 3.38 G: 3; .375 INJECTION, SOLUTION INTRAVENOUS at 22:51

## 2024-03-31 RX ADMIN — Medication: at 08:00

## 2024-03-31 RX ADMIN — ATORVASTATIN CALCIUM 20 MG: 20 TABLET, FILM COATED ORAL at 22:51

## 2024-03-31 RX ADMIN — POTASSIUM CHLORIDE 20 MEQ: 1.5 POWDER, FOR SOLUTION ORAL at 22:51

## 2024-03-31 RX ADMIN — ACETAMINOPHEN 650 MG: 325 TABLET ORAL at 18:00

## 2024-03-31 RX ADMIN — PIPERACILLIN SODIUM AND TAZOBACTAM SODIUM 3.38 G: 3; .375 INJECTION, SOLUTION INTRAVENOUS at 15:26

## 2024-03-31 RX ADMIN — PIPERACILLIN SODIUM AND TAZOBACTAM SODIUM 3.38 G: 3; .375 INJECTION, SOLUTION INTRAVENOUS at 04:07

## 2024-03-31 RX ADMIN — POTASSIUM CHLORIDE 20 MEQ: 1.5 POWDER, FOR SOLUTION ORAL at 08:48

## 2024-03-31 RX ADMIN — PANTOPRAZOLE SODIUM 40 MG: 40 TABLET, DELAYED RELEASE ORAL at 06:38

## 2024-03-31 RX ADMIN — EMPAGLIFLOZIN 25 MG: 10 TABLET, FILM COATED ORAL at 08:47

## 2024-03-31 RX ADMIN — APIXABAN 2.5 MG: 2.5 TABLET, FILM COATED ORAL at 08:48

## 2024-03-31 RX ADMIN — PIPERACILLIN SODIUM AND TAZOBACTAM SODIUM 3.38 G: 3; .375 INJECTION, SOLUTION INTRAVENOUS at 09:05

## 2024-03-31 RX ADMIN — GLIMEPIRIDE 2 MG: 2 TABLET ORAL at 06:38

## 2024-03-31 ASSESSMENT — COGNITIVE AND FUNCTIONAL STATUS - GENERAL
DAILY ACTIVITIY SCORE: 9
WALKING IN HOSPITAL ROOM: TOTAL
STANDING UP FROM CHAIR USING ARMS: TOTAL
MOVING TO AND FROM BED TO CHAIR: TOTAL
MOVING FROM LYING ON BACK TO SITTING ON SIDE OF FLAT BED WITH BEDRAILS: TOTAL
DRESSING REGULAR LOWER BODY CLOTHING: TOTAL
TOILETING: TOTAL
HELP NEEDED FOR BATHING: TOTAL
WALKING IN HOSPITAL ROOM: TOTAL
EATING MEALS: A LOT
PERSONAL GROOMING: A LOT
DRESSING REGULAR UPPER BODY CLOTHING: A LOT
CLIMB 3 TO 5 STEPS WITH RAILING: TOTAL
MOVING TO AND FROM BED TO CHAIR: TOTAL
EATING MEALS: A LOT
DAILY ACTIVITIY SCORE: 9
TOILETING: TOTAL
MOBILITY SCORE: 6
DRESSING REGULAR LOWER BODY CLOTHING: TOTAL
DAILY ACTIVITIY SCORE: 9
DRESSING REGULAR UPPER BODY CLOTHING: A LOT
STANDING UP FROM CHAIR USING ARMS: TOTAL
MOBILITY SCORE: 6
STANDING UP FROM CHAIR USING ARMS: TOTAL
CLIMB 3 TO 5 STEPS WITH RAILING: TOTAL
CLIMB 3 TO 5 STEPS WITH RAILING: TOTAL
HELP NEEDED FOR BATHING: TOTAL
TOILETING: TOTAL
MOBILITY SCORE: 6
MOVING TO AND FROM BED TO CHAIR: TOTAL
TURNING FROM BACK TO SIDE WHILE IN FLAT BAD: TOTAL
TURNING FROM BACK TO SIDE WHILE IN FLAT BAD: TOTAL
HELP NEEDED FOR BATHING: TOTAL
PERSONAL GROOMING: A LOT
DRESSING REGULAR LOWER BODY CLOTHING: TOTAL
TURNING FROM BACK TO SIDE WHILE IN FLAT BAD: TOTAL
EATING MEALS: A LOT
DRESSING REGULAR UPPER BODY CLOTHING: A LOT
MOVING FROM LYING ON BACK TO SITTING ON SIDE OF FLAT BED WITH BEDRAILS: TOTAL
PERSONAL GROOMING: A LOT
WALKING IN HOSPITAL ROOM: TOTAL
MOVING FROM LYING ON BACK TO SITTING ON SIDE OF FLAT BED WITH BEDRAILS: TOTAL

## 2024-03-31 ASSESSMENT — PAIN SCALES - PAIN ASSESSMENT IN ADVANCED DEMENTIA (PAINAD)
TOTALSCORE: 0
FACIALEXPRESSION: SMILING OR INEXPRESSIVE
BREATHING: NORMAL
BODYLANGUAGE: RELAXED
BODYLANGUAGE: RELAXED
CONSOLABILITY: NO NEED TO CONSOLE
BREATHING: NORMAL
TOTALSCORE: 0
FACIALEXPRESSION: SMILING OR INEXPRESSIVE
CONSOLABILITY: NO NEED TO CONSOLE

## 2024-03-31 ASSESSMENT — PAIN SCALES - GENERAL
PAINLEVEL_OUTOF10: 0 - NO PAIN
PAINLEVEL_OUTOF10: 0 - NO PAIN

## 2024-03-31 NOTE — PROGRESS NOTES
[place] is a 83 y.o. male on day 9 of admission presenting with Pneumonia of right lung due to infectious organism, unspecified part of lung.      Subjective   Overall improving with no new complaints       Objective        Vitals 24HR  Heart Rate:  []   Temp:  [36.2 °C (97.2 °F)-36.9 °C (98.4 °F)]   Resp:  [18-20]   BP: (124-154)/(78-95)   SpO2:  [93 %-100 %]     Intake/Output last 3 Shifts:    Intake/Output Summary (Last 24 hours) at 3/31/2024 1401  Last data filed at 3/31/2024 0935  Gross per 24 hour   Intake 500 ml   Output 500 ml   Net 0 ml       Physical Exam    General appearance: Comfortable no acute distress  Head and ENT: Normocephalic/atraumatic/supple neck/no JVD  Lungs: Anterior rhonchi  Heart: RRR  Abdomen; soft tenderness  Extremities: Bilateral lower extremity edema +2  Neurologic: Physiologic     Relevant results      Results from last 7 days   Lab Units 03/31/24  0616 03/30/24  0636 03/29/24  0523   WBC AUTO x10*3/uL 8.0 7.4 7.9   HEMOGLOBIN g/dL 13.0* 13.6 13.7   HEMATOCRIT % 45.9 48.8 47.7   PLATELETS AUTO x10*3/uL 119* 131* 154      Results from last 7 days   Lab Units 03/31/24  0616 03/30/24  0636 03/29/24  0523   SODIUM mmol/L 155* 151* 146*   POTASSIUM mmol/L 3.1* 3.5 3.8   CHLORIDE mmol/L 109* 105 105   CO2 mmol/L 39* 37* 28   BUN mg/dL 40* 44* 41*   CREATININE mg/dL 2.09* 2.40* 2.32*   GLUCOSE mg/dL 63* 148* 171*   CALCIUM mg/dL 8.0* 7.9* 8.3*        Current Facility-Administered Medications:     acetaminophen (Tylenol) tablet 650 mg, 650 mg, oral, q4h PRN, 650 mg at 03/24/24 0838 **OR** acetaminophen (Tylenol) oral liquid 650 mg, 650 mg, oral, q4h PRN **OR** acetaminophen (Tylenol) suppository 650 mg, 650 mg, rectal, q4h PRN, Juve Brown MD    apixaban (Eliquis) tablet 2.5 mg, 2.5 mg, oral, BID, Juve Brown MD, 2.5 mg at 03/31/24 0848    atorvastatin (Lipitor) tablet 20 mg, 20 mg, oral, Nightly, Juve Brown MD, 20 mg at 03/30/24 2100    cyanocobalamin (Vitamin  B-12) tablet 500 mcg, 500 mcg, oral, Daily, Juve Brown MD, 500 mcg at 03/31/24 0848    docusate sodium (Colace) capsule 100 mg, 100 mg, oral, BID, Juve Brown MD, 100 mg at 03/31/24 0848    doxazosin (Cardura) tablet 8 mg, 8 mg, oral, Nightly, Mundo Holguin MD, 8 mg at 03/30/24 2100    empagliflozin (Jardiance) tablet 25 mg, 25 mg, oral, Daily, Juve Brown MD, 25 mg at 03/31/24 0847    finasteride (Proscar) tablet 5 mg, 5 mg, oral, Daily, Juve Brown MD, 5 mg at 03/31/24 0848    [Held by provider] furosemide (Lasix) injection 60 mg, 60 mg, intravenous, BID, Johanny Evangelista MD, 60 mg at 03/30/24 1658    glimepiride (Amaryl) tablet 2 mg, 2 mg, oral, Daily before breakfast, Juve Brown MD, 2 mg at 03/31/24 0638    hydrALAZINE (Apresoline) tablet 50 mg, 50 mg, oral, BID, Johanny Evangelista MD, 50 mg at 03/31/24 0847    ipratropium-albuteroL (Duo-Neb) 0.5-2.5 mg/3 mL nebulizer solution 3 mL, 3 mL, nebulization, q4h PRN, Juve Brown MD, 3 mL at 03/29/24 1141    ipratropium-albuteroL (Duo-Neb) 0.5-2.5 mg/3 mL nebulizer solution 3 mL, 3 mL, nebulization, TID, Juve Brown MD, 3 mL at 03/31/24 1339    loperamide (Imodium) capsule 2 mg, 2 mg, oral, TID PRN, Juve Brown MD    losartan (Cozaar) tablet 50 mg, 50 mg, oral, Daily, Juve Brown MD, 50 mg at 03/31/24 0848    metoprolol succinate XL (Toprol-XL) 24 hr tablet 150 mg, 150 mg, oral, BID, Juve Brown MD, 150 mg at 03/31/24 0848    oxygen (O2) therapy, , inhalation, Continuous PRN - O2/gases, Kevon Gunderson MD, Rate Verify at 03/31/24 0330    oxygen (O2) therapy, , inhalation, Continuous PRN - O2/gases, Kevon Gunderson MD    pantoprazole (ProtoNix) EC tablet 40 mg, 40 mg, oral, Daily before breakfast, Juve Brown MD, 40 mg at 03/31/24 0638    piperacillin-tazobactam-dextrose (Zosyn) IV 3.375 g, 3.375 g, intravenous, q6h, Juve Brown MD, Stopped at 03/31/24 0935    polyethylene glycol  (Glycolax, Miralax) packet 17 g, 17 g, oral, Daily, Juve Brown MD, 17 g at 03/31/24 0848    potassium chloride (Klor-Con) packet 20 mEq, 20 mEq, oral, TID, Johanny Evangelista MD, 20 mEq at 03/31/24 0848    sodium chloride 0.45 % infusion, 50 mL/hr, intravenous, Continuous, Kevon Gunderson MD, Last Rate: 50 mL/hr at 03/31/24 0905, 50 mL/hr at 03/31/24 0905           Assessment/Plan      1.  ROSE on top of CKD, probably worsening from diuresis  We will get urine lites , ultrasound kidneys portable, urine protein after pheresis and serum pheresis.  Avoid nonsteroidal inflammatory medications/SSRI/hydrochlorothiazide/contrast dye/all meds as per pharmacy dosage lungs  2.  Patient still has element of volume overload on top of his pneumonia, with diuresis is using more free water making him hyponatremic.  Will modify the dosage of diuretics and attempt to decrease it gradually.  He  3.  Hypertension continue current meds  4.  Hypokalemia will replace today  5.  Hypernatremia, worsening today sodium up to 155  We will stop giving D5W at 50 cc/h today for next several days.  Will follow patient daily reviewing BMP and CBC and clinical status and reviewing other consultants input.  Principal Problem:    Pneumonia of right lung due to infectious organism, unspecified part of lung           Principal Problem:    Pneumonia of right lung due to infectious organism, unspecified part of lung              I spent 35 minutes in the professional and overall care of this patient.      Johanny Evangelista MD

## 2024-03-31 NOTE — CARE PLAN
The patient's goals for the shift include Good night's sleep    The clinical goals for the shift include Pt will remain hemodynamically stable throughout shift.    Over the shift, the patient did make progress toward the following goals.

## 2024-03-31 NOTE — CONSULTS
"Reason For Consult   Acute hypercarbic respiratory failure    History Of Present Illness  [place] is a 83 y.o. male with past medical history of HTN, CHF, HLD, DMT2, A-fib (s/p cardioversion and ablation on Eliquis), CKD 3, GERD who presented to [facility] ED on 3/22 for shortness of breath and generalized weakness for couple of days.  Family reports he was unable to get out of his chair.  Baseline is in room air but required 4 L per EMS.  CT on 3/28 showed cardiomegaly with bilateral infiltrates and pleural effusions (R> L) with soft tissue edema and free fluid consistent with fluid overload/CHF and unable to exclude superimposed pneumonia.  DVT and PE studies were negative.  He was admitted for acute exacerbation of CHF & pneumonia, treated with IV diuresis (I/O shows -7.1 L for the stay) and Zosyn for CAP coverage (today is day #9). Oxygen needs fluctuated throughout the stay & on 3/29 he desaturated on 3 L to 87-88% was increased ultimately to 5 L.  On 3/30 ABG showed uncompensated respiratory acidosis with pCO2 87 & patient was placed on BiPAP for short time without repeat ABG or VBG. He has been weaned to 4 L nasal cannula with BiPAP at night.  Pulmonology is consulted for acute hypercarbic respiratory failure.     Patient seen and examined with family at bedside.  ED course as above.  Family acknowledges that the patient has had multiple admissions recently for heart failure and that he has not seem to bounce back as quickly with each subsequent admission.  Overall the family noticed that he was becoming much more fatigued and \"lost the use of his legs\" prompting them to call EMS.  Patient is cognitively not able to interact appropriately.  Patient is unable to tell me his name or answer yes/no when asked if a name is his.  Able to follow simple commands with significant amount of redirection.  When asked patient how he is breathing or if his breathing is all right patient simply repeats \"breathing breathing " "breathing breathing\".  Continually attempts to remove oxygen.  Took oxygen off during the exam patient saturation dropped to mid 80s, however when the patient is actively verbally engaged, saturation holds~ 91 to 92%.  Patient was not able to cough for me and did not respond to any questions about how he was feeling.  The patient does not wear oxygen at home and does not take any pulmonary medications.    Past Medical History  He has a past medical history of Diabetes mellitus (CMS/[facility]), Hypertension, Mixed hyperlipidemia (02/13/2013), Paroxysmal atrial fibrillation (CMS/[facility]) (03/08/2018), S/P ablation of atrial fibrillation (05/03/2019), and Stage 3b chronic kidney disease (CMS/[facility]) (12/29/2023).    Surgical History  He has a past surgical history that includes Cardioversion and Cardiac electrophysiology mapping and ablation.     Social History  He reports that he has quit smoking. His smoking use included cigarettes. He has quit using smokeless tobacco. No history on file for alcohol use and drug use.    Family History  No family history on file.     Allergies  Pollen extracts    Review of Systems  Review of Systems   Reason unable to perform ROS: Patient is unable to provide history.         Physical Exam  Constitutional:   Elderly, ill-appearing, NAD, unable to fully participate in exam  HENT: Atraumatic, semidry mucous membranes  Eyes: PERRL, nonicteric  Neck: Supple, no obvious JVD  Cardiovascular: S1, S2 distinct, irregular, HR 100s, no murmur appreciated  Pulmonary: Fair air entry with clear posterior fields, anterior mild scattered rhonchi throughout; no wheezing or crackles noted  Abdominal: Soft, obese, nontender, + BS  Musculoskeletal: Fair active range of motion BUEs, minimal active movement of BLEs  Extremities:   +1 BLE edema  Lymphadenopathy: No nuchal LAP  Skin: Warm and dry with bilateral lower extremity darkened and thickened skin  Neurological: Awake answers to name but cannot state name unaware " of location or time; speech is semiclear to mumbling/garbled, does not speak in sentences or finish complete thoughts; multiple periods of repetitive words; observed right upper extremity intermittent tremor   Psychiatric:   Mood is appropriate, behavior is fidgety, pulling at medical devices    Last Recorded Vitals  Blood pressure 136/88, pulse 107, temperature 36.4 °C (97.5 °F), temperature source Temporal, resp. rate 20, weight 102 kg (224 lb 3.3 oz), SpO2 97 %.    Relevant Results  Scheduled Medications:  apixaban, 2.5 mg, oral, BID  atorvastatin, 20 mg, oral, Nightly  cyanocobalamin, 500 mcg, oral, Daily  docusate sodium, 100 mg, oral, BID  doxazosin, 8 mg, oral, Nightly  empagliflozin, 25 mg, oral, Daily  finasteride, 5 mg, oral, Daily  [Held by provider] furosemide, 60 mg, intravenous, BID  glimepiride, 2 mg, oral, Daily before breakfast  hydrALAZINE, 50 mg, oral, BID  ipratropium-albuteroL, 3 mL, nebulization, TID  losartan, 50 mg, oral, Daily  metoprolol succinate XL, 150 mg, oral, BID  pantoprazole, 40 mg, oral, Daily before breakfast  piperacillin-tazobactam, 3.375 g, intravenous, q6h  polyethylene glycol, 17 g, oral, Daily  potassium chloride, 20 mEq, oral, TID     PRN medications: acetaminophen **OR** acetaminophen **OR** acetaminophen, ipratropium-albuteroL, loperamide, oxygen, oxygen     Results from last 7 days   Lab Units 03/31/24  0616 03/30/24  0636 03/29/24  0523   WBC AUTO x10*3/uL 8.0 7.4 7.9   HEMOGLOBIN g/dL 13.0* 13.6 13.7   HEMATOCRIT % 45.9 48.8 47.7   PLATELETS AUTO x10*3/uL 119* 131* 154      Results from last 7 days   Lab Units 03/31/24  0616 03/30/24  1852 03/30/24  0636 03/29/24  0523   SODIUM mmol/L 155*  --  151* 146*   POTASSIUM mmol/L 3.1*  --  3.5 3.8   CHLORIDE mmol/L 109*  --  105 105   CO2 mmol/L 39*  --  37* 28   BUN mg/dL 40*  --  44* 41*   CREATININE mg/dL 2.09*  --  2.40* 2.32*   CALCIUM mg/dL 8.0*  --  7.9* 8.3*   PROTEIN TOTAL g/dL  --  5.7*  --   --    GLUCOSE mg/dL 63*   --  148* 171*      Results from last 7 days   Lab Units 03/30/24  1025 03/28/24  1501   POCT PH, ARTERIAL pH 7.26* 7.39   POCT PCO2, ARTERIAL mm Hg 87* 52*   POCT PO2, ARTERIAL mm Hg 92 99*   POCT HCO3 CALCULATED, ARTERIAL mmol/L 39.0* 31.5*   POCT BASE EXCESS, ARTERIAL mmol/L 8.2* 5.2*     ECHO 12/22/2023  CONCLUSIONS:   - Technically difficult exam due to suboptimal positioning.   - Exam indication: Sustained atrial fibrillation   - The left ventricle is normal in size. Left ventricular systolic function is   mildly decreased. EF = 50 ± 5% (visual est.) Definity contrast used for   endocardial border detection.   - The right ventricle is normal in size. Right ventricular systolic function is   low normal.   - The left atrial cavity is mildly dilated.   - There is AV sclerosis, no stenosis.   - Exam was compared with the prior CC echocardiographic exam performed on 8/20/23.    LVEF has improved on side by side comparison. LV volumes have also improved     Electronically signed by Thomas Garza MD on 12/22/2023 at 8:21:23 PM     ECHO 8/20/2023  CONCLUSIONS:   - Exam indication: Sustained atrial fibrillation   - The left ventricle is mildly dilated. There is mild concentric left ventricular   hypertrophy. Left ventricular systolic function is moderately decreased. EF = 38 ±    5% (2D biplane) Left ventricular diastolic function was not evaluated due to AF.   - The right ventricle is normal in size. Right ventricular systolic function is   mildly decreased.   - The left atrial cavity is moderately dilated.   - The right atrial cavity is dilated.   - There is mild (1+) mitral valve regurgitation.   - There is mild (1+) aortic valve regurgitation.   - Markedly abnormal global LV strain -7% with apical sparing consider cardiac   amyloid   - Exam was compared with the prior CC echocardiographic exam performed on   09/13/2022 ([facility]). Slight decrease in LVEF, prior EF 45%.    Electronically signed by Thomas Garza MD  on 8/20/2023 at 9:04:34 AM     US renal complete  Result Date: 3/30/2024  Interpreted By:  Yanira Menjivar, STUDY: US RENAL COMPLETE;  3/30/2024 3:39 pm   INDICATION: Signs/Symptoms:arcelia r/o obstruction.   COMPARISON: None.   ACCESSION NUMBER(S): [id number]   ORDERING CLINICIAN: FRANCIE AMBROCIO   TECHNIQUE: Multiple images of the kidneys were obtained.   FINDINGS: RIGHT KIDNEY: 12.5 cm in length. No hydronephrosis. No focal renal abnormality. Lobulated contour. Increased renal cortical echogenicity.   LEFT KIDNEY: 12.2 cm in length. No hydronephrosis. No focal renal abnormality. Lobulated contour. Increased renal cortical echogenicity.   BLADDER: Slightly irregular bladder contour, nonspecific. Estimated prostate volume 19 mL, normal. Bilateral ureteral jets identified.   Other: Small volume ascites.       Lobulated kidneys with increased cortical echogenicity suggestive of medical renal disease. No hydronephrosis.   Signed by: Yanira Menjivar 3/30/2024 5:23 PM Dictation workstation:   [id number]      CT chest wo IV contrast  Result Date: 3/28/2024  Interpreted By:  Aimee Dc, STUDY: CT CHEST WO IV CONTRAST;  3/28/2024 3:37 pm   INDICATION: Signs/Symptoms:SOB.   COMPARISON: None.   ACCESSION NUMBER(S): [id number]   ORDERING CLINICIAN: LEA CASTRO   TECHNIQUE: CT of the chest was performed. Sagittal and coronal reconstructions were generated.  No intravenous contrast given for the examination.   FINDINGS: Hilar, vessel, and solid organ evaluation limited without IV contrast. Artifact related to motion and patient's body habitus limits evaluation.   CHEST WALL AND LOWER NECK: Edema in the lower chest wall bilaterally, right-greater-than-left. 4.6 x 2.1 cm fat attenuation lesion containing fine septation anterior to the left clavicular head. Additional incompletely included 2 cm fat attenuation nodule lateral to the right scapula and another subcentimeter fat attenuation nodule along the inferomedial  left scapula. 2.0 x 1.3 cm hypodense nodule in the right lobe of the thyroid. Subcentimeter hypodense nodule in the left lobe of the thyroid.   MEDIASTINUM AND ELLE:  Limited hilar assessment on unenhanced exam. Subcentimeter lymph nodes in the anterior and middle mediastinum. Small hiatal hernia.   HEART AND VESSELS:  Lack of IV contrast precludes vascular luminal assessment. The heart is enlarged. No significant pericardial effusion. Multifocal atherosclerotic calcifications including the coronary arteries and aortic valve region.   LUNGS, PLEURA, LARGE AIRWAYS:  Respiratory motion artifact limits evaluation. Uneven bilateral interstitial opacities including subtle ground-glass and reticular densities in both upper lobes, bandlike densities in both lung bases and patchy opacities in the posterior aspect of both lungs. Moderate to large right pleural effusion. Moderate left pleural effusion. Adjacent presumed compressive atelectasis of both lungs. No definite pneumothorax. The central airways are grossly patent allowing for motion artifact.   UPPER ABDOMEN:  Moderate free fluid. Stranding in the mesentery. Cholecystectomy clips near the liver hilum. Partially imaged soft tissue attenuation 4.4 cm rounded opacity in the region of the upper pole of the right kidney on the most caudal image.   BONES:  Kyphosis of the midthoracic spine. Multifocal presumed degenerative changes.       Cardiomegaly with bilateral infiltrates, right-greater-than-left pleural effusions, soft tissue edema, and free fluid. Findings are consistent with fluid overload/CHF. Superimposed pneumonia not excluded. Clinical correlation and follow-up to ensure resolution after appropriate treatment recommended.   Partially imaged indeterminate masslike area in the upper pole of the right kidney. Further evaluation which could include ultrasound or abdominal CT recommended.   2 cm hypodense nodule in the right lobe of the thyroid, can not be further  characterized by CT. Further evaluation with ultrasound suggested.   Bilateral chest wall fat attenuation lesions, possible lipomas; largest lesion in the left anterior chest wall is slightly complex with thin septation. Low-grade liposarcoma can not be excluded based on imaging appearance. Clinical follow-up suggested.   Additional findings as described above.   MACRO: None.   Signed by: Aimee Dc 3/28/2024 3:55 PM Dictation workstation:   [id number]    XR chest 2 views  Result Date: 3/22/2024  Interpreted By:  Contreras Rdz, STUDY: XR CHEST 2 VIEWS  3/22/2024 1:49 pm   INDICATION: Signs/Symptoms:sob   COMPARISON: None.   ACCESSION NUMBER(S): [id number]   ORDERING CLINICIAN: RANDAL VALENTIN   TECHNIQUE: PA and lateral views of the chest were obtained.   FINDINGS: Cardiac monitoring leads are seen over the chest. Right basilar airspace consolidation is seen and may represent atelectasis and/or pneumonia. No pleural effusion or pneumothorax is identified. The cardiac silhouette is within normal limits for size.  Mild discogenic degenerative changes are seen throughout the thoracic spine.       Right basilar airspace consolidation, as above. Clinical correlation and continued follow-up until clearing is recommended.   MACRO: None.   Signed by: Contreras Rdz 3/22/2024 2:09 PM Dictation workstation:   [id number]       Assessment/Plan     [place] is a 83 y.o. male with past medical history of HTN, CHF, HLD, DMT2, A-fib (s/p cardioversion and ablation on Eliquis), CKD 3 who presented to [facility] ED on 3/22 for shortness of breath and generalized weakness for couple of days.  Family reports he was unable to get out of his chair.  Baseline is in room air but required 4 L per EMS.  CT on 3/28 showed cardiomegaly with bilateral infiltrates and pleural effusions (R> L) with soft tissue edema and free fluid consistent with fluid overload/CHF and unable to exclude superimposed pneumonia.  DVT and PE studies  were negative.  He was admitted for acute exacerbation of CHF & pneumonia, treated with IV diuresis (I/O shows -7.1 L for the stay) and Zosyn for CAP coverage (today is day #9). Oxygen needs fluctuated throughout the stay & on 3/29 he desaturated on 3 L to 87-88% was increased ultimately to 5 L.  On 3/30 ABG showed uncompensated respiratory acidosis with pCO2 87 & patient was placed on BiPAP for short time without repeat ABG or VBG. He has been weaned to 4 L nasal cannula with BiPAP at night.  Pulmonology is consulted for acute hypercarbic respiratory failure.    PFT 10/2021: FEV1/FVC 0.69, FVC 3.03 (lower limit normal 3.3), DLCO 57% predicted; PFT is indicative of no obstruction suggestive of restriction with moderately decreased diffusion capacity     Impressions:  # Acute hypoxic and hypercarbic respiratory failure: Patient hypoxic in the 80s per EMS placed on 4 L requiring max 5L during admission.  ABG on 3/30 with uncompensated respiratory acidosis with pCO2 of 87; likely reflective of acute systolic heart failure, restrictive lung disease with bilateral pleural effusion, cognitive decline with deconditioning; currently weaned to 2 L NC    #Pleural effusions, bilateral: Noted on chest CT, right greater than left with compressive atelectasis bilaterally likely related to acute on chronic HFpEF, low concern for pneumonia given afebrile, no leukocytosis and procalcitonin 0.16 aching parapneumonic effusion less likely    #Acute on chronic HFpEF: Chest CT with cardiomegaly with bilateral infiltrates and bilateral pleural effusions with soft tissue edema and free fluid; BNP > 4700 (previous BNPs March, early> 4700, February 3956, December 48)    #OHS: BMI 31.28 with central obesity and abdominal fluid retention 2/2 HF exacerbation, pCO2 87 on arterial gas (baseline ~ 50s)    #Cognitive dysfunction, delirium: Patient does not engage in conversation and interact appropriately, requires much redirection and is not  oriented to time or place; family reports change in cognitive function noting a decline since his last admission and worsening further the morning of 3/30 but does report that he seems to wax and wane    Recommendations:  -Continue supplemental O2, wean for saturation 92 to 95%  -Adequately treated for CAP  -Continue DuoNebs   -BPH with Acapella, may add ezpap  -Diuresis as hemodynamics and renal function allow (-7.8L for the stay)  -Continue BiPAP at night, wean to NC during the day  -Check venous gas in the morning after coming off BiPAP  -Chest x-ray in the morning to evaluate effusions  -May need to consider possible thoracentesis of the right side: Will evaluate with pulmonary attending on the morning of 4/1 after CXR  -Consider repeat echo given substantial increase in BNP and multiple hospitalizations failure since last echo 12/2023  -Agree with palliative care consult     Thank you for the consult. Pulmonology will follow.   VANCE Springer-CNS

## 2024-03-31 NOTE — NURSING NOTE
Pt became more alert overnight, pt awake and alert at this time, took a.m. meds whole in applesauce and drank cranberry juice, pt A&OX2-3, O2/5L nc, pt has call light within reach.

## 2024-03-31 NOTE — PROGRESS NOTES
"Subjective Data:  No events. No complaints.        Objective Data:  Last Recorded Vitals:  Vitals:    24 0759 24 1152 24 1339 24 1500   BP:  138/82  136/88   BP Location:  Left arm  Left arm   Patient Position:  Lying  Lying   Pulse:  103  107   Resp:  20  20   Temp:  36.6 °C (97.9 °F)  36.4 °C (97.5 °F)   TempSrc:  Temporal  Temporal   SpO2: 94% 96% 100% 97%   Weight:         Medical Gas Therapy: Supplemental oxygen  O2 Delivery Method: Nasal cannula  Weight  Av kg (228 lb 1.1 oz)  Min: 102 kg (224 lb 3.3 oz)  Max: 104 kg (230 lb)    LABS:  CMP:  Results from last 7 days   Lab Units 24  0616 24  1852 24  0636 24  0523 24  0555 24  0546 24  0712 24  0721   SODIUM mmol/L 155*  --  151* 146* 143 145 142 143   POTASSIUM mmol/L 3.1*  --  3.5 3.8 4.2 3.5 3.5 3.6   CHLORIDE mmol/L 109*  --  105 105 103 103 101 103   CO2 mmol/L 39*  --  37* 28 29 33* 33* 32   ANION GAP mmol/L 10  --  13 17 15 13 12 12   BUN mg/dL 40*  --  44* 41* 34* 31* 36* 36*   CREATININE mg/dL 2.09*  --  2.40* 2.32* 2.06* 2.05* 2.00* 2.08*   EGFR mL/min/1.73m*2 31*  --  26* 27* 31* 31* 32* 31*   MAGNESIUM mg/dL 2.60* 2.70* 2.60* 2.50* 2.50*  --   --   --      CBC:  Results from last 7 days   Lab Units 24  0616 24  0636 24  0523 24  0555 24  0712 24  0721   WBC AUTO x10*3/uL 8.0 7.4 7.9 7.1 6.7 5.1   HEMOGLOBIN g/dL 13.0* 13.6 13.7 13.4* 12.6* 12.7*   HEMATOCRIT % 45.9 48.8 47.7 44.2 42.5 44.7   PLATELETS AUTO x10*3/uL 119* 131* 154 166 137* 126*   MCV fL 92 93 92 86 90 92     COAG:     ABO: No results found for: \"ABO\"  HEME/ENDO:     CARDIAC:       No lab exists for component: \"CK\", \"CKMBP\"          Last I/O:    Intake/Output Summary (Last 24 hours) at 3/31/2024 1616  Last data filed at 3/31/2024 1556  Gross per 24 hour   Intake 915.84 ml   Output 500 ml   Net 415.84 ml     Net IO Since Admission: -8,224.16 mL [24 1616]        "     Inpatient Medications:  Scheduled medications   Medication Dose Route Frequency    apixaban  2.5 mg oral BID    atorvastatin  20 mg oral Nightly    cyanocobalamin  500 mcg oral Daily    docusate sodium  100 mg oral BID    doxazosin  8 mg oral Nightly    empagliflozin  25 mg oral Daily    finasteride  5 mg oral Daily    [Held by provider] furosemide  60 mg intravenous BID    glimepiride  2 mg oral Daily before breakfast    hydrALAZINE  50 mg oral BID    ipratropium-albuteroL  3 mL nebulization TID    losartan  50 mg oral Daily    metoprolol succinate XL  150 mg oral BID    pantoprazole  40 mg oral Daily before breakfast    piperacillin-tazobactam  3.375 g intravenous q6h    polyethylene glycol  17 g oral Daily    potassium chloride  20 mEq oral TID     PRN medications   Medication    acetaminophen    Or    acetaminophen    Or    acetaminophen    ipratropium-albuteroL    loperamide    oxygen    oxygen     Continuous Medications   Medication Dose Last Rate    dextrose 5 % in water (D5W)  50 mL/hr 50 mL/hr (03/31/24 1556)           Physical Exam:  Gen Well appearing elderly male lying in bed in no apparent distress. Body mass index is 31.28 kg/m².   CV rrr. No m/r/g appreciated. No JVD. Trace bilateral leg edema. Pulses 2+ and symmetric.    Pulm Lungs clear with normal respiratory effort.  Neuro Alert and conversant. Grossly nonfocal.          Assessment:  Acute on chronic HFpEF  Adequate diuresis thus far. Volume status acceptable on exam though with known significant effusion on CT. On adjusted dose furosemide per nephrology.  Persistent atrial flutter.   Rates acceptable. On anticoagulation. May benefit from restoration of sinus rhythm at some point. Defer to his outpatient cardiologist.  Hypertension   BP considerably improved and acceptable as of late.      Recommendations   Con't IV diuresis per nephrology--consider higher dose furosemide ( 80 mg BID) on discharge. Would pursue right sided  thoracentesis.      Will sign off. Call with ?'s       Mundo Holguin MD

## 2024-04-01 ENCOUNTER — APPOINTMENT (OUTPATIENT)
Dept: RADIOLOGY | Facility: HOSPITAL | Age: 84
DRG: 193 | End: 2024-04-01
Payer: MEDICARE

## 2024-04-01 LAB
ANION GAP SERPL CALC-SCNC: 11 MMOL/L (ref 10–20)
BUN SERPL-MCNC: 35 MG/DL (ref 6–23)
CALCIUM SERPL-MCNC: 7.8 MG/DL (ref 8.6–10.3)
CHLORIDE SERPL-SCNC: 109 MMOL/L (ref 98–107)
CO2 SERPL-SCNC: 40 MMOL/L (ref 21–32)
CREAT SERPL-MCNC: 2 MG/DL (ref 0.5–1.3)
EGFRCR SERPLBLD CKD-EPI 2021: 32 ML/MIN/1.73M*2
ERYTHROCYTE [DISTWIDTH] IN BLOOD BY AUTOMATED COUNT: 17.3 % (ref 11.5–14.5)
GLUCOSE SERPL-MCNC: 101 MG/DL (ref 74–99)
HCT VFR BLD AUTO: 44.4 % (ref 41–52)
HGB BLD-MCNC: 12.3 G/DL (ref 13.5–17.5)
LDH SERPL L TO P-CCNC: 167 U/L (ref 84–246)
MAGNESIUM SERPL-MCNC: 2.6 MG/DL (ref 1.6–2.4)
MCH RBC QN AUTO: 25.3 PG (ref 26–34)
MCHC RBC AUTO-ENTMCNC: 27.7 G/DL (ref 32–36)
MCV RBC AUTO: 91 FL (ref 80–100)
NRBC BLD-RTO: 0 /100 WBCS (ref 0–0)
PLATELET # BLD AUTO: 100 X10*3/UL (ref 150–450)
POTASSIUM SERPL-SCNC: 2.7 MMOL/L (ref 3.5–5.3)
PROT SERPL-MCNC: 5.4 G/DL (ref 6.4–8.2)
RBC # BLD AUTO: 4.86 X10*6/UL (ref 4.5–5.9)
SODIUM SERPL-SCNC: 157 MMOL/L (ref 136–145)
WBC # BLD AUTO: 7.4 X10*3/UL (ref 4.4–11.3)

## 2024-04-01 PROCEDURE — 99233 SBSQ HOSP IP/OBS HIGH 50: CPT | Performed by: INTERNAL MEDICINE

## 2024-04-01 PROCEDURE — 2500000002 HC RX 250 W HCPCS SELF ADMINISTERED DRUGS (ALT 637 FOR MEDICARE OP, ALT 636 FOR OP/ED): Performed by: INTERNAL MEDICINE

## 2024-04-01 PROCEDURE — 99232 SBSQ HOSP IP/OBS MODERATE 35: CPT | Performed by: CLINICAL NURSE SPECIALIST

## 2024-04-01 PROCEDURE — 2500000004 HC RX 250 GENERAL PHARMACY W/ HCPCS (ALT 636 FOR OP/ED): Performed by: INTERNAL MEDICINE

## 2024-04-01 PROCEDURE — 2500000004 HC RX 250 GENERAL PHARMACY W/ HCPCS (ALT 636 FOR OP/ED): Performed by: FAMILY MEDICINE

## 2024-04-01 PROCEDURE — 36415 COLL VENOUS BLD VENIPUNCTURE: CPT | Performed by: NURSE PRACTITIONER

## 2024-04-01 PROCEDURE — 92526 ORAL FUNCTION THERAPY: CPT | Mod: GN

## 2024-04-01 PROCEDURE — 36415 COLL VENOUS BLD VENIPUNCTURE: CPT | Performed by: CLINICAL NURSE SPECIALIST

## 2024-04-01 PROCEDURE — 71045 X-RAY EXAM CHEST 1 VIEW: CPT

## 2024-04-01 PROCEDURE — 94640 AIRWAY INHALATION TREATMENT: CPT | Mod: MUE

## 2024-04-01 PROCEDURE — 71045 X-RAY EXAM CHEST 1 VIEW: CPT | Performed by: STUDENT IN AN ORGANIZED HEALTH CARE EDUCATION/TRAINING PROGRAM

## 2024-04-01 PROCEDURE — 83735 ASSAY OF MAGNESIUM: CPT | Performed by: NURSE PRACTITIONER

## 2024-04-01 PROCEDURE — 2500000001 HC RX 250 WO HCPCS SELF ADMINISTERED DRUGS (ALT 637 FOR MEDICARE OP): Performed by: INTERNAL MEDICINE

## 2024-04-01 PROCEDURE — 1200000002 HC GENERAL ROOM WITH TELEMETRY DAILY

## 2024-04-01 PROCEDURE — 2500000005 HC RX 250 GENERAL PHARMACY W/O HCPCS: Performed by: FAMILY MEDICINE

## 2024-04-01 PROCEDURE — 80048 BASIC METABOLIC PNL TOTAL CA: CPT | Performed by: NURSE PRACTITIONER

## 2024-04-01 PROCEDURE — 83615 LACTATE (LD) (LDH) ENZYME: CPT | Performed by: CLINICAL NURSE SPECIALIST

## 2024-04-01 PROCEDURE — 84155 ASSAY OF PROTEIN SERUM: CPT | Performed by: CLINICAL NURSE SPECIALIST

## 2024-04-01 PROCEDURE — 99223 1ST HOSP IP/OBS HIGH 75: CPT | Performed by: NURSE PRACTITIONER

## 2024-04-01 PROCEDURE — 85027 COMPLETE CBC AUTOMATED: CPT | Performed by: NURSE PRACTITIONER

## 2024-04-01 RX ORDER — METOPROLOL TARTRATE 1 MG/ML
5 INJECTION, SOLUTION INTRAVENOUS ONCE
Status: COMPLETED | OUTPATIENT
Start: 2024-04-01 | End: 2024-04-01

## 2024-04-01 RX ORDER — POTASSIUM CHLORIDE 14.9 MG/ML
20 INJECTION INTRAVENOUS
Status: COMPLETED | OUTPATIENT
Start: 2024-04-01 | End: 2024-04-01

## 2024-04-01 RX ADMIN — PIPERACILLIN SODIUM AND TAZOBACTAM SODIUM 3.38 G: 3; .375 INJECTION, SOLUTION INTRAVENOUS at 21:13

## 2024-04-01 RX ADMIN — IPRATROPIUM BROMIDE AND ALBUTEROL SULFATE 3 ML: 2.5; .5 SOLUTION RESPIRATORY (INHALATION) at 07:57

## 2024-04-01 RX ADMIN — IPRATROPIUM BROMIDE AND ALBUTEROL SULFATE 3 ML: 2.5; .5 SOLUTION RESPIRATORY (INHALATION) at 20:47

## 2024-04-01 RX ADMIN — PIPERACILLIN SODIUM AND TAZOBACTAM SODIUM 3.38 G: 3; .375 INJECTION, SOLUTION INTRAVENOUS at 03:58

## 2024-04-01 RX ADMIN — POTASSIUM CHLORIDE 20 MEQ: 14.9 INJECTION, SOLUTION INTRAVENOUS at 18:02

## 2024-04-01 RX ADMIN — POTASSIUM CHLORIDE 20 MEQ: 1.5 POWDER, FOR SOLUTION ORAL at 09:39

## 2024-04-01 RX ADMIN — IPRATROPIUM BROMIDE AND ALBUTEROL SULFATE 3 ML: 2.5; .5 SOLUTION RESPIRATORY (INHALATION) at 13:30

## 2024-04-01 RX ADMIN — ACETAMINOPHEN 650 MG: 650 SOLUTION ORAL at 02:54

## 2024-04-01 RX ADMIN — METOPROLOL TARTRATE 5 MG: 5 INJECTION INTRAVENOUS at 16:06

## 2024-04-01 RX ADMIN — POTASSIUM CHLORIDE 20 MEQ: 14.9 INJECTION, SOLUTION INTRAVENOUS at 16:06

## 2024-04-01 RX ADMIN — PIPERACILLIN SODIUM AND TAZOBACTAM SODIUM 3.38 G: 3; .375 INJECTION, SOLUTION INTRAVENOUS at 14:23

## 2024-04-01 ASSESSMENT — PAIN SCALES - PAIN ASSESSMENT IN ADVANCED DEMENTIA (PAINAD)
BREATHING: NORMAL
CONSOLABILITY: NO NEED TO CONSOLE
BODYLANGUAGE: TENSE, DISTRESSED PACING, FIDGETING
FACIALEXPRESSION: SAD, FRIGHTENED, FROWN
NEGVOCALIZATION: OCCASIONAL MOAN/GROAN, LOW SPEECH, NEGATIVE/DISAPPROVING QUALITY
TOTALSCORE: 3

## 2024-04-01 ASSESSMENT — PAIN - FUNCTIONAL ASSESSMENT
PAIN_FUNCTIONAL_ASSESSMENT: PAINAD (PAIN ASSESSMENT IN ADVANCED DEMENTIA SCALE)
PAIN_FUNCTIONAL_ASSESSMENT: PAINAD (PAIN ASSESSMENT IN ADVANCED DEMENTIA SCALE)

## 2024-04-01 ASSESSMENT — PAIN SCALES - GENERAL: PAINLEVEL_OUTOF10: 0 - NO PAIN

## 2024-04-01 NOTE — PROGRESS NOTES
Nephrology Consult Progress Note    Admit Date: 3/22/2024    Interval history:  Pt is confuse, apparently eating poorly    CURRENT MEDICATIONS:    Current Facility-Administered Medications:     acetaminophen (Tylenol) tablet 650 mg, 650 mg, oral, q4h PRN, 650 mg at 03/31/24 1800 **OR** acetaminophen (Tylenol) oral liquid 650 mg, 650 mg, oral, q4h PRN, 650 mg at 04/01/24 0254 **OR** acetaminophen (Tylenol) suppository 650 mg, 650 mg, rectal, q4h PRN, Juve Brown MD    apixaban (Eliquis) tablet 2.5 mg, 2.5 mg, oral, BID, Juve Brown MD, 2.5 mg at 03/31/24 2251    atorvastatin (Lipitor) tablet 20 mg, 20 mg, oral, Nightly, Juve Brown MD, 20 mg at 03/31/24 2251    cyanocobalamin (Vitamin B-12) tablet 500 mcg, 500 mcg, oral, Daily, Juve Brown MD, 500 mcg at 03/31/24 0848    dextrose 5 % in water (D5W) infusion, 50 mL/hr, intravenous, Continuous, Johanny Evangelista MD, Last Rate: 50 mL/hr at 03/31/24 1556, 50 mL/hr at 03/31/24 1556    docusate sodium (Colace) capsule 100 mg, 100 mg, oral, BID, Juve Brown MD, 100 mg at 03/31/24 0848    doxazosin (Cardura) tablet 8 mg, 8 mg, oral, Nightly, Mundo Holguin MD, 8 mg at 03/31/24 2251    empagliflozin (Jardiance) tablet 25 mg, 25 mg, oral, Daily, Juve Brown MD, 25 mg at 03/31/24 0847    finasteride (Proscar) tablet 5 mg, 5 mg, oral, Daily, Juve Brown MD, 5 mg at 03/31/24 0848    [Held by provider] furosemide (Lasix) injection 60 mg, 60 mg, intravenous, BID, Johanny Evangelista MD, 60 mg at 03/30/24 1658    glimepiride (Amaryl) tablet 2 mg, 2 mg, oral, Daily before breakfast, Juve Brown MD, 2 mg at 03/31/24 0638    hydrALAZINE (Apresoline) tablet 50 mg, 50 mg, oral, BID, Johanny Evangelista MD, 50 mg at 03/31/24 2251    ipratropium-albuteroL (Duo-Neb) 0.5-2.5 mg/3 mL nebulizer solution 3 mL, 3 mL, nebulization, q4h PRN, Juve Brown MD, 3 mL at 03/29/24 1141    ipratropium-albuteroL (Duo-Neb) 0.5-2.5 mg/3 mL  nebulizer solution 3 mL, 3 mL, nebulization, TID, Juve Brown MD, 3 mL at 04/01/24 0757    loperamide (Imodium) capsule 2 mg, 2 mg, oral, TID PRN, Juve Brown MD    losartan (Cozaar) tablet 50 mg, 50 mg, oral, Daily, Juve Brown MD, 50 mg at 03/31/24 0848    metoprolol succinate XL (Toprol-XL) 24 hr tablet 150 mg, 150 mg, oral, BID, Juve Brown MD, 150 mg at 03/31/24 2251    oxygen (O2) therapy, , inhalation, Continuous PRN - O2/gases, Kevon Gunderson MD, Rate Verify at 03/31/24 0330    oxygen (O2) therapy, , inhalation, Continuous PRN - O2/gases, Kevon Gunderson MD, 3 L/min at 03/31/24 2015    pantoprazole (ProtoNix) EC tablet 40 mg, 40 mg, oral, Daily before breakfast, Juve Brown MD, 40 mg at 03/31/24 0638    piperacillin-tazobactam-dextrose (Zosyn) IV 3.375 g, 3.375 g, intravenous, q6h, Juve Brown MD, Stopped at 04/01/24 0428    polyethylene glycol (Glycolax, Miralax) packet 17 g, 17 g, oral, Daily, Juve Brown MD, 17 g at 03/31/24 0848    potassium chloride (Klor-Con) packet 20 mEq, 20 mEq, oral, TID, Johanny Evangelista MD, 20 mEq at 04/01/24 0939       Intake/Output Summary (Last 24 hours) at 4/1/2024 1253  Last data filed at 4/1/2024 1158  Gross per 24 hour   Intake 415.84 ml   Output 1000 ml   Net -584.16 ml       PHYSICAL EXAM:  BP (!) 157/102 (BP Location: Left arm, Patient Position: Lying)   Pulse 98   Temp 36.2 °C (97.1 °F) (Temporal)   Resp 19   Wt 102 kg (224 lb 3.3 oz)   SpO2 100%   BMI 31.28 kg/m²     Intake/Output Summary (Last 24 hours) at 4/1/2024 1253  Last data filed at 4/1/2024 1158  Gross per 24 hour   Intake 415.84 ml   Output 1000 ml   Net -584.16 ml     Gen: Awake, disoriented, NAD  Neck: No JVD  Cardiac: RRR  Resp: clear BS  Abd: Soft, non tender, +BS, non distended   Ext: No edema   Neuro: moves 4 ext  Peripheral Pulses: weak peripheral pulses.  Skin: Skin color, texture, turgor normal, no suspicious rashes or lesions.    Labs:  Results  for orders placed or performed during the hospital encounter of 03/22/24 (from the past 24 hour(s))   Basic Metabolic Panel   Result Value Ref Range    Glucose 101 (H) 74 - 99 mg/dL    Sodium 157 (H) 136 - 145 mmol/L    Potassium 2.7 (LL) 3.5 - 5.3 mmol/L    Chloride 109 (H) 98 - 107 mmol/L    Bicarbonate 40 (HH) 21 - 32 mmol/L    Anion Gap 11 10 - 20 mmol/L    Urea Nitrogen 35 (H) 6 - 23 mg/dL    Creatinine 2.00 (H) 0.50 - 1.30 mg/dL    eGFR 32 (L) >60 mL/min/1.73m*2    Calcium 7.8 (L) 8.6 - 10.3 mg/dL   CBC   Result Value Ref Range    WBC 7.4 4.4 - 11.3 x10*3/uL    nRBC 0.0 0.0 - 0.0 /100 WBCs    RBC 4.86 4.50 - 5.90 x10*6/uL    Hemoglobin 12.3 (L) 13.5 - 17.5 g/dL    Hematocrit 44.4 41.0 - 52.0 %    MCV 91 80 - 100 fL    MCH 25.3 (L) 26.0 - 34.0 pg    MCHC 27.7 (L) 32.0 - 36.0 g/dL    RDW 17.3 (H) 11.5 - 14.5 %    Platelets 100 (L) 150 - 450 x10*3/uL   Magnesium   Result Value Ref Range    Magnesium 2.60 (H) 1.60 - 2.40 mg/dL        DATA:   Diagnostic tests reviewed for today's visit:    New labs and imaging     Assessment and Plan:  Pt admitted with pneumonia and acute on chronic CHF, persistent AF  - ROSE on CKD stage 3a: resolved ROSE Scr at baseline  Euvolemic s/p diuretic tx, currently on hold  BP: acceptable control  Hypernatremia: due to water deficit, remains uncorrected, pt lost iv access, not eating  HypoP and HypoK, poor intake    PLAN:  - D5W 57 ml/h once iv access in place, diuretic on hold   - replete K and P prn      Will continue to follow.     Signature: Sascha Perez MD

## 2024-04-01 NOTE — PROGRESS NOTES
New Castano is a 83 y.o. male on day 10 of admission presenting with Pneumonia of right lung due to infectious organism, unspecified part of lung.    Subjective     Patient seen and examined lying in bed.  Sitter at bedside.  Patient was unable to complete wearing BiPAP last evening as he continually pulled off mask.  Venous gas was not drawn.  He is continue to reach for oxygen tubing as well as IVs.  Today he is a little more interactive but his responses are not appropriate and often incomplete and/or  nonsensical.  Chest x-ray this morning shows worsening right-sided effusion despite I/O documented as -9 L for the stay.  He is stable on 3 L nasal cannula satting in the mid to high 90s.  He is unable to clearly answer questions regarding pain, coughing or other signs and symptoms.     Update: IR consult placed for right thoracentesis. Per discussion with Dr. Gunderson, ordered cultures, protein and LDH (fluid and serum).     Objective     Physical Exam  Constitutional:   Elderly, ill-appearing, NAD, unable to fully participate in exam  HENT: Atraumatic, semidry mucous membranes  Eyes: nonicteric  Neck: Supple, no obvious JVD  Cardiovascular: S1, S2 distinct, irregular, HR 90s, no murmur appreciated  Pulmonary: Fair air entry with clear fields with diminished rt mid lung and base fields, anterior mild scattered rhonchi throughout; no wheezing or crackles noted  Abdominal: Soft, obese, nontender, + BS  Musculoskeletal: Fair active range of motion BUEs, minimal active movement of BLEs  Extremities:   +1 BLE edema  Lymphadenopathy: No nuchal LAP  Skin: Warm and dry with bilateral lower extremity darkened and thickened skin  Neurological: Awake and continues to answer to his name, was unable to say that he is in the hospital and did not respond when asked what the year was; speech occasionally clear and generally semi-clear and often nonsensical  Psychiatric:   Mood is appropriate, behavior is fidgety, pulling at  medical devices     Last Recorded Vitals  Blood pressure (!) 157/102, pulse 98, temperature 36.2 °C (97.1 °F), temperature source Temporal, resp. rate 19, weight 102 kg (224 lb 3.3 oz), SpO2 96 %.  Intake/Output last 3 Shifts:  I/O last 3 completed shifts:  In: 915.8 (9 mL/kg) [I.V.:365.8 (3.6 mL/kg); IV Piggyback:550]  Out: 800 (7.9 mL/kg) [Urine:800 (0.2 mL/kg/hr)]  Weight: 101.7 kg     Relevant Results  Scheduled Medications:  apixaban, 2.5 mg, oral, BID  atorvastatin, 20 mg, oral, Nightly  cyanocobalamin, 500 mcg, oral, Daily  docusate sodium, 100 mg, oral, BID  doxazosin, 8 mg, oral, Nightly  empagliflozin, 25 mg, oral, Daily  finasteride, 5 mg, oral, Daily  [Held by provider] furosemide, 60 mg, intravenous, BID  glimepiride, 2 mg, oral, Daily before breakfast  hydrALAZINE, 50 mg, oral, BID  ipratropium-albuteroL, 3 mL, nebulization, TID  losartan, 50 mg, oral, Daily  metoprolol succinate XL, 150 mg, oral, BID  pantoprazole, 40 mg, oral, Daily before breakfast  piperacillin-tazobactam, 3.375 g, intravenous, q6h  polyethylene glycol, 17 g, oral, Daily  potassium chloride, 20 mEq, oral, TID       PRN medications: acetaminophen **OR** acetaminophen **OR** acetaminophen, ipratropium-albuteroL, loperamide, oxygen, oxygen     Results for orders placed or performed during the hospital encounter of 03/22/24 (from the past 24 hour(s))   Basic Metabolic Panel   Result Value Ref Range    Glucose 101 (H) 74 - 99 mg/dL    Sodium 157 (H) 136 - 145 mmol/L    Potassium 2.7 (LL) 3.5 - 5.3 mmol/L    Chloride 109 (H) 98 - 107 mmol/L    Bicarbonate 40 (HH) 21 - 32 mmol/L    Anion Gap 11 10 - 20 mmol/L    Urea Nitrogen 35 (H) 6 - 23 mg/dL    Creatinine 2.00 (H) 0.50 - 1.30 mg/dL    eGFR 32 (L) >60 mL/min/1.73m*2    Calcium 7.8 (L) 8.6 - 10.3 mg/dL   CBC   Result Value Ref Range    WBC 7.4 4.4 - 11.3 x10*3/uL    nRBC 0.0 0.0 - 0.0 /100 WBCs    RBC 4.86 4.50 - 5.90 x10*6/uL    Hemoglobin 12.3 (L) 13.5 - 17.5 g/dL    Hematocrit 44.4  41.0 - 52.0 %    MCV 91 80 - 100 fL    MCH 25.3 (L) 26.0 - 34.0 pg    MCHC 27.7 (L) 32.0 - 36.0 g/dL    RDW 17.3 (H) 11.5 - 14.5 %    Platelets 100 (L) 150 - 450 x10*3/uL   Magnesium   Result Value Ref Range    Magnesium 2.60 (H) 1.60 - 2.40 mg/dL      XR chest 1 view  Result Date: 4/1/2024  Interpreted By:  Alex Alvarez, STUDY: XR CHEST 1 VIEW; 4/1/2024 4:55 am   INDICATION: Signs/Symptoms:b/l pleural effusions   COMPARISON: Radiographs 03/22/2024   ACCESSION NUMBER(S): [id number]   ORDERING CLINICIAN: ALBA WHELAN   TECHNIQUE: Single frontal view of the chest performed.   FINDINGS: LINES AND DEVICES: None.   LUNGS: Stable moderate to large right and small left layering pleural effusions. Increased interstitial opacities bilaterally. No pneumothorax.   CARDIOMEDIASTINAL SILHOUETTE: Stable cardiomegaly.       Stable to slightly increased bilateral pleural effusions with possible superimposed worsening edema and/or airspace disease.   MACRO None   Signed by: Alex Alvarez 4/1/2024 8:18 AM Dictation workstation:   [id number]    US renal complete  Result Date: 3/30/2024  Interpreted By:  Yanira Menjivar, STUDY: US RENAL COMPLETE;  3/30/2024 3:39 pm   INDICATION: Signs/Symptoms:arcelia r/o obstruction.   COMPARISON: None.   ACCESSION NUMBER(S): [id number]   ORDERING CLINICIAN: FRANCIE AMBROCIO   TECHNIQUE: Multiple images of the kidneys were obtained.   FINDINGS: RIGHT KIDNEY: 12.5 cm in length. No hydronephrosis. No focal renal abnormality. Lobulated contour. Increased renal cortical echogenicity.   LEFT KIDNEY: 12.2 cm in length. No hydronephrosis. No focal renal abnormality. Lobulated contour. Increased renal cortical echogenicity.   BLADDER: Slightly irregular bladder contour, nonspecific. Estimated prostate volume 19 mL, normal. Bilateral ureteral jets identified.   Other: Small volume ascites.       Lobulated kidneys with increased cortical echogenicity suggestive of medical renal disease. No  hydronephrosis.   Signed by: Yanira Menjivar 3/30/2024 5:23 PM Dictation workstation:   [id number]        Assessment/Plan     New Castano is a 83 y.o. male with past medical history of HTN, CHF, HLD, DMT2, A-fib (s/p cardioversion and ablation on Eliquis), CKD 3 who presented to [facility] ED on 3/22 for shortness of breath and generalized weakness for couple of days.  Family reports he was unable to get out of his chair.  Baseline is in room air but required 4 L per EMS.  CT on 3/28 showed cardiomegaly with bilateral infiltrates and pleural effusions (R> L) with soft tissue edema and free fluid consistent with fluid overload/CHF and unable to exclude superimposed pneumonia.  DVT and PE studies were negative.  He was admitted for acute exacerbation of CHF & pneumonia, treated with IV diuresis (I/O shows -7.1 L for the stay) and Zosyn for CAP coverage (today is day #9). Oxygen needs fluctuated throughout the stay & on 3/29 he desaturated on 3 L to 87-88% was increased ultimately to 5 L.  On 3/30 ABG showed uncompensated respiratory acidosis with pCO2 87 & patient was placed on BiPAP for short time without repeat ABG or VBG. He has been weaned to 4 L nasal cannula with BiPAP at night.  Pulmonology is consulted for acute hypercarbic respiratory failure.     PFT 10/2021: FEV1/FVC 0.69, FVC 3.03 (lower limit normal 3.3), DLCO 57% predicted; PFT is indicative of no obstruction suggestive of restriction with moderately decreased diffusion capacity     Impressions:  # Acute hypoxic and hypercarbic respiratory failure: Patient hypoxic in the 80s per EMS placed on 4 L requiring max 5L during admission.  ABG on 3/30 with uncompensated respiratory acidosis with pCO2 of 87; likely reflective of acute systolic heart failure, restrictive lung disease with bilateral pleural effusion, cognitive decline with deconditioning; currently weaned to 2 L NC     #Pleural effusions, bilateral: Noted on chest CT, right greater than left with  compressive atelectasis bilaterally likely related to acute on chronic HFpEF, low concern for pneumonia given afebrile, no leukocytosis and procalcitonin 0.16 aching parapneumonic effusion less likely     #Acute on chronic HFpEF: Chest CT with cardiomegaly with bilateral infiltrates and bilateral pleural effusions with soft tissue edema and free fluid; BNP > 4700 (previous BNPs March, early> 4700, February 3956, December 48)     #OHS: BMI 31.28 with central obesity and abdominal fluid retention 2/2 HF exacerbation, pCO2 87 on arterial gas (baseline ~ 50s)     #Cognitive dysfunction, delirium: Patient does not engage in conversation and interact appropriately, requires much redirection and is not oriented to time or place; family reports change in cognitive function noting a decline since his last admission and worsening further the morning of 3/30 but does report that he seems to wax and wane     Recommendations:  -Continue supplemental O2, wean for saturation 92 to 95%  -Adequately treated for CAP  -Continue DuoNebs   -BPH with Acapella, may add ezpap  -Diuresis as hemodynamics and renal function allow (-9L for the stay)  -Continue BiPAP at night, wean to NC during the day  -Check venous gas in the morning after coming off BiPAP  -Chest x-ray in the morning to evaluate effusions  -IR consult placed for right thoracentesis  -Would recommend repeat echo given substantial increase in BNP and multiple hospitalizations failure since last echo 12/2023  -Agree with palliative care consult     -Appreciate SLP recommendations, given patient's fluctuating cognitive status, would only attempt to feed patient if he is fully awake and able to participate and follow directions     Thank you for the consult. Pulmonology will follow.     VANCE Springer-CNS

## 2024-04-01 NOTE — PROGRESS NOTES
Occupational Therapy                 Therapy Communication Note    Patient Name: New Castano  MRN: [id number]  Today's Date: 4/1/2024     Discipline: Occupational Therapy    Missed Visit Reason: Missed Visit Reason: Patient placed on medical hold (Per CNP hold today due to medical status. Pending Palliative care mtg today)    Missed Time: Attempt

## 2024-04-01 NOTE — CARE PLAN
Problem: Skin  Goal: Decreased wound size/increased tissue granulation at next dressing change  Outcome: Progressing  Flowsheets (Taken 3/31/2024 0029 by Aminata West RN)  Decreased wound size/increased tissue granulation at next dressing change: Promote sleep for wound healing  Goal: Prevent/manage excess moisture  Outcome: Progressing  Flowsheets (Taken 4/1/2024 0626)  Prevent/manage excess moisture: Cleanse incontinence/protect with barrier cream     Problem: Diabetes  Goal: Achieve decreasing blood glucose levels by end of shift  Outcome: Progressing     Problem: Fall/Injury  Goal: Not fall by end of shift  Outcome: Progressing  Goal: Be free from injury by end of the shift  Outcome: Progressing   The patient's goals for the shift include Good night's sleep    The clinical goals for the shift include pt will maintain an SPo2 above 92% during the shift    Over the shift, the patient did not make progress toward the following goals. Barriers to progression include . Recommendations to address these barriers include .

## 2024-04-01 NOTE — CONSULTS
Inpatient consult to Palliative Care  Consult performed by: VANCE Severino-CNP  Consult ordered by: Keovn Gunderson MD  Reason for consult: Daughter would like to speak with the palliative care team to see what options are out there to help keep patient comfortable.           History Of Present Illness  New Castano is a 83 y.o. male with past medical history of CHF diastolic, hypertension, hyperlipidemia, A-fib flutter past history of cardioversion and ablation on anticoagulation with Eliquis CKD stage III GERD had been in the hospital last month for shortness of breath heart failure pleural effusions want him getting treated was recently discharged did not last long at home comes back to the hospital with worsening shortness of breath and possible superimposed pneumonia.  Had to be placed on BiPAP because of his hypoxic respiratory failure he has been delirious recently fidgety and pulling off ECG problems taking oxygen off and placement on his head.    My conversation with his daughter Nena normally when he is good he is not confused at home.  She tells me his hospitalizations are getting closer and closer together.  She has a multitude of questions about goals of care level of aggressiveness etc. tells me that they do not want to keep doing this if really he is not likely to recover to maintain any semblance of quality of life.  Ergo, palliative medicine was consulted.    When I came to see him this afternoon he was markedly confused there is a sitter at the bedside he intermittently takes his oxygen off now is on nasal cannula does not have desaturation and rapidly plummets into the 70 to 80% range.    Patient himself denies any pain but falls asleep midway through my query.  Does not look to be in acute physical pain there is no grimacing or wincing or brow furrowing.  Patient himself does not offer anything else during my examination.  He is markedly confused without any clear worry that why he came in  ETC.     Lab work reviewed showing a white count of 7.4 hemoglobin 12.3 hematocrit 44.4 platelets 100,000 been dropping.  Sodium 157 potassium 2.7 chloride 109 CO2 40 BUN 35 creatinine 2.0 and trending downward glucose 101 gap 11 magnesium 2.6 calcium 7.8.    Imaging as below.  Patient is a exceedingly poor historian not even able to stay awake for much of my questioning cannot contribute anything to the HPI or review of systems.    Symptoms (0 - 10, Best to Worst)  [place] Symptom Assessment System  Pain Score: 0 - No pain    BM in last 48 hours? unknown   Personal/Social History  According to the chart  He reports that he has quit smoking. His smoking use included cigarettes. He has quit using smokeless tobacco. No history on file for alcohol use and drug use.    Functional Status  Presently unknown functional status      Caregiving/Caregiver Support  Does the patient require assistance in some or all components of his care, including coordination of medical care? Yes  If Yes, which person serves that role?  Family    Past Medical History  He has a past medical history of Diabetes mellitus (CMS/[facility]), Hypertension, Mixed hyperlipidemia (02/13/2013), Paroxysmal atrial fibrillation (CMS/[facility]) (03/08/2018), S/P ablation of atrial fibrillation (05/03/2019), and Stage 3b chronic kidney disease (CMS/[facility]) (12/29/2023).    Surgical History  He has a past surgical history that includes Cardioversion and Cardiac electrophysiology mapping and ablation.     Family History  No family history on file.  Allergies  Pollen extracts    Review of Systems  Patient is confused and delirious totally unobtainable review of systems, and positives as mentioned in the HPI from the chart.  Physical Exam  Vitals and nursing note reviewed.   Constitutional:       Appearance: He is obese. He is ill-appearing. He is not toxic-appearing or diaphoretic.   HENT:      Head: Normocephalic.      Nose: Congestion present. No rhinorrhea.       Mouth/Throat:      Mouth: Mucous membranes are moist.      Pharynx: Oropharynx is clear. No oropharyngeal exudate or posterior oropharyngeal erythema.   Eyes:      General: No scleral icterus.        Right eye: No discharge.         Left eye: No discharge.      Extraocular Movements: Extraocular movements intact.      Conjunctiva/sclera: Conjunctivae normal.      Pupils: Pupils are equal, round, and reactive to light.   Neck:      Vascular: No carotid bruit.   Cardiovascular:      Rate and Rhythm: Normal rate. Rhythm irregular.      Heart sounds: No murmur heard.     No friction rub. No gallop.   Pulmonary:      Effort: No respiratory distress.      Breath sounds: No stridor. Rales present. No wheezing or rhonchi.      Comments: Recently on nasal cannula oxygen repeatedly need to replace this as he keeps pulling it off putting it on his forehead.  He is difficult to redirect.  Does not have good reserve capacity he drops his O2 sats quickly  Chest:      Chest wall: No tenderness.   Abdominal:      General: Bowel sounds are normal. There is no distension.      Palpations: Abdomen is soft. There is no mass.      Tenderness: There is no abdominal tenderness. There is no right CVA tenderness, left CVA tenderness, guarding or rebound.      Hernia: No hernia is present.   Genitourinary:     Comments: Pure wick, concentrated urine  Musculoskeletal:         General: Tenderness (Mild tenderness bilateral lower extremity) present. No deformity.      Cervical back: Normal range of motion and neck supple. No rigidity or tenderness.      Right lower leg: Edema present.      Left lower leg: Edema present.   Lymphadenopathy:      Cervical: No cervical adenopathy.   Skin:     Capillary Refill: Capillary refill takes 2 to 3 seconds.      Coloration: Skin is pale. Skin is not jaundiced.      Findings: Bruising present.   Neurological:      Motor: Weakness present.      Comments: Markedly confused difficult getting him to consistently  following track he falls asleep in the middle of my examination this has been consistent according to the sitter at the bedside    He does have capacity to move all extremities no focal weakness he is generally weak respiratory moderate  Weak pedal push pulls    No obvious hemifacial droop he does have some garbled muttering speech from time to time  Pupils are sluggishly reactive no obvious nystagmus    Challenging to fully evaluate cranial nerves does not seem to have any overt deficits hearing and vision appear to be adequate   Psychiatric:      Comments: He is vacillating between being lethargic and delirious  Very fidgety pulling at the ECG leads picking at IV sites  Not at all capable, he's making no sense when he is awake talking         Last Recorded Vitals  Blood pressure (!) 157/102, pulse 98, temperature 36.2 °C (97.1 °F), temperature source Temporal, resp. rate 19, weight 102 kg (224 lb 3.3 oz), SpO2 100 %.    Relevant Results  No echocardiogram results found for the past 12 months    Current Facility-Administered Medications:     acetaminophen (Tylenol) tablet 650 mg, 650 mg, oral, q4h PRN, 650 mg at 03/31/24 1800 **OR** acetaminophen (Tylenol) oral liquid 650 mg, 650 mg, oral, q4h PRN, 650 mg at 04/01/24 0254 **OR** acetaminophen (Tylenol) suppository 650 mg, 650 mg, rectal, q4h PRN, Juve Brown MD    apixaban (Eliquis) tablet 2.5 mg, 2.5 mg, oral, BID, Juve Brown MD, 2.5 mg at 03/31/24 2251    atorvastatin (Lipitor) tablet 20 mg, 20 mg, oral, Nightly, Juve Brown MD, 20 mg at 03/31/24 2251    cyanocobalamin (Vitamin B-12) tablet 500 mcg, 500 mcg, oral, Daily, Juve Brown MD, 500 mcg at 03/31/24 0848    dextrose 5 % in water (D5W) infusion, 50 mL/hr, intravenous, Continuous, Johanny Evangelista MD, Last Rate: 50 mL/hr at 03/31/24 1556, 50 mL/hr at 03/31/24 1556    docusate sodium (Colace) capsule 100 mg, 100 mg, oral, BID, Juve Brown MD, 100 mg at 03/31/24 0841     doxazosin (Cardura) tablet 8 mg, 8 mg, oral, Nightly, Mundo Holguin MD, 8 mg at 03/31/24 2251    empagliflozin (Jardiance) tablet 25 mg, 25 mg, oral, Daily, Juve Brown MD, 25 mg at 03/31/24 0847    finasteride (Proscar) tablet 5 mg, 5 mg, oral, Daily, Juve Brown MD, 5 mg at 03/31/24 0848    [Held by provider] furosemide (Lasix) injection 60 mg, 60 mg, intravenous, BID, Johanny Evangelista MD, 60 mg at 03/30/24 1658    glimepiride (Amaryl) tablet 2 mg, 2 mg, oral, Daily before breakfast, Juve Brown MD, 2 mg at 03/31/24 0638    hydrALAZINE (Apresoline) tablet 50 mg, 50 mg, oral, BID, Johanny Evangelista MD, 50 mg at 03/31/24 2251    ipratropium-albuteroL (Duo-Neb) 0.5-2.5 mg/3 mL nebulizer solution 3 mL, 3 mL, nebulization, q4h PRN, Juve Brown MD, 3 mL at 03/29/24 1141    ipratropium-albuteroL (Duo-Neb) 0.5-2.5 mg/3 mL nebulizer solution 3 mL, 3 mL, nebulization, TID, Juve Brown MD, 3 mL at 04/01/24 0757    loperamide (Imodium) capsule 2 mg, 2 mg, oral, TID PRN, Juve Brown MD    losartan (Cozaar) tablet 50 mg, 50 mg, oral, Daily, Juve Brown MD, 50 mg at 03/31/24 0848    metoprolol succinate XL (Toprol-XL) 24 hr tablet 150 mg, 150 mg, oral, BID, Juve Brown MD, 150 mg at 03/31/24 2251    oxygen (O2) therapy, , inhalation, Continuous PRN - O2/gases, Kevon Gunderson MD, Rate Verify at 03/31/24 0330    oxygen (O2) therapy, , inhalation, Continuous PRN - O2/gases, Kevon Gunderson MD, 3 L/min at 03/31/24 2015    pantoprazole (ProtoNix) EC tablet 40 mg, 40 mg, oral, Daily before breakfast, Juve Brown MD, 40 mg at 03/31/24 0638    piperacillin-tazobactam-dextrose (Zosyn) IV 3.375 g, 3.375 g, intravenous, q6h, Juve Brown MD, Stopped at 04/01/24 0428    polyethylene glycol (Glycolax, Miralax) packet 17 g, 17 g, oral, Daily, Juve Brown MD, 17 g at 03/31/24 0848    potassium chloride (Klor-Con) packet 20 mEq, 20 mEq, oral, TID, Melkon O  MD Jean Carlos, 20 mEq at 04/01/24 0939    Results for orders placed or performed during the hospital encounter of 03/22/24 (from the past 24 hour(s))   Basic Metabolic Panel   Result Value Ref Range    Glucose 101 (H) 74 - 99 mg/dL    Sodium 157 (H) 136 - 145 mmol/L    Potassium 2.7 (LL) 3.5 - 5.3 mmol/L    Chloride 109 (H) 98 - 107 mmol/L    Bicarbonate 40 (HH) 21 - 32 mmol/L    Anion Gap 11 10 - 20 mmol/L    Urea Nitrogen 35 (H) 6 - 23 mg/dL    Creatinine 2.00 (H) 0.50 - 1.30 mg/dL    eGFR 32 (L) >60 mL/min/1.73m*2    Calcium 7.8 (L) 8.6 - 10.3 mg/dL   CBC   Result Value Ref Range    WBC 7.4 4.4 - 11.3 x10*3/uL    nRBC 0.0 0.0 - 0.0 /100 WBCs    RBC 4.86 4.50 - 5.90 x10*6/uL    Hemoglobin 12.3 (L) 13.5 - 17.5 g/dL    Hematocrit 44.4 41.0 - 52.0 %    MCV 91 80 - 100 fL    MCH 25.3 (L) 26.0 - 34.0 pg    MCHC 27.7 (L) 32.0 - 36.0 g/dL    RDW 17.3 (H) 11.5 - 14.5 %    Platelets 100 (L) 150 - 450 x10*3/uL   Magnesium   Result Value Ref Range    Magnesium 2.60 (H) 1.60 - 2.40 mg/dL     XR chest 1 view    Result Date: 4/1/2024  Interpreted By:  Alex Alvarez, STUDY: XR CHEST 1 VIEW; 4/1/2024 4:55 am   INDICATION: Signs/Symptoms:b/l pleural effusions   COMPARISON: Radiographs 03/22/2024   ACCESSION NUMBER(S): [id number]   ORDERING CLINICIAN: ALBA WHELAN   TECHNIQUE: Single frontal view of the chest performed.   FINDINGS: LINES AND DEVICES: None.   LUNGS: Stable moderate to large right and small left layering pleural effusions. Increased interstitial opacities bilaterally. No pneumothorax.   CARDIOMEDIASTINAL SILHOUETTE: Stable cardiomegaly.       Stable to slightly increased bilateral pleural effusions with possible superimposed worsening edema and/or airspace disease.   MACRO None   Signed by: Alex Alvarez 4/1/2024 8:18 AM Dictation workstation:   [id number]    US renal complete    Result Date: 3/30/2024  Interpreted By:  Yanira Menjivar, STUDY: US RENAL COMPLETE;  3/30/2024 3:39 pm   INDICATION:  Signs/Symptoms:rose r/o obstruction.   COMPARISON: None.   ACCESSION NUMBER(S): [id number]   ORDERING CLINICIAN: FRANCIE AMBROCIO   TECHNIQUE: Multiple images of the kidneys were obtained.   FINDINGS: RIGHT KIDNEY: 12.5 cm in length. No hydronephrosis. No focal renal abnormality. Lobulated contour. Increased renal cortical echogenicity.   LEFT KIDNEY: 12.2 cm in length. No hydronephrosis. No focal renal abnormality. Lobulated contour. Increased renal cortical echogenicity.   BLADDER: Slightly irregular bladder contour, nonspecific. Estimated prostate volume 19 mL, normal. Bilateral ureteral jets identified.   Other: Small volume ascites.       Lobulated kidneys with increased cortical echogenicity suggestive of medical renal disease. No hydronephrosis.   Signed by: Yanira Menjivar 3/30/2024 5:23 PM Dictation workstation:   [id number]        Assessment/Plan   Acute hypoxic and hypercarbic respiratory failure  Acute on chronic diastolic heart failure  -Beta-blocker Jardiance, ARB  -? consider repeat ECHO  Pleural effusion  -?  Thoracentesis  Pneumonia, possible  -On Zosyn  A-fib flutter  -On Eliquis, beta-blocker  ROSE-improving  Altered mental status/delirium  -Multifactorial, acute respiratory failure hypoxia, hypercarbia, hypernatremia, potential infectious etiology PNA, metabolic, CHF, hospital induced delirium Etc.  -Never really fully recovered from last admit per the daughter, ie did not bounce all the way back typically at home he is not confused per the daughter Nena  Palliative care    CODE STATUS: Full code  Patient most assuredly is not a capable decision maker he has waxing and waning mentation which is multifactorial most likely affected by his acute hypoxic and hypercarbic respiratory failure as well as potential infectious etiology in the form of pneumonia also component of hospital induced delirium CHF and hypernatremia certainly not helping.    Daughter Nena reportedly healthcare POA, no  documents in epic as of yet she tells me that the patient's wife, her mother has some dementia and is not really always fully capable.    At this point we are continuing the aggressive disease modifying model of care  CODE STATUS FULL CODE for the time being, would advise strongly against aggressive measures would suggest CCA/DNI status.... Will further discuss later    I have a 430PM family meeting set up with him for today  Daughter asking questions about palliative versus possible hospice.    She would like to be aggressive but does not want to keep doing things if the overall outlook is not going to be positive for more sustainable.  We briefly touched upon palliative versus hospice.  Will have further goals of care this afternoon once the daughter arrives.    Avoid any centrally acting agents is much as possible.    Thank very much for the consult will update the chart further this afternoon after I speak with the daughter.    Thus far, total time which has not been continuous is over 80 minutes.  Will discuss with pulmonology, danny with Elham the nurse caring for the patient.    Al Escobedo, APRN-CNP

## 2024-04-01 NOTE — PROGRESS NOTES
New Castano is a 83 y.o. male on day 10 of admission presenting with Pneumonia of right lung due to infectious organism, unspecified part of lung.      Subjective   No chest pain  Sleeping in bed  No ac issues per aide at bed side  Has mittens on    Plan for thora later noted      Objective     Last Recorded Vitals  BP (!) 157/102 (BP Location: Left arm, Patient Position: Lying)   Pulse 98   Temp 36.2 °C (97.1 °F) (Temporal)   Resp 19   Wt 102 kg (224 lb 3.3 oz)   SpO2 96%   Intake/Output last 3 Shifts:    Intake/Output Summary (Last 24 hours) at 4/1/2024 1508  Last data filed at 4/1/2024 1158  Gross per 24 hour   Intake 415.84 ml   Output 1000 ml   Net -584.16 ml       Admission Weight  Weight: 104 kg (230 lb) (03/27/24 1300)    Daily Weight  03/29/24 : 102 kg (224 lb 3.3 oz)    Image Results  XR chest 1 view  Narrative: Interpreted By:  Alex Alvarez,   STUDY:  XR CHEST 1 VIEW; 4/1/2024 4:55 am      INDICATION:  Signs/Symptoms:b/l pleural effusions      COMPARISON:  Radiographs 03/22/2024      ACCESSION NUMBER(S):  [id number]      ORDERING CLINICIAN:  ALBA WHELAN      TECHNIQUE:  Single frontal view of the chest performed.      FINDINGS:  LINES AND DEVICES:  None.      LUNGS:  Stable moderate to large right and small left layering pleural  effusions. Increased interstitial opacities bilaterally. No  pneumothorax.      CARDIOMEDIASTINAL SILHOUETTE:  Stable cardiomegaly.      Impression: Stable to slightly increased bilateral pleural effusions with  possible superimposed worsening edema and/or airspace disease.      MACRO  None      Signed by: Alex Alvarez 4/1/2024 8:18 AM  Dictation workstation:   [id number]      Physical Exam    Constitutional:       Appearance: Normal appearance. awake, no distress, lethargic  HENT:      Head: Normocephalic and atraumatic.   Cardiovascular:      Rate and Rhythm: Normal rate and regular rhythm.   Pulmonary:      Effort: Pulmonary effort is normal.   Abdominal:       General: Abdomen obese, bs presetn   Musculoskeletal:           General: Skin is warm and dry.   Neurological:   Asleep     Edema ++ on thighs and abdomen        No current facility-administered medications on file prior to encounter.     Current Outpatient Medications on File Prior to Encounter   Medication Sig Dispense Refill    apixaban (Eliquis) 2.5 mg tablet Take 1 tablet (2.5 mg) by mouth 2 times a day.      atorvastatin (Lipitor) 20 mg tablet Take 1 tablet (20 mg) by mouth once daily at bedtime.      doxazosin (Cardura) 4 mg tablet Take 1 tablet (4 mg) by mouth once daily at bedtime.      empagliflozin (Jardiance) 25 mg Take 1 tablet (25 mg) by mouth once daily.      finasteride (Proscar) 5 mg tablet Take 1 tablet (5 mg) by mouth once daily.      furosemide (Lasix) 40 mg tablet Take 1 tablet (40 mg) by mouth 2 times a day. 60 tablet 0    glimepiride (Amaryl) 2 mg tablet Take 1 tablet (2 mg) by mouth once daily in the morning. Take before meals.      losartan (Cozaar) 50 mg tablet Take 1 tablet (50 mg) by mouth once daily. 30 tablet 0    metFORMIN, OSM, (Fortamet) 1,000 mg 24 hr tablet Take 1 tablet (1,000 mg) by mouth 2 times a day with meals. Do not crush, chew, or split.      metoprolol succinate XL (Toprol-XL) 50 mg 24 hr tablet Take 3 tablets (150 mg) by mouth 2 times a day. 180 tablet 0    omeprazole (PriLOSEC) 20 mg DR capsule Take 1 capsule (20 mg) by mouth once daily in the morning. Take before meals. Do not crush or chew.         Results for orders placed or performed during the hospital encounter of 03/22/24 (from the past 24 hour(s))   Basic Metabolic Panel   Result Value Ref Range    Glucose 101 (H) 74 - 99 mg/dL    Sodium 157 (H) 136 - 145 mmol/L    Potassium 2.7 (LL) 3.5 - 5.3 mmol/L    Chloride 109 (H) 98 - 107 mmol/L    Bicarbonate 40 (HH) 21 - 32 mmol/L    Anion Gap 11 10 - 20 mmol/L    Urea Nitrogen 35 (H) 6 - 23 mg/dL    Creatinine 2.00 (H) 0.50 - 1.30 mg/dL    eGFR 32 (L) >60  mL/min/1.73m*2    Calcium 7.8 (L) 8.6 - 10.3 mg/dL   CBC   Result Value Ref Range    WBC 7.4 4.4 - 11.3 x10*3/uL    nRBC 0.0 0.0 - 0.0 /100 WBCs    RBC 4.86 4.50 - 5.90 x10*6/uL    Hemoglobin 12.3 (L) 13.5 - 17.5 g/dL    Hematocrit 44.4 41.0 - 52.0 %    MCV 91 80 - 100 fL    MCH 25.3 (L) 26.0 - 34.0 pg    MCHC 27.7 (L) 32.0 - 36.0 g/dL    RDW 17.3 (H) 11.5 - 14.5 %    Platelets 100 (L) 150 - 450 x10*3/uL   Magnesium   Result Value Ref Range    Magnesium 2.60 (H) 1.60 - 2.40 mg/dL       Vitals:    03/29/24 0430   Weight: 102 kg (224 lb 3.3 oz)         Intake/Output Summary (Last 24 hours) at 4/1/2024 1508  Last data filed at 4/1/2024 1158  Gross per 24 hour   Intake 415.84 ml   Output 1000 ml   Net -584.16 ml         Assessment/Plan      Acute respiratory failure with hypoxia d/t pneumonia-IV as per orders, better  Weakness-PT/OT following, will need SNF.   HTN- better with less agitation  Agitation- ativan as needed  Acute on chronic diastolic heart failure  A fib on eliquis, rate controlled  EF 50 in 12/23  Check bladder scan  Resume losartan   Hypernatremia  Dandre / CKD 3 w hypernatremia   Metabolic encephalopathy,            Input from specialities noted  Cont with Diuresis per renal , monitor labs and electrolytes  Plan noted for thora    -Continue current treatment as ordered. Will make adjustments as necessary.    Plan of care discussed with: RN    Patient case and plan of care discussed with Dr. EMELIA Gunderson.    VANCE Lieberman - CNP  -In collaboration with Dr. EMELIA Gunderson    [facility]  Office: [phone]  Fax: [phone]  Pt seen this evening dw NP and family by bedside   Noted pal care input   Pt has not been doing well  Confused  Does wake up with verbal stimulation   Chest decreased air entry   Cvs regular  Ext edema improving   Labs and cxr dw family   Showing worsening fluid   A/p as above,  Will cotn with fluids  Thoracentesis tomorrow  Daughter is going to  discuss about his code status with rest of family   Prognosis is guarded  Hypernatremia cont with flids,   Will follow    Kevon Gunderson MD

## 2024-04-01 NOTE — CARE PLAN
Problem: Skin  Goal: Decreased wound size/increased tissue granulation at next dressing change  Outcome: Progressing  Flowsheets (Taken 3/31/2024 0029 by Aminata West RN)  Decreased wound size/increased tissue granulation at next dressing change: Promote sleep for wound healing     Problem: Diabetes  Goal: Achieve decreasing blood glucose levels by end of shift  Outcome: Progressing     Problem: Fall/Injury  Goal: Not fall by end of shift  Outcome: Progressing  Goal: Be free from injury by end of the shift  Outcome: Progressing   The patient's goals for the shift include Good night's sleep    The clinical goals for the shift include pt will maintain an SPo2 above 92% during the shift    Over the shift, the patient did not make progress toward the following goals. Barriers to progression include . Recommendations to address these barriers include .

## 2024-04-01 NOTE — INDIVIDUALIZED OVERALL PLAN OF CARE NOTE
ACP note:    Lengthy conversation with wife and daughter Nena Zelaya stated POA.  Wife has some dementia, so not understanding everything , though she agrees on transition to more comfort based philosophy.    35 min ACP conversation   For now, staying in treatment model of care, though leaning towards more conservative model.  Daughter completely agrees with DNRRCA/DNI BUT wants to discuss with her siblings to ensue they are on board though she is POA she wants to ensure consensus. Pts wife in favor of this as well, neither wants to prolong his life at the expense of quality. They do wish to pursue thoracentesis, it has been ordered by Shahana jacobsen pulm, with whom I discussed in detail this afternoon.     I reviewed all labs and most recent imaging with them and we had extensive conversations on goals of care and his declining condition, both acutely and in general, his repeated hospitalizations and his current encephalopathy.     Hospice consult placed for [facility], will have our palliative team follow up on this tomorrow.  Hospice consult called it in this evening, spoke with Jacinda. They will reach out to Nena and set up info mtg.     Shahana Calderon CNP and Dr Gunderson updated.

## 2024-04-01 NOTE — PROGRESS NOTES
Physical Therapy                 Therapy Communication Note    Patient Name: New Castano  MRN: [id number]  Today's Date: 4/1/2024     Discipline: Physical Therapy    Missed Visit Reason: Missed Visit Reason:  (per convsersation wtih OT - Per CNP hold today due to medical status. Pending Palliative care mtg today)    Missed Time: Attempt    Comment:

## 2024-04-01 NOTE — PROGRESS NOTES
New Castano is a 83 y.o. male on day 9 of admission presenting with Pneumonia of right lung due to infectious organism, unspecified part of lung.      Subjective   No chest pain  Seen this morning   More alert  Did need bipap overnigth       Objective     Last Recorded Vitals  /88 (BP Location: Left arm, Patient Position: Lying)   Pulse 107   Temp 36.4 °C (97.5 °F) (Temporal)   Resp 20   Wt 102 kg (224 lb 3.3 oz)   SpO2 93%   Intake/Output last 3 Shifts:    Intake/Output Summary (Last 24 hours) at 3/31/2024 2221  Last data filed at 3/31/2024 1600  Gross per 24 hour   Intake 915.84 ml   Output 800 ml   Net 115.84 ml         Admission Weight  Weight: 104 kg (230 lb) (03/27/24 1300)    Daily Weight  03/29/24 : 102 kg (224 lb 3.3 oz)    Image Results  US renal complete  Narrative: Interpreted By:  Yanira Menjivar,   STUDY:  US RENAL COMPLETE;  3/30/2024 3:39 pm      INDICATION:  Signs/Symptoms:arcelia r/o obstruction.      COMPARISON:  None.      ACCESSION NUMBER(S):  [id number]      ORDERING CLINICIAN:  FRANCIE AMBROCIO      TECHNIQUE:  Multiple images of the kidneys were obtained.      FINDINGS:  RIGHT KIDNEY:  12.5 cm in length. No hydronephrosis. No focal renal abnormality.  Lobulated contour. Increased renal cortical echogenicity.      LEFT KIDNEY:  12.2 cm in length. No hydronephrosis. No focal renal abnormality.  Lobulated contour. Increased renal cortical echogenicity.      BLADDER:  Slightly irregular bladder contour, nonspecific. Estimated prostate  volume 19 mL, normal. Bilateral ureteral jets identified.      Other: Small volume ascites.      Impression: Lobulated kidneys with increased cortical echogenicity suggestive of  medical renal disease. No hydronephrosis.      Signed by: Yanira Menjivar 3/30/2024 5:23 PM  Dictation workstation:   [id number]      Physical Exam    Constitutional:       Appearance: Normal appearance. awake, no distress, lethargic  HENT:      Head: Normocephalic  and atraumatic.   Cardiovascular:      Rate and Rhythm: Normal rate and regular rhythm.   Pulmonary:      Effort: Pulmonary effort is normal.   Abdominal:      General: Abdomen obese, bs presetn   Musculoskeletal:           General: Skin is warm and dry.   Neurological:   awake    Edema ++ on thighs and abdomen        No current facility-administered medications on file prior to encounter.     Current Outpatient Medications on File Prior to Encounter   Medication Sig Dispense Refill    apixaban (Eliquis) 2.5 mg tablet Take 1 tablet (2.5 mg) by mouth 2 times a day.      atorvastatin (Lipitor) 20 mg tablet Take 1 tablet (20 mg) by mouth once daily at bedtime.      doxazosin (Cardura) 4 mg tablet Take 1 tablet (4 mg) by mouth once daily at bedtime.      empagliflozin (Jardiance) 25 mg Take 1 tablet (25 mg) by mouth once daily.      finasteride (Proscar) 5 mg tablet Take 1 tablet (5 mg) by mouth once daily.      furosemide (Lasix) 40 mg tablet Take 1 tablet (40 mg) by mouth 2 times a day. 60 tablet 0    glimepiride (Amaryl) 2 mg tablet Take 1 tablet (2 mg) by mouth once daily in the morning. Take before meals.      losartan (Cozaar) 50 mg tablet Take 1 tablet (50 mg) by mouth once daily. 30 tablet 0    metFORMIN, OSM, (Fortamet) 1,000 mg 24 hr tablet Take 1 tablet (1,000 mg) by mouth 2 times a day with meals. Do not crush, chew, or split.      metoprolol succinate XL (Toprol-XL) 50 mg 24 hr tablet Take 3 tablets (150 mg) by mouth 2 times a day. 180 tablet 0    omeprazole (PriLOSEC) 20 mg DR capsule Take 1 capsule (20 mg) by mouth once daily in the morning. Take before meals. Do not crush or chew.      [DISCONTINUED] cyanocobalamin (Vitamin B-12) 500 mcg tablet Take 1 tablet (500 mcg) by mouth once daily.      [DISCONTINUED] docusate sodium (Colace) 100 mg capsule Take 1 capsule (100 mg) by mouth 2 times a day. (Patient not taking: Reported on 3/22/2024)      [DISCONTINUED] loperamide (Imodium) 2 mg capsule Take 1  capsule (2 mg) by mouth 3 times a day as needed for diarrhea.         Results for orders placed or performed during the hospital encounter of 03/22/24 (from the past 24 hour(s))   Basic Metabolic Panel   Result Value Ref Range    Glucose 63 (L) 74 - 99 mg/dL    Sodium 155 (H) 136 - 145 mmol/L    Potassium 3.1 (L) 3.5 - 5.3 mmol/L    Chloride 109 (H) 98 - 107 mmol/L    Bicarbonate 39 (H) 21 - 32 mmol/L    Anion Gap 10 10 - 20 mmol/L    Urea Nitrogen 40 (H) 6 - 23 mg/dL    Creatinine 2.09 (H) 0.50 - 1.30 mg/dL    eGFR 31 (L) >60 mL/min/1.73m*2    Calcium 8.0 (L) 8.6 - 10.3 mg/dL   CBC   Result Value Ref Range    WBC 8.0 4.4 - 11.3 x10*3/uL    nRBC 0.0 0.0 - 0.0 /100 WBCs    RBC 5.00 4.50 - 5.90 x10*6/uL    Hemoglobin 13.0 (L) 13.5 - 17.5 g/dL    Hematocrit 45.9 41.0 - 52.0 %    MCV 92 80 - 100 fL    MCH 26.0 26.0 - 34.0 pg    MCHC 28.3 (L) 32.0 - 36.0 g/dL    RDW 17.2 (H) 11.5 - 14.5 %    Platelets 119 (L) 150 - 450 x10*3/uL   Magnesium   Result Value Ref Range    Magnesium 2.60 (H) 1.60 - 2.40 mg/dL       Vitals:    03/29/24 0430   Weight: 102 kg (224 lb 3.3 oz)         Intake/Output Summary (Last 24 hours) at 3/31/2024 2221  Last data filed at 3/31/2024 1600  Gross per 24 hour   Intake 915.84 ml   Output 800 ml   Net 115.84 ml           Assessment/Plan      Acute respiratory failure with hypoxia d/t pneumonia-IV as per orders, better  Weakness-PT/OT following, will need SNF.   HTN- better with less agitation  Agitation- ativan as needed  Acute on chronic diastolic heart failure  A fib on eliquis, rate controlled  EF 50 in 12/23  Check bladder scan  Resume losartan   Hypernatremia  CKD 3   Pt seen this morning, noted input from renal however Na is increasing   Metabolic encephalopathy,   Will hold lasix and start d5       Cardio consulted  Input noted   Cont current regimen  Diuresis per renal   Pulm consulted due to hypercarbic resp failure, acute on chronic    -Continue current treatment as ordered. Will make  adjustments as necessary.    Plan of care discussed with: Provider, RN, Patient      Kevon Gunderson MD    Hypernatremia noted consulted renal

## 2024-04-02 ENCOUNTER — APPOINTMENT (OUTPATIENT)
Dept: RADIOLOGY | Facility: HOSPITAL | Age: 84
DRG: 193 | End: 2024-04-02
Payer: MEDICARE

## 2024-04-02 LAB
ANION GAP SERPL CALC-SCNC: 10 MMOL/L (ref 10–20)
BUN SERPL-MCNC: 31 MG/DL (ref 6–23)
CALCIUM SERPL-MCNC: 8.3 MG/DL (ref 8.6–10.3)
CHLORIDE SERPL-SCNC: 108 MMOL/L (ref 98–107)
CO2 SERPL-SCNC: 38 MMOL/L (ref 21–32)
CREAT SERPL-MCNC: 1.77 MG/DL (ref 0.5–1.3)
EGFRCR SERPLBLD CKD-EPI 2021: 37 ML/MIN/1.73M*2
ERYTHROCYTE [DISTWIDTH] IN BLOOD BY AUTOMATED COUNT: 17.3 % (ref 11.5–14.5)
GLUCOSE SERPL-MCNC: 175 MG/DL (ref 74–99)
HCT VFR BLD AUTO: 42.4 % (ref 41–52)
HGB BLD-MCNC: 12 G/DL (ref 13.5–17.5)
INR PPP: 1.5 (ref 0.9–1.1)
MAGNESIUM SERPL-MCNC: 2.6 MG/DL (ref 1.6–2.4)
MCH RBC QN AUTO: 25.8 PG (ref 26–34)
MCHC RBC AUTO-ENTMCNC: 28.3 G/DL (ref 32–36)
MCV RBC AUTO: 91 FL (ref 80–100)
NRBC BLD-RTO: 0 /100 WBCS (ref 0–0)
PLATELET # BLD AUTO: 91 X10*3/UL (ref 150–450)
POTASSIUM SERPL-SCNC: 3.2 MMOL/L (ref 3.5–5.3)
PROTHROMBIN TIME: 16.9 SECONDS (ref 9.8–12.8)
RBC # BLD AUTO: 4.66 X10*6/UL (ref 4.5–5.9)
SODIUM SERPL-SCNC: 153 MMOL/L (ref 136–145)
WBC # BLD AUTO: 7.8 X10*3/UL (ref 4.4–11.3)

## 2024-04-02 PROCEDURE — 87075 CULTR BACTERIA EXCEPT BLOOD: CPT | Mod: AHULAB,91 | Performed by: CLINICAL NURSE SPECIALIST

## 2024-04-02 PROCEDURE — 2500000001 HC RX 250 WO HCPCS SELF ADMINISTERED DRUGS (ALT 637 FOR MEDICARE OP): Performed by: INTERNAL MEDICINE

## 2024-04-02 PROCEDURE — 2500000002 HC RX 250 W HCPCS SELF ADMINISTERED DRUGS (ALT 637 FOR MEDICARE OP, ALT 636 FOR OP/ED): Performed by: INTERNAL MEDICINE

## 2024-04-02 PROCEDURE — 2500000004 HC RX 250 GENERAL PHARMACY W/ HCPCS (ALT 636 FOR OP/ED): Performed by: INTERNAL MEDICINE

## 2024-04-02 PROCEDURE — 85610 PROTHROMBIN TIME: CPT | Performed by: NURSE PRACTITIONER

## 2024-04-02 PROCEDURE — 36415 COLL VENOUS BLD VENIPUNCTURE: CPT | Performed by: NURSE PRACTITIONER

## 2024-04-02 PROCEDURE — 2500000005 HC RX 250 GENERAL PHARMACY W/O HCPCS: Performed by: FAMILY MEDICINE

## 2024-04-02 PROCEDURE — 1200000002 HC GENERAL ROOM WITH TELEMETRY DAILY

## 2024-04-02 PROCEDURE — 87102 FUNGUS ISOLATION CULTURE: CPT | Mod: AHULAB | Performed by: CLINICAL NURSE SPECIALIST

## 2024-04-02 PROCEDURE — C1729 CATH, DRAINAGE: HCPCS | Performed by: NURSE PRACTITIONER

## 2024-04-02 PROCEDURE — 2500000005 HC RX 250 GENERAL PHARMACY W/O HCPCS: Performed by: NURSE PRACTITIONER

## 2024-04-02 PROCEDURE — 32555 ASPIRATE PLEURA W/ IMAGING: CPT

## 2024-04-02 PROCEDURE — 99232 SBSQ HOSP IP/OBS MODERATE 35: CPT | Performed by: CLINICAL NURSE SPECIALIST

## 2024-04-02 PROCEDURE — 84157 ASSAY OF PROTEIN OTHER: CPT | Mod: AHULAB | Performed by: CLINICAL NURSE SPECIALIST

## 2024-04-02 PROCEDURE — 85027 COMPLETE CBC AUTOMATED: CPT | Performed by: NURSE PRACTITIONER

## 2024-04-02 PROCEDURE — 87070 CULTURE OTHR SPECIMN AEROBIC: CPT | Mod: AHULAB | Performed by: CLINICAL NURSE SPECIALIST

## 2024-04-02 PROCEDURE — 32555 ASPIRATE PLEURA W/ IMAGING: CPT | Mod: ZK | Performed by: NURSE PRACTITIONER

## 2024-04-02 PROCEDURE — 71046 X-RAY EXAM CHEST 2 VIEWS: CPT | Performed by: STUDENT IN AN ORGANIZED HEALTH CARE EDUCATION/TRAINING PROGRAM

## 2024-04-02 PROCEDURE — 2720000007 HC OR 272 NO HCPCS: Performed by: NURSE PRACTITIONER

## 2024-04-02 PROCEDURE — 94640 AIRWAY INHALATION TREATMENT: CPT

## 2024-04-02 PROCEDURE — 99232 SBSQ HOSP IP/OBS MODERATE 35: CPT | Performed by: INTERNAL MEDICINE

## 2024-04-02 PROCEDURE — 87015 SPECIMEN INFECT AGNT CONCNTJ: CPT | Mod: AHULAB,91 | Performed by: CLINICAL NURSE SPECIALIST

## 2024-04-02 PROCEDURE — 32555 ASPIRATE PLEURA W/ IMAGING: CPT | Performed by: NURSE PRACTITIONER

## 2024-04-02 PROCEDURE — 0W993ZZ DRAINAGE OF RIGHT PLEURAL CAVITY, PERCUTANEOUS APPROACH: ICD-10-PCS | Performed by: FAMILY MEDICINE

## 2024-04-02 PROCEDURE — 83615 LACTATE (LD) (LDH) ENZYME: CPT | Mod: AHULAB | Performed by: CLINICAL NURSE SPECIALIST

## 2024-04-02 PROCEDURE — 2500000004 HC RX 250 GENERAL PHARMACY W/ HCPCS (ALT 636 FOR OP/ED): Performed by: FAMILY MEDICINE

## 2024-04-02 PROCEDURE — 80048 BASIC METABOLIC PNL TOTAL CA: CPT | Performed by: NURSE PRACTITIONER

## 2024-04-02 PROCEDURE — 83735 ASSAY OF MAGNESIUM: CPT | Performed by: NURSE PRACTITIONER

## 2024-04-02 PROCEDURE — 71046 X-RAY EXAM CHEST 2 VIEWS: CPT

## 2024-04-02 RX ORDER — LIDOCAINE HYDROCHLORIDE 10 MG/ML
INJECTION, SOLUTION EPIDURAL; INFILTRATION; INTRACAUDAL; PERINEURAL
Status: COMPLETED | OUTPATIENT
Start: 2024-04-02 | End: 2024-04-02

## 2024-04-02 RX ORDER — POTASSIUM CHLORIDE 14.9 MG/ML
20 INJECTION INTRAVENOUS
Status: DISPENSED | OUTPATIENT
Start: 2024-04-02 | End: 2024-04-02

## 2024-04-02 RX ADMIN — DOXAZOSIN 8 MG: 2 TABLET ORAL at 23:10

## 2024-04-02 RX ADMIN — Medication 3 L/MIN: at 19:49

## 2024-04-02 RX ADMIN — IPRATROPIUM BROMIDE AND ALBUTEROL SULFATE 3 ML: 2.5; .5 SOLUTION RESPIRATORY (INHALATION) at 19:46

## 2024-04-02 RX ADMIN — POTASSIUM CHLORIDE 20 MEQ: 14.9 INJECTION, SOLUTION INTRAVENOUS at 11:12

## 2024-04-02 RX ADMIN — PIPERACILLIN SODIUM AND TAZOBACTAM SODIUM 3.38 G: 3; .375 INJECTION, SOLUTION INTRAVENOUS at 23:14

## 2024-04-02 RX ADMIN — METOPROLOL SUCCINATE 150 MG: 50 TABLET, EXTENDED RELEASE ORAL at 10:15

## 2024-04-02 RX ADMIN — CYANOCOBALAMIN TAB 500 MCG 500 MCG: 500 TAB at 10:15

## 2024-04-02 RX ADMIN — POTASSIUM CHLORIDE 20 MEQ: 1.5 POWDER, FOR SOLUTION ORAL at 23:13

## 2024-04-02 RX ADMIN — PIPERACILLIN SODIUM AND TAZOBACTAM SODIUM 3.38 G: 3; .375 INJECTION, SOLUTION INTRAVENOUS at 16:15

## 2024-04-02 RX ADMIN — DEXTROSE MONOHYDRATE 75 ML/HR: 50 INJECTION, SOLUTION INTRAVENOUS at 16:14

## 2024-04-02 RX ADMIN — ATORVASTATIN CALCIUM 20 MG: 20 TABLET, FILM COATED ORAL at 23:14

## 2024-04-02 RX ADMIN — FINASTERIDE 5 MG: 5 TABLET, FILM COATED ORAL at 10:15

## 2024-04-02 RX ADMIN — ACETAMINOPHEN 650 MG: 650 SOLUTION ORAL at 23:10

## 2024-04-02 RX ADMIN — PIPERACILLIN SODIUM AND TAZOBACTAM SODIUM 3.38 G: 3; .375 INJECTION, SOLUTION INTRAVENOUS at 01:49

## 2024-04-02 RX ADMIN — HYDRALAZINE HYDROCHLORIDE 50 MG: 50 TABLET ORAL at 10:15

## 2024-04-02 RX ADMIN — APIXABAN 2.5 MG: 2.5 TABLET, FILM COATED ORAL at 10:15

## 2024-04-02 RX ADMIN — POLYETHYLENE GLYCOL 3350 17 G: 17 POWDER, FOR SOLUTION ORAL at 10:15

## 2024-04-02 RX ADMIN — EMPAGLIFLOZIN 25 MG: 10 TABLET, FILM COATED ORAL at 10:15

## 2024-04-02 RX ADMIN — DOCUSATE SODIUM 100 MG: 100 CAPSULE, LIQUID FILLED ORAL at 10:15

## 2024-04-02 RX ADMIN — DEXTROSE MONOHYDRATE 75 ML/HR: 50 INJECTION, SOLUTION INTRAVENOUS at 01:47

## 2024-04-02 RX ADMIN — METOPROLOL SUCCINATE 150 MG: 50 TABLET, EXTENDED RELEASE ORAL at 23:10

## 2024-04-02 RX ADMIN — LOSARTAN POTASSIUM 50 MG: 50 TABLET, FILM COATED ORAL at 10:15

## 2024-04-02 RX ADMIN — LIDOCAINE HYDROCHLORIDE 10 ML: 10 INJECTION, SOLUTION EPIDURAL; INFILTRATION; INTRACAUDAL; PERINEURAL at 15:16

## 2024-04-02 RX ADMIN — PIPERACILLIN SODIUM AND TAZOBACTAM SODIUM 3.38 G: 3; .375 INJECTION, SOLUTION INTRAVENOUS at 10:20

## 2024-04-02 RX ADMIN — POTASSIUM CHLORIDE 20 MEQ: 1.5 POWDER, FOR SOLUTION ORAL at 10:15

## 2024-04-02 RX ADMIN — APIXABAN 2.5 MG: 2.5 TABLET, FILM COATED ORAL at 23:13

## 2024-04-02 RX ADMIN — POTASSIUM CHLORIDE 20 MEQ: 1.5 POWDER, FOR SOLUTION ORAL at 16:15

## 2024-04-02 RX ADMIN — HYDRALAZINE HYDROCHLORIDE 50 MG: 50 TABLET ORAL at 23:13

## 2024-04-02 ASSESSMENT — PAIN SCALES - GENERAL
PAINLEVEL_OUTOF10: 1
PAINLEVEL_OUTOF10: 0 - NO PAIN

## 2024-04-02 ASSESSMENT — PAIN SCALES - PAIN ASSESSMENT IN ADVANCED DEMENTIA (PAINAD)
FACIALEXPRESSION: SMILING OR INEXPRESSIVE
TOTALSCORE: 1
TOTALSCORE: MEDICATION (SEE MAR);RELAXATION TECHNIQUE;REPOSITIONED
BREATHING: NORMAL
BREATHING: NORMAL
BODYLANGUAGE: TENSE, DISTRESSED PACING, FIDGETING
TOTALSCORE: 4
NEGVOCALIZATION: OCCASIONAL MOAN/GROAN, LOW SPEECH, NEGATIVE/DISAPPROVING QUALITY
FACIALEXPRESSION: SMILING OR INEXPRESSIVE
NEGVOCALIZATION: OCCASIONAL MOAN/GROAN, LOW SPEECH, NEGATIVE/DISAPPROVING QUALITY
BODYLANGUAGE: RELAXED
CONSOLABILITY: UNABLE TO CONSOLE, DISTRACT OR REASSURE
CONSOLABILITY: NO NEED TO CONSOLE

## 2024-04-02 ASSESSMENT — COGNITIVE AND FUNCTIONAL STATUS - GENERAL
DRESSING REGULAR LOWER BODY CLOTHING: TOTAL
MOVING TO AND FROM BED TO CHAIR: TOTAL
MOVING FROM LYING ON BACK TO SITTING ON SIDE OF FLAT BED WITH BEDRAILS: TOTAL
DRESSING REGULAR LOWER BODY CLOTHING: TOTAL
STANDING UP FROM CHAIR USING ARMS: TOTAL
TOILETING: TOTAL
TURNING FROM BACK TO SIDE WHILE IN FLAT BAD: TOTAL
EATING MEALS: TOTAL
CLIMB 3 TO 5 STEPS WITH RAILING: TOTAL
HELP NEEDED FOR BATHING: TOTAL
DRESSING REGULAR UPPER BODY CLOTHING: A LOT
PERSONAL GROOMING: A LOT
MOVING TO AND FROM BED TO CHAIR: TOTAL
TOILETING: TOTAL
MOBILITY SCORE: 6
DAILY ACTIVITIY SCORE: 6
STANDING UP FROM CHAIR USING ARMS: TOTAL
DRESSING REGULAR UPPER BODY CLOTHING: TOTAL
CLIMB 3 TO 5 STEPS WITH RAILING: TOTAL
EATING MEALS: A LOT
WALKING IN HOSPITAL ROOM: TOTAL
DAILY ACTIVITIY SCORE: 9
HELP NEEDED FOR BATHING: TOTAL
PERSONAL GROOMING: TOTAL
WALKING IN HOSPITAL ROOM: TOTAL
TURNING FROM BACK TO SIDE WHILE IN FLAT BAD: TOTAL
MOVING FROM LYING ON BACK TO SITTING ON SIDE OF FLAT BED WITH BEDRAILS: TOTAL
MOBILITY SCORE: 6

## 2024-04-02 NOTE — PROGRESS NOTES
New Castano is a 83 y.o. male on day 11 of admission presenting with Pneumonia of right lung due to infectious organism, unspecified part of lung.    Subjective     Patient seen and examined lying in bed. Patient attempted to participate during exam and required significant prompting with often incomplete and inappropriate responses. Pt required and remains in mitten restraints. Unable to wear BiPAP due to intolerance and continued altered mentation. Currently on 2 L NC satting in the in the mid to high 90's. Unable to answer questions when asked at this time. Per bedside Nurse family meeting scheduled for today. POA changed code status yesterday to DNR/DNI; however still wants to pursue thoracenteses planned for today.     Objective     Physical Exam  Constitutional:   Elderly, ill-appearing, NAD, unable to fully participate in exam  HENT: Atraumatic, dry mucous membranes  Eyes: nonicteric  Neck: Supple, no obvious JVD  Cardiovascular: S1, S2 distinct, irregular, HR 110s, no murmur appreciated  Pulmonary: Fair air entry with clear fields with diminished rt mid lung and base; no wheezing or crackles noted  Abdominal: Soft, obese, nontender, + BS  Musculoskeletal: Fair active range of motion BUEs, minimal active movement of BLEs  Extremities: +1 BLE edema (more taught today)  Lymphadenopathy: No nuchal LAP  Skin: Warm and dry with bilateral lower extremity darkened and thickened skin  Neurological: Awake and answers to his name, was unable to say that he is in the hospital and did not respond when asked what the year was; speech occasionally clear, generally semi-clear and often nonsensical  Psychiatric: Mood is appropriate, behavior is fidgety, biting at mitten restraints.     Last Recorded Vitals  Blood pressure 136/89, pulse (!) 113, temperature 36.9 °C (98.4 °F), temperature source Temporal, resp. rate 17, weight 99.8 kg (220 lb 0.3 oz), SpO2 97 %.  Intake/Output last 3 Shifts:  I/O last 3 completed  shifts:  In: 2357.5 (23.6 mL/kg) [I.V.:2107.5 (21.1 mL/kg); IV Piggyback:250]  Out: 1400 (14 mL/kg) [Urine:1400 (0.4 mL/kg/hr)]  Weight: 99.8 kg     Relevant Results  Scheduled Medications:  apixaban, 2.5 mg, oral, BID  atorvastatin, 20 mg, oral, Nightly  cyanocobalamin, 500 mcg, oral, Daily  docusate sodium, 100 mg, oral, BID  doxazosin, 8 mg, oral, Nightly  empagliflozin, 25 mg, oral, Daily  finasteride, 5 mg, oral, Daily  [Held by provider] furosemide, 60 mg, intravenous, BID  glimepiride, 2 mg, oral, Daily before breakfast  hydrALAZINE, 50 mg, oral, BID  ipratropium-albuteroL, 3 mL, nebulization, TID  losartan, 50 mg, oral, Daily  metoprolol succinate XL, 150 mg, oral, BID  pantoprazole, 40 mg, oral, Daily before breakfast  piperacillin-tazobactam, 3.375 g, intravenous, q6h  polyethylene glycol, 17 g, oral, Daily  potassium chloride, 20 mEq, oral, TID  potassium chloride, 20 mEq, intravenous, q2h       PRN medications: acetaminophen **OR** acetaminophen **OR** acetaminophen, ipratropium-albuteroL, loperamide, oxygen, oxygen     Results for orders placed or performed during the hospital encounter of 03/22/24 (from the past 24 hour(s))   Lactate Dehydrogenase   Result Value Ref Range     84 - 246 U/L   Protein, Total   Result Value Ref Range    Total Protein 5.4 (L) 6.4 - 8.2 g/dL   Basic Metabolic Panel   Result Value Ref Range    Glucose 175 (H) 74 - 99 mg/dL    Sodium 153 (H) 136 - 145 mmol/L    Potassium 3.2 (L) 3.5 - 5.3 mmol/L    Chloride 108 (H) 98 - 107 mmol/L    Bicarbonate 38 (H) 21 - 32 mmol/L    Anion Gap 10 10 - 20 mmol/L    Urea Nitrogen 31 (H) 6 - 23 mg/dL    Creatinine 1.77 (H) 0.50 - 1.30 mg/dL    eGFR 37 (L) >60 mL/min/1.73m*2    Calcium 8.3 (L) 8.6 - 10.3 mg/dL   CBC   Result Value Ref Range    WBC 7.8 4.4 - 11.3 x10*3/uL    nRBC 0.0 0.0 - 0.0 /100 WBCs    RBC 4.66 4.50 - 5.90 x10*6/uL    Hemoglobin 12.0 (L) 13.5 - 17.5 g/dL    Hematocrit 42.4 41.0 - 52.0 %    MCV 91 80 - 100 fL    MCH  25.8 (L) 26.0 - 34.0 pg    MCHC 28.3 (L) 32.0 - 36.0 g/dL    RDW 17.3 (H) 11.5 - 14.5 %    Platelets 91 (L) 150 - 450 x10*3/uL   Magnesium   Result Value Ref Range    Magnesium 2.60 (H) 1.60 - 2.40 mg/dL   Protime-INR   Result Value Ref Range    Protime 16.9 (H) 9.8 - 12.8 seconds    INR 1.5 (H) 0.9 - 1.1      XR chest 1 view  Result Date: 4/1/2024  Interpreted By:  Alex Alvarez, STUDY: XR CHEST 1 VIEW; 4/1/2024 4:55 am   INDICATION: Signs/Symptoms:b/l pleural effusions   COMPARISON: Radiographs 03/22/2024   ACCESSION NUMBER(S): [id number]   ORDERING CLINICIAN: ALBA WHELAN   TECHNIQUE: Single frontal view of the chest performed.   FINDINGS: LINES AND DEVICES: None.   LUNGS: Stable moderate to large right and small left layering pleural effusions. Increased interstitial opacities bilaterally. No pneumothorax.   CARDIOMEDIASTINAL SILHOUETTE: Stable cardiomegaly.       Stable to slightly increased bilateral pleural effusions with possible superimposed worsening edema and/or airspace disease.   MACRO None   Signed by: Alex Alvarez 4/1/2024 8:18 AM Dictation workstation:   [id number]    US renal complete  Result Date: 3/30/2024  Interpreted By:  Yanira Menjivar, STUDY: US RENAL COMPLETE;  3/30/2024 3:39 pm   INDICATION: Signs/Symptoms:arcelia r/o obstruction.   COMPARISON: None.   ACCESSION NUMBER(S): [id number]   ORDERING CLINICIAN: FRANCIE AMBROCIO   TECHNIQUE: Multiple images of the kidneys were obtained.   FINDINGS: RIGHT KIDNEY: 12.5 cm in length. No hydronephrosis. No focal renal abnormality. Lobulated contour. Increased renal cortical echogenicity.   LEFT KIDNEY: 12.2 cm in length. No hydronephrosis. No focal renal abnormality. Lobulated contour. Increased renal cortical echogenicity.   BLADDER: Slightly irregular bladder contour, nonspecific. Estimated prostate volume 19 mL, normal. Bilateral ureteral jets identified.   Other: Small volume ascites.       Lobulated kidneys with increased cortical  echogenicity suggestive of medical renal disease. No hydronephrosis.   Signed by: Yanira Menjivar 3/30/2024 5:23 PM Dictation workstation:   [id number]      Assessment/Plan     New Castano is a 83 y.o. male with past medical history of HTN, CHF, HLD, DMT2, A-fib (s/p cardioversion and ablation on Eliquis), CKD 3 who presented to [facility] ED on 3/22 for shortness of breath and generalized weakness for couple of days.  Family reports he was unable to get out of his chair.  Baseline is in room air but required 4 L per EMS.  CT on 3/28 showed cardiomegaly with bilateral infiltrates and pleural effusions (R> L) with soft tissue edema and free fluid consistent with fluid overload/CHF and unable to exclude superimposed pneumonia.  DVT and PE studies were negative.  He was admitted for acute exacerbation of CHF & pneumonia, treated with IV diuresis (I/O shows -7.1 L for the stay) and Zosyn for CAP coverage (today is day #9). Oxygen needs fluctuated throughout the stay & on 3/29 he desaturated on 3 L to 87-88% was increased ultimately to 5 L.  On 3/30 ABG showed uncompensated respiratory acidosis with pCO2 87 & patient was placed on BiPAP for short time without repeat ABG or VBG. He has been weaned to 4 L nasal cannula with BiPAP at night.  Pulmonology is consulted for acute hypercarbic respiratory failure.     PFT 10/2021: FEV1/FVC 0.69, FVC 3.03 (lower limit normal 3.3), DLCO 57% predicted; PFT is indicative of no obstruction suggestive of restriction with moderately decreased diffusion capacity     Impressions:  # Acute hypoxic and hypercarbic respiratory failure: Patient hypoxic in the 80s per EMS placed on 4 L requiring max 5L during admission.  ABG on 3/30 with uncompensated respiratory acidosis with pCO2 of 87; likely reflective of acute systolic heart failure, restrictive lung disease with bilateral pleural effusion, cognitive decline with deconditioning; currently weaned to 2 L NC     #Pleural effusions, bilateral:  Noted on chest CT, right greater than left with compressive atelectasis bilaterally likely related to acute on chronic HFpEF, low concern for pneumonia given afebrile, no leukocytosis and procalcitonin 0.16 aching parapneumonic effusion less likely     #Acute on chronic HFpEF: Chest CT with cardiomegaly with bilateral infiltrates and bilateral pleural effusions with soft tissue edema and free fluid; BNP > 4700 (previous BNPs March, early> 4700, February 3956, December 48)     #OHS: BMI 31.28 with central obesity and abdominal fluid retention 2/2 HF exacerbation, pCO2 87 on arterial gas (baseline ~ 50s)     #Cognitive dysfunction, delirium: Patient does not engage in conversation and interact appropriately, requires much redirection and is not oriented to time or place; family reports change in cognitive function noting a decline since his last admission and worsening further the morning of 3/30; currently slight improvement in cognition but continues overall to be confused, intermittently agitated     Recommendations:  -Continue supplemental O2, wean for saturation 92 to 95%  -Adequately treated for CAP  -Continue DuoNebs   -BPH with Acapella, may add ezpap  -Diuresis as hemodynamics and renal function allow (-7.9L for the stay)  -Continue BiPAP at night, wean to NC during the day - pt continuously pulling off bipap   -Check venous gas in the morning after coming off BiPAP - pt will not wear  -Chest x-ray in the morning to evaluate effusions  -IR consult placed for right thoracentesis (Per discussion with Dr. Gunderson, ordered cultures, protein and LDH fluid and serum). Planned for 4/2, follow fluid analysis and cultures  -Would recommend repeat echo given substantial increase in BNP and multiple hospitalizations failure since last echo 12/2023 (may not be necessary if family is considering transition to hospice)  -Agree with palliative care consult   -Appreciate SLP recommendations, given patient's fluctuating  cognitive status, would only attempt to feed patient if he is fully awake and able to participate and follow directions     Thank you for the consult. Pulmonology will follow.   Juliana Knapp RN     I was present for and fully participated in the care of this patient.  Shahana Lomeli, VANCE-CNS

## 2024-04-02 NOTE — PROGRESS NOTES
Occupational Therapy                 Therapy Communication Note    Patient Name: New Castano  MRN: [id number]  Today's Date: 4/2/2024     Discipline: Occupational Therapy    Missed Visit Reason: Missed Visit Reason: Other (Comment) (Pt cleared by nursing. Working with PT to get pt prepped to sit on EOB. while starting to get pt to EOB transport arrived to take pt to procedure. Pt fully returned to bed for procedure. Family present)    Missed Time: Attempt

## 2024-04-02 NOTE — PROGRESS NOTES
04/02/24 1617   Discharge Planning   Assistance Needed chest x-ray, IR consulted right thoracentesis cont diurusis   Type of Post Acute Facility Services Skilled nursing   Patient expects to be discharged to: [facility] -

## 2024-04-02 NOTE — PROGRESS NOTES
Nephrology Consult Progress Note    Admit Date: 3/22/2024    Interval history:  Pt is confuse, eating poorly    CURRENT MEDICATIONS:    Current Facility-Administered Medications:     acetaminophen (Tylenol) tablet 650 mg, 650 mg, oral, q4h PRN, 650 mg at 03/31/24 1800 **OR** acetaminophen (Tylenol) oral liquid 650 mg, 650 mg, oral, q4h PRN, 650 mg at 04/01/24 0254 **OR** acetaminophen (Tylenol) suppository 650 mg, 650 mg, rectal, q4h PRN, Juve Brown MD    apixaban (Eliquis) tablet 2.5 mg, 2.5 mg, oral, BID, Juve Brown MD, 2.5 mg at 04/02/24 1015    atorvastatin (Lipitor) tablet 20 mg, 20 mg, oral, Nightly, Juve Brown MD, 20 mg at 03/31/24 2251    cyanocobalamin (Vitamin B-12) tablet 500 mcg, 500 mcg, oral, Daily, Juve Brown MD, 500 mcg at 04/02/24 1015    dextrose 5 % in water (D5W) infusion, 75 mL/hr, intravenous, Continuous, Sascha Perez MD, Last Rate: 75 mL/hr at 04/02/24 0331, 75 mL/hr at 04/02/24 0331    docusate sodium (Colace) capsule 100 mg, 100 mg, oral, BID, Juve Brown MD, 100 mg at 04/02/24 1015    doxazosin (Cardura) tablet 8 mg, 8 mg, oral, Nightly, Mundo Holguin MD, 8 mg at 03/31/24 2251    empagliflozin (Jardiance) tablet 25 mg, 25 mg, oral, Daily, Juve Brown MD, 25 mg at 04/02/24 1015    finasteride (Proscar) tablet 5 mg, 5 mg, oral, Daily, Juve Brown MD, 5 mg at 04/02/24 1015    [Held by provider] furosemide (Lasix) injection 60 mg, 60 mg, intravenous, BID, Johanny Evangelista MD, 60 mg at 03/30/24 1658    glimepiride (Amaryl) tablet 2 mg, 2 mg, oral, Daily before breakfast, Juve Brown MD, 2 mg at 03/31/24 0638    hydrALAZINE (Apresoline) tablet 50 mg, 50 mg, oral, BID, Johanny Evangelista MD, 50 mg at 04/02/24 1015    ipratropium-albuteroL (Duo-Neb) 0.5-2.5 mg/3 mL nebulizer solution 3 mL, 3 mL, nebulization, q4h PRN, Juve Brown MD, 3 mL at 03/29/24 1141    ipratropium-albuteroL (Duo-Neb) 0.5-2.5 mg/3 mL nebulizer solution 3  mL, 3 mL, nebulization, TID, Juve Brown MD, 3 mL at 04/01/24 2047    loperamide (Imodium) capsule 2 mg, 2 mg, oral, TID PRN, Juve Brown MD    losartan (Cozaar) tablet 50 mg, 50 mg, oral, Daily, Juve Brown MD, 50 mg at 04/02/24 1015    metoprolol succinate XL (Toprol-XL) 24 hr tablet 150 mg, 150 mg, oral, BID, Juve Brown MD, 150 mg at 04/02/24 1015    oxygen (O2) therapy, , inhalation, Continuous PRN - O2/gases, Kevon Gunderson MD, Rate Verify at 03/31/24 0330    oxygen (O2) therapy, , inhalation, Continuous PRN - O2/gases, Kevon Gunderson MD, 2 L/min at 04/02/24 0802    pantoprazole (ProtoNix) EC tablet 40 mg, 40 mg, oral, Daily before breakfast, Juve Brown MD, 40 mg at 03/31/24 0638    piperacillin-tazobactam-dextrose (Zosyn) IV 3.375 g, 3.375 g, intravenous, q6h, Juve Brown MD, Stopped at 04/02/24 1050    polyethylene glycol (Glycolax, Miralax) packet 17 g, 17 g, oral, Daily, Juve Brown MD, 17 g at 04/02/24 1015    potassium chloride (Klor-Con) packet 20 mEq, 20 mEq, oral, TID, Johanny Evangelista MD, 20 mEq at 04/02/24 1015    potassium chloride 20 mEq in 100 mL IV premix, 20 mEq, intravenous, q2h, Kevon Gunderson MD, Last Rate: 50 mL/hr at 04/02/24 1112, 20 mEq at 04/02/24 1112       Intake/Output Summary (Last 24 hours) at 4/2/2024 1156  Last data filed at 4/2/2024 0443  Gross per 24 hour   Intake 2357.5 ml   Output 1400 ml   Net 957.5 ml         PHYSICAL EXAM:  /89 (BP Location: Left arm, Patient Position: Lying)   Pulse (!) 113   Temp 36.9 °C (98.4 °F) (Temporal)   Resp 17   Wt 99.8 kg (220 lb 0.3 oz)   SpO2 97%   BMI 30.70 kg/m²     Intake/Output Summary (Last 24 hours) at 4/2/2024 1156  Last data filed at 4/2/2024 0443  Gross per 24 hour   Intake 2357.5 ml   Output 1400 ml   Net 957.5 ml       Gen: Awake, disoriented, NAD  Neck: No JVD  Cardiac: RRR  Resp: clear BS  Abd: Soft, non tender, +BS, non distended   Ext: No edema   Neuro: moves 4  ext  Peripheral Pulses: weak peripheral pulses.  Skin: Skin color, texture, turgor normal, no suspicious rashes or lesions.    Labs:  Results for orders placed or performed during the hospital encounter of 03/22/24 (from the past 24 hour(s))   Lactate Dehydrogenase   Result Value Ref Range     84 - 246 U/L   Protein, Total   Result Value Ref Range    Total Protein 5.4 (L) 6.4 - 8.2 g/dL   Basic Metabolic Panel   Result Value Ref Range    Glucose 175 (H) 74 - 99 mg/dL    Sodium 153 (H) 136 - 145 mmol/L    Potassium 3.2 (L) 3.5 - 5.3 mmol/L    Chloride 108 (H) 98 - 107 mmol/L    Bicarbonate 38 (H) 21 - 32 mmol/L    Anion Gap 10 10 - 20 mmol/L    Urea Nitrogen 31 (H) 6 - 23 mg/dL    Creatinine 1.77 (H) 0.50 - 1.30 mg/dL    eGFR 37 (L) >60 mL/min/1.73m*2    Calcium 8.3 (L) 8.6 - 10.3 mg/dL   CBC   Result Value Ref Range    WBC 7.8 4.4 - 11.3 x10*3/uL    nRBC 0.0 0.0 - 0.0 /100 WBCs    RBC 4.66 4.50 - 5.90 x10*6/uL    Hemoglobin 12.0 (L) 13.5 - 17.5 g/dL    Hematocrit 42.4 41.0 - 52.0 %    MCV 91 80 - 100 fL    MCH 25.8 (L) 26.0 - 34.0 pg    MCHC 28.3 (L) 32.0 - 36.0 g/dL    RDW 17.3 (H) 11.5 - 14.5 %    Platelets 91 (L) 150 - 450 x10*3/uL   Magnesium   Result Value Ref Range    Magnesium 2.60 (H) 1.60 - 2.40 mg/dL   Protime-INR   Result Value Ref Range    Protime 16.9 (H) 9.8 - 12.8 seconds    INR 1.5 (H) 0.9 - 1.1        DATA:   Diagnostic tests reviewed for today's visit:    New labs and imaging     Assessment and Plan:  Pt admitted with pneumonia and acute on chronic CHF, persistent AF  - ROSE on CKD stage 3a: resolved ROSE Scr remains at baseline  Euvolemic s/p diuretic tx, currently on hold  BP: acceptable control  Hypernatremia: due to water deficit, remains uncorrected but better, on D5W drip  HypoK, poor intake, better    PLAN:  - D5W 75 ml/h another day, replete K prn, diuretic on hold     Will continue to follow.     Signature: Sascha Perez MD

## 2024-04-02 NOTE — CARE PLAN
The patient's goals for the shift include Good night's sleep    The clinical goals for the shift include pt will maintain an SPo2 above 92% during the shift    Problem: Skin  Goal: Decreased wound size/increased tissue granulation at next dressing change  Outcome: Progressing  Flowsheets (Taken 3/31/2024 0029 by Aminata West RN)  Decreased wound size/increased tissue granulation at next dressing change: Promote sleep for wound healing  Goal: Participates in plan/prevention/treatment measures  Flowsheets (Taken 3/31/2024 0029 by Aminata West RN)  Participates in plan/prevention/treatment measures: Elevate heels  Goal: Promote skin healing  Outcome: Progressing  Flowsheets (Taken 4/2/2024 0328)  Promote skin healing: Ensure correct size (line/device) and apply per  instructions     Problem: Safety  Goal: Patient will be injury free during hospitalization  Outcome: Progressing     Problem: Daily Care  Goal: Daily care needs are met  Outcome: Progressing     Over the shift, the patient did not make progress toward the following goals. Barriers to progression include confusion / disoriented.     Problem: Psychosocial Needs  Goal: Demonstrates ability to cope with hospitalization/illness  Outcome: Not Progressing

## 2024-04-02 NOTE — INTERVAL H&P NOTE
H&P reviewed. The patient was examined and there are no changes to the H&P.  Right pleural effusion for thoracentesis.

## 2024-04-02 NOTE — SIGNIFICANT EVENT
Phone call with daughter Nena Zelaya who reports family discussed code status and they desire patient's status be changed to DNR/DNI.  Order placed in chart.  Will also fill out [place] Portable DNR and will place at bedside for family.

## 2024-04-02 NOTE — POST-PROCEDURE NOTE
Interventional Radiology Brief Postprocedure Note    Attending: Nata Eller CNP    Assistant: none    Diagnosis: Pleural effusion; pneumonia    Description of procedure: Ultrasound guided thoracentesis was preformed on the right.  750mL of clear yellow fluid was drained.      Anesthesia:  Local    Complications: None    Estimated Blood Loss: none    Specimens: Yes     See detailed result report with images in PACS.    The patient tolerated the procedure well without incident or complication and is in stable condition.

## 2024-04-02 NOTE — CARE PLAN
The patient's goals for the shift include Good night's sleep    The clinical goals for the shift include remain safe and free from injury

## 2024-04-02 NOTE — PROGRESS NOTES
Physical Therapy                 Therapy Communication Note    Patient Name: New Castano  MRN: [id number]  Today's Date: 4/2/2024     Discipline: Physical Therapy    Missed Visit Reason: Missed Visit Reason:  (Pt cleared by nursing. Working with PT to get pt prepped to sit on EOB. while starting to get pt to EOB transport arrived to take pt to procedure. Pt fully returned to bed for procedure. Family present)    Missed Time: Attempt    Comment:

## 2024-04-03 LAB
ALBUMIN MFR UR ELPH: 66 %
ALBUMIN: 3 G/DL (ref 3.4–5)
ALPHA 1 GLOBULIN: 0.3 G/DL (ref 0.2–0.6)
ALPHA 2 GLOBULIN: 0.7 G/DL (ref 0.4–1.1)
ALPHA1 GLOB MFR UR ELPH: 3.7 %
ALPHA2 GLOB MFR UR ELPH: 10.3 %
ANION GAP SERPL CALC-SCNC: 9 MMOL/L (ref 10–20)
B-GLOBULIN MFR UR ELPH: 10.2 %
BETA GLOBULIN: 0.7 G/DL (ref 0.5–1.2)
BUN SERPL-MCNC: 28 MG/DL (ref 6–23)
CALCIUM SERPL-MCNC: 7.9 MG/DL (ref 8.6–10.3)
CHLORIDE SERPL-SCNC: 108 MMOL/L (ref 98–107)
CO2 SERPL-SCNC: 38 MMOL/L (ref 21–32)
CREAT SERPL-MCNC: 1.87 MG/DL (ref 0.5–1.3)
EGFRCR SERPLBLD CKD-EPI 2021: 35 ML/MIN/1.73M*2
ERYTHROCYTE [DISTWIDTH] IN BLOOD BY AUTOMATED COUNT: 17.3 % (ref 11.5–14.5)
GAMMA GLOB MFR UR ELPH: 9.8 %
GAMMA GLOBULIN: 1 G/DL (ref 0.5–1.4)
GLUCOSE SERPL-MCNC: 159 MG/DL (ref 74–99)
HCT VFR BLD AUTO: 40 % (ref 41–52)
HGB BLD-MCNC: 11.5 G/DL (ref 13.5–17.5)
IMMUNOFIXATION COMMENT: ABNORMAL
LDH FLD L TO P-CCNC: 49 U/L
MAGNESIUM SERPL-MCNC: 2.6 MG/DL (ref 1.6–2.4)
MCH RBC QN AUTO: 26.1 PG (ref 26–34)
MCHC RBC AUTO-ENTMCNC: 28.8 G/DL (ref 32–36)
MCV RBC AUTO: 91 FL (ref 80–100)
NRBC BLD-RTO: 0 /100 WBCS (ref 0–0)
PATH REVIEW - SERUM IMMUNOFIXATION: ABNORMAL
PATH REVIEW-SERUM PROTEIN ELECTROPHORESIS: ABNORMAL
PATH REVIEW-URINE PROTEIN ELECTROPHORESIS: NORMAL
PLATELET # BLD AUTO: 82 X10*3/UL (ref 150–450)
POTASSIUM SERPL-SCNC: 3.6 MMOL/L (ref 3.5–5.3)
PROT FLD-MCNC: 1.3 G/DL
PROTEIN ELECTROPHORESIS COMMENT: ABNORMAL
RBC # BLD AUTO: 4.41 X10*6/UL (ref 4.5–5.9)
SODIUM SERPL-SCNC: 151 MMOL/L (ref 136–145)
URINE ELECTROPHORESIS COMMENT: NORMAL
WBC # BLD AUTO: 6.6 X10*3/UL (ref 4.4–11.3)

## 2024-04-03 PROCEDURE — 83735 ASSAY OF MAGNESIUM: CPT | Performed by: NURSE PRACTITIONER

## 2024-04-03 PROCEDURE — 85027 COMPLETE CBC AUTOMATED: CPT | Performed by: NURSE PRACTITIONER

## 2024-04-03 PROCEDURE — 80048 BASIC METABOLIC PNL TOTAL CA: CPT | Performed by: NURSE PRACTITIONER

## 2024-04-03 PROCEDURE — 2500000001 HC RX 250 WO HCPCS SELF ADMINISTERED DRUGS (ALT 637 FOR MEDICARE OP): Performed by: INTERNAL MEDICINE

## 2024-04-03 PROCEDURE — 2500000004 HC RX 250 GENERAL PHARMACY W/ HCPCS (ALT 636 FOR OP/ED): Performed by: INTERNAL MEDICINE

## 2024-04-03 PROCEDURE — 99232 SBSQ HOSP IP/OBS MODERATE 35: CPT | Performed by: NURSE PRACTITIONER

## 2024-04-03 PROCEDURE — 94640 AIRWAY INHALATION TREATMENT: CPT | Mod: MUE

## 2024-04-03 PROCEDURE — 2500000002 HC RX 250 W HCPCS SELF ADMINISTERED DRUGS (ALT 637 FOR MEDICARE OP, ALT 636 FOR OP/ED): Performed by: INTERNAL MEDICINE

## 2024-04-03 PROCEDURE — 1200000002 HC GENERAL ROOM WITH TELEMETRY DAILY

## 2024-04-03 PROCEDURE — 99233 SBSQ HOSP IP/OBS HIGH 50: CPT | Performed by: INTERNAL MEDICINE

## 2024-04-03 PROCEDURE — 99232 SBSQ HOSP IP/OBS MODERATE 35: CPT | Performed by: STUDENT IN AN ORGANIZED HEALTH CARE EDUCATION/TRAINING PROGRAM

## 2024-04-03 PROCEDURE — 36415 COLL VENOUS BLD VENIPUNCTURE: CPT | Performed by: NURSE PRACTITIONER

## 2024-04-03 RX ADMIN — CYANOCOBALAMIN TAB 500 MCG 500 MCG: 500 TAB at 09:43

## 2024-04-03 RX ADMIN — DOCUSATE SODIUM 100 MG: 100 CAPSULE, LIQUID FILLED ORAL at 09:42

## 2024-04-03 RX ADMIN — DOCUSATE SODIUM 100 MG: 100 CAPSULE, LIQUID FILLED ORAL at 21:48

## 2024-04-03 RX ADMIN — DEXTROSE MONOHYDRATE 100 ML/HR: 50 INJECTION, SOLUTION INTRAVENOUS at 17:40

## 2024-04-03 RX ADMIN — METOPROLOL SUCCINATE 150 MG: 50 TABLET, EXTENDED RELEASE ORAL at 21:48

## 2024-04-03 RX ADMIN — GLIMEPIRIDE 2 MG: 2 TABLET ORAL at 06:37

## 2024-04-03 RX ADMIN — APIXABAN 2.5 MG: 2.5 TABLET, FILM COATED ORAL at 21:48

## 2024-04-03 RX ADMIN — POTASSIUM CHLORIDE 20 MEQ: 1.5 POWDER, FOR SOLUTION ORAL at 09:43

## 2024-04-03 RX ADMIN — POLYETHYLENE GLYCOL 3350 17 G: 17 POWDER, FOR SOLUTION ORAL at 09:42

## 2024-04-03 RX ADMIN — HYDRALAZINE HYDROCHLORIDE 50 MG: 50 TABLET ORAL at 21:48

## 2024-04-03 RX ADMIN — METOPROLOL SUCCINATE 150 MG: 50 TABLET, EXTENDED RELEASE ORAL at 09:42

## 2024-04-03 RX ADMIN — POTASSIUM CHLORIDE 20 MEQ: 1.5 POWDER, FOR SOLUTION ORAL at 16:34

## 2024-04-03 RX ADMIN — IPRATROPIUM BROMIDE AND ALBUTEROL SULFATE 3 ML: 2.5; .5 SOLUTION RESPIRATORY (INHALATION) at 20:23

## 2024-04-03 RX ADMIN — ATORVASTATIN CALCIUM 20 MG: 20 TABLET, FILM COATED ORAL at 21:48

## 2024-04-03 RX ADMIN — PANTOPRAZOLE SODIUM 40 MG: 40 TABLET, DELAYED RELEASE ORAL at 06:37

## 2024-04-03 RX ADMIN — PIPERACILLIN SODIUM AND TAZOBACTAM SODIUM 3.38 G: 3; .375 INJECTION, SOLUTION INTRAVENOUS at 04:48

## 2024-04-03 RX ADMIN — FINASTERIDE 5 MG: 5 TABLET, FILM COATED ORAL at 09:43

## 2024-04-03 RX ADMIN — PIPERACILLIN SODIUM AND TAZOBACTAM SODIUM 3.38 G: 3; .375 INJECTION, SOLUTION INTRAVENOUS at 16:33

## 2024-04-03 RX ADMIN — DOXAZOSIN 8 MG: 2 TABLET ORAL at 21:48

## 2024-04-03 RX ADMIN — IPRATROPIUM BROMIDE AND ALBUTEROL SULFATE 3 ML: 2.5; .5 SOLUTION RESPIRATORY (INHALATION) at 14:30

## 2024-04-03 RX ADMIN — APIXABAN 2.5 MG: 2.5 TABLET, FILM COATED ORAL at 09:43

## 2024-04-03 RX ADMIN — IPRATROPIUM BROMIDE AND ALBUTEROL SULFATE 3 ML: 2.5; .5 SOLUTION RESPIRATORY (INHALATION) at 08:40

## 2024-04-03 RX ADMIN — LOSARTAN POTASSIUM 50 MG: 50 TABLET, FILM COATED ORAL at 09:42

## 2024-04-03 RX ADMIN — DEXTROSE MONOHYDRATE 75 ML/HR: 50 INJECTION, SOLUTION INTRAVENOUS at 04:48

## 2024-04-03 RX ADMIN — PIPERACILLIN SODIUM AND TAZOBACTAM SODIUM 3.38 G: 3; .375 INJECTION, SOLUTION INTRAVENOUS at 12:26

## 2024-04-03 RX ADMIN — PIPERACILLIN SODIUM AND TAZOBACTAM SODIUM 3.38 G: 3; .375 INJECTION, SOLUTION INTRAVENOUS at 23:01

## 2024-04-03 RX ADMIN — POTASSIUM CHLORIDE 20 MEQ: 1.5 POWDER, FOR SOLUTION ORAL at 21:48

## 2024-04-03 RX ADMIN — HYDRALAZINE HYDROCHLORIDE 50 MG: 50 TABLET ORAL at 09:43

## 2024-04-03 RX ADMIN — EMPAGLIFLOZIN 25 MG: 10 TABLET, FILM COATED ORAL at 09:42

## 2024-04-03 NOTE — CONSULTS
Wound Care Consult     Visit Date: 4/3/2024      Patient Name: New Castano         MRN: [id number]           YOB: 1940     Reason for Consult: Wound care completed. Please continue with current wound care recommendations.            Pertinent Labs:   Albumin   Date Value Ref Range Status   03/30/2024 3.0 (L) 3.4 - 5.0 g/dL Final   03/22/2024 3.4 3.4 - 5.0 g/dL Final     Albumin %   Date Value Ref Range Status   03/30/2024 66.0 % Final       Wound Assessment:  Wound 03/22/24 Skin Tear Pretibial Right (Active)   Wound Image   04/03/24 1027   Site Assessment Clean;Dry;Intact 04/03/24 1247   Nidia-Wound Assessment Dry 03/29/24 0400   Non-staged Wound Description Partial thickness 03/25/24 1155   Shape oval 03/25/24 1155   Wound Length (cm) 6.5 cm 04/03/24 1027   Wound Width (cm) 4 cm 04/03/24 1027   Wound Surface Area (cm^2) 26 cm^2 04/03/24 1027   Wound Depth (cm) 0.2 cm 04/03/24 1027   Wound Volume (cm^3) 5.2 cm^3 04/03/24 1027   Wound Healing % 35 04/03/24 1027   State of Healing Slough;Early/partial granulation 03/25/24 1155   Margins Attached edges;Well-defined edges 03/25/24 1155   Drainage Description Serosanguineous 03/25/24 1155   Drainage Amount Moderate 03/25/24 1155   Dressing Foam 03/29/24 0400   Dressing Changed Changed 03/31/24 1024   Dressing Status Clean;Dry 04/03/24 1247        Carmelita Andre RN BSN,WCC,CWOCN  [phone]   4/3/2024  5:53 PM

## 2024-04-03 NOTE — PROGRESS NOTES
New Castano is a 83 y.o. male on day 12 of admission presenting with Pneumonia of right lung due to infectious organism, unspecified part of lung.    Subjective   Symptoms (0 - 10, Best to Worst)  Apex Symptom Assessment System  Pain Score: 1  Patient in bed working with therapy and being cooperative.  Remains confused.  Has been trying to pull off oxygen overnight and remains in mittens.  Continues to need 3 L via nasal cannula desats into 80s when removes O2. Pt does not understand why he wears NC.  Can follow commands but has poor reasoning and no retention.  Did well with thoracentesis yesterday 750 mL were removed from right however repeat x-ray showed enlargement of the left pleural effusion.  No pain at access site.  Gets a little agitated due to mittens but denies other distress.  Remains a poor historian.       Objective     Physical Exam  Constitutional:       Appearance: He is obese. He is ill-appearing.   HENT:      Head: Normocephalic.      Nose: Congestion present. No rhinorrhea.      Mouth/Throat:      Mouth: Mucous membranes are moist.      Pharynx: Oropharynx is clear. No oropharyngeal exudate or posterior oropharyngeal erythema.   Eyes:      General: No scleral icterus.        Right eye: No discharge.         Left eye: No discharge.      Extraocular Movements: Extraocular movements intact.      Conjunctiva/sclera: Conjunctivae normal.      Pupils: Pupils are equal, round, and reactive to light.   Neck:      Vascular: No carotid bruit.   Cardiovascular:      Rate and Rhythm: Normal rate. Rhythm irregular.      Heart sounds: No murmur heard.     No friction rub. No gallop.   Pulmonary:      Effort: No respiratory distress.      Breath sounds: Rales present. No wheezing or rhonchi. 3l nc  Chest:      Chest wall: No tenderness.   Abdominal:      General: Bowel sounds are normal. There is no distension.      Palpations: Abdomen is soft. There is no mass.      Tenderness: There is no abdominal  tenderness. There is no right CVA tenderness, left CVA tenderness, guarding or rebound.      Hernia: No hernia is present.   Genitourinary:     Comments: Pure wick, concentrated urine  Musculoskeletal:         General: Tenderness (Mild tenderness bilateral lower extremity) present. No deformity.      Cervical back: Normal range of motion and neck supple. No rigidity or tenderness.      Right lower leg: Edema present.      Left lower leg: Edema present.   Lymphadenopathy:      Cervical: No cervical adenopathy.   Skin:     Capillary Refill: Capillary refill takes 2 to 3 seconds.      Coloration: Skin is pale. Skin is not jaundiced.      Findings: Bruising present.   Neurological:      Motor: Weakness present.      Comments: Markedly confused, in mittens    Last Recorded Vitals  Blood pressure 139/88, pulse 97, temperature 36.2 °C (97.2 °F), temperature source Temporal, resp. rate 18, weight 99.8 kg (220 lb 0.3 oz), SpO2 98 %.  Intake/Output last 3 Shifts:  I/O last 3 completed shifts:  In: 4065.8 (40.7 mL/kg) [P.O.:240; I.V.:3375.8 (33.8 mL/kg); IV Piggyback:450]  Out: 4350 (43.6 mL/kg) [Urine:4350 (1.2 mL/kg/hr)]  Weight: 99.8 kg     Relevant Results  Results for orders placed or performed during the hospital encounter of 03/22/24 (from the past 24 hour(s))   Sterile Fluid Culture/Smear    Specimen: Pleural; Fluid   Result Value Ref Range    Gram Stain No polymorphonuclear leukocytes seen     Gram Stain No organisms seen    Fungal Culture/Smear    Specimen: Pleural; Fluid   Result Value Ref Range    Fungal Culture/Smear       Culture in progress, a report will be issued when positive or after 2 weeks of incubation.    Fungal Smear No fungal elements seen    Lactate Dehydrogenase, Fluid   Result Value Ref Range    LD, Fluid 49 Not established. U/L   Protein, Total Fluid   Result Value Ref Range    Protein, Total Fluid 1.3 Not established g/dL   Basic Metabolic Panel   Result Value Ref Range    Glucose 159 (H) 74 - 99  mg/dL    Sodium 151 (H) 136 - 145 mmol/L    Potassium 3.6 3.5 - 5.3 mmol/L    Chloride 108 (H) 98 - 107 mmol/L    Bicarbonate 38 (H) 21 - 32 mmol/L    Anion Gap 9 (L) 10 - 20 mmol/L    Urea Nitrogen 28 (H) 6 - 23 mg/dL    Creatinine 1.87 (H) 0.50 - 1.30 mg/dL    eGFR 35 (L) >60 mL/min/1.73m*2    Calcium 7.9 (L) 8.6 - 10.3 mg/dL   CBC   Result Value Ref Range    WBC 6.6 4.4 - 11.3 x10*3/uL    nRBC 0.0 0.0 - 0.0 /100 WBCs    RBC 4.41 (L) 4.50 - 5.90 x10*6/uL    Hemoglobin 11.5 (L) 13.5 - 17.5 g/dL    Hematocrit 40.0 (L) 41.0 - 52.0 %    MCV 91 80 - 100 fL    MCH 26.1 26.0 - 34.0 pg    MCHC 28.8 (L) 32.0 - 36.0 g/dL    RDW 17.3 (H) 11.5 - 14.5 %    Platelets 82 (L) 150 - 450 x10*3/uL   Magnesium   Result Value Ref Range    Magnesium 2.60 (H) 1.60 - 2.40 mg/dL     Scheduled medications  apixaban, 2.5 mg, oral, BID  atorvastatin, 20 mg, oral, Nightly  cyanocobalamin, 500 mcg, oral, Daily  docusate sodium, 100 mg, oral, BID  doxazosin, 8 mg, oral, Nightly  empagliflozin, 25 mg, oral, Daily  finasteride, 5 mg, oral, Daily  [Held by provider] furosemide, 60 mg, intravenous, BID  glimepiride, 2 mg, oral, Daily before breakfast  hydrALAZINE, 50 mg, oral, BID  ipratropium-albuteroL, 3 mL, nebulization, TID  losartan, 50 mg, oral, Daily  metoprolol succinate XL, 150 mg, oral, BID  pantoprazole, 40 mg, oral, Daily before breakfast  piperacillin-tazobactam, 3.375 g, intravenous, q6h  polyethylene glycol, 17 g, oral, Daily  potassium chloride, 20 mEq, oral, TID      Continuous medications  dextrose 5 % in water (D5W), 75 mL/hr, Last Rate: 75 mL/hr (04/03/24 0891)      PRN medications  PRN medications: acetaminophen **OR** acetaminophen **OR** acetaminophen, ipratropium-albuteroL, loperamide, oxygen, oxygen        Assessment/Plan   Acute hypoxic and hypercarbic respiratory failure  2. Acute on chronic diastolic heart failure-Beta-blocker Jardiance, ARB  3. Pleural effusion - 4/2 Thoracentesis R 750ml, now worse on left  4.  Pneumonia, possible-On Zosyn  5. A-fib flutter-On Eliquis, beta-blocker  6. ROSE- Crea worse today 1.87, still high but improved na 151  7. Altered mental status/delirium -Multifactorial, acute respiratory failure hypoxia, hypercarbia, hypernatremia, potential infectious etiology PNA, metabolic, CHF, hospital induced delirium Etc.  -Never really fully recovered from last admit per the daughter, ie did not bounce all the way back typically at home he is not confused per the daughter Nena. For now remains in mittens  8. Palliative care    DNR CCA DNI  Not capable  Stated healthcare power of  is daughter Nena as wife reportedly has dementia  Currently disease-modifying mode continue all treatments.  Could consider thoracentesis on the left and attempt to wean off O2 which is the main reason patient needs mittens as he keeps on pulling on nasal cannula.  Did well with thoracentesis on the right side, awaiting results of culture/sample sent  Right now continues to be confused and not easy to redirect which leads to poor prognosis unless mental status improves.  Family is open to discuss comfort care approach hospice [facility] referral was placed and meeting time is set for tomorrow at 5:30 PM at bedside with [facility]  Until then continue all treatments to allow for functional improvement.  We will follow           I spent 40 minutes in the professional and overall care of this patient.      VANCE Blake-CNP

## 2024-04-03 NOTE — PROGRESS NOTES
04/03/24 1606   Discharge Planning   Patient expects to be discharged to: meeting with [id number]-to discuss comfort care -referral placed [facility]-Pallitive care following   Does the patient need discharge transport arranged? Yes   RoundTrip coordination needed? Yes   Has discharge transport been arranged? No

## 2024-04-03 NOTE — PROGRESS NOTES
New Castano is a 83 y.o. male on day 11 of admission presenting with Pneumonia of right lung due to infectious organism, unspecified part of lung.      Subjective   No chest pain  Resting , confused, does have mittens  No ac issues per aide at bed side  Plan for thora later noted      Objective     Last Recorded Vitals  /80 (BP Location: Left arm, Patient Position: Lying)   Pulse 94   Temp 36.8 °C (98.2 °F) (Oral)   Resp 18   Wt 99.8 kg (220 lb 0.3 oz)   SpO2 97%   Intake/Output last 3 Shifts:      Admission Weight  Weight: 104 kg (230 lb) (03/27/24 1300)    Daily Weight  04/02/24 : 99.8 kg (220 lb 0.3 oz)    Image Results  XR chest 2 views  Narrative: Interpreted By:  Juve Mahan,   STUDY:  XR CHEST 2 VIEWS;  4/2/2024 9:29 pm      INDICATION:  Signs/Symptoms:rt pleural effusion; s/p thoracemtesis.      COMPARISON:  04/01/2024      ACCESSION NUMBER(S):  [id number]      ORDERING CLINICIAN:  ALBA WHELAN      FINDINGS:          Redemonstration cardiomegaly. Atherosclerotic aorta.  Decreased size of right pleural effusion. However there is interval  enlargement of a left pleural effusion. There is worsening airspace  disease at the left base representing consolidation or atelectasis.      There is no pneumothorax.      Impression: Worsening left pleural effusion and left basilar airspace disease,  either atelectasis or consolidation, including relating to aspiration  in the appropriate clinical context.      Decreased size of right pleural effusion without evidence of  pneumothorax.      MACRO:  None.      Signed by: Juve Mahan 4/2/2024 10:04 PM  Dictation workstation:   [id number]  US thoracentesis  Narrative: Interpreted By:  Nata Eller,   STUDY:  US THORACENTESIS; 4/2/20244:10 pm      INDICATION:  Signs/Symptoms:increasing right pleural effusion      COMPARISON:  None.      ACCESSION NUMBER(S):  [id number]      ORDERING CLINICIAN:  ALBA WHELAN       TECHNIQUE:  INTERVENTIONALIST:  Nata Eller      CONSENT:  The patient/patient's POA/next of kin was informed of the nature of  the proposed procedure. Risks were discussed including but not  limited to bleeding, infection, pain at insertion site, or  pneumo/hemothorax requiring chest tube placement. Purpose of  treatment and alternative options were also reviewed. All questions  were answered and patient agreed to proceed.      SEDATION:  None      MEDICATION/CONTRAST:  Lidocaine 1%.      TIME OUT:  A time out was performed immediately prior to procedure start with  the interventional team, correctly identifying the patient name, date  of birth, MRN, procedure, anatomy (including marking of site and  side), patient position, procedure consent form, relevant laboratory  and imaging test results, antibiotic administration, safety  precautions, and procedure-specific equipment needs.      FINDINGS:  The patient was placed in the left lateral decubitus position.      The patient's right pleural space was scanned using the ultrasound  probe. The area of fluid most amenable to drainage was marked. Color  doppler was used to assess for vasculature in the intended field.      The patient's skin was sterilized using chlorhexidine and draped in  sterile manner. The skin was anesthestized with 1% lidocaine.  Under  real time ultrasound guidance, a 5F valved centesis catheter was  inserted into the right pleural space where previously marked. A  total of 750 mL of clear yellow pleural fluid was removed. The  catheter was then removed and a sterile op site was placed over the  insertion site. Sterile technique used throughout entirety of  procedure.      Specimens were obtained, labeled and sent to lab with requisitions  ordered by primary team.      The patient tolerated the procedure well and there were no immediate  complications.      Impression: Uneventful thoracentesis, as detailed above: Right Pleural space,  750  mL      Performed and dictated at [facility].      Signed by: Nata Eller 4/2/2024 4:11 PM  Dictation workstation:   [id number]      Physical Exam    Constitutional:       Appearance: Normal appearance. awake, no distress, lethargic  HENT:      Head: Normocephalic and atraumatic.   Cardiovascular:      Rate and Rhythm: Normal rate and regular rhythm.   Pulmonary:      Effort: Pulmonary effort is normal.   Abdominal:      General: Abdomen obese, bs presetn   Musculoskeletal:           General: Skin is warm and dry.   Neurological:   Awake confused     Edema + on thighs and abdomen        No current facility-administered medications on file prior to encounter.     Current Outpatient Medications on File Prior to Encounter   Medication Sig Dispense Refill    apixaban (Eliquis) 2.5 mg tablet Take 1 tablet (2.5 mg) by mouth 2 times a day.      atorvastatin (Lipitor) 20 mg tablet Take 1 tablet (20 mg) by mouth once daily at bedtime.      doxazosin (Cardura) 4 mg tablet Take 1 tablet (4 mg) by mouth once daily at bedtime.      empagliflozin (Jardiance) 25 mg Take 1 tablet (25 mg) by mouth once daily.      finasteride (Proscar) 5 mg tablet Take 1 tablet (5 mg) by mouth once daily.      furosemide (Lasix) 40 mg tablet Take 1 tablet (40 mg) by mouth 2 times a day. 60 tablet 0    glimepiride (Amaryl) 2 mg tablet Take 1 tablet (2 mg) by mouth once daily in the morning. Take before meals.      losartan (Cozaar) 50 mg tablet Take 1 tablet (50 mg) by mouth once daily. 30 tablet 0    metFORMIN, OSM, (Fortamet) 1,000 mg 24 hr tablet Take 1 tablet (1,000 mg) by mouth 2 times a day with meals. Do not crush, chew, or split.      metoprolol succinate XL (Toprol-XL) 50 mg 24 hr tablet Take 3 tablets (150 mg) by mouth 2 times a day. 180 tablet 0    omeprazole (PriLOSEC) 20 mg DR capsule Take 1 capsule (20 mg) by mouth once daily in the morning. Take before meals. Do not crush or chew.          Results for orders placed or performed during the hospital encounter of 03/22/24 (from the past 24 hour(s))   Basic Metabolic Panel   Result Value Ref Range    Glucose 175 (H) 74 - 99 mg/dL    Sodium 153 (H) 136 - 145 mmol/L    Potassium 3.2 (L) 3.5 - 5.3 mmol/L    Chloride 108 (H) 98 - 107 mmol/L    Bicarbonate 38 (H) 21 - 32 mmol/L    Anion Gap 10 10 - 20 mmol/L    Urea Nitrogen 31 (H) 6 - 23 mg/dL    Creatinine 1.77 (H) 0.50 - 1.30 mg/dL    eGFR 37 (L) >60 mL/min/1.73m*2    Calcium 8.3 (L) 8.6 - 10.3 mg/dL   CBC   Result Value Ref Range    WBC 7.8 4.4 - 11.3 x10*3/uL    nRBC 0.0 0.0 - 0.0 /100 WBCs    RBC 4.66 4.50 - 5.90 x10*6/uL    Hemoglobin 12.0 (L) 13.5 - 17.5 g/dL    Hematocrit 42.4 41.0 - 52.0 %    MCV 91 80 - 100 fL    MCH 25.8 (L) 26.0 - 34.0 pg    MCHC 28.3 (L) 32.0 - 36.0 g/dL    RDW 17.3 (H) 11.5 - 14.5 %    Platelets 91 (L) 150 - 450 x10*3/uL   Magnesium   Result Value Ref Range    Magnesium 2.60 (H) 1.60 - 2.40 mg/dL   Protime-INR   Result Value Ref Range    Protime 16.9 (H) 9.8 - 12.8 seconds    INR 1.5 (H) 0.9 - 1.1   Sterile Fluid Culture/Smear    Specimen: Pleural; Fluid   Result Value Ref Range    Gram Stain No polymorphonuclear leukocytes seen     Gram Stain No organisms seen    Lactate Dehydrogenase, Fluid   Result Value Ref Range    LD, Fluid 49 Not established. U/L   Protein, Total Fluid   Result Value Ref Range    Protein, Total Fluid 1.3 Not established g/dL       Vitals:    04/02/24 0443   Weight: 99.8 kg (220 lb 0.3 oz)         Intake/Output Summary (Last 24 hours) at 4/3/2024 0457  Last data filed at 4/3/2024 0410  Gross per 24 hour   Intake 1518.33 ml   Output 3250 ml   Net -1731.67 ml           Assessment/Plan      Acute respiratory failure with hypoxia d/t pneumonia-IV as per orders, better  Weakness-PT/OT following, will need SNF.   HTN- better with less agitation  Agitation- ativan as needed  Acute on chronic diastolic heart failure  A fib on eliquis, rate controlled  EF 50  in 12/23  Check bladder scan  Resume losartan   Hypernatremia  Dandre / CKD 3 w hypernatremia   Metabolic encephalopathy,   Pleural effusion camila         Input from specialities noted  Cont with Diuresis per renal , monitor labs and electrolytes  Plan noted for thora  Lasix on hold   Getting dextrose for hypernatremia    -Continue current treatment as ordered. Will make adjustments as necessary.    Plan of care discussed with: ZANDER Gunderson MD

## 2024-04-03 NOTE — CARE PLAN
Problem: Skin  Goal: Decreased wound size/increased tissue granulation at next dressing change  Outcome: Progressing  Goal: Participates in plan/prevention/treatment measures  Outcome: Progressing  Goal: Prevent/manage excess moisture  Outcome: Progressing  Goal: Prevent/minimize sheer/friction injuries  Outcome: Progressing  Goal: Promote/optimize nutrition  Outcome: Progressing     Problem: Diabetes  Goal: Achieve decreasing blood glucose levels by end of shift  Outcome: Progressing  Goal: Increase stability of blood glucose readings by end of shift  Outcome: Progressing     Problem: Fall/Injury  Goal: Be free from injury by end of the shift  Outcome: Progressing  Goal: Verbalize understanding of personal risk factors for fall in the hospital  Outcome: Progressing  Goal: Verbalize understanding of risk factor reduction measures to prevent injury from fall in the home  Outcome: Progressing  Goal: Use assistive devices by end of the shift  Outcome: Progressing  Goal: Pace activities to prevent fatigue by end of the shift  Outcome: Progressing     Problem: Safety  Goal: Patient will be injury free during hospitalization  Outcome: Progressing  Goal: I will remain free of falls  Outcome: Progressing

## 2024-04-03 NOTE — PROGRESS NOTES
Nephrology Consult Progress Note    Admit Date: 3/22/2024    Interval history:  Pt is confuse, eating poorly    CURRENT MEDICATIONS:    Current Facility-Administered Medications:     acetaminophen (Tylenol) tablet 650 mg, 650 mg, oral, q4h PRN, 650 mg at 03/31/24 1800 **OR** acetaminophen (Tylenol) oral liquid 650 mg, 650 mg, oral, q4h PRN, 650 mg at 04/02/24 2310 **OR** acetaminophen (Tylenol) suppository 650 mg, 650 mg, rectal, q4h PRN, Juve Brown MD    apixaban (Eliquis) tablet 2.5 mg, 2.5 mg, oral, BID, Juve Brown MD, 2.5 mg at 04/03/24 0943    atorvastatin (Lipitor) tablet 20 mg, 20 mg, oral, Nightly, Juve Brown MD, 20 mg at 04/02/24 2314    cyanocobalamin (Vitamin B-12) tablet 500 mcg, 500 mcg, oral, Daily, Juve Brown MD, 500 mcg at 04/03/24 0943    dextrose 5 % in water (D5W) infusion, 100 mL/hr, intravenous, Continuous, Sascha Perez MD, Last Rate: 75 mL/hr at 04/03/24 0448, 75 mL/hr at 04/03/24 0448    docusate sodium (Colace) capsule 100 mg, 100 mg, oral, BID, Juve Brown MD, 100 mg at 04/03/24 0942    doxazosin (Cardura) tablet 8 mg, 8 mg, oral, Nightly, Mundo Holguin MD, 8 mg at 04/02/24 2310    empagliflozin (Jardiance) tablet 25 mg, 25 mg, oral, Daily, Juve Brown MD, 25 mg at 04/03/24 0942    finasteride (Proscar) tablet 5 mg, 5 mg, oral, Daily, Juve Brown MD, 5 mg at 04/03/24 0943    [Held by provider] furosemide (Lasix) injection 60 mg, 60 mg, intravenous, BID, Johanny Evangelista MD, 60 mg at 03/30/24 1658    glimepiride (Amaryl) tablet 2 mg, 2 mg, oral, Daily before breakfast, Juve Brown MD, 2 mg at 04/03/24 0637    hydrALAZINE (Apresoline) tablet 50 mg, 50 mg, oral, BID, Johanny Evangelista MD, 50 mg at 04/03/24 0943    ipratropium-albuteroL (Duo-Neb) 0.5-2.5 mg/3 mL nebulizer solution 3 mL, 3 mL, nebulization, q4h PRN, Juve Brown MD, 3 mL at 03/29/24 1141    ipratropium-albuteroL (Duo-Neb) 0.5-2.5 mg/3 mL nebulizer solution 3  mL, 3 mL, nebulization, TID, Juve Brown MD, 3 mL at 04/03/24 0840    loperamide (Imodium) capsule 2 mg, 2 mg, oral, TID PRN, Juve Brown MD    losartan (Cozaar) tablet 50 mg, 50 mg, oral, Daily, Juve Brown MD, 50 mg at 04/03/24 0942    metoprolol succinate XL (Toprol-XL) 24 hr tablet 150 mg, 150 mg, oral, BID, Juve Brown MD, 150 mg at 04/03/24 0942    oxygen (O2) therapy, , inhalation, Continuous PRN - O2/gases, Kevon Gunderson MD, Rate Verify at 03/31/24 0330    oxygen (O2) therapy, , inhalation, Continuous PRN - O2/gases, Kevon Gunderson MD, 3 L/min at 04/03/24 0840    pantoprazole (ProtoNix) EC tablet 40 mg, 40 mg, oral, Daily before breakfast, Juve Brown MD, 40 mg at 04/03/24 0637    piperacillin-tazobactam-dextrose (Zosyn) IV 3.375 g, 3.375 g, intravenous, q6h, Juve Brown MD, Stopped at 04/03/24 0518    polyethylene glycol (Glycolax, Miralax) packet 17 g, 17 g, oral, Daily, Juve Brown MD, 17 g at 04/03/24 0942    potassium chloride (Klor-Con) packet 20 mEq, 20 mEq, oral, TID, Johanny Evangelista MD, 20 mEq at 04/03/24 0943       Intake/Output Summary (Last 24 hours) at 4/3/2024 1131  Last data filed at 4/3/2024 0518  Gross per 24 hour   Intake 1708.33 ml   Output 3250 ml   Net -1541.67 ml         PHYSICAL EXAM:  /88 (BP Location: Left arm, Patient Position: Lying)   Pulse 97   Temp 36.2 °C (97.2 °F) (Temporal)   Resp 18   Wt 99.8 kg (220 lb 0.3 oz)   SpO2 98%   BMI 30.70 kg/m²     Intake/Output Summary (Last 24 hours) at 4/3/2024 1131  Last data filed at 4/3/2024 0518  Gross per 24 hour   Intake 1708.33 ml   Output 3250 ml   Net -1541.67 ml       Gen: Awake, disoriented, NAD  Neck: No JVD  Cardiac: RRR  Resp: clear BS  Abd: Soft, non tender, +BS, non distended   Ext: No edema   Neuro: moves 4 ext  Peripheral Pulses: weak peripheral pulses.  Skin: Skin color, texture, turgor normal, no suspicious rashes or lesions.    Labs:  Results for orders placed  or performed during the hospital encounter of 03/22/24 (from the past 24 hour(s))   Sterile Fluid Culture/Smear    Specimen: Pleural; Fluid   Result Value Ref Range    Gram Stain No polymorphonuclear leukocytes seen     Gram Stain No organisms seen    Fungal Culture/Smear    Specimen: Pleural; Fluid   Result Value Ref Range    Fungal Culture/Smear       Culture in progress, a report will be issued when positive or after 2 weeks of incubation.    Fungal Smear No fungal elements seen    Lactate Dehydrogenase, Fluid   Result Value Ref Range    LD, Fluid 49 Not established. U/L   Protein, Total Fluid   Result Value Ref Range    Protein, Total Fluid 1.3 Not established g/dL   Basic Metabolic Panel   Result Value Ref Range    Glucose 159 (H) 74 - 99 mg/dL    Sodium 151 (H) 136 - 145 mmol/L    Potassium 3.6 3.5 - 5.3 mmol/L    Chloride 108 (H) 98 - 107 mmol/L    Bicarbonate 38 (H) 21 - 32 mmol/L    Anion Gap 9 (L) 10 - 20 mmol/L    Urea Nitrogen 28 (H) 6 - 23 mg/dL    Creatinine 1.87 (H) 0.50 - 1.30 mg/dL    eGFR 35 (L) >60 mL/min/1.73m*2    Calcium 7.9 (L) 8.6 - 10.3 mg/dL   CBC   Result Value Ref Range    WBC 6.6 4.4 - 11.3 x10*3/uL    nRBC 0.0 0.0 - 0.0 /100 WBCs    RBC 4.41 (L) 4.50 - 5.90 x10*6/uL    Hemoglobin 11.5 (L) 13.5 - 17.5 g/dL    Hematocrit 40.0 (L) 41.0 - 52.0 %    MCV 91 80 - 100 fL    MCH 26.1 26.0 - 34.0 pg    MCHC 28.8 (L) 32.0 - 36.0 g/dL    RDW 17.3 (H) 11.5 - 14.5 %    Platelets 82 (L) 150 - 450 x10*3/uL   Magnesium   Result Value Ref Range    Magnesium 2.60 (H) 1.60 - 2.40 mg/dL        DATA:   Diagnostic tests reviewed for today's visit:    New labs and imaging     Assessment and Plan:  Pt admitted with pneumonia and acute on chronic CHF, persistent AF  - ROSE on CKD stage 3a: resolved ROSE Scr remains stable at baseline  Euvolemic s/p diuretic tx, currently on hold  BP: acceptable control  Hypernatremia: due to water deficit, remains uncorrected but a bit better, on D5W drip  HypoK, poor intake,  better    PLAN:  - D5W increasing to 100ml/h another day, replete K prn, diuretic on hold     Will continue to follow.     Signature: Sascha Perez MD

## 2024-04-03 NOTE — PROGRESS NOTES
New Castano is a 83 y.o. male on day 12 of admission presenting with Pneumonia of right lung due to infectious organism, unspecified part of lung.    Subjective   Patient seen at bedside, reports feeling significantly improved without current dyspnea, cough or sputum production. He has been weaned to 3L NC. ROS otherwise negative. Patient is much calmer and more oriented today.        Objective     Physical Exam  Constitutional:       General: He is not in acute distress.     Appearance: Normal appearance.   HENT:      Head: Normocephalic and atraumatic.      Nose: No rhinorrhea.      Mouth/Throat:      Mouth: Mucous membranes are moist.   Eyes:      Extraocular Movements: Extraocular movements intact.      Conjunctiva/sclera: Conjunctivae normal.      Pupils: Pupils are equal, round, and reactive to light.   Cardiovascular:      Rate and Rhythm: Normal rate. Rhythm irregular.      Heart sounds: No murmur heard.     No friction rub. No gallop.   Pulmonary:      Comments: Good air entry bilaterally, no wheezes, crackles or ronchi  Abdominal:      General: Bowel sounds are normal. There is no distension.      Palpations: Abdomen is soft.   Musculoskeletal:         General: Normal range of motion.      Cervical back: Normal range of motion.      Right lower leg: Edema present.      Left lower leg: Edema present.   Skin:     General: Skin is warm and dry.      Findings: No rash.   Neurological:      General: No focal deficit present.      Mental Status: He is alert.         Last Recorded Vitals  Blood pressure 126/58, pulse 88, temperature 37.1 °C (98.8 °F), temperature source Temporal, resp. rate 17, weight 99.8 kg (220 lb 0.3 oz), SpO2 100 %.  Intake/Output last 3 Shifts:  I/O last 3 completed shifts:  In: 2428.3 (24.3 mL/kg) [P.O.:960; I.V.:1268.3 (12.7 mL/kg); IV Piggyback:200]  Out: 3600 (36.1 mL/kg) [Urine:3600 (1 mL/kg/hr)]  Weight: 99.8 kg     Relevant Results       Results for orders placed or performed  during the hospital encounter of 03/22/24 (from the past 24 hour(s))   Basic Metabolic Panel   Result Value Ref Range    Glucose 159 (H) 74 - 99 mg/dL    Sodium 151 (H) 136 - 145 mmol/L    Potassium 3.6 3.5 - 5.3 mmol/L    Chloride 108 (H) 98 - 107 mmol/L    Bicarbonate 38 (H) 21 - 32 mmol/L    Anion Gap 9 (L) 10 - 20 mmol/L    Urea Nitrogen 28 (H) 6 - 23 mg/dL    Creatinine 1.87 (H) 0.50 - 1.30 mg/dL    eGFR 35 (L) >60 mL/min/1.73m*2    Calcium 7.9 (L) 8.6 - 10.3 mg/dL   CBC   Result Value Ref Range    WBC 6.6 4.4 - 11.3 x10*3/uL    nRBC 0.0 0.0 - 0.0 /100 WBCs    RBC 4.41 (L) 4.50 - 5.90 x10*6/uL    Hemoglobin 11.5 (L) 13.5 - 17.5 g/dL    Hematocrit 40.0 (L) 41.0 - 52.0 %    MCV 91 80 - 100 fL    MCH 26.1 26.0 - 34.0 pg    MCHC 28.8 (L) 32.0 - 36.0 g/dL    RDW 17.3 (H) 11.5 - 14.5 %    Platelets 82 (L) 150 - 450 x10*3/uL   Magnesium   Result Value Ref Range    Magnesium 2.60 (H) 1.60 - 2.40 mg/dL                      Assessment/Plan   Principal Problem:    Pneumonia of right lung due to infectious organism, unspecified part of lung    New [place]ano is a 83 y.o. male with past medical history of HTN, CHF, HLD, DMT2, A-fib (s/p cardioversion and ablation on Eliquis), CKD 3 who presented to [facility] ED on 3/22 for shortness of breath and generalized weakness for couple of days.  Family reports he was unable to get out of his chair.  Baseline is in room air but required 4 L per EMS.  CT on 3/28 showed cardiomegaly with bilateral infiltrates and pleural effusions (R> L) with soft tissue edema and free fluid consistent with fluid overload/CHF and unable to exclude superimposed pneumonia.  DVT and PE studies were negative.  He was admitted for acute exacerbation of CHF & pneumonia, treated with IV diuresis and Zosyn for HAP coverage. He required acute bipap and HFNC, however has been weaned to 3L.   PFT 10/2021: FEV1/FVC 0.69, FVC 3.03 (lower limit normal 3.3), DLCO 57% predicted; PFT is indicative of no obstruction  suggestive of restriction with moderately decreased diffusion capacity     Impressions:  # Acute hypoxic and hypercarbic respiratory failure: Patient hypoxic in the 80s per EMS placed on 4 L requiring max 5L during admission.  ABG on 3/30 with uncompensated respiratory acidosis with pCO2 of 87; likely reflective of acute systolic heart failure, restrictive lung disease with bilateral pleural effusion, cognitive decline with deconditioning; currently weaned to 3 L NC     #Pleural effusions, bilateral: due to volume overload.      #Acute on chronic HFpEF: Chest CT with cardiomegaly with bilateral infiltrates and bilateral pleural effusions with soft tissue edema and free fluid; BNP > 4700 (previous BNPs March, early> 4700, February 3956, December 48)     #OHS: BMI 31.28 with central obesity and abdominal fluid retention 2/2 HF exacerbation, pCO2 87 on arterial gas (baseline ~ 50s)     #Cognitive dysfunction, delirium: improved      Recommendations:  -Improved post right thoracentesis, may benefit from left thoracentesis  -Follow up fluid studies  -Continue supplemental O2, wean for saturation 92 to 95%  -Adequately treated for pneumonia (11 days of Zosyn), would recommend stopping antibiotics  -Continue DuoNebs   -BPH with Acapella   -Diuresis as hemodynamics and renal function allow   -Would recommend repeat echo given substantial increase in BNP and multiple hospitalizations failure since last echo 12/2023 (may not be necessary if family is considering transition to hospice)  -Agree with palliative care consult and hospice discussion  -Appreciate SLP recommendations, given patient's fluctuating cognitive status, would only attempt to feed patient if he is fully awake and able to participate and follow directions          I spent 35 minutes in the professional and overall care of this patient.      Noemí Carlos,

## 2024-04-03 NOTE — NURSING NOTE
Alysia Rider was admitted to [facility] room 130 from ED via cart accompanied by significant other.   Reason for hospitalization is abdominal pain.   Upon arrival, patient is stable, but very agitated. Patient has history significant for GERD, pancreatitis, asthmas.  Patient oriented to bed, call light, room and unit.  Patient provided with the following educational materials upon admission:safety and pain.   Level of understanding patient verbalized understanding.   Admission orders received at this time.   Called Dr. Strong to request pain medication. Orders received.    Upon arrival the patient is AO+1, follows some 1-2 step commands, vss, bedrest, tolerated Po intake w/ o s/s of aspiration, patient had periods of desatting when he removed his oxygen  so he required mitts.   The patiENT remained stable care transferred w/o incidence to oncoming RN.

## 2024-04-03 NOTE — PROGRESS NOTES
New Castano is a 83 y.o. male on day 12 of admission presenting with Pneumonia of right lung due to infectious organism, unspecified part of lung.      Subjective   No chest pain  Resting , confused, does have mittens  No ac issues per aide at bed side  R thoracentesis on 04/02/2024  He is more interactive today       Objective     Last Recorded Vitals  /88 (BP Location: Left arm, Patient Position: Lying)   Pulse 97   Temp 36.2 °C (97.2 °F) (Temporal)   Resp 18   Wt 99.8 kg (220 lb 0.3 oz)   SpO2 98%   Intake/Output last 3 Shifts:      Admission Weight  Weight: 104 kg (230 lb) (03/27/24 1300)    Daily Weight  04/02/24 : 99.8 kg (220 lb 0.3 oz)    Image Results  XR chest 2 views  Narrative: Interpreted By:  Juve Mahan,   STUDY:  XR CHEST 2 VIEWS;  4/2/2024 9:29 pm      INDICATION:  Signs/Symptoms:rt pleural effusion; s/p thoracemtesis.      COMPARISON:  04/01/2024      ACCESSION NUMBER(S):  [id number]      ORDERING CLINICIAN:  ALBA WHELAN      FINDINGS:          Redemonstration cardiomegaly. Atherosclerotic aorta.  Decreased size of right pleural effusion. However there is interval  enlargement of a left pleural effusion. There is worsening airspace  disease at the left base representing consolidation or atelectasis.      There is no pneumothorax.      Impression: Worsening left pleural effusion and left basilar airspace disease,  either atelectasis or consolidation, including relating to aspiration  in the appropriate clinical context.      Decreased size of right pleural effusion without evidence of  pneumothorax.      MACRO:  None.      Signed by: Juve Mahan 4/2/2024 10:04 PM  Dictation workstation:   [id number]  US thoracentesis  Narrative: Interpreted By:  Nata Eller,   STUDY:  US THORACENTESIS; 4/2/20244:10 pm      INDICATION:  Signs/Symptoms:increasing right pleural effusion      COMPARISON:  None.      ACCESSION NUMBER(S):  [id number]      ORDERING  CLINICIAN:  ALBA WHELAN      TECHNIQUE:  INTERVENTIONALIST:  Nata Eller      CONSENT:  The patient/patient's POA/next of kin was informed of the nature of  the proposed procedure. Risks were discussed including but not  limited to bleeding, infection, pain at insertion site, or  pneumo/hemothorax requiring chest tube placement. Purpose of  treatment and alternative options were also reviewed. All questions  were answered and patient agreed to proceed.      SEDATION:  None      MEDICATION/CONTRAST:  Lidocaine 1%.      TIME OUT:  A time out was performed immediately prior to procedure start with  the interventional team, correctly identifying the patient name, date  of birth, MRN, procedure, anatomy (including marking of site and  side), patient position, procedure consent form, relevant laboratory  and imaging test results, antibiotic administration, safety  precautions, and procedure-specific equipment needs.      FINDINGS:  The patient was placed in the left lateral decubitus position.      The patient's right pleural space was scanned using the ultrasound  probe. The area of fluid most amenable to drainage was marked. Color  doppler was used to assess for vasculature in the intended field.      The patient's skin was sterilized using chlorhexidine and draped in  sterile manner. The skin was anesthestized with 1% lidocaine.  Under  real time ultrasound guidance, a 5F valved centesis catheter was  inserted into the right pleural space where previously marked. A  total of 750 mL of clear yellow pleural fluid was removed. The  catheter was then removed and a sterile op site was placed over the  insertion site. Sterile technique used throughout entirety of  procedure.      Specimens were obtained, labeled and sent to lab with requisitions  ordered by primary team.      The patient tolerated the procedure well and there were no immediate  complications.      Impression: Uneventful thoracentesis, as detailed  above: Right Pleural space, 750  mL      Performed and dictated at [facility].      Signed by: Nata Eller 4/2/2024 4:11 PM  Dictation workstation:   [id number]      Physical Exam    Constitutional:       Appearance: Normal appearance. awake, no distress, lethargic, confusion less  HENT:      Head: Normocephalic and atraumatic.   Cardiovascular:      Rate and Rhythm: Normal rate and regular rhythm.   Pulmonary:      Effort: Pulmonary effort is normal.   Abdominal:      General: Abdomen obese, bs presetn   Musculoskeletal:           General: Skin is warm and dry.   Neurological:   Awake confused     Edema + on thighs and abdomen        No current facility-administered medications on file prior to encounter.     Current Outpatient Medications on File Prior to Encounter   Medication Sig Dispense Refill    apixaban (Eliquis) 2.5 mg tablet Take 1 tablet (2.5 mg) by mouth 2 times a day.      atorvastatin (Lipitor) 20 mg tablet Take 1 tablet (20 mg) by mouth once daily at bedtime.      doxazosin (Cardura) 4 mg tablet Take 1 tablet (4 mg) by mouth once daily at bedtime.      empagliflozin (Jardiance) 25 mg Take 1 tablet (25 mg) by mouth once daily.      finasteride (Proscar) 5 mg tablet Take 1 tablet (5 mg) by mouth once daily.      furosemide (Lasix) 40 mg tablet Take 1 tablet (40 mg) by mouth 2 times a day. 60 tablet 0    glimepiride (Amaryl) 2 mg tablet Take 1 tablet (2 mg) by mouth once daily in the morning. Take before meals.      losartan (Cozaar) 50 mg tablet Take 1 tablet (50 mg) by mouth once daily. 30 tablet 0    metFORMIN, OSM, (Fortamet) 1,000 mg 24 hr tablet Take 1 tablet (1,000 mg) by mouth 2 times a day with meals. Do not crush, chew, or split.      metoprolol succinate XL (Toprol-XL) 50 mg 24 hr tablet Take 3 tablets (150 mg) by mouth 2 times a day. 180 tablet 0    omeprazole (PriLOSEC) 20 mg DR capsule Take 1 capsule (20 mg) by mouth once daily in the morning. Take  before meals. Do not crush or chew.         Results for orders placed or performed during the hospital encounter of 03/22/24 (from the past 24 hour(s))   Sterile Fluid Culture/Smear    Specimen: Pleural; Fluid   Result Value Ref Range    Gram Stain No polymorphonuclear leukocytes seen     Gram Stain No organisms seen    Lactate Dehydrogenase, Fluid   Result Value Ref Range    LD, Fluid 49 Not established. U/L   Protein, Total Fluid   Result Value Ref Range    Protein, Total Fluid 1.3 Not established g/dL   Basic Metabolic Panel   Result Value Ref Range    Glucose 159 (H) 74 - 99 mg/dL    Sodium 151 (H) 136 - 145 mmol/L    Potassium 3.6 3.5 - 5.3 mmol/L    Chloride 108 (H) 98 - 107 mmol/L    Bicarbonate 38 (H) 21 - 32 mmol/L    Anion Gap 9 (L) 10 - 20 mmol/L    Urea Nitrogen 28 (H) 6 - 23 mg/dL    Creatinine 1.87 (H) 0.50 - 1.30 mg/dL    eGFR 35 (L) >60 mL/min/1.73m*2    Calcium 7.9 (L) 8.6 - 10.3 mg/dL   CBC   Result Value Ref Range    WBC 6.6 4.4 - 11.3 x10*3/uL    nRBC 0.0 0.0 - 0.0 /100 WBCs    RBC 4.41 (L) 4.50 - 5.90 x10*6/uL    Hemoglobin 11.5 (L) 13.5 - 17.5 g/dL    Hematocrit 40.0 (L) 41.0 - 52.0 %    MCV 91 80 - 100 fL    MCH 26.1 26.0 - 34.0 pg    MCHC 28.8 (L) 32.0 - 36.0 g/dL    RDW 17.3 (H) 11.5 - 14.5 %    Platelets 82 (L) 150 - 450 x10*3/uL   Magnesium   Result Value Ref Range    Magnesium 2.60 (H) 1.60 - 2.40 mg/dL       Vitals:    04/02/24 0443   Weight: 99.8 kg (220 lb 0.3 oz)         Intake/Output Summary (Last 24 hours) at 4/3/2024 1004  Last data filed at 4/3/2024 0518  Gross per 24 hour   Intake 1708.33 ml   Output 3250 ml   Net -1541.67 ml           Assessment/Plan      Acute respiratory failure with hypoxia d/t pneumonia-IV as per orders, better  Weakness-PT/OT following, will need SNF.   HTN- better with less agitation  Agitation- ativan as needed  Acute on chronic diastolic heart failure  A fib on eliquis, rate controlled  EF 50 in 12/23  Check bladder scan  Resume losartan    Hypernatremia  Dandre / CKD 3 w hypernatremia   Metabolic encephalopathy,   Pleural effusion camila        Input from specialities noted  Cont with Diuresis per renal , monitor labs and electrolytes  Did hve thoracentesis on the rt on 04/02/2024 will dw pulm for thora on L  Lasix on hold   Getting dextrose for hypernatremia  Hypernatremia gradually improving     -Continue current treatment as ordered. Will make adjustments as necessary.    Plan of care discussed with: ZANDER Gunderson MD

## 2024-04-04 ENCOUNTER — APPOINTMENT (OUTPATIENT)
Dept: RADIOLOGY | Facility: HOSPITAL | Age: 84
DRG: 193 | End: 2024-04-04
Payer: MEDICARE

## 2024-04-04 LAB
ANION GAP SERPL CALC-SCNC: 11 MMOL/L (ref 10–20)
BUN SERPL-MCNC: 23 MG/DL (ref 6–23)
CALCIUM SERPL-MCNC: 7.5 MG/DL (ref 8.6–10.3)
CHLORIDE SERPL-SCNC: 102 MMOL/L (ref 98–107)
CO2 SERPL-SCNC: 34 MMOL/L (ref 21–32)
CREAT SERPL-MCNC: 1.56 MG/DL (ref 0.5–1.3)
EGFRCR SERPLBLD CKD-EPI 2021: 44 ML/MIN/1.73M*2
ERYTHROCYTE [DISTWIDTH] IN BLOOD BY AUTOMATED COUNT: 17.1 % (ref 11.5–14.5)
GLUCOSE SERPL-MCNC: 138 MG/DL (ref 74–99)
HCT VFR BLD AUTO: 43.4 % (ref 41–52)
HGB BLD-MCNC: 12.4 G/DL (ref 13.5–17.5)
IMMUNOFIXATION COMMENT: NORMAL
MCH RBC QN AUTO: 26.1 PG (ref 26–34)
MCHC RBC AUTO-ENTMCNC: 28.6 G/DL (ref 32–36)
MCV RBC AUTO: 91 FL (ref 80–100)
NRBC BLD-RTO: 0 /100 WBCS (ref 0–0)
PATH REVIEW - URINE IMMUNOFIXATION: NORMAL
PLATELET # BLD AUTO: 70 X10*3/UL (ref 150–450)
POTASSIUM SERPL-SCNC: 3.8 MMOL/L (ref 3.5–5.3)
RBC # BLD AUTO: 4.76 X10*6/UL (ref 4.5–5.9)
SODIUM SERPL-SCNC: 143 MMOL/L (ref 136–145)
WBC # BLD AUTO: 5.8 X10*3/UL (ref 4.4–11.3)

## 2024-04-04 PROCEDURE — G0316 PR PROLONGED HOSPITAL INPATIENT OR OBSERVATION CARE EVALUATION AND MANAGEMENT SERVICE(S) BEYOND THE TOTAL TIME FOR THE PRIMARY SERVICE (WHEN THE PRIMARY SERVICE HAS BEEN SELECTED USING TIME: HCPCS | Performed by: NURSE PRACTITIONER

## 2024-04-04 PROCEDURE — 0W9B3ZZ DRAINAGE OF LEFT PLEURAL CAVITY, PERCUTANEOUS APPROACH: ICD-10-PCS | Performed by: FAMILY MEDICINE

## 2024-04-04 PROCEDURE — 99232 SBSQ HOSP IP/OBS MODERATE 35: CPT | Performed by: STUDENT IN AN ORGANIZED HEALTH CARE EDUCATION/TRAINING PROGRAM

## 2024-04-04 PROCEDURE — 36415 COLL VENOUS BLD VENIPUNCTURE: CPT | Performed by: INTERNAL MEDICINE

## 2024-04-04 PROCEDURE — 2500000001 HC RX 250 WO HCPCS SELF ADMINISTERED DRUGS (ALT 637 FOR MEDICARE OP): Performed by: INTERNAL MEDICINE

## 2024-04-04 PROCEDURE — 2500000004 HC RX 250 GENERAL PHARMACY W/ HCPCS (ALT 636 FOR OP/ED): Performed by: INTERNAL MEDICINE

## 2024-04-04 PROCEDURE — 94640 AIRWAY INHALATION TREATMENT: CPT | Mod: MUE

## 2024-04-04 PROCEDURE — 1200000002 HC GENERAL ROOM WITH TELEMETRY DAILY

## 2024-04-04 PROCEDURE — 32555 ASPIRATE PLEURA W/ IMAGING: CPT | Mod: ZK

## 2024-04-04 PROCEDURE — 2500000002 HC RX 250 W HCPCS SELF ADMINISTERED DRUGS (ALT 637 FOR MEDICARE OP, ALT 636 FOR OP/ED): Performed by: INTERNAL MEDICINE

## 2024-04-04 PROCEDURE — 71045 X-RAY EXAM CHEST 1 VIEW: CPT | Performed by: RADIOLOGY

## 2024-04-04 PROCEDURE — 97530 THERAPEUTIC ACTIVITIES: CPT | Mod: GP,CQ

## 2024-04-04 PROCEDURE — 92526 ORAL FUNCTION THERAPY: CPT | Mod: GN

## 2024-04-04 PROCEDURE — 85027 COMPLETE CBC AUTOMATED: CPT | Performed by: FAMILY MEDICINE

## 2024-04-04 PROCEDURE — 2500000005 HC RX 250 GENERAL PHARMACY W/O HCPCS

## 2024-04-04 PROCEDURE — 32555 ASPIRATE PLEURA W/ IMAGING: CPT

## 2024-04-04 PROCEDURE — 71045 X-RAY EXAM CHEST 1 VIEW: CPT

## 2024-04-04 PROCEDURE — G0316 PR PROLONGED INPATIENT/OBSERVATION EM SVC EA 15 MIN: HCPCS | Performed by: NURSE PRACTITIONER

## 2024-04-04 PROCEDURE — 80048 BASIC METABOLIC PNL TOTAL CA: CPT | Performed by: INTERNAL MEDICINE

## 2024-04-04 PROCEDURE — 99233 SBSQ HOSP IP/OBS HIGH 50: CPT | Performed by: INTERNAL MEDICINE

## 2024-04-04 PROCEDURE — 97129 THER IVNTJ 1ST 15 MIN: CPT | Mod: GO

## 2024-04-04 PROCEDURE — 99233 SBSQ HOSP IP/OBS HIGH 50: CPT | Performed by: NURSE PRACTITIONER

## 2024-04-04 RX ORDER — DEXTROSE MONOHYDRATE AND SODIUM CHLORIDE 5; .45 G/100ML; G/100ML
50 INJECTION, SOLUTION INTRAVENOUS CONTINUOUS
Status: DISCONTINUED | OUTPATIENT
Start: 2024-04-04 | End: 2024-04-06

## 2024-04-04 RX ORDER — LIDOCAINE HYDROCHLORIDE 10 MG/ML
INJECTION, SOLUTION EPIDURAL; INFILTRATION; INTRACAUDAL; PERINEURAL
Status: COMPLETED | OUTPATIENT
Start: 2024-04-04 | End: 2024-04-04

## 2024-04-04 RX ORDER — HYDROXYZINE PAMOATE 25 MG/1
50 CAPSULE ORAL EVERY 6 HOURS PRN
Status: DISCONTINUED | OUTPATIENT
Start: 2024-04-04 | End: 2024-04-09 | Stop reason: HOSPADM

## 2024-04-04 RX ADMIN — APIXABAN 2.5 MG: 2.5 TABLET, FILM COATED ORAL at 08:36

## 2024-04-04 RX ADMIN — IPRATROPIUM BROMIDE AND ALBUTEROL SULFATE 3 ML: 2.5; .5 SOLUTION RESPIRATORY (INHALATION) at 19:54

## 2024-04-04 RX ADMIN — APIXABAN 2.5 MG: 2.5 TABLET, FILM COATED ORAL at 21:42

## 2024-04-04 RX ADMIN — POTASSIUM CHLORIDE 20 MEQ: 1.5 POWDER, FOR SOLUTION ORAL at 08:36

## 2024-04-04 RX ADMIN — EMPAGLIFLOZIN 25 MG: 10 TABLET, FILM COATED ORAL at 08:36

## 2024-04-04 RX ADMIN — POTASSIUM CHLORIDE 20 MEQ: 1.5 POWDER, FOR SOLUTION ORAL at 21:42

## 2024-04-04 RX ADMIN — HYDRALAZINE HYDROCHLORIDE 50 MG: 50 TABLET ORAL at 08:36

## 2024-04-04 RX ADMIN — CYANOCOBALAMIN TAB 500 MCG 500 MCG: 500 TAB at 08:36

## 2024-04-04 RX ADMIN — METOPROLOL SUCCINATE 150 MG: 50 TABLET, EXTENDED RELEASE ORAL at 08:36

## 2024-04-04 RX ADMIN — DEXTROSE AND SODIUM CHLORIDE 70 ML/HR: 5; 450 INJECTION, SOLUTION INTRAVENOUS at 15:19

## 2024-04-04 RX ADMIN — FINASTERIDE 5 MG: 5 TABLET, FILM COATED ORAL at 08:36

## 2024-04-04 RX ADMIN — DOXAZOSIN 8 MG: 2 TABLET ORAL at 21:43

## 2024-04-04 RX ADMIN — METOPROLOL SUCCINATE 150 MG: 50 TABLET, EXTENDED RELEASE ORAL at 21:43

## 2024-04-04 RX ADMIN — LOSARTAN POTASSIUM 50 MG: 50 TABLET, FILM COATED ORAL at 08:36

## 2024-04-04 RX ADMIN — DOCUSATE SODIUM 100 MG: 100 CAPSULE, LIQUID FILLED ORAL at 21:43

## 2024-04-04 RX ADMIN — POLYETHYLENE GLYCOL 3350 17 G: 17 POWDER, FOR SOLUTION ORAL at 08:36

## 2024-04-04 RX ADMIN — GLIMEPIRIDE 2 MG: 2 TABLET ORAL at 07:00

## 2024-04-04 RX ADMIN — PIPERACILLIN SODIUM AND TAZOBACTAM SODIUM 3.38 G: 3; .375 INJECTION, SOLUTION INTRAVENOUS at 05:53

## 2024-04-04 RX ADMIN — IPRATROPIUM BROMIDE AND ALBUTEROL SULFATE 3 ML: 2.5; .5 SOLUTION RESPIRATORY (INHALATION) at 07:42

## 2024-04-04 RX ADMIN — LIDOCAINE HYDROCHLORIDE 10 ML: 10 INJECTION, SOLUTION EPIDURAL; INFILTRATION; INTRACAUDAL; PERINEURAL at 12:33

## 2024-04-04 RX ADMIN — ATORVASTATIN CALCIUM 20 MG: 20 TABLET, FILM COATED ORAL at 21:42

## 2024-04-04 RX ADMIN — HYDRALAZINE HYDROCHLORIDE 50 MG: 50 TABLET ORAL at 21:42

## 2024-04-04 RX ADMIN — DEXTROSE MONOHYDRATE 100 ML/HR: 50 INJECTION, SOLUTION INTRAVENOUS at 08:42

## 2024-04-04 RX ADMIN — IPRATROPIUM BROMIDE AND ALBUTEROL SULFATE 3 ML: 2.5; .5 SOLUTION RESPIRATORY (INHALATION) at 14:15

## 2024-04-04 RX ADMIN — POTASSIUM CHLORIDE 20 MEQ: 1.5 POWDER, FOR SOLUTION ORAL at 14:22

## 2024-04-04 RX ADMIN — DOCUSATE SODIUM 100 MG: 100 CAPSULE, LIQUID FILLED ORAL at 08:36

## 2024-04-04 RX ADMIN — PANTOPRAZOLE SODIUM 40 MG: 40 TABLET, DELAYED RELEASE ORAL at 07:03

## 2024-04-04 ASSESSMENT — COGNITIVE AND FUNCTIONAL STATUS - GENERAL
EATING MEALS: TOTAL
TURNING FROM BACK TO SIDE WHILE IN FLAT BAD: A LOT
MOVING TO AND FROM BED TO CHAIR: A LOT
STANDING UP FROM CHAIR USING ARMS: A LOT
MOVING TO AND FROM BED TO CHAIR: TOTAL
HELP NEEDED FOR BATHING: TOTAL
DRESSING REGULAR UPPER BODY CLOTHING: TOTAL
CLIMB 3 TO 5 STEPS WITH RAILING: TOTAL
HELP NEEDED FOR BATHING: TOTAL
MOVING FROM LYING ON BACK TO SITTING ON SIDE OF FLAT BED WITH BEDRAILS: TOTAL
MOBILITY SCORE: 10
CLIMB 3 TO 5 STEPS WITH RAILING: TOTAL
DRESSING REGULAR LOWER BODY CLOTHING: TOTAL
MOBILITY SCORE: 6
STANDING UP FROM CHAIR USING ARMS: A LOT
DRESSING REGULAR UPPER BODY CLOTHING: TOTAL
PERSONAL GROOMING: TOTAL
WALKING IN HOSPITAL ROOM: TOTAL
DAILY ACTIVITIY SCORE: 6
STANDING UP FROM CHAIR USING ARMS: TOTAL
TOILETING: TOTAL
WALKING IN HOSPITAL ROOM: TOTAL
MOVING TO AND FROM BED TO CHAIR: A LOT
HELP NEEDED FOR BATHING: TOTAL
TURNING FROM BACK TO SIDE WHILE IN FLAT BAD: A LOT
TOILETING: TOTAL
DAILY ACTIVITIY SCORE: 6
PERSONAL GROOMING: TOTAL
MOBILITY SCORE: 10
DRESSING REGULAR UPPER BODY CLOTHING: TOTAL
DRESSING REGULAR LOWER BODY CLOTHING: TOTAL
CLIMB 3 TO 5 STEPS WITH RAILING: TOTAL
DAILY ACTIVITIY SCORE: 6
WALKING IN HOSPITAL ROOM: TOTAL
MOVING FROM LYING ON BACK TO SITTING ON SIDE OF FLAT BED WITH BEDRAILS: A LOT
MOVING FROM LYING ON BACK TO SITTING ON SIDE OF FLAT BED WITH BEDRAILS: A LOT
DRESSING REGULAR LOWER BODY CLOTHING: TOTAL
PERSONAL GROOMING: TOTAL
EATING MEALS: TOTAL
TOILETING: TOTAL
EATING MEALS: TOTAL
TURNING FROM BACK TO SIDE WHILE IN FLAT BAD: TOTAL

## 2024-04-04 ASSESSMENT — PAIN SCALES - PAIN ASSESSMENT IN ADVANCED DEMENTIA (PAINAD)
TOTALSCORE: 0
CONSOLABILITY: NO NEED TO CONSOLE
BODYLANGUAGE: RELAXED
BREATHING: NORMAL
FACIALEXPRESSION: SMILING OR INEXPRESSIVE

## 2024-04-04 ASSESSMENT — PAIN - FUNCTIONAL ASSESSMENT
PAIN_FUNCTIONAL_ASSESSMENT: 0-10
PAIN_FUNCTIONAL_ASSESSMENT: 0-10

## 2024-04-04 ASSESSMENT — PAIN SCALES - GENERAL
PAINLEVEL_OUTOF10: 0 - NO PAIN

## 2024-04-04 ASSESSMENT — PAIN SCALES - WONG BAKER: WONGBAKER_NUMERICALRESPONSE: NO HURT

## 2024-04-04 NOTE — PROGRESS NOTES
New Castano is a 83 y.o. male on day 13 of admission presenting with Pneumonia of right lung due to infectious organism, unspecified part of lung.    Subjective   Symptoms (0 - 10, Best to Worst)  [place] Symptom Assessment System  Pain Score: 1  Patient resting in bed again in mittens which have been able to get removed last night but had to be replaced this morning as patient had ripped out his IV and has been again noncompliant with oxygen.  Patient confirms he feels restless does not really recall what happened overnight why or how long he has been in the hospital.  Is able to tell me that he is having breathing issues but does not feel short of breath.  Does not like anything in his face.  Does not like anything on him.  However review of system apart from restlessness essentially negative.  Was not aware he was having a hospice meeting [facility].  Reached out to daughter Nena and spoke with her on phone also spoke with interventional radiology about renewed thoracentesis today on the left side planned for this morning.         Objective     Physical Exam  Constitutional:       Appearance: He is obese. He is ill-appearing.   HENT:      Head: Normocephalic.      Nose: Congestion present. No rhinorrhea.      Mouth/Throat:      Mouth: Mucous membranes are moist.      Pharynx: Oropharynx is clear. No oropharyngeal exudate or posterior oropharyngeal erythema.   Eyes:      General: No scleral icterus.        Right eye: No discharge.         Left eye: No discharge.      Extraocular Movements: Extraocular movements intact.      Conjunctiva/sclera: Conjunctivae normal.      Pupils: Pupils are equal, round, and reactive to light.   Neck:      Vascular: No carotid bruit.   Cardiovascular:      Rate and Rhythm: Normal rate. Rhythm irregular.      Heart sounds: No murmur heard.     No friction rub. No gallop.   Pulmonary:      Effort: No respiratory distress.      Breath sounds: Severely diminished bases,no wheezing or  rhonchi. 3l nc  Chest:      Chest wall: No tenderness.   Abdominal:      General: Bowel sounds are normal. There is no distension.      Palpations: Abdomen is soft. There is no mass.      Tenderness: There is no abdominal tenderness. There is no right CVA tenderness, left CVA tenderness, guarding or rebound.      Hernia: No hernia is present.   Genitourinary:     Comments: Pure wick, concentrated urine  Musculoskeletal:         General: Tenderness (Mild tenderness bilateral lower extremity) present. No deformity.      Cervical back: Normal range of motion and neck supple. No rigidity or tenderness.      Right lower leg: Edema present.      Left lower leg: Edema present.   Lymphadenopathy:      Cervical: No cervical adenopathy.   Skin:     Capillary Refill: Capillary refill takes 2 to 3 seconds.      Coloration: Skin is pale. Skin is not jaundiced.      Findings: Bruising present. Skin tear RLE  Neurological:      Motor: Weakness present.      Comments: less confused, but again in mittens    Last Recorded Vitals  Blood pressure (!) 138/96, pulse 90, temperature 36.5 °C (97.7 °F), temperature source Temporal, resp. rate 20, weight 99.8 kg (220 lb 0.3 oz), SpO2 100 %.  Intake/Output last 3 Shifts:  I/O last 3 completed shifts:  In: 2578.3 (25.8 mL/kg) [P.O.:960; I.V.:1268.3 (12.7 mL/kg); IV Piggyback:350]  Out: 1700 (17 mL/kg) [Urine:1700 (0.5 mL/kg/hr)]  Weight: 99.8 kg     Relevant Results  No results found for this or any previous visit (from the past 24 hour(s)).  Scheduled medications  apixaban, 2.5 mg, oral, BID  atorvastatin, 20 mg, oral, Nightly  cyanocobalamin, 500 mcg, oral, Daily  docusate sodium, 100 mg, oral, BID  doxazosin, 8 mg, oral, Nightly  empagliflozin, 25 mg, oral, Daily  finasteride, 5 mg, oral, Daily  [Held by provider] furosemide, 60 mg, intravenous, BID  glimepiride, 2 mg, oral, Daily before breakfast  hydrALAZINE, 50 mg, oral, BID  ipratropium-albuteroL, 3 mL, nebulization, TID  losartan, 50  mg, oral, Daily  metoprolol succinate XL, 150 mg, oral, BID  pantoprazole, 40 mg, oral, Daily before breakfast  polyethylene glycol, 17 g, oral, Daily  potassium chloride, 20 mEq, oral, TID      Continuous medications  dextrose 5 % in water (D5W), 100 mL/hr, Last Rate: 100 mL/hr (04/04/24 0842)      PRN medications  PRN medications: acetaminophen **OR** acetaminophen **OR** acetaminophen, hydrOXYzine pamoate, ipratropium-albuteroL, loperamide, oxygen, oxygen        Assessment/Plan     Acute hypoxic and hypercarbic respiratory failure  2. Acute on chronic diastolic heart failure-Beta-blocker Jardiance, ARB  3. Pleural effusion - 4/2 Thoracentesis R 750ml, now worse on left  4. Pneumonia, possible-On Zosyn  5. A-fib flutter-On Eliquis, beta-blocker  6. ROSE- no labs on file today, will order BMP  7. Altered mental status/delirium -Multifactorial, acute respiratory failure hypoxia, hypercarbia, hypernatremia, potential infectious etiology PNA, metabolic, CHF, hospital induced delirium Etc.  -Never really fully recovered from last admit per the daughter, ie did not bounce all the way back typically at home he is not confused per the daughter Nena. For now remains in [facility]  8. Palliative care     DNR CCA DNI  Not capable  Stated healthcare power of  is daughter Nena as wife reportedly has dementia  Currently disease-modifying mode continue all treatments.  We all agree thoracentesis is beneficial and will proceed with thoracentesis this morning.  Discussed with Nena that patient is not able to get discharged if he continues to take off his oxygen and ripped out his IVs.  Cannot be discharged while in restraints.  Explained that if he is preferring comfort of not having oxygen on and comfort of not having an IV in that this would be in line with comfort care approach and hospice.  Unfortunately Nena was under time constraints and we were not able to have finalized our conversation about this.  She states she  will continue having these discussions with hospice tonight at that point we will also be able to see how the patient has improved with a thoracentesis.  Also will awair results of BMP I am ordering to follow up on hypernatremia.        I spent 80 minutes in the professional and overall care of this patient.      Zaira Mera, VANCE-CNP

## 2024-04-04 NOTE — PROGRESS NOTES
Occupational Therapy    Occupational Therapy Treatment    Name: New Castano  MRN: [id number]  : 1940  Date: 24  Time Calculation  Start Time: 1455  Stop Time: 1416  Time Calculation (min): 1401 min    Assessment:  OT Assessment: Pt seen for OT tx. Pt cont with decreased cognition, assist with mobility and adls. Pt with soft mitts at start of sessin removed for pt participation,. Pt adhemetly refuseing reapplication of mitts, Nurse present and stated she will apply mitts  Prognosis: Fair  Barriers to Discharge: None  Evaluation/Treatment Tolerance: Patient limited by fatigue  Medical Staff Made Aware: Yes  End of Session Communication: Bedside nurse  End of Session Patient Position: Bed, 3 rail up, Alarm on  Plan:  Treatment Interventions: ADL retraining, UE strengthening/ROM, Cognitive reorientation, Endurance training, Functional transfer training  OT Frequency: 3 times per week  OT Discharge Recommendations: Moderate intensity level of continued care  OT Recommended Transfer Status: Assist of 1, Minimal assist  OT - OK to Discharge: Yes    Subjective   Previous Visit Info:  OT Last Visit  OT Received On: 24  General:  General  Reason for Referral: Pneumonia; hypoxia  Family/Caregiver Present: Yes  Caregiver Feedback: family in at end of session  Co-Treatment: PT  Co-Treatment Reason: maxamize pt safety mobilization and participation  Prior to Session Communication: Bedside nurse  Patient Position Received: Bed, 3 rail up, Alarm on  Preferred Learning Style: auditory, visual  General Comment: pt continues with increasded confusion  Precautions:  Medical Precautions: Fall precautions, Oxygen therapy device and L/min  Precautions Comment: IV Tele  Vitals:     Pain Assessment:  Pain Assessment  Pain Assessment: 0-10  Pain Score: 0 - No pain     Objective      Bed Mobility/Transfers: Bed Mobility  Bed Mobility: Yes  Bed Mobility 1  Bed Mobility 1: Supine to sitting, Sitting to supine  Level of  Assistance 1: Minimum assistance, Moderate assistance, Moderate verbal cues, Moderate tactile cues (x2)  Bed Mobility Comments 1: HOB elevate  Bed Mobility 2  Bed Mobility  2: Scooting  Level of Assistance 2: Maximum assistance, Maximum verbal cues, Maximum tactile cues  Bed Mobility Comments 2: max x2 to sccot pt to HOB    Sitting Balance:  Static Sitting Balance  Static Sitting-Balance Support: Feet supported  Static Sitting-Level of Assistance: Close supervision  Therapy/Activity: Therapeutic Exercise  Therapeutic Exercise Performed: Yes  Therapeutic Exercise Activity 1: pt started completeing UE exercises and then stopped and stateds this is worthless and declined doing more     Cognitive Skill Development:  Cognitive Skill Development  Cognitive Skill Development Activity 1: pt oriented to person was not able to give last name or birthdate.  Cognitive Skill Development Activity 2: Decreased comprehension with simple and multi step commands. needs increeased time,  cuing and repetition       RUE   RUE : Within Functional Limits and LUE   LUE: Within Functional Limits  Outcome Measures:  [facility] Daily Activity  Putting on and taking off regular lower body clothing: Total  Bathing (including washing, rinsing, drying): Total  Putting on and taking off regular upper body clothing: Total  Toileting, which includes using toilet, bedpan or urinal: Total  Taking care of personal grooming such as brushing teeth: Total  Eating Meals: Total  Daily Activity - Total Score: 6        Education Documentation  Precautions, taught by Lisa Jansen OT at 4/4/2024  5:04 PM.  Learner: Patient  Readiness: Acceptance  Method: Explanation, Demonstration  Response: Verbalizes Understanding, Needs Reinforcement    ADL Training, taught by Lisa Jansen OT at 4/4/2024  5:04 PM.  Learner: Patient  Readiness: Acceptance  Method: Explanation, Demonstration  Response: Verbalizes Understanding, Needs Reinforcement    Education  Comments  No comments found.      Goals:  Encounter Problems       Encounter Problems (Active)       Bathing       STG - Patient will bathe body Setup/Sup (Progressing)       Start:  03/23/24    Expected End:  04/06/24               Dressings Lower Extremities       STG - Patient to complete lower body dressing Setup/Sup (Progressing)       Start:  03/23/24    Expected End:  04/06/24               Grooming       STG - Patient completes grooming Mod I (Progressing)       Start:  03/23/24    Expected End:  04/06/24               OT Transfers       STG - Patient will perform toilet transfer SBA (Progressing)       Start:  03/23/24    Expected End:  04/06/24               Toileting       STG - Patient will complete toileting tasks with Mod I (Progressing)       Start:  03/23/24    Expected End:  04/06/24                  none

## 2024-04-04 NOTE — NURSING NOTE
Patient bed alarm was going off, This nurse entered the room and patient was trying to get out of bed. Nurse seen patient had pulled out IV and was bleeding. This nurse did find the source of the bleeding and stopped it. Patient was cleaned up and placed back in mitting restraints.

## 2024-04-04 NOTE — PROGRESS NOTES
Speech-Language Pathology    Speech-Language Pathology Clinical Swallow Treatment    Patient Name: New Castano  MRN: [id number]  : 1940  Today's Date: 24  Start time: Start Time: 1335  Stop time: Stop Time: 1355  Time calculation (min) : Time Calculation (min): 20 min    ASSESSMENT  SLP TX Intervention Outcome: Making Progress Towards Goals  Treatment Tolerance: Patient tolerated treatment well    Impressions: Good participation in therapy this date    PLAN  Recommended solid consistency: Regular (IDDSI level 7)  Recommended liquid consistency: Thin (IDDSI 0)  Recommended medication administration: Whole  Safe swallow strategies: Upright positioning for all PO intake, Slow rate of intake, Small bites, and Small sips  Discharge recommendation: Recommend LOW intensity ST upon DC in order to ensure safety with least restrictive diet.  Inpatient/Swing Bed or Outpatient: Inpatient  SLP TX Plan: Continue Plan of Care  SLP Plan: Skilled SLP  SLP Frequency: 2x per week  Duration: 2 weeks  Next Treatment Priority: *  Discussed POC: Patient, Caregiver/family  Patient/Caregiver Agreeable: Yes  Next Treatment Priority: *    SUBJECTIVE  Prior to Session Communication: Bedside nurse  RN cleared pt to participate in session and reported patient returned from ultrasound  Respiratory status: Supplemental oxygen via NC  Positioning: Upright in bed  Pt was alert, pleasant, cooperative, and pleasantly confused for session.    Pain Assessment  Pain Assessment: 0-10  Pain Score: 0 - No pain  Joshi-Baker FACES Pain Rating: No hurt  Pain Interventions: Medication (See MAR)  Response to Interventions: relief  PAINAD Score: 4    Orientation: Oriented to self and Oriented to hospital but not name of facility  Ability to follow functional commands: Follows simple commands inconsistently    OBJECTIVE  Therapeutic Swallow  Therapeutic Swallow Intervention : PO Trials, Caregiver Education  Solid Diet Recommendations: Regular (IDDSI  Level 7)  Liquid Diet Recommendations: Thin (IDDSI Level 0)  Swallow Comments: no overt signs of penetration or aspiration    Treatment/Education:  Results and recommendations were relayed to: Patient and Bedside nurse  Education provided: Yes   Learner: Patient   Barriers to learning: Cognitive limitations barrier   Method of teaching: Verbal and Demonstration   Topic: Role of ST, results of assessment, risk for aspiration, recommended diet modifications, recommendation for MBSS, recommended safe swallow strategies, and recommendation for dysphagia follow-up   Outcome of teaching: Pt verbalized understanding, Education will be reinforced during follow-up visits, as appropriate, and Needs reinforcement    Goals:  Pt. to tolerate least restrictive diet without pulmonary compromise, Pt. to use safe swallow strategies independently in all observed trials  Progressing

## 2024-04-04 NOTE — PROGRESS NOTES
Nephrology Consult Progress Note    Admit Date: 3/22/2024    Interval history:  Pt is confuse, not eating    CURRENT MEDICATIONS:    Current Facility-Administered Medications:     acetaminophen (Tylenol) tablet 650 mg, 650 mg, oral, q4h PRN, 650 mg at 03/31/24 1800 **OR** acetaminophen (Tylenol) oral liquid 650 mg, 650 mg, oral, q4h PRN, 650 mg at 04/02/24 2310 **OR** acetaminophen (Tylenol) suppository 650 mg, 650 mg, rectal, q4h PRN, Juve Brown MD    apixaban (Eliquis) tablet 2.5 mg, 2.5 mg, oral, BID, Juve Brown MD, 2.5 mg at 04/04/24 0836    atorvastatin (Lipitor) tablet 20 mg, 20 mg, oral, Nightly, Juve Brown MD, 20 mg at 04/03/24 2148    cyanocobalamin (Vitamin B-12) tablet 500 mcg, 500 mcg, oral, Daily, Juve Brown MD, 500 mcg at 04/04/24 0836    dextrose 5 % in water (D5W) infusion, 100 mL/hr, intravenous, Continuous, Sascha Perez MD, Last Rate: 100 mL/hr at 04/04/24 0842, 100 mL/hr at 04/04/24 0842    docusate sodium (Colace) capsule 100 mg, 100 mg, oral, BID, Juve Brown MD, 100 mg at 04/04/24 0836    doxazosin (Cardura) tablet 8 mg, 8 mg, oral, Nightly, Mundo Holguin MD, 8 mg at 04/03/24 2148    empagliflozin (Jardiance) tablet 25 mg, 25 mg, oral, Daily, Juve Brown MD, 25 mg at 04/04/24 0836    finasteride (Proscar) tablet 5 mg, 5 mg, oral, Daily, Juve Brown MD, 5 mg at 04/04/24 0836    [Held by provider] furosemide (Lasix) injection 60 mg, 60 mg, intravenous, BID, Johanny Evangelista MD, 60 mg at 03/30/24 1658    glimepiride (Amaryl) tablet 2 mg, 2 mg, oral, Daily before breakfast, Juve Brown MD, 2 mg at 04/04/24 0700    hydrALAZINE (Apresoline) tablet 50 mg, 50 mg, oral, BID, Johanny Evangelista MD, 50 mg at 04/04/24 0836    hydrOXYzine pamoate (Vistaril) capsule 50 mg, 50 mg, oral, q6h PRN, VANCE Blake-CNP    ipratropium-albuteroL (Duo-Neb) 0.5-2.5 mg/3 mL nebulizer solution 3 mL, 3 mL, nebulization, q4h PRN, Juve Brown,  MD, 3 mL at 03/29/24 1141    ipratropium-albuteroL (Duo-Neb) 0.5-2.5 mg/3 mL nebulizer solution 3 mL, 3 mL, nebulization, TID, Juve Brown MD, 3 mL at 04/04/24 0742    loperamide (Imodium) capsule 2 mg, 2 mg, oral, TID PRN, Juve Brown MD    losartan (Cozaar) tablet 50 mg, 50 mg, oral, Daily, Juve Brown MD, 50 mg at 04/04/24 0836    metoprolol succinate XL (Toprol-XL) 24 hr tablet 150 mg, 150 mg, oral, BID, Juve Brown MD, 150 mg at 04/04/24 0836    oxygen (O2) therapy, , inhalation, Continuous PRN - O2/gases, Kevon Gunderson MD, Rate Verify at 03/31/24 0330    oxygen (O2) therapy, , inhalation, Continuous PRN - O2/gases, Kevon Gunderson MD, 3 L/min at 04/03/24 0840    pantoprazole (ProtoNix) EC tablet 40 mg, 40 mg, oral, Daily before breakfast, Juve Brown MD, 40 mg at 04/04/24 0703    polyethylene glycol (Glycolax, Miralax) packet 17 g, 17 g, oral, Daily, Juve Brown MD, 17 g at 04/04/24 0836    potassium chloride (Klor-Con) packet 20 mEq, 20 mEq, oral, TID, Johanny Evangelista MD, 20 mEq at 04/04/24 0836       Intake/Output Summary (Last 24 hours) at 4/4/2024 1149  Last data filed at 4/3/2024 2302  Gross per 24 hour   Intake 870 ml   Output 1000 ml   Net -130 ml         PHYSICAL EXAM:  BP (!) 138/96 (BP Location: Left arm, Patient Position: Lying)   Pulse 90   Temp 36.5 °C (97.7 °F) (Temporal)   Resp 20   Wt 99.8 kg (220 lb 0.3 oz)   SpO2 100%   BMI 30.70 kg/m²     Intake/Output Summary (Last 24 hours) at 4/4/2024 1149  Last data filed at 4/3/2024 2302  Gross per 24 hour   Intake 870 ml   Output 1000 ml   Net -130 ml       Gen: Awake, disoriented, NAD  Neck: No JVD  Cardiac: RRR  Resp: clear BS  Abd: Soft, non tender, +BS, non distended   Ext: No edema   Neuro: moves 4 ext  Peripheral Pulses: weak peripheral pulses.  Skin: Skin color, texture, turgor normal, no suspicious rashes or lesions.    Labs:  No results found for this or any previous visit (from the past 24  hour(s)).       DATA:   Diagnostic tests reviewed for today's visit:    New labs and imaging     Assessment and Plan:  Pt admitted with pneumonia and acute on chronic CHF, persistent AF  - ROSE on CKD stage 3a: resolved ROSE Scr stable at baseline till last labs   Euvolemic s/p diuretic tx, currently on hold  BP: acceptable control  Hypernatremia: due to water deficit, remains uncorrected but improving will last labs, on D5W drip  HypoK, poor intake    PLAN:  - Keep D5W awaiting for labs, if hypernatremia is corrected OK to stop or change to D51/2NS drip to ensure euvolemia. Supportive care, GOC discussion, replete K prn. No active renal issue     Will continue to follow.     Signature: Sascha Perez MD

## 2024-04-04 NOTE — PROGRESS NOTES
"New Castano is a 83 y.o. male on day 13 of admission presenting with Pneumonia of right lung due to infectious organism, unspecified part of lung.    Subjective   Patient seen at bedside wearing mitts due to pulling out IVs. He is eating lunch with SLP. Reports feeling \"ok,\" unreliable ROS due to altered mentation.        Objective     Physical Exam  Constitutional:       General: He is not in acute distress.     Appearance: Normal appearance.   HENT:      Head: Normocephalic and atraumatic.      Nose: No rhinorrhea.      Mouth/Throat:      Mouth: Mucous membranes are moist.   Eyes:      Extraocular Movements: Extraocular movements intact.      Conjunctiva/sclera: Conjunctivae normal.      Pupils: Pupils are equal, round, and reactive to light.   Cardiovascular:      Rate and Rhythm: Normal rate. Rhythm irregular.      Heart sounds: No murmur heard.     No friction rub. No gallop.   Pulmonary:      Comments: Good air entry bilaterally, no wheezes, crackles or ronchi  Abdominal:      General: Bowel sounds are normal. There is no distension.      Palpations: Abdomen is soft.   Musculoskeletal:         General: Normal range of motion.      Cervical back: Normal range of motion.      Right lower leg: Edema present.      Left lower leg: Edema present.   Skin:     General: Skin is warm and dry.      Findings: No rash.   Neurological:      General: No focal deficit present.      Mental Status: He is alert.         Last Recorded Vitals  Blood pressure 118/70, pulse 88, temperature 36.5 °C (97.7 °F), temperature source Temporal, resp. rate 18, weight 99.8 kg (220 lb 0.3 oz), SpO2 96 %.  Intake/Output last 3 Shifts:  I/O last 3 completed shifts:  In: 2578.3 (25.8 mL/kg) [P.O.:960; I.V.:1268.3 (12.7 mL/kg); IV Piggyback:350]  Out: 1700 (17 mL/kg) [Urine:1700 (0.5 mL/kg/hr)]  Weight: 99.8 kg     Relevant Results       Results for orders placed or performed during the hospital encounter of 03/22/24 (from the past 24 hour(s)) "   Basic metabolic panel   Result Value Ref Range    Glucose 138 (H) 74 - 99 mg/dL    Sodium 143 136 - 145 mmol/L    Potassium 3.8 3.5 - 5.3 mmol/L    Chloride 102 98 - 107 mmol/L    Bicarbonate 34 (H) 21 - 32 mmol/L    Anion Gap 11 10 - 20 mmol/L    Urea Nitrogen 23 6 - 23 mg/dL    Creatinine 1.56 (H) 0.50 - 1.30 mg/dL    eGFR 44 (L) >60 mL/min/1.73m*2    Calcium 7.5 (L) 8.6 - 10.3 mg/dL   CBC   Result Value Ref Range    WBC 5.8 4.4 - 11.3 x10*3/uL    nRBC 0.0 0.0 - 0.0 /100 WBCs    RBC 4.76 4.50 - 5.90 x10*6/uL    Hemoglobin 12.4 (L) 13.5 - 17.5 g/dL    Hematocrit 43.4 41.0 - 52.0 %    MCV 91 80 - 100 fL    MCH 26.1 26.0 - 34.0 pg    MCHC 28.6 (L) 32.0 - 36.0 g/dL    RDW 17.1 (H) 11.5 - 14.5 %    Platelets 70 (L) 150 - 450 x10*3/uL                      Assessment/Plan   Principal Problem:    Pneumonia of right lung due to infectious organism, unspecified part of lung    New Castano is a 83 y.o. male with past medical history of HTN, CHF, HLD, DMT2, A-fib (s/p cardioversion and ablation on Eliquis), CKD 3 who [place] [facility] [place] for shortness of breath and generalized weakness for couple of days.  Family reports he was unable to get out of his chair.  Baseline is in room air but required 4 L per EMS.  CT on 3/28 showed cardiomegaly with bilateral infiltrates and pleural effusions (R> L) with soft tissue edema and free fluid consistent with fluid overload/CHF and unable to exclude superimposed pneumonia.  DVT and PE studies were negative.  He was admitted for acute exacerbation of CHF & pneumonia, treated with IV diuresis and Zosyn for HAP coverage. He required acute bipap and HFNC, however has been weaned to 3L.   PFT 10/2021: FEV1/FVC 0.69, FVC 3.03 (lower limit normal 3.3), DLCO 57% predicted; PFT is indicative of no obstruction suggestive of restriction with moderately decreased diffusion capacity     Impressions:  # Acute hypoxic and hypercarbic respiratory failure: Patient hypoxic in the 80s per EMS  placed on 4 L requiring max 5L during admission.  ABG on 3/30 with uncompensated respiratory acidosis with pCO2 of 87; likely reflective of acute systolic heart failure, restrictive lung disease with bilateral pleural effusion, cognitive decline with deconditioning; currently weaned to 3 L NC     #Pleural effusions, bilateral: due to volume overload.      #Acute on chronic HFpEF: Chest CT with cardiomegaly with bilateral infiltrates and bilateral pleural effusions with soft tissue edema and free fluid; BNP > 4700 (previous BNPs March, early> 4700, February 3956, December 48)     #OHS: BMI 31.28 with central obesity and abdominal fluid retention 2/2 HF exacerbation, pCO2 87 on arterial gas (baseline ~ 50s)     #Cognitive dysfunction, delirium: improved      Recommendations:  -Wean O2 post left thoracentesis  -Follow up fluid studies  -Continue supplemental O2, wean for saturation 92 to 95%  -Adequately treated for pneumonia (11 days of Zosyn), would recommend stopping antibiotics  -Continue DuoNebs   -BPH with Acapella   -Diuresis as hemodynamics and renal function allow   -Would recommend repeat echo given substantial increase in BNP and multiple hospitalizations failure since last echo 12/2023 (may not be necessary if family is considering transition to hospice)  -Agree with palliative care consult and hospice discussion  -Appreciate SLP recommendations, given patient's fluctuating cognitive status, would only attempt to feed patient if he is fully awake and able to participate and follow directions          I spent 35 minutes in the professional and overall care of this patient.      Noemí Carlos, DO

## 2024-04-04 NOTE — NURSING NOTE
RN Hospice Note    New Castano is a Hospice Patient.   Hospice terminal diagnosis:  CHF, pneumonia  Physician: Kevon Gunderson MD  Visit type: Information Only    Comments/recommendations:  Met with 2 daughters, spouse and patient. Discussed hospice support. They had lots of questions about coverage, if hospice can be cancelled if they changed their mind, hospice house stay. After review of these things and based on patient condition improving, they want him to be evaluated by PT to see if he can get stronger before going home and if not, remain at . Hospice contact number provided if he should decline again. Explained that we could follow at [facility] but not under skilled care. Discussed R & B charges with long term care as well. Lots of information provided to them. If it is determined by PT that patient can not go skilled, we can come back out to sign with hospice and assist with admission to long term care.     Discharge Planning:  Patient to be discharged to  to be determined    The following is to be completed:  Discharge order:    State DNR signed by MD:    Nursing facility referral/transfer form:    Medication reconciliation:    PAS/RR or convalescent stay form:    Prescriptions for al narcotics/new medications:    Transportation:    Other:      Plan of care reviewed with patient/family members Nena (dtr) Jessica (dtr) and Jana (spouse)   Plan of care reviewed with hospital staff members: Bedside Nurse, palliative care NP, MD, SW, TCC     Please notify [facility] of any changes in condition. Thank you.  Office: [phone] (8 am-6:30 pm M-F and 8 am-4:30 pm weekends and holidays)   [phone] (6:30 pm-8 am M-F and 4:30 pm-8 am weekends and holidays)    Nae Chavez RN

## 2024-04-04 NOTE — PROGRESS NOTES
Physical Therapy    Physical Therapy Treatment    Patient Name: New Castano  MRN: [id number]  Today's Date: 4/4/2024  Time Calculation  Start Time: 1454  Stop Time: 1515  Time Calculation (min): 21 min       Assessment/Plan   PT Assessment  End of Session Communication: Bedside nurse  End of Session Patient Position: Alarm on, Bed, 3 rail up  PT Plan  Inpatient/Swing Bed or Outpatient: Inpatient  PT Plan  Treatment/Interventions: Bed mobility, Balance training  PT Plan: Skilled PT  PT Frequency: 3 times per week  PT Discharge Recommendations: Moderate intensity level of continued care  Equipment Recommended upon Discharge: Wheeled walker (patient reports having rolling walker)  PT Recommended Transfer Status: Assist x2  PT - OK to Discharge: Yes (per POC)      General Visit Information:   PT  Visit  PT Received On: 04/04/24  General  Reason for Referral: Pneumonia; hypoxia  Referred By: Dr. Brown  Past Medical History Relevant to Rehab: DM, HTN, HLD. a-fib  Family/Caregiver Present: Yes  Co-Treatment: OT  Co-Treatment Reason: maxamize pt safety and participation  Prior to Session Communication: Bedside nurse  Patient Position Received: Bed, 3 rail up, Alarm on  General Comment: pt confused unable to give full name, and only state that birthday was in march.  became agitated at mitts needing to be donned again at end of tx    Subjective   Precautions:     Vital Signs:       Objective   Pain:  Pain Assessment  Pain Score: 0 - No pain  Cognition:     Postural Control:  Static Sitting Balance  Static Sitting-Comment/Number of Minutes: pt performed at EOB with SBA/CGA, pt performed for x10min  Extremity/Trunk Assessments:    Activity Tolerance:     Treatments:  Therapeutic Exercise  Therapeutic Exercise Performed: Yes  Therapeutic Exercise Activity 1: pt performed seated ther ex with min vc/tc x15-20: HR DF LAQ, Marching  VC/TC req to complete         Bed Mobility  Bed Mobility: Yes  Bed Mobility 1  Bed Mobility 1:  Supine to sitting, Sitting to supine  Level of Assistance 1: Minimum assistance, Moderate assistance (x2)  Bed Mobility Comments 1: HOB elevated.  increased assist req to complete bed mobility from sitting >supine    Outcome Measures:  [facility] Basic Mobility  Turning from your back to your side while in a flat bed without using bedrails: A lot  Moving from lying on your back to sitting on the side of a flat bed without using bedrails: A lot  Moving to and from bed to chair (including a wheelchair): A lot  Standing up from a chair using your arms (e.g. wheelchair or bedside chair): A lot  To walk in hospital room: Total  Climbing 3-5 steps with railing: Total  Basic Mobility - Total Score: 10    Education Documentation  Mobility Training, taught by Bill Felder PTA at 4/4/2024  4:00 PM.  Learner: Patient  Readiness: Acceptance  Method: Explanation  Response: Needs Reinforcement    Education Comments  No comments found.        OP EDUCATION:       Encounter Problems       Encounter Problems (Active)       Mobility       STG - Patient will ambulate 150 ft with rolling walker Mod I`` (Progressing)       Start:  03/23/24    Expected End:  04/06/24            STG - Patient will ascend and descend 3 stairs with 1 rail and cane PRN with supervision (Not Progressing)       Start:  03/23/24    Expected End:  04/06/24               PT Transfers       STG - Patient will perform bed mobility mod I (Progressing)       Start:  03/23/24    Expected End:  04/06/24            STG - Patient will transfer sit to and from stand mod I (Progressing)       Start:  03/23/24    Expected End:  04/06/24

## 2024-04-04 NOTE — POST-PROCEDURE NOTE
Interventional Radiology Brief Postprocedure Note    Attending: Jair Valdes CNP    Assistant: none    Diagnosis: Left sided pleural effusion    Description of procedure: Ultrasound guided thoracentesis was preformed on the left.  450mL of xiang fluid was drained.       Anesthesia:  Local    Complications: None    Estimated Blood Loss: none    Medications  As of 04/04/24 1412      Tc-99m-albumin (Draximage MAA) injection 4.3 millicurie (millicurie) Total dose:  4.3 millicurie      Date/Time Rate/Dose/Volume Action       03/22/24  1434 4.3 millicurie Given               vancomycin (Vancocin) in dextrose 5 % water (D5W) 500 mL IV 1,500 mg (mL/hr) Total dose:  1,500 mg*   *From user-documented volume     Date/Time Rate/Dose/Volume Action       03/22/24  1653 1,500 mg - 333.3 mL/hr (over 90 min) New Bag      1833 500 mL Stopped               furosemide (Lasix) injection 20 mg (mg) Total dose:  100 mg      Date/Time Rate/Dose/Volume Action       03/22/24  1830 20 mg Given     03/24/24  1150 20 mg Given     03/25/24  1111 20 mg Given     03/26/24  1307 20 mg Given      1816 20 mg Given               furosemide (Lasix) injection 40 mg (mg) Total dose:  40 mg      Date/Time Rate/Dose/Volume Action       03/23/24  1548 40 mg Given               furosemide (Lasix) injection 40 mg (mg) Total dose:  0 mg*   *Administration not included in total     Date/Time Rate/Dose/Volume Action       03/26/24  1500 *40 mg Missed               furosemide (Lasix) injection 40 mg (mg) Total dose:  40 mg      Date/Time Rate/Dose/Volume Action       03/27/24  0843 40 mg Given               furosemide (Lasix) injection 80 mg (mg) Total dose:  480 mg Dosing weight:  104      Date/Time Rate/Dose/Volume Action       03/27/24  1700 80 mg Given     03/28/24  1115 80 mg Given      2218 80 mg Given     03/29/24  0906 80 mg Given      1708 80 mg Given     03/30/24  0946 80 mg Given               furosemide (Lasix) injection 60 mg (mg) Total dose:  60  mg* Dosing weight:  102   *Administration not included in total     Date/Time Rate/Dose/Volume Action       03/30/24  1658 60 mg Given     03/31/24  0816 *Not included in total Held by provider      0900 *Not included in total Automatically Held      1700 *Not included in total Automatically Held     04/01/24  0900 *60 mg Missed      1700 *60 mg Missed     04/02/24  0900 *Not included in total Automatically Held      1700 *Not included in total Automatically Held     04/03/24  0900 *Not included in total Automatically Held      1700 *Not included in total Automatically Held     04/04/24  0900 *60 mg Missed               piperacillin-tazobactam-dextrose (Zosyn) IV 3.375 g (mL/hr) Total dose:  94.5 g*   *From user-documented volume     Date/Time Rate/Dose/Volume Action       03/22/24  1920 3.375 g - 100 mL/hr (over 30 min) New Bag      2121 50 mL Stopped     03/23/24  0212 3.375 g - 100 mL/hr (over 30 min) New Bag      0242  (over 30 min) Stopped      0905 3.375 g - 100 mL/hr (over 30 min) New Bag      0935  (over 30 min) Stopped      1636 3.375 g - 100 mL/hr (over 30 min) New Bag      1706  (over 30 min) Stopped      2010 3.375 g - 100 mL/hr (over 30 min) New Bag      2040  (over 30 min) Stopped     03/24/24  0140 3.375 g - 100 mL/hr (over 30 min) New Bag      0210  (over 30 min) Stopped      0837 3.375 g - 100 mL/hr (over 30 min) New Bag      0907  (over 30 min) Stopped      1509 3.375 g - 100 mL/hr (over 30 min) - 50 mL New Bag      1539  (over 30 min) Stopped      2041 3.375 g - 100 mL/hr (over 30 min) New Bag      2111  (over 30 min) Stopped     03/25/24  0225 3.375 g - 100 mL/hr (over 30 min) New Bag      0255  (over 30 min) Stopped      0951 3.375 g - 100 mL/hr (over 30 min) New Bag      1021  (over 30 min) Stopped      1435 3.375 g - 100 mL/hr (over 30 min) New Bag      1505  (over 30 min) Stopped      2145 3.375 g - 100 mL/hr (over 30 min) New Bag      2215  (over 30 min) Stopped     03/26/24  0412 3.375 g -  100 mL/hr (over 30 min) New Bag      0442  (over 30 min) Stopped      1006 3.375 g - 100 mL/hr (over 30 min) New Bag      1036  (over 30 min) Stopped      1425 3.375 g - 100 mL/hr (over 30 min) New Bag      1455  (over 30 min) Stopped      2114 3.375 g - 100 mL/hr (over 30 min) New Bag      2144  (over 30 min) Stopped     03/27/24  0205 3.375 g - 100 mL/hr (over 30 min) New Bag      0235  (over 30 min) Stopped      0844 3.375 g - 100 mL/hr (over 30 min) New Bag      0914  (over 30 min) Stopped      1441 3.375 g - 100 mL/hr (over 30 min) New Bag      1511  (over 30 min) Stopped      2239 3.375 g - 100 mL/hr (over 30 min) New Bag      2309  (over 30 min) Stopped     03/28/24  0318 3.375 g - 100 mL/hr (over 30 min) New Bag      0348  (over 30 min) Stopped      1121 3.375 g - 100 mL/hr (over 30 min) New Bag      1151 50 mL Stopped      1803 3.375 g - 100 mL/hr (over 30 min) New Bag      1833 50 mL Stopped      2231 3.375 g - 100 mL/hr (over 30 min) New Bag      2301 50 mL Stopped     03/29/24  0400 3.375 g - 100 mL/hr (over 30 min) New Bag      0430  (over 30 min) Stopped      0906 3.375 g - 100 mL/hr (over 30 min) New Bag      0936  (over 30 min) Stopped      1708 3.375 g - 100 mL/hr (over 30 min) New Bag      1738  (over 30 min) Stopped      2150 3.375 g - 100 mL/hr (over 30 min) New Bag      2220  (over 30 min) Stopped     03/30/24  0411 3.375 g - 100 mL/hr (over 30 min) New Bag      0441  (over 30 min) Stopped      0945 3.375 g - 100 mL/hr (over 30 min) New Bag      1015  (over 30 min) Stopped      1703 3.375 g - 100 mL/hr (over 30 min) New Bag      2100 3.375 g - 100 mL/hr (over 30 min) New Bag      2130  (over 30 min) Stopped     03/31/24  0407 3.375 g - 100 mL/hr (over 30 min) New Bag      0437  (over 30 min) Stopped      0905 3.375 g - 100 mL/hr (over 30 min) New Bag      0935 500 mL Stopped      1526 3.375 g - 100 mL/hr (over 30 min) New Bag      1556 50 mL Stopped      2251 3.375 g - 100 mL/hr (over 30 min)  New Bag      2321  (over 30 min) Stopped     04/01/24  0358 3.375 g - 100 mL/hr (over 30 min) New Bag      0428  (over 30 min) Stopped      1423 3.375 g - 100 mL/hr (over 30 min) New Bag      1453  (over 30 min) Stopped      2113 3.375 g - 100 mL/hr (over 30 min) New Bag      2143  (over 30 min) Stopped     04/02/24  0149 3.375 g - 100 mL/hr (over 30 min) New Bag      0219 250 mL Stopped      1020 3.375 g - 100 mL/hr (over 30 min) New Bag      1050  (over 30 min) Stopped      1615 3.375 g - 100 mL/hr (over 30 min) New Bag      1645  (over 30 min) Stopped      2314 3.375 g - 100 mL/hr (over 30 min) New Bag      2344 150 mL Stopped     04/03/24  0448 3.375 g - 100 mL/hr (over 30 min) New Bag      0518 50 mL Stopped      1226 3.375 g - 100 mL/hr (over 30 min) New Bag      1256  (over 30 min) Stopped      1633 3.375 g - 100 mL/hr (over 30 min) New Bag      1703  (over 30 min) Stopped      2301 3.375 g - 100 mL/hr (over 30 min) - 150 mL New Bag     04/04/24  0553 3.375 g - 100 mL/hr (over 30 min) New Bag      0623  (over 30 min) Stopped               acetaminophen (Tylenol) tablet 650 mg (mg) Total dose:  1,300 mg      Date/Time Rate/Dose/Volume Action       03/24/24  0838 650 mg Given     03/31/24  1800 650 mg Given     04/01/24  0254 *Not included in total See Alternative     04/02/24  2310 *Not included in total See Alternative               acetaminophen (Tylenol) oral liquid 650 mg (mg) Total dose:  1,300 mg      Date/Time Rate/Dose/Volume Action       03/24/24  0838 *Not included in total See Alternative     03/31/24  1800 *Not included in total See Alternative     04/01/24  0254 650 mg Given     04/02/24  2310 650 mg Given               acetaminophen (Tylenol) suppository 650 mg (mg) Total dose:  Cannot be calculated*   *Administration dose not documented     Date/Time Rate/Dose/Volume Action       03/24/24  0838 *Not included in total See Alternative     03/31/24  1800 *Not included in total See Alternative      04/01/24  0254 *Not included in total See Alternative     04/02/24  2310 *Not included in total See Alternative               polyethylene glycol (Glycolax, Miralax) packet 17 g (g) Total dose:  153 g*   *Administration not included in total     Date/Time Rate/Dose/Volume Action       03/22/24 2010 *17 g Missed     03/23/24  0900 *17 g Missed     03/24/24  0837 17 g Given     03/25/24  0951 17 g Given     03/26/24  1006 17 g Given     03/27/24  0900 *17 g Missed     03/28/24  0900 *17 g Missed     03/29/24  0908 17 g Given     03/30/24  0945 17 g Given     03/31/24  0848 17 g Given     04/01/24  0900 *17 g Missed     04/02/24  1015 17 g Given     04/03/24  0942 17 g Given     04/04/24  0836 17 g Given               apixaban (Eliquis) tablet 2.5 mg (mg) Total dose:  57.5 mg*   *Administration not included in total     Date/Time Rate/Dose/Volume Action       03/22/24  2317 2.5 mg Given     03/23/24  0900 2.5 mg Given      2010 2.5 mg Given     03/24/24  0839 2.5 mg Given      2042 2.5 mg Given     03/25/24  0952 2.5 mg Given      2145 2.5 mg Given     03/26/24  1006 2.5 mg Given      2113 2.5 mg Given     03/27/24  0844 2.5 mg Given      2238 *2.5 mg Missed     03/28/24  1122 2.5 mg Given      2219 2.5 mg Given     03/29/24  0906 2.5 mg Given      2054 2.5 mg Given     03/30/24  0945 2.5 mg Given      2100 2.5 mg Given     03/31/24  0848 2.5 mg Given      2251 2.5 mg Given     04/01/24  0939 *2.5 mg Missed      2100 *2.5 mg Missed     04/02/24  1015 2.5 mg Given      2313 2.5 mg Given     04/03/24  0943 2.5 mg Given      2148 2.5 mg Given     04/04/24  0836 2.5 mg Given               atorvastatin (Lipitor) tablet 20 mg (mg) Total dose:  200 mg*   *Administration not included in total     Date/Time Rate/Dose/Volume Action       03/22/24  2317 20 mg Given     03/23/24 2011 20 mg Given     03/24/24 2042 20 mg Given     03/25/24 2145 20 mg Given     03/26/24 2113 20 mg Given     03/27/24 2238 *20 mg Missed      03/28/24  2100 *20 mg Missed     03/29/24 2054 20 mg Given     03/30/24  2100 20 mg Given     03/31/24 2251 20 mg Given     04/01/24  2100 *20 mg Missed     04/02/24 2314 20 mg Given     04/03/24  2148 20 mg Given               cyanocobalamin (Vitamin B-12) tablet 500 mcg (mcg) Total dose:  6,500 mcg*   *Administration not included in total     Date/Time Rate/Dose/Volume Action       03/22/24  2318 500 mcg Given     03/23/24  0900 500 mcg Given     03/24/24  0838 500 mcg Given     03/25/24  0952 500 mcg Given     03/26/24  1006 500 mcg Given     03/27/24  0844 500 mcg Given     03/28/24  1122 500 mcg Given     03/29/24  0908 500 mcg Given     03/30/24  0945 500 mcg Given     03/31/24  0848 500 mcg Given     04/01/24  0939 *500 mcg Missed     04/02/24  1015 500 mcg Given     04/03/24  0943 500 mcg Given     04/04/24  0836 500 mcg Given               docusate sodium (Colace) capsule 100 mg (mg) Total dose:  1,800 mg*   *Administration not included in total     Date/Time Rate/Dose/Volume Action       03/22/24  2318 100 mg Given     03/23/24  0900 100 mg Given      2011 100 mg Given     03/24/24  0839 100 mg Given      2042 100 mg Given     03/25/24  0952 100 mg Given      2145 100 mg Given     03/26/24  1006 100 mg Given      2113 100 mg Given     03/27/24  0844 100 mg Given      2239 *100 mg Missed     03/28/24  0900 *100 mg Missed      2100 *100 mg Missed     03/29/24  0910 100 mg Given      2054 100 mg Given     03/30/24  0945 100 mg Given      2100 *100 mg Missed     03/31/24  0848 100 mg Given      2100 *100 mg Missed     04/01/24  0939 *100 mg Missed      2100 *100 mg Missed     04/02/24  1015 100 mg Given      2100 *100 mg Missed     04/03/24  0942 100 mg Given      2148 100 mg Given     04/04/24  0836 100 mg Given               doxazosin (Cardura) tablet 4 mg (mg) Total dose:  20 mg      Date/Time Rate/Dose/Volume Action       03/22/24  2317 4 mg Given     03/23/24 2011 4 mg Given     03/24/24 2041 4 mg  Given     03/25/24  2145 4 mg Given     03/26/24  2113 4 mg Given               doxazosin (Cardura) tablet 8 mg (mg) Total dose:  40 mg* Dosing weight:  104   *Administration not included in total     Date/Time Rate/Dose/Volume Action       03/27/24  2238 *8 mg Missed     03/28/24  2100 *8 mg Missed     03/29/24  2053 8 mg Given     03/30/24  2100 8 mg Given     03/31/24  2251 8 mg Given     04/01/24  2100 *8 mg Missed     04/02/24  2310 8 mg Given     04/03/24  2148 8 mg Given               empagliflozin (Jardiance) tablet 25 mg (mg) Total dose:  325 mg*   *Administration not included in total     Date/Time Rate/Dose/Volume Action       03/22/24  2317 25 mg Given     03/23/24  0900 25 mg Given     03/24/24  0838 25 mg Given     03/25/24  0951 25 mg Given     03/26/24  1006 25 mg Given     03/27/24  0844 25 mg Given     03/28/24  1121 25 mg Given     03/29/24  0906 25 mg Given     03/30/24  0945 25 mg Given     03/31/24  0847 25 mg Given     04/01/24  0939 *25 mg Missed     04/02/24  1015 25 mg Given     04/03/24  0942 25 mg Given     04/04/24  0836 25 mg Given               finasteride (Proscar) tablet 5 mg (mg) Total dose:  60 mg*   *Administration not included in total     Date/Time Rate/Dose/Volume Action       03/22/24  2317 5 mg Given     03/23/24  0901 5 mg Given     03/24/24  0839 5 mg Given     03/25/24  0952 5 mg Given     03/26/24  1006 5 mg Given     03/27/24  0844 5 mg Given     03/28/24  0900 *5 mg Missed     03/29/24  0906 5 mg Given     03/30/24  0945 5 mg Given     03/31/24  0848 5 mg Given     04/01/24  0939 *5 mg Missed     04/02/24  1015 5 mg Given     04/03/24  0943 5 mg Given     04/04/24  0836 5 mg Given               glimepiride (Amaryl) tablet 2 mg (mg) Total dose:  22 mg*   *Administration not included in total     Date/Time Rate/Dose/Volume Action       03/23/24  0606 2 mg Given     03/24/24  0633 2 mg Given     03/25/24  0623 2 mg Given     03/26/24  0702 2 mg Given     03/27/24  0656 2  mg Given     03/28/24  0601 2 mg Given     03/29/24  0656 2 mg Given     03/30/24  0945 2 mg Given     03/31/24  0638 2 mg Given     04/01/24  0642 *2 mg Missed     04/02/24  0700 *2 mg Missed     04/03/24  0637 2 mg Given     04/04/24  0700 2 mg Given               losartan (Cozaar) tablet 50 mg (mg) Total dose:  650 mg*   *Administration not included in total     Date/Time Rate/Dose/Volume Action       03/22/24  2318 50 mg Given     03/23/24  0901 50 mg Given     03/24/24  0838 50 mg Given     03/25/24  0954 50 mg Given     03/26/24  1006 50 mg Given     03/27/24  0844 50 mg Given     03/28/24  1122 50 mg Given     03/29/24  0908 50 mg Given     03/30/24  0945 50 mg Given     03/31/24  0848 50 mg Given     04/01/24  0900 *50 mg Missed     04/02/24  1015 50 mg Given     04/03/24  0942 50 mg Given     04/04/24  0836 50 mg Given               metoprolol succinate XL (Toprol-XL) 24 hr tablet 150 mg (mg) Total dose:  3,150 mg*   *Administration not included in total     Date/Time Rate/Dose/Volume Action       03/22/24  2317 150 mg Given     03/23/24  0900 150 mg Given      2010 150 mg Given     03/24/24  0838 150 mg Given      2041 150 mg Given     03/25/24  0952 150 mg Given      2145 150 mg Given     03/26/24  1006 150 mg Given      2113 150 mg Given     03/27/24  0845 150 mg Given      2237 *150 mg Missed     03/28/24  1122 150 mg Given      2100 *150 mg Missed     03/29/24  0907 150 mg Given      2054 150 mg Given     03/30/24  0945 150 mg Given      2100 *150 mg Missed     03/31/24  0848 150 mg Given      2251 150 mg Given     04/01/24  0938 *150 mg Missed      2100 *150 mg Missed     04/02/24  1015 150 mg Given      2310 150 mg Given     04/03/24  0942 150 mg Given      2148 150 mg Given     04/04/24  0836 150 mg Given               pantoprazole (ProtoNix) EC tablet 40 mg (mg) Total dose:  360 mg*   *Administration not included in total     Date/Time Rate/Dose/Volume Action       03/23/24 0606 40 mg Given      03/24/24  0633 40 mg Given     03/25/24  0623 40 mg Given     03/26/24  0702 40 mg Given     03/27/24  0656 40 mg Given     03/28/24  0601 40 mg Given     03/29/24  0700 *40 mg Missed     03/30/24  0700 *40 mg Missed     03/31/24  0638 40 mg Given     04/01/24  0642 *40 mg Missed     04/02/24  0700 *40 mg Missed     04/03/24  0637 40 mg Given     04/04/24  0703 40 mg Given               LORazepam (Ativan) tablet 0.5 mg (mg) Total dose:  1.5 mg      Date/Time Rate/Dose/Volume Action       03/24/24  1509 0.5 mg Given     03/25/24  0033 0.5 mg Given     03/26/24  0412 0.5 mg Given               ipratropium-albuteroL (Duo-Neb) 0.5-2.5 mg/3 mL nebulizer solution 3 mL (mL) Total volume:  87 mL*   *Administration not included in total     Date/Time Rate/Dose/Volume Action       03/24/24  1847 3 mL Given     03/25/24  0725 3 mL Given      1307 3 mL Given      2024 3 mL Given     03/26/24  0727 3 mL Given      1300 *3 mL Missed      2008 3 mL Given     03/27/24  0725 3 mL Given      1511 3 mL Given      1929 3 mL Given     03/28/24  0759 3 mL Given      1334 3 mL Given      1900 *3 mL Missed     03/29/24  0822 3 mL Given      1141 3 mL Given      1423 3 mL Given      2004 3 mL Given     03/30/24  0712 3 mL Given      1335 3 mL Given      2111 3 mL Given     03/31/24  0759 3 mL Given      1324 *3 mL Missed      1339 3 mL Given      2015 3 mL Given     04/01/24  0757 3 mL Given      1330 3 mL Given      2047 3 mL Given     04/02/24  0804 *3 mL Missed      1416 *3 mL Missed      1946 3 mL Given     04/03/24  0840 3 mL Given      1430 3 mL Given      2023 3 mL Given     04/04/24  0742 3 mL Given               oxygen (O2) therapy Total dose:  Cannot be calculated* Dosing weight:  102   *Administration does not have dose documented     Date/Time Rate/Dose/Volume Action       03/30/24  1140  Rate Verify      1335  Rate Verify     03/31/24  0016  Rate Verify      0330  Rate Verify               oxygen (O2) therapy (L/min) Total  volume:  Not documented*   *Total volume has not been documented. View each administration to see the amount administered.     Date/Time Rate/Dose/Volume Action       03/24/24  1847 2 L/min Rate Change      2000 2 L/min Start     03/25/24  0728 2 L/min Rate Verify      2000  Start     03/26/24  0727  Stopped      0800  Start      2000  Start     03/27/24  1929 2 L/min Start     03/28/24  1958 4 L/min Rate Verify      2000  Start     03/29/24  0118 4 L/min Rate Verify      0500 2 L/min Rate Verify      1142  Rate Verify      1143  Rate Verify      1429  Start               oxygen (O2) therapy (L/min) Total volume:  Not documented* Dosing weight:  102   *Total volume has not been documented. View each administration to see the amount administered.     Date/Time Rate/Dose/Volume Action       03/29/24 2000  Start      2004 3 L/min Rate Verify      2020 4 L/min Rate Change     03/30/24  0712 4 L/min Rate Change      1409 4 L/min Rate Verify      2000  Start      2111  Rate Verify     03/31/24  0759 4 L/min Rate Verify      0800  Start               oxygen (O2) therapy (L/min) Total volume:  Not documented* Dosing weight:  102   *Total volume has not been documented. View each administration to see the amount administered.     Date/Time Rate/Dose/Volume Action       03/31/24  2015 3 L/min Rate Verify     04/01/24  0757 5 L/min Rate Change      1330 3 L/min Rate Change     04/02/24  0802 2 L/min Rate Change      1949 3 L/min Start     04/03/24  0840 3 L/min Rate Verify               potassium chloride CR (Klor-Con M20) ER tablet 20 mEq (mEq) Total dose:  20 mEq*   *Administration not included in total     Date/Time Rate/Dose/Volume Action       03/27/24  1441 20 mEq Given      2238 *20 mEq Missed               hydrALAZINE (Apresoline) tablet 25 mg (mg) Total dose:  50 mg* Dosing weight:  104   *Administration not included in total     Date/Time Rate/Dose/Volume Action       03/28/24  1252 25 mg Given      2100 *25 mg  Missed     03/29/24  0906 25 mg Given               hydrALAZINE (Apresoline) tablet 25 mg (mg) Total dose:  50 mg* Dosing weight:  102   *Administration not included in total     Date/Time Rate/Dose/Volume Action       03/29/24  1605 25 mg Given      2054 25 mg Given     03/30/24  0900 *25 mg Missed               barium sulfate (Varibar THIN Liquid) 81 % (w/w) suspension 150 mL (mL) Total volume:  150 mL Dosing weight:  102      Date/Time Rate/Dose/Volume Action       03/29/24  1321 150 mL Given               barium sulfate (Varibar Honey) 40 %(w/v), 29% (w/w)(1500 CPS) suspension 50 mL (mL) Total volume:  50 mL Dosing weight:  102      Date/Time Rate/Dose/Volume Action       03/29/24  1321 50 mL Given               barium sulfate (Varibar [place]) 40 % (w/v) suspension 50 mL (mL) Total volume:  50 mL Dosing weight:  102      Date/Time Rate/Dose/Volume Action       03/29/24  1321 50 mL Given               barium sulfate (Varibar Pudding) 40 % (w/v), 30% (w/w) oral paste 15 mL (mL) Total volume:  15 mL Dosing weight:  102      Date/Time Rate/Dose/Volume Action       03/29/24  1321 15 mL Given               hydrALAZINE (Apresoline) tablet 50 mg (mg) Total dose:  400 mg* Dosing weight:  102   *Administration not included in total     Date/Time Rate/Dose/Volume Action       03/30/24  2100 50 mg Given     03/31/24  0847 50 mg Given      2251 50 mg Given     04/01/24  0939 *50 mg Missed      2100 *50 mg Missed     04/02/24  1015 50 mg Given      2313 50 mg Given     04/03/24  0943 50 mg Given      2148 50 mg Given     04/04/24  0836 50 mg Given               potassium chloride (Klor-Con) packet 20 mEq (mEq) Total dose:  240 mEq* Dosing weight:  102   *Administration not included in total     Date/Time Rate/Dose/Volume Action       03/30/24  1515 *20 mEq Missed      2100 20 mEq Given     03/31/24  0848 20 mEq Given      1526 20 mEq Given      2251 20 mEq Given     04/01/24  0939 20 mEq Given      1500 *20 mEq Missed       2100 *20 mEq Missed     04/02/24  1015 20 mEq Given      1615 20 mEq Given      2313 20 mEq Given     04/03/24  0943 20 mEq Given      1634 20 mEq Given      2148 20 mEq Given     04/04/24  0836 20 mEq Given               sodium chloride 0.45 % infusion (mL/hr) Total volume:  341.67 mL* Dosing weight:  102   *From user-documented volume     Date/Time Rate/Dose/Volume Action       03/31/24  0905 50 mL/hr New Bag      1522 50 mL/hr - 314.17 mL Rate Verify      1555 27.5 mL Stopped               dextrose 5 % in water (D5W) infusion (mL/hr) Total volume:  3,400 mL* Dosing weight:  102   *From user-documented volume     Date/Time Rate/Dose/Volume Action       03/31/24  1527 50 mL/hr New Bag      1556 50 mL/hr - 24.17 mL Rate Verify     04/01/24  1423 75 mL/hr Rate Change      2113 75 mL/hr Rate Verify     04/02/24  0147 75 mL/hr New Bag      0331 75 mL/hr - 2,107.5 mL Rate Verify      1614 75 mL/hr New Bag      2344 868.33 mL      04/03/24  0312 260 mL       0448 75 mL/hr New Bag      0504 140 mL       1131 100 mL/hr Rate Change      1740 100 mL/hr New Bag     04/04/24  0842 100 mL/hr New Bag               metoprolol tartrate (Lopressor) injection 5 mg (mg) Total dose:  5 mg Dosing weight:  102      Date/Time Rate/Dose/Volume Action       04/01/24  1606 5 mg Given               potassium chloride 20 mEq in 100 mL IV premix (mL/hr) Total volume:  Not documented* Dosing weight:  102   *Total volume has not been documented. View each administration to see the amount administered.     Date/Time Rate/Dose/Volume Action       04/01/24  1606 20 mEq - 50 mL/hr (over 120 min) New Bag      1759  (over 120 min) Stopped      1802 20 mEq - 50 mL/hr (over 120 min) New Bag      1945  (over 120 min) Stopped               potassium chloride 20 mEq in 100 mL IV premix (mL/hr) Total volume:  Not documented* Dosing weight:  99.8   *Total volume has not been documented. View each administration to see the amount administered.     Date/Time  Rate/Dose/Volume Action       04/02/24  1112 20 mEq - 50 mL/hr (over 120 min) New Bag               lidocaine PF (Xylocaine) 10 mg/mL (1 %) injection (mL) Total volume:  20 mL      Date/Time Rate/Dose/Volume Action       04/02/24  1516 10 mL Given     04/04/24  1233 10 mL Given                   No specimens collected      See detailed result report with images in PACS.    The patient tolerated the procedure well without incident or complication and is in stable condition.

## 2024-04-05 LAB
ANION GAP SERPL CALC-SCNC: 12 MMOL/L (ref 10–20)
BACTERIA FLD CULT: NORMAL
BUN SERPL-MCNC: 25 MG/DL (ref 6–23)
CALCIUM SERPL-MCNC: 7.3 MG/DL (ref 8.6–10.3)
CHLORIDE SERPL-SCNC: 101 MMOL/L (ref 98–107)
CO2 SERPL-SCNC: 30 MMOL/L (ref 21–32)
CREAT SERPL-MCNC: 1.41 MG/DL (ref 0.5–1.3)
EGFRCR SERPLBLD CKD-EPI 2021: 49 ML/MIN/1.73M*2
ERYTHROCYTE [DISTWIDTH] IN BLOOD BY AUTOMATED COUNT: 17 % (ref 11.5–14.5)
GLUCOSE SERPL-MCNC: 171 MG/DL (ref 74–99)
GRAM STN SPEC: NORMAL
GRAM STN SPEC: NORMAL
HCT VFR BLD AUTO: 41.8 % (ref 41–52)
HGB BLD-MCNC: 12 G/DL (ref 13.5–17.5)
HOLD SPECIMEN: NORMAL
HOLD SPECIMEN: NORMAL
MCH RBC QN AUTO: 25.8 PG (ref 26–34)
MCHC RBC AUTO-ENTMCNC: 28.7 G/DL (ref 32–36)
MCV RBC AUTO: 90 FL (ref 80–100)
NRBC BLD-RTO: 0 /100 WBCS (ref 0–0)
PLATELET # BLD AUTO: 70 X10*3/UL (ref 150–450)
POTASSIUM SERPL-SCNC: 4.4 MMOL/L (ref 3.5–5.3)
RBC # BLD AUTO: 4.65 X10*6/UL (ref 4.5–5.9)
SODIUM SERPL-SCNC: 139 MMOL/L (ref 136–145)
WBC # BLD AUTO: 6.9 X10*3/UL (ref 4.4–11.3)

## 2024-04-05 PROCEDURE — 2500000001 HC RX 250 WO HCPCS SELF ADMINISTERED DRUGS (ALT 637 FOR MEDICARE OP): Performed by: INTERNAL MEDICINE

## 2024-04-05 PROCEDURE — 99232 SBSQ HOSP IP/OBS MODERATE 35: CPT | Performed by: NURSE PRACTITIONER

## 2024-04-05 PROCEDURE — 2500000002 HC RX 250 W HCPCS SELF ADMINISTERED DRUGS (ALT 637 FOR MEDICARE OP, ALT 636 FOR OP/ED): Performed by: INTERNAL MEDICINE

## 2024-04-05 PROCEDURE — 94667 MNPJ CHEST WALL 1ST: CPT

## 2024-04-05 PROCEDURE — 99232 SBSQ HOSP IP/OBS MODERATE 35: CPT | Performed by: STUDENT IN AN ORGANIZED HEALTH CARE EDUCATION/TRAINING PROGRAM

## 2024-04-05 PROCEDURE — 94640 AIRWAY INHALATION TREATMENT: CPT | Mod: MUE

## 2024-04-05 PROCEDURE — 36415 COLL VENOUS BLD VENIPUNCTURE: CPT | Performed by: FAMILY MEDICINE

## 2024-04-05 PROCEDURE — 92526 ORAL FUNCTION THERAPY: CPT | Mod: GN

## 2024-04-05 PROCEDURE — 99231 SBSQ HOSP IP/OBS SF/LOW 25: CPT | Performed by: NURSE PRACTITIONER

## 2024-04-05 PROCEDURE — 82374 ASSAY BLOOD CARBON DIOXIDE: CPT | Performed by: FAMILY MEDICINE

## 2024-04-05 PROCEDURE — 2500000004 HC RX 250 GENERAL PHARMACY W/ HCPCS (ALT 636 FOR OP/ED): Performed by: INTERNAL MEDICINE

## 2024-04-05 PROCEDURE — 85027 COMPLETE CBC AUTOMATED: CPT | Performed by: FAMILY MEDICINE

## 2024-04-05 PROCEDURE — 99232 SBSQ HOSP IP/OBS MODERATE 35: CPT | Performed by: INTERNAL MEDICINE

## 2024-04-05 PROCEDURE — 1200000002 HC GENERAL ROOM WITH TELEMETRY DAILY

## 2024-04-05 RX ADMIN — METOPROLOL SUCCINATE 150 MG: 50 TABLET, EXTENDED RELEASE ORAL at 20:58

## 2024-04-05 RX ADMIN — POTASSIUM CHLORIDE 20 MEQ: 1.5 POWDER, FOR SOLUTION ORAL at 17:49

## 2024-04-05 RX ADMIN — POLYETHYLENE GLYCOL 3350 17 G: 17 POWDER, FOR SOLUTION ORAL at 09:29

## 2024-04-05 RX ADMIN — DOXAZOSIN 8 MG: 2 TABLET ORAL at 20:58

## 2024-04-05 RX ADMIN — POTASSIUM CHLORIDE 20 MEQ: 1.5 POWDER, FOR SOLUTION ORAL at 09:23

## 2024-04-05 RX ADMIN — LOSARTAN POTASSIUM 50 MG: 50 TABLET, FILM COATED ORAL at 09:23

## 2024-04-05 RX ADMIN — FINASTERIDE 5 MG: 5 TABLET, FILM COATED ORAL at 09:23

## 2024-04-05 RX ADMIN — POTASSIUM CHLORIDE 20 MEQ: 1.5 POWDER, FOR SOLUTION ORAL at 20:58

## 2024-04-05 RX ADMIN — HYDRALAZINE HYDROCHLORIDE 50 MG: 50 TABLET ORAL at 20:58

## 2024-04-05 RX ADMIN — DOCUSATE SODIUM 100 MG: 100 CAPSULE, LIQUID FILLED ORAL at 09:23

## 2024-04-05 RX ADMIN — HYDRALAZINE HYDROCHLORIDE 50 MG: 50 TABLET ORAL at 09:22

## 2024-04-05 RX ADMIN — IPRATROPIUM BROMIDE AND ALBUTEROL SULFATE 3 ML: 2.5; .5 SOLUTION RESPIRATORY (INHALATION) at 20:25

## 2024-04-05 RX ADMIN — ATORVASTATIN CALCIUM 20 MG: 20 TABLET, FILM COATED ORAL at 20:58

## 2024-04-05 RX ADMIN — DEXTROSE AND SODIUM CHLORIDE 70 ML/HR: 5; 450 INJECTION, SOLUTION INTRAVENOUS at 03:06

## 2024-04-05 RX ADMIN — CYANOCOBALAMIN TAB 500 MCG 500 MCG: 500 TAB at 09:22

## 2024-04-05 RX ADMIN — APIXABAN 2.5 MG: 2.5 TABLET, FILM COATED ORAL at 20:58

## 2024-04-05 RX ADMIN — DOCUSATE SODIUM 100 MG: 100 CAPSULE, LIQUID FILLED ORAL at 20:58

## 2024-04-05 RX ADMIN — EMPAGLIFLOZIN 25 MG: 10 TABLET, FILM COATED ORAL at 09:23

## 2024-04-05 RX ADMIN — METOPROLOL SUCCINATE 150 MG: 50 TABLET, EXTENDED RELEASE ORAL at 09:22

## 2024-04-05 RX ADMIN — IPRATROPIUM BROMIDE AND ALBUTEROL SULFATE 3 ML: 2.5; .5 SOLUTION RESPIRATORY (INHALATION) at 08:04

## 2024-04-05 RX ADMIN — APIXABAN 2.5 MG: 2.5 TABLET, FILM COATED ORAL at 09:23

## 2024-04-05 ASSESSMENT — COGNITIVE AND FUNCTIONAL STATUS - GENERAL
DRESSING REGULAR LOWER BODY CLOTHING: A LOT
DRESSING REGULAR UPPER BODY CLOTHING: TOTAL
STANDING UP FROM CHAIR USING ARMS: A LOT
TURNING FROM BACK TO SIDE WHILE IN FLAT BAD: A LOT
TOILETING: TOTAL
MOVING TO AND FROM BED TO CHAIR: A LOT
CLIMB 3 TO 5 STEPS WITH RAILING: TOTAL
WALKING IN HOSPITAL ROOM: TOTAL
STANDING UP FROM CHAIR USING ARMS: A LOT
PERSONAL GROOMING: A LOT
EATING MEALS: A LITTLE
DAILY ACTIVITIY SCORE: 8
WALKING IN HOSPITAL ROOM: TOTAL
HELP NEEDED FOR BATHING: TOTAL
MOBILITY SCORE: 12
TURNING FROM BACK TO SIDE WHILE IN FLAT BAD: A LOT
TOILETING: TOTAL
EATING MEALS: A LITTLE
DRESSING REGULAR UPPER BODY CLOTHING: TOTAL
DAILY ACTIVITIY SCORE: 10
DRESSING REGULAR LOWER BODY CLOTHING: TOTAL
MOBILITY SCORE: 12
CLIMB 3 TO 5 STEPS WITH RAILING: TOTAL
HELP NEEDED FOR BATHING: TOTAL
PERSONAL GROOMING: TOTAL
MOVING TO AND FROM BED TO CHAIR: A LOT

## 2024-04-05 ASSESSMENT — PAIN - FUNCTIONAL ASSESSMENT
PAIN_FUNCTIONAL_ASSESSMENT: 0-10

## 2024-04-05 ASSESSMENT — PAIN SCALES - GENERAL
PAINLEVEL_OUTOF10: 0 - NO PAIN

## 2024-04-05 ASSESSMENT — PAIN SCALES - PAIN ASSESSMENT IN ADVANCED DEMENTIA (PAINAD)
FACIALEXPRESSION: SMILING OR INEXPRESSIVE
BREATHING: NORMAL
TOTALSCORE: 0
BODYLANGUAGE: RELAXED
CONSOLABILITY: NO NEED TO CONSOLE

## 2024-04-05 NOTE — PROGRESS NOTES
[place] is a 83 y.o. male on day 14 of admission presenting with Pneumonia of right lung due to infectious organism, unspecified part of lung.  Patient has required thoracentesis, most recently on the left side for  450 ml.  Palliative care on board for goals of care.  Family met with hospice last evening but have decided to pursue a skilled stay if possible.  PT and OT are already involved.  There has also been issue of restlessness, pulling off 02, IV and patient has required mitts to allow for these interventions.  Vistaril started yesterday to address restlessness as a PRN, has not yet been used.     Denies headache, dizziness, vision changes  Denies oral or throat pain, difficulty swallowing  Denies chest pain, palpitations, lower extremity edema  Denies SOB, cough  Denies ABD pain, nausea/vomiting/constipation/diarrhea. Family at bedside reports good appetite  Denies dysuria, frequency, urgency, flank pain  Denies pain to shoulders, neck, back, hips, knees  Denies itching, rashes, or skin lesions  Denies mood changes, sleep problems, anhedonia, anxiety.  He does endorse feeling mixed up.       Objective     Physical Exam  Constitutional:       Appearance: Normal appearance.      Comments: Physically a bit restless, wiggling in bed.     HENT:      Head: Normocephalic and atraumatic.      Mouth/Throat:      Mouth: Mucous membranes are moist.      Pharynx: Oropharynx is clear.   Eyes:      Conjunctiva/sclera: Conjunctivae normal.      Pupils: Pupils are equal, round, and reactive to light.   Cardiovascular:      Rate and Rhythm: Normal rate and regular rhythm.   Pulmonary:      Effort: Pulmonary effort is normal. No respiratory distress.      Comments: Lungs diminished bilaterally  Abdominal:      General: Bowel sounds are normal.      Palpations: Abdomen is soft.      Tenderness: There is no abdominal tenderness.   Musculoskeletal:         General: Normal range of motion.      Cervical back: Normal range  of motion.   Skin:     General: Skin is warm and dry.   Neurological:      Mental Status: He is alert and oriented to person, place, and time.   Psychiatric:         Mood and Affect: Mood normal.         Last Recorded Vitals  Blood pressure (!) 141/91, pulse 92, temperature 36.4 °C (97.5 °F), temperature source Temporal, resp. rate 18, weight 100 kg (221 lb 1.9 oz), SpO2 98 %.  Intake/Output last 3 Shifts:  I/O last 3 completed shifts:  In: 1237.3 (12.3 mL/kg) [I.V.:1087.3 (10.8 mL/kg); IV Piggyback:150]  Out: 650 (6.5 mL/kg) [Urine:650 (0.2 mL/kg/hr)]  Weight: 100.3 kg     Relevant Results  US thoracentesis    Result Date: 4/4/2024  Interpreted By:  Jair Valdes, STUDY: US THORACENTESIS4/4/20243:09 pm   INDICATION: Signs/Symptoms:L pleural effusion left sided pleural effusion   COMPARISON: US thoracentesis 4/2/2024   ACCESSION NUMBER(S): [id number]   ORDERING CLINICIAN: OTF DEL ROSARIO   TECHNIQUE: INTERVENTIONALIST(S): Jair Valdes   CONSENT: The patient/patient's POA/next of kin was informed of the nature of the proposed procedure. The purposes, alternatives, risks, and benefits were explained and discussed. All questions were answered and consent was obtained.   SEDATION: None   MEDICATION/CONTRAST: 1% lidocaine was used to anesthetize subcutaneously.   TIME OUT: A time out was performed immediately prior to procedure start with the interventional team, correctly identifying the patient name, date of birth, MRN, procedure, anatomy (including marking of site and side), patient position, procedure consent form, relevant laboratory and imaging test results, antibiotic administration, safety precautions, and procedure-specific equipment needs.   FINDINGS: The patient was placed in the sitting position.   The pleural space was examined with grey scale ultrasound, and the most accessible fluid identified and marked for thoracentesis.   The skin was prepped and draped in usual manner. Local anesthesia with lidocaine  was administered and a left-sided thoracentesis was performed.  A 5 [place] One-Step Valved thoracentesis needle/catheter was then placed where marked. Approximately 450 mL of  xiang colored fluid was removed.  The needle/catheter was then withdrawn.   The patient tolerated the procedure well and there were no immediate complications.       Uneventful thoracentesis, as detailed above. Left pleural space, 450 mL   I personally performed and/or directly supervised this study and was present for the entire procedure.   Performed and dictated at [facility].   Signed by: Ministerio Valdes 4/4/2024 3:10 PM Dictation workstation:   [id number]    XR chest 1 view    Result Date: 4/4/2024  Interpreted By:  Aimee Dc, STUDY: XR CHEST 1 VIEW;  4/4/2024 12:57 pm   INDICATION: Signs/Symptoms:s/p left chest xray.   COMPARISON: 04/02/2024   ACCESSION NUMBER(S): [id number]   ORDERING CLINICIAN: MINISTERIO VALDES   FINDINGS: Artifact from overlying monitoring leads noted. Improved left basilar opacity. Hazy right basilar opacity. Pleural angles are sharp. No pneumothorax. The cardiac silhouette is mildly enlarged.       Hazy right basilar infiltrate. Improved left basilar opacity from 2 days prior. No pneumothorax.   MACRO: None.   Signed by: Aimee Dc 4/4/2024 1:31 PM Dictation workstation:   [id number]    XR chest 2 views    Result Date: 4/2/2024  Interpreted By:  Juve Mahan, STUDY: XR CHEST 2 VIEWS;  4/2/2024 9:29 pm   INDICATION: Signs/Symptoms:rt pleural effusion; s/p thoracemtesis.   COMPARISON: 04/01/2024   ACCESSION NUMBER(S): [id number]   ORDERING CLINICIAN: ALBA WHELAN   FINDINGS:     Redemonstration cardiomegaly. Atherosclerotic aorta. Decreased size of right pleural effusion. However there is interval enlargement of a left pleural effusion. There is worsening airspace disease at the left base representing consolidation or atelectasis.   There is no pneumothorax.       Worsening  left pleural effusion and left basilar airspace disease, either atelectasis or consolidation, including relating to aspiration in the appropriate clinical context.   Decreased size of right pleural effusion without evidence of pneumothorax.   MACRO: None.   Signed by: Juve Mahan 4/2/2024 10:04 PM Dictation workstation:   [id number]    US thoracentesis    Result Date: 4/2/2024  Interpreted By:  Nata Eller, STUDY: US THORACENTESIS; 4/2/20244:10 pm   INDICATION: Signs/Symptoms:increasing right pleural effusion   COMPARISON: None.   ACCESSION NUMBER(S): [id number]   ORDERING CLINICIAN: ALBA WHELAN   TECHNIQUE: INTERVENTIONALIST: Nata Eller   CONSENT: The patient/patient's POA/next of kin was informed of the nature of the proposed procedure. Risks were discussed including but not limited to bleeding, infection, pain at insertion site, or pneumo/hemothorax requiring chest tube placement. Purpose of treatment and alternative options were also reviewed. All questions were answered and patient agreed to proceed.   SEDATION: None   MEDICATION/CONTRAST: Lidocaine 1%.   TIME OUT: A time out was performed immediately prior to procedure start with the interventional team, correctly identifying the patient name, date of birth, MRN, procedure, anatomy (including marking of site and side), patient position, procedure consent form, relevant laboratory and imaging test results, antibiotic administration, safety precautions, and procedure-specific equipment needs.   FINDINGS: The patient was placed in the left lateral decubitus position.   The patient's right pleural space was scanned using the ultrasound probe. The area of fluid most amenable to drainage was marked. Color doppler was used to assess for vasculature in the intended field.   The patient's skin was sterilized using chlorhexidine and draped in sterile manner. The skin was anesthestized with 1% lidocaine.  Under real time ultrasound guidance, a  5F valved centesis catheter was inserted into the right pleural space where previously marked. A total of 750 mL of clear yellow pleural fluid was removed. The catheter was then removed and a sterile op site was placed over the insertion site. Sterile technique used throughout entirety of procedure.   Specimens were obtained, labeled and sent to lab with requisitions ordered by primary team.   The patient tolerated the procedure well and there were no immediate complications.       Uneventful thoracentesis, as detailed above: Right Pleural space, 750 mL   Performed and dictated at [facility].   Signed by: Nata Eller 4/2/2024 4:11 PM Dictation workstation:   [id number]    XR chest 1 view    Result Date: 4/1/2024  Interpreted By:  Alex Alvarez, STUDY: XR CHEST 1 VIEW; 4/1/2024 4:55 am   INDICATION: Signs/Symptoms:b/l pleural effusions   COMPARISON: Radiographs 03/22/2024   ACCESSION NUMBER(S): [id number]   ORDERING CLINICIAN: ALBA WHELAN   TECHNIQUE: Single frontal view of the chest performed.   FINDINGS: LINES AND DEVICES: None.   LUNGS: Stable moderate to large right and small left layering pleural effusions. Increased interstitial opacities bilaterally. No pneumothorax.   CARDIOMEDIASTINAL SILHOUETTE: Stable cardiomegaly.       Stable to slightly increased bilateral pleural effusions with possible superimposed worsening edema and/or airspace disease.   MACRO None   Signed by: Aelx Alvarez 4/1/2024 8:18 AM Dictation workstation:   [id number]    US renal complete    Result Date: 3/30/2024  Interpreted By:  Yanira Menjivar, STUDY: US RENAL COMPLETE;  3/30/2024 3:39 pm   INDICATION: Signs/Symptoms:arcelia r/o obstruction.   COMPARISON: None.   ACCESSION NUMBER(S): [id number]   ORDERING CLINICIAN: FRANCIE AMBROCIO   TECHNIQUE: Multiple images of the kidneys were obtained.   FINDINGS: RIGHT KIDNEY: 12.5 cm in length. No hydronephrosis. No focal renal abnormality.  Lobulated contour. Increased renal cortical echogenicity.   LEFT KIDNEY: 12.2 cm in length. No hydronephrosis. No focal renal abnormality. Lobulated contour. Increased renal cortical echogenicity.   BLADDER: Slightly irregular bladder contour, nonspecific. Estimated prostate volume 19 mL, normal. Bilateral ureteral jets identified.   Other: Small volume ascites.       Lobulated kidneys with increased cortical echogenicity suggestive of medical renal disease. No hydronephrosis.   Signed by: Yanira Menjivar 3/30/2024 5:23 PM Dictation workstation:   [id number]    FL modified barium swallow study    Result Date: 3/29/2024  Interpreted By:  Juve Barrios and Paulus Lindsey STUDY: FL MODIFIED BARIUM SWALLOW STUDY;; 3/29/2024 1:01 pm   INDICATION: Signs/Symptoms:r/o aspiration.   COMPARISON: None.   ACCESSION NUMBER(S): [id number]   ORDERING CLINICIAN: OTF DEL ROSARIO   TECHNIQUE: MBSS completed. Informed verbal consent obtained prior to completion of exam. Trials of thin, nectar thick, honey thick, puree, and regular solids given. Fluoroscopy time :  2 minutes, 29 seconds.   SLP: Cassidy Clark Phone/Pager:   SPEECH FINDINGS: Reason for referral: Concern for aspiration Patient hx: DM, HTN, HLD Respiratory status: WFL Previous diet: Reg/Th   FINAL SPEECH RECOMMENDATIONS   Diet recommendations/feeding strategies: Regular diet/thin liquids - upright posture for PO, slow rate, small bites/sips   Follow-up speech therapy recommended: Yes.     Education provided: Yes.   Mechanics of the swallow summary: *Oral phase: Decreased labial seal with anterior spillage. Poor cohesive bolus hold with liquid pooling in sulci and spillage anteriorly after swallow completed. Delayed swallow onset with spillage over base of tongue. Mildly prolonged mastication 2/2 with eventual oral clearance. *Pharyngeal phase: Poor visualization of pharynx 2/2 pt positioning in chair and decreased participation in attempt at repositioning.  Delayed  swallow with spillage to the pyriform with thin liquids prior to swallow initiation and to the valleculae with all other trials. Functional hyolaryngeal elevation/excursion. Delayed but complete epiglottic inversion and laryngeal vestibule closure. No observed penetration or aspiration, however poor visualization of laryngeal vestibule and trachea, though seemingly no column of contrast coating posterior epiglottis. Mild pooling in valleculae and pyriform after swallow, cleared with subsequent swallows or cued double swallow. *Esophageal phase: Retention in esophagus noted   SLP impressions with severity rating: Mild oropharyngeal dysphagia   Thin Liquids (MBSS) Rosenbek's Penetration Aspiration Scale, Thin Liquids (MBSS): 1. NO ASPIRATION & NO PENETRATION - no aspiration, contrast does not enter airway     Nectar Thick Liquids (MBSS) Rosenbek's Penetration Aspiration Scale, Nectar thick liquids (MBSS): 1. NO ASPIRATION & NO PENETRATION - no aspiration, contrast does not enter airway     Purees (MBSS) Rosenbek's Penetration Aspiration Scale, Purees (MBSS): 1. NO ASPIRATION & NO PENETRATION - no aspiration, contrast does not enter airway   Solids (MBSS) Rosenbek's Penetration Aspiration Scale, Solids (MBSS): 1. NO ASPIRATION & NO PENETRATION - no aspiration, contrast does not enter airway   Speech Therapy section of this report signed by Cassidy Clrak MA.CCC-SLP on 3/29/2024 at 3:41 pm.   RADIOLOGY FINDINGS: As above.   Radiology section of this report signed by Juve Barrios MD.       No aspiration or penetration of the airway. Please see above for details.   MACRO: None   Signed by: Juve Barrios 3/29/2024 4:15 PM Dictation workstation:   [id number]    Electrocardiogram, 12-lead PRN ACS symptoms    Result Date: 3/28/2024  Sinus rhythm with short AZ with occasional Premature ventricular complexes and Fusion complexes Nonspecific intraventricular block T wave abnormality, consider lateral ischemia Abnormal ECG  When compared with ECG of 22-MAR-2024 12:03, Previous ECG has undetermined rhythm, needs review    CT chest wo IV contrast    Result Date: 3/28/2024  Interpreted By:  Aimee Dc, STUDY: CT CHEST WO IV CONTRAST;  3/28/2024 3:37 pm   INDICATION: Signs/Symptoms:SOB.   COMPARISON: None.   ACCESSION NUMBER(S): [id number]   ORDERING CLINICIAN: LEA CSATRO   TECHNIQUE: CT of the chest was performed. Sagittal and coronal reconstructions were generated.  No intravenous contrast given for the examination.   FINDINGS: Hilar, vessel, and solid organ evaluation limited without IV contrast. Artifact related to motion and patient's body habitus limits evaluation.   CHEST WALL AND LOWER NECK: Edema in the lower chest wall bilaterally, right-greater-than-left. 4.6 x 2.1 cm fat attenuation lesion containing fine septation anterior to the left clavicular head. Additional incompletely included 2 cm fat attenuation nodule lateral to the right scapula and another subcentimeter fat attenuation nodule along the inferomedial left scapula. 2.0 x 1.3 cm hypodense nodule in the right lobe of the thyroid. Subcentimeter hypodense nodule in the left lobe of the thyroid.   MEDIASTINUM AND ELLE:  Limited hilar assessment on unenhanced exam. Subcentimeter lymph nodes in the anterior and middle mediastinum. Small hiatal hernia.   HEART AND VESSELS:  Lack of IV contrast precludes vascular luminal assessment. The heart is enlarged. No significant pericardial effusion. Multifocal atherosclerotic calcifications including the coronary arteries and aortic valve region.   LUNGS, PLEURA, LARGE AIRWAYS:  Respiratory motion artifact limits evaluation. Uneven bilateral interstitial opacities including subtle ground-glass and reticular densities in both upper lobes, bandlike densities in both lung bases and patchy opacities in the posterior aspect of both lungs. Moderate to large right pleural effusion. Moderate left pleural effusion. Adjacent presumed  compressive atelectasis of both lungs. No definite pneumothorax. The central airways are grossly patent allowing for motion artifact.   UPPER ABDOMEN:  Moderate free fluid. Stranding in the mesentery. Cholecystectomy clips near the liver hilum. Partially imaged soft tissue attenuation 4.4 cm rounded opacity in the region of the upper pole of the right kidney on the most caudal image.   BONES:  Kyphosis of the midthoracic spine. Multifocal presumed degenerative changes.       Cardiomegaly with bilateral infiltrates, right-greater-than-left pleural effusions, soft tissue edema, and free fluid. Findings are consistent with fluid overload/CHF. Superimposed pneumonia not excluded. Clinical correlation and follow-up to ensure resolution after appropriate treatment recommended.   Partially imaged indeterminate masslike area in the upper pole of the right kidney. Further evaluation which could include ultrasound or abdominal CT recommended.   2 cm hypodense nodule in the right lobe of the thyroid, can not be further characterized by CT. Further evaluation with ultrasound suggested.   Bilateral chest wall fat attenuation lesions, possible lipomas; largest lesion in the left anterior chest wall is slightly complex with thin septation. Low-grade liposarcoma can not be excluded based on imaging appearance. Clinical follow-up suggested.   Additional findings as described above.   MACRO: None.   Signed by: Aimee Dc 3/28/2024 3:55 PM Dictation workstation:   [id number]    NM Lung perfusion with spect/ct    Result Date: 3/22/2024  Interpreted By:  Cameron Howard and Osman Sena STUDY: NM LUNG PERFUSION WITH SPECT/CT;  3/22/2024 3:02 pm   INDICATION: Signs/Symptoms: 83-year-old male with hypoxia elevated d dimer. Chest x-ray on 03/22/2024 demonstrate right basilar airspace consolidation.   COMPARISON: Chest x-ray from 03/22/2024   ACCESSION NUMBER(S): [id number]   ORDERING CLINICIAN: RANDAL VALENTIN   TECHNIQUE:  DIVISION OF NUCLEAR MEDICINE PERFUSION LUNG SCANS   Multiple perfusion images of the lungs were acquired after the intravenous administration of 4.0 mCi of Tc-99m macroaggregated albumin (MAA). In addition, SPECT/CT of the chest was performed.   FINDINGS: Perfusion images of both lungs demonstrate mild heterogeneity throughout the lung fields bilaterally. There are no distinct segmental perfusion defects seen. A large perfusion defect at right lower lung matching chest x-ray finding of right basilar airspace consolidation.       There is nonspecific heterogeneity in perfusion of both lungs indicating low probability for acute pulmonary embolism.     The interpretation above is based on modified PISAPED criteria.   This study was analyzed and interpreted at [place], [place].   MACRO: None   Signed by: Cameron Howard 3/22/2024 4:10 PM Dictation workstation:   [id number]    ECG 12 lead    Result Date: 3/22/2024  See ED provider note for full interpretation and clinical correlation Confirmed by Megan Hatfield ([id number]) on 3/22/2024 4:02:11 PM    Vascular US Lower Extremity Venous Duplex Bilateral    Result Date: 3/22/2024  Interpreted By:  Filippo Ngo, STUDY: [facility] US LOWER EXTREMITY VENOUS DUPLEX BILATERAL;  3/22/2024 2:32 pm   INDICATION: Signs/Symptoms:swelling.   COMPARISON: None.   ACCESSION NUMBER(S): [id number]   ORDERING CLINICIAN: RANDAL VALENTIN   TECHNIQUE: Vascular ultrasound of the bilateral lower extremities was performed. Real-time compression views as well as Gray scale, color Doppler and spectral Doppler waveform analysis was performed.   FINDINGS: Evaluation of the visualized portions of the bilateral common femoral, proximal, mid, and distal femoral, and popliteal veins was performed.  Evaluation of the visualized portions of the posterior tibial and peroneal veins was also performed.   Limitations: None   The evaluated veins demonstrate normal compressibility. There is  intact venous flow demonstrating normal respiratory variability and normal augmentation of flow with calf compression.         No sonographic evidence for deep vein thrombosis within the evaluated veins of the bilateral lower extremities.   MACRO: None   Signed by: Filippo Ngo 3/22/2024 2:44 PM Dictation workstation:   [id number]    XR chest 2 views    Result Date: 3/22/2024  Interpreted By:  Contreras Rdz, STUDY: XR CHEST 2 VIEWS  3/22/2024 1:49 pm   INDICATION: Signs/Symptoms:sob   COMPARISON: None.   ACCESSION NUMBER(S): [id number]   ORDERING CLINICIAN: RANDAL VALENTIN   TECHNIQUE: PA and lateral views of the chest were obtained.   FINDINGS: Cardiac monitoring leads are seen over the chest. Right basilar airspace consolidation is seen and may represent atelectasis and/or pneumonia. No pleural effusion or pneumothorax is identified. The cardiac silhouette is within normal limits for size.  Mild discogenic degenerative changes are seen throughout the thoracic spine.       Right basilar airspace consolidation, as above. Clinical correlation and continued follow-up until clearing is recommended.   MACRO: None.   Signed by: Contreras Rdz 3/22/2024 2:09 PM Dictation workstation:   [id number]    ECG 12 lead    Result Date: 3/8/2024  Atrial flutter with variable AV block Left ventricular hypertrophy with QRS widening and repolarization abnormality ( [place] product ) Abnormal ECG When compared with ECG of 07-FEB-2024 07:40, Atrial flutter has replaced Sinus rhythm QRS duration has increased See ED provider note for full interpretation and clinical correlation Confirmed by Megan Hatfield ([id number]) on 3/8/2024 12:03:18 PM    CT head wo IV contrast    Result Date: 3/7/2024  Interpreted By:  Korey Ward, STUDY: CT HEAD WO IV CONTRAST; CT CERVICAL SPINE WO IV CONTRAST;  3/7/2024 5:25 pm   INDICATION: Signs/Symptoms:frequent falls on xeralto; Signs/Symptoms:fall.   COMPARISON: None.   ACCESSION NUMBER(S):  [id number]; [id number]   ORDERING CLINICIAN: JACINTO GLASS   TECHNIQUE: Noncontrast axial CT scan of head and cervical spine was performed. Angled reformats in brain and bone windows were generated. The images were reviewed in bone, brain, blood and soft tissue windows.   FINDINGS: Head: There is no intra/extra-axial fluid collection, mass effect, or midline shift. The gray/white matter junction is preserved. Hypoattenuation of periventricular and subcortical white matter suggestive of chronic small vessel ischemic disease. Mild-to-moderate diffuse parenchymal volume loss is noted. There is vascular calcification. The basal cisterns are patent. Visualized paranasal sinuses and mastoid air cells are clear. The calvarium is intact.   Cervical spine: The bones are somewhat osteopenic. There is no acute fracture or traumatic subluxation in the cervical spine. Multilevel cervical spondylosis is seen. Prevertebral soft tissues are unremarkable. Vascular calcification is noted multiple thyroid nodules are seen measuring up to 2.2 cm. Ultrasound may be obtained for further evaluation. There is no apical pneumothorax. Layering right-sided pleural effusion is noted.       No evidence of acute cortical infarct or intracranial hemorrhage.   No acute fracture or subluxation in the cervical spine.   Thyroid nodules, amenable to further evaluation by ultrasound.   Layering right-sided pleural effusion   Signed by: Korey Ward 3/7/2024 6:06 PM Dictation workstation:   [id number]    CT cervical spine wo IV contrast    Result Date: 3/7/2024  Interpreted By:  Korey Ward, STUDY: CT HEAD WO IV CONTRAST; CT CERVICAL SPINE WO IV CONTRAST;  3/7/2024 5:25 pm   INDICATION: Signs/Symptoms:frequent falls on xeralto; Signs/Symptoms:fall.   COMPARISON: None.   ACCESSION NUMBER(S): [id number]; [id number]   ORDERING CLINICIAN: JACINTO GLASS   TECHNIQUE: Noncontrast axial CT scan of head and cervical spine was performed. Angled  reformats in brain and bone windows were generated. The images were reviewed in bone, brain, blood and soft tissue windows.   FINDINGS: Head: There is no intra/extra-axial fluid collection, mass effect, or midline shift. The gray/white matter junction is preserved. Hypoattenuation of periventricular and subcortical white matter suggestive of chronic small vessel ischemic disease. Mild-to-moderate diffuse parenchymal volume loss is noted. There is vascular calcification. The basal cisterns are patent. Visualized paranasal sinuses and mastoid air cells are clear. The calvarium is intact.   Cervical spine: The bones are somewhat osteopenic. There is no acute fracture or traumatic subluxation in the cervical spine. Multilevel cervical spondylosis is seen. Prevertebral soft tissues are unremarkable. Vascular calcification is noted multiple thyroid nodules are seen measuring up to 2.2 cm. Ultrasound may be obtained for further evaluation. There is no apical pneumothorax. Layering right-sided pleural effusion is noted.       No evidence of acute cortical infarct or intracranial hemorrhage.   No acute fracture or subluxation in the cervical spine.   Thyroid nodules, amenable to further evaluation by ultrasound.   Layering right-sided pleural effusion   Signed by: Korey Ward 3/7/2024 6:06 PM Dictation workstation:   [id number]    XR chest 1 view    Result Date: 3/7/2024  Interpreted By:  Tia Dillon, STUDY: XR CHEST 1 VIEW;  3/7/2024 4:14 pm   INDICATION: Signs/Symptoms:weakness fall.   COMPARISON: 02/04/2024   ACCESSION NUMBER(S): [id number]   ORDERING CLINICIAN: JACINTO GLASS   FINDINGS: CARDIOMEDIASTINAL SILHOUETTE: The heart is enlarged.     LUNGS: There is a small wedge-shaped right basilar infiltrate versus crowded vessels, seen previously on 02/02/2024. There is no congestion or pleural fluid.   ABDOMEN: No remarkable upper abdominal findings.     BONES: No acute osseous changes. Status post right shoulder  rotator cuff repair.       Equivocal right basilar infiltrate versus crowded pulmonary vessels right lung base medially.   MACRO: None   Signed by: Tia Dillon 3/7/2024 4:24 PM Dictation workstation:   [id number]    Results for orders placed or performed during the hospital encounter of 03/22/24 (from the past 24 hour(s))   Basic metabolic panel   Result Value Ref Range    Glucose 138 (H) 74 - 99 mg/dL    Sodium 143 136 - 145 mmol/L    Potassium 3.8 3.5 - 5.3 mmol/L    Chloride 102 98 - 107 mmol/L    Bicarbonate 34 (H) 21 - 32 mmol/L    Anion Gap 11 10 - 20 mmol/L    Urea Nitrogen 23 6 - 23 mg/dL    Creatinine 1.56 (H) 0.50 - 1.30 mg/dL    eGFR 44 (L) >60 mL/min/1.73m*2    Calcium 7.5 (L) 8.6 - 10.3 mg/dL   CBC   Result Value Ref Range    WBC 5.8 4.4 - 11.3 x10*3/uL    nRBC 0.0 0.0 - 0.0 /100 WBCs    RBC 4.76 4.50 - 5.90 x10*6/uL    Hemoglobin 12.4 (L) 13.5 - 17.5 g/dL    Hematocrit 43.4 41.0 - 52.0 %    MCV 91 80 - 100 fL    MCH 26.1 26.0 - 34.0 pg    MCHC 28.6 (L) 32.0 - 36.0 g/dL    RDW 17.1 (H) 11.5 - 14.5 %    Platelets 70 (L) 150 - 450 x10*3/uL   CBC   Result Value Ref Range    WBC 6.9 4.4 - 11.3 x10*3/uL    nRBC 0.0 0.0 - 0.0 /100 WBCs    RBC 4.65 4.50 - 5.90 x10*6/uL    Hemoglobin 12.0 (L) 13.5 - 17.5 g/dL    Hematocrit 41.8 41.0 - 52.0 %    MCV 90 80 - 100 fL    MCH 25.8 (L) 26.0 - 34.0 pg    MCHC 28.7 (L) 32.0 - 36.0 g/dL    RDW 17.0 (H) 11.5 - 14.5 %    Platelets 70 (L) 150 - 450 x10*3/uL    Scheduled medications  apixaban, 2.5 mg, oral, BID  atorvastatin, 20 mg, oral, Nightly  cyanocobalamin, 500 mcg, oral, Daily  docusate sodium, 100 mg, oral, BID  doxazosin, 8 mg, oral, Nightly  empagliflozin, 25 mg, oral, Daily  finasteride, 5 mg, oral, Daily  [Held by provider] furosemide, 60 mg, intravenous, BID  glimepiride, 2 mg, oral, Daily before breakfast  hydrALAZINE, 50 mg, oral, BID  ipratropium-albuteroL, 3 mL, nebulization, TID  losartan, 50 mg, oral, Daily  metoprolol succinate XL, 150 mg, oral,  BID  pantoprazole, 40 mg, oral, Daily before breakfast  polyethylene glycol, 17 g, oral, Daily  potassium chloride, 20 mEq, oral, TID      Continuous medications  dextrose 5%-0.45 % sodium chloride, 70 mL/hr, Last Rate: 70 mL/hr (04/05/24 0306)      PRN medications  PRN medications: acetaminophen **OR** acetaminophen **OR** acetaminophen, hydrOXYzine pamoate, ipratropium-albuteroL, loperamide, oxygen, oxygen     Assessment/Plan     83 year old man here with PNA, recurrent pleural effusions acute on chronic respiratory failure.  Care c/b by confusion, restlessness, which appears to be clearing a bit.  Patient still restless with sensation of needing to move legs, but not necessarily confused.      Palliative care/goals of care:  plan for SNF right now  -family aware they can access hospice care in the future   -continue with DNR/DNI, OH portable form on chart, please have it travel with patient on discharge    Restless legs syndrome:  has not yet received PRN Vistaril  -continue Vistaril PRN  -can consider Ropinirole if Vistaril ineffective or has too many anticholingeric AE           Lisa Loyola, APRN-CNP

## 2024-04-05 NOTE — PROGRESS NOTES
Nephrology Consult Progress Note    Admit Date: 3/22/2024    Interval history:  Pt is less confuse, looking at the menu    CURRENT MEDICATIONS:    Current Facility-Administered Medications:     acetaminophen (Tylenol) tablet 650 mg, 650 mg, oral, q4h PRN, 650 mg at 03/31/24 1800 **OR** acetaminophen (Tylenol) oral liquid 650 mg, 650 mg, oral, q4h PRN, 650 mg at 04/02/24 2310 **OR** acetaminophen (Tylenol) suppository 650 mg, 650 mg, rectal, q4h PRN, Juve Brown MD    apixaban (Eliquis) tablet 2.5 mg, 2.5 mg, oral, BID, Juve Brown MD, 2.5 mg at 04/05/24 0923    atorvastatin (Lipitor) tablet 20 mg, 20 mg, oral, Nightly, Juve Brown MD, 20 mg at 04/04/24 2142    cyanocobalamin (Vitamin B-12) tablet 500 mcg, 500 mcg, oral, Daily, Juve Brown MD, 500 mcg at 04/05/24 0922    dextrose 5%-0.45 % sodium chloride infusion, 70 mL/hr, intravenous, Continuous, Sascha Perez MD, Last Rate: 70 mL/hr at 04/05/24 1058, 70 mL/hr at 04/05/24 1058    docusate sodium (Colace) capsule 100 mg, 100 mg, oral, BID, Juve Brown MD, 100 mg at 04/05/24 0923    doxazosin (Cardura) tablet 8 mg, 8 mg, oral, Nightly, Mundo Holguin MD, 8 mg at 04/04/24 2143    empagliflozin (Jardiance) tablet 25 mg, 25 mg, oral, Daily, Juve Brown MD, 25 mg at 04/05/24 0923    finasteride (Proscar) tablet 5 mg, 5 mg, oral, Daily, Juve Brown MD, 5 mg at 04/05/24 0923    [Held by provider] furosemide (Lasix) injection 60 mg, 60 mg, intravenous, BID, Johanny Evangelista MD, 60 mg at 03/30/24 1658    glimepiride (Amaryl) tablet 2 mg, 2 mg, oral, Daily before breakfast, Juve Brown MD, 2 mg at 04/04/24 0700    hydrALAZINE (Apresoline) tablet 50 mg, 50 mg, oral, BID, Johanny Evangelista MD, 50 mg at 04/05/24 0922    hydrOXYzine pamoate (Vistaril) capsule 50 mg, 50 mg, oral, q6h PRN, VANCE Blake-CNP    ipratropium-albuteroL (Duo-Neb) 0.5-2.5 mg/3 mL nebulizer solution 3 mL, 3 mL, nebulization, q4h PRN,  Juve Brown MD, 3 mL at 03/29/24 1141    ipratropium-albuteroL (Duo-Neb) 0.5-2.5 mg/3 mL nebulizer solution 3 mL, 3 mL, nebulization, TID, Juve Brown MD, 3 mL at 04/05/24 0804    loperamide (Imodium) capsule 2 mg, 2 mg, oral, TID PRN, Juve Brown MD    losartan (Cozaar) tablet 50 mg, 50 mg, oral, Daily, Juve Brown MD, 50 mg at 04/05/24 0923    metoprolol succinate XL (Toprol-XL) 24 hr tablet 150 mg, 150 mg, oral, BID, Juve Brown MD, 150 mg at 04/05/24 0922    oxygen (O2) therapy, , inhalation, Continuous PRN - O2/gases, Kevon Gunderson MD, 2 L/min at 04/05/24 0804    oxygen (O2) therapy, , inhalation, Continuous PRN - O2/gases, Kevon Gunderson MD, 2 L/min at 04/04/24 1954    pantoprazole (ProtoNix) EC tablet 40 mg, 40 mg, oral, Daily before breakfast, Juve Brown MD, 40 mg at 04/04/24 0703    polyethylene glycol (Glycolax, Miralax) packet 17 g, 17 g, oral, Daily, Juve Brown MD, 17 g at 04/05/24 0929    potassium chloride (Klor-Con) packet 20 mEq, 20 mEq, oral, TID, Johanny Evangelista MD, 20 mEq at 04/05/24 0923       Intake/Output Summary (Last 24 hours) at 4/5/2024 1136  Last data filed at 4/5/2024 1058  Gross per 24 hour   Intake 1735.5 ml   Output --   Net 1735.5 ml         PHYSICAL EXAM:  BP (!) 141/91 (BP Location: Left arm, Patient Position: Lying)   Pulse 92   Temp 36.4 °C (97.5 °F) (Temporal)   Resp 18   Wt 100 kg (221 lb 1.9 oz)   SpO2 98%   BMI 30.85 kg/m²     Intake/Output Summary (Last 24 hours) at 4/5/2024 1136  Last data filed at 4/5/2024 1058  Gross per 24 hour   Intake 1735.5 ml   Output --   Net 1735.5 ml       Gen: Awake, alert, NAD  Neck: No JVD  Cardiac: RRR  Resp: clear BS  Abd: Soft, non tender, +BS, non distended   Ext: No edema   Neuro: moves 4 ext  Peripheral Pulses: weak peripheral pulses.  Skin: Skin color, texture, turgor normal, no suspicious rashes or lesions.    Labs:  Results for orders placed or performed during the hospital  encounter of 03/22/24 (from the past 24 hour(s))   CBC   Result Value Ref Range    WBC 6.9 4.4 - 11.3 x10*3/uL    nRBC 0.0 0.0 - 0.0 /100 WBCs    RBC 4.65 4.50 - 5.90 x10*6/uL    Hemoglobin 12.0 (L) 13.5 - 17.5 g/dL    Hematocrit 41.8 41.0 - 52.0 %    MCV 90 80 - 100 fL    MCH 25.8 (L) 26.0 - 34.0 pg    MCHC 28.7 (L) 32.0 - 36.0 g/dL    RDW 17.0 (H) 11.5 - 14.5 %    Platelets 70 (L) 150 - 450 x10*3/uL   Green Top   Result Value Ref Range    Extra Tube Hold for add-ons.           DATA:   Diagnostic tests reviewed for today's visit:    New labs and imaging     Assessment and Plan:  Pt admitted with pneumonia and acute on chronic CHF, persistent AF  - ROSE on CKD stage 3a: resolved ROSE, Scr stable at baseline   Euvolemic s/p diuretic tx, currently on hold  BP: acceptable control  Hypernatremia: due to water deficit, resolved with D5W and fluid changed to D5-1/2NS yesterday     PLAN:  - lowering ivf rate and OK to stop if appropriate po intake.. Supportive care, GOC discussion, replete K prn. Stable from my stand point     Will continue to follow.     Signature: Sascha Perez MD

## 2024-04-05 NOTE — CARE PLAN
Problem: Skin  Goal: Decreased wound size/increased tissue granulation at next dressing change  Outcome: Progressing  Flowsheets (Taken 4/5/2024 0650)  Decreased wound size/increased tissue granulation at next dressing change: Promote sleep for wound healing  Goal: Participates in plan/prevention/treatment measures  Outcome: Progressing     Problem: Diabetes  Goal: Achieve decreasing blood glucose levels by end of shift  Outcome: Progressing   The patient's goals for the shift include Good night's sleep    The clinical goals for the shift include pt will remain free from falls during the shift    Over the shift, the patient did not make progress toward the following goals. Barriers to progression include . Recommendations to address these barriers include .

## 2024-04-05 NOTE — PROGRESS NOTES
"New Castano is a 83 y.o. male on day 14 of admission presenting with Pneumonia of right lung due to infectious organism, unspecified part of lung.    Subjective   Patient seen at bedside, sleeping but awakens to voice. States he feels \"ok\" and denies dyspnea or cough. Unable to obtain further ROS as patient did not wish to talk.        Objective     Physical Exam  Constitutional:       General: He is not in acute distress.     Appearance: Normal appearance.   HENT:      Head: Normocephalic and atraumatic.      Nose: No rhinorrhea.      Mouth/Throat:      Mouth: Mucous membranes are moist.   Eyes:      Extraocular Movements: Extraocular movements intact.      Conjunctiva/sclera: Conjunctivae normal.      Pupils: Pupils are equal, round, and reactive to light.   Cardiovascular:      Rate and Rhythm: Normal rate. Rhythm irregular.      Heart sounds: No murmur heard.     No friction rub. No gallop.   Pulmonary:      Comments: Good air entry bilaterally, no wheezes, crackles or ronchi  Abdominal:      General: Bowel sounds are normal. There is no distension.      Palpations: Abdomen is soft.   Musculoskeletal:      Cervical back: Normal range of motion.      Right lower leg: Edema present.      Left lower leg: Edema present.   Skin:     General: Skin is warm and dry.      Findings: No rash.   Neurological:      General: No focal deficit present.      Mental Status: He is alert.         Last Recorded Vitals  Blood pressure 135/87, pulse 81, temperature 36.9 °C (98.4 °F), temperature source Temporal, resp. rate 18, weight 100 kg (221 lb 1.9 oz), SpO2 100 %.  Intake/Output last 3 Shifts:  I/O last 3 completed shifts:  In: 1237.3 (12.3 mL/kg) [I.V.:1087.3 (10.8 mL/kg); IV Piggyback:150]  Out: 650 (6.5 mL/kg) [Urine:650 (0.2 mL/kg/hr)]  Weight: 100.3 kg     Relevant Results       Results for orders placed or performed during the hospital encounter of 03/22/24 (from the past 24 hour(s))   CBC   Result Value Ref Range    WBC " 6.9 4.4 - 11.3 x10*3/uL    nRBC 0.0 0.0 - 0.0 /100 WBCs    RBC 4.65 4.50 - 5.90 x10*6/uL    Hemoglobin 12.0 (L) 13.5 - 17.5 g/dL    Hematocrit 41.8 41.0 - 52.0 %    MCV 90 80 - 100 fL    MCH 25.8 (L) 26.0 - 34.0 pg    MCHC 28.7 (L) 32.0 - 36.0 g/dL    RDW 17.0 (H) 11.5 - 14.5 %    Platelets 70 (L) 150 - 450 x10*3/uL   Green Top   Result Value Ref Range    Extra Tube Hold for add-ons.    Basic Metabolic Panel   Result Value Ref Range    Glucose 171 (H) 74 - 99 mg/dL    Sodium 139 136 - 145 mmol/L    Potassium 4.4 3.5 - 5.3 mmol/L    Chloride 101 98 - 107 mmol/L    Bicarbonate 30 21 - 32 mmol/L    Anion Gap 12 10 - 20 mmol/L    Urea Nitrogen 25 (H) 6 - 23 mg/dL    Creatinine 1.41 (H) 0.50 - 1.30 mg/dL    eGFR 49 (L) >60 mL/min/1.73m*2    Calcium 7.3 (L) 8.6 - 10.3 mg/dL   Lavender Top   Result Value Ref Range    Extra Tube Hold for add-ons.                       Assessment/Plan   Principal Problem:    Pneumonia of right lung due to infectious organism, unspecified part of lung    New Castano is a 83 y.o. male with past medical history of HTN, CHF, HLD, DMT2, A-fib (s/p cardioversion and ablation on Eliquis), CKD 3 who [place] to [facility] [place] on [place]22 for shortness of breath and generalized weakness for couple of days.  Family reports he was unable to get out of his chair.  Baseline is in room air but required 4 L per EMS.  CT on 3/28 showed cardiomegaly with bilateral infiltrates and pleural effusions (R> L) with soft tissue edema and free fluid consistent with fluid overload/CHF and unable to exclude superimposed pneumonia.  DVT and PE studies were negative.  He was admitted for acute exacerbation of CHF & pneumonia, treated with IV diuresis and Zosyn for HAP coverage. He required acute bipap and HFNC, however has been weaned to 3L.   PFT 10/2021: FEV1/FVC 0.69, FVC 3.03 (lower limit normal 3.3), DLCO 57% predicted; PFT is indicative of no obstruction suggestive of restriction with moderately decreased diffusion  capacity     Impressions:  # Acute hypoxic and hypercarbic respiratory failure: Patient hypoxic in the 80s per EMS placed on 4 L requiring max 5L during admission.  ABG on 3/30 with uncompensated respiratory acidosis with pCO2 of 87; likely reflective of acute systolic heart failure, restrictive lung disease with bilateral pleural effusion, cognitive decline with deconditioning; currently weaned to 3 L NC     #Pleural effusions, bilateral: due to volume overload. Fluid transudate, no concern for infection     #Acute on chronic HFpEF: Chest CT with cardiomegaly with bilateral infiltrates and bilateral pleural effusions with soft tissue edema and free fluid; BNP > 4700 (previous BNPs March, early> 4700, February 3956, December 48)     #OHS: BMI 31.28 with central obesity and abdominal fluid retention 2/2 HF exacerbation, pCO2 87 on arterial gas (baseline ~ 50s)     #Cognitive dysfunction, delirium: improved      Recommendations:  -Wean O2 goal 92-95%. Patient saturating 100% on 2L  -Continue DuoNebs   -BPH with Acapella   -Agree with palliative care consult and hospice discussion  -Appreciate SLP recommendations, given patient's fluctuating cognitive status, would only attempt to feed patient if he is fully awake and able to participate and follow directions      Patient improving significantly and pending discharge to SNF. No concern for infection in pleural effusions. Pulmonary will sign off at this time.            I spent 35 minutes in the professional and overall care of this patient.      Noemí Carlos, DO

## 2024-04-05 NOTE — PROGRESS NOTES
Speech-Language Pathology    Speech-Language Pathology Clinical Swallow Treatment    Patient Name: New Castano  MRN: [id number]  : 1940  Today's Date: 24  Start time: Start Time: 933  Stop time: Stop Time: 953  Time calculation (min) : Time Calculation (min): 20 min    ASSESSMENT  SLP TX Intervention Outcome: Making Progress Towards Goals  Treatment Tolerance: Patient tolerated treatment well    Impressions: no signs of penetration aspiration during this session    PLAN  Recommended solid consistency: Regular (IDDSI level 7)  Recommended liquid consistency: Thin (IDDSI 0)  Recommended medication administration: Whole  Safe swallow strategies: Upright positioning for all PO intake, Slow rate of intake, Small bites, and Small sips  Discharge recommendation: Recommend LOW intensity ST upon DC in order to ensure safety with least restrictive diet.  Inpatient/Swing Bed or Outpatient: Inpatient  SLP TX Plan: Continue Plan of Care  SLP Plan: Skilled SLP  SLP Frequency: 2x per week  Duration: 2 weeks  Next Treatment Priority: *  Discussed POC: Patient, Caregiver/family  Patient/Caregiver Agreeable: Yes  Next Treatment Priority: *    SUBJECTIVE  Prior to Session Communication: Bedside nurse  RN cleared pt to participate in session and reported patient appropriate  Respiratory status: Supplemental oxygen via NC  Positioning: Upright in bed  Pt was drowsy and pleasantly confused for session.    Pain Assessment  Pain Assessment: 0-10  Pain Score: 0 - No pain  Joshi-Baker FACES Pain Rating: No hurt  Pain Interventions: Medication (See MAR)  Response to Interventions: relief  PAINAD Score: 0    Orientation: Oriented to self  Ability to follow functional commands: Does not consistently follow commands despite cues    OBJECTIVE  Therapeutic Swallow  Therapeutic Swallow Intervention : PO Trials, Caregiver Education  Solid Diet Recommendations: Regular (IDDSI Level 7)  Liquid Diet Recommendations: Thin (IDDSI Level  0)  Swallow Comments: *    Treatment/Education:  Results and recommendations were relayed to: Patient and Bedside nurse  Education provided: Yes   Learner: Patient   Barriers to learning: Cognitive limitations barrier and Hearing impairment barrier   Method of teaching: Verbal   Topic: Role of ST, results of assessment, risk for aspiration, recommended diet modifications, recommendation for MBSS, recommended safe swallow strategies, and recommendation for dysphagia follow-up   Outcome of teaching: Education will be reinforced during follow-up visits, as appropriate and Needs reinforcement    Goals:    Pt. to tolerate least restrictive diet without pulmonary compromise, Pt. to use safe swallow strategies independently in all observed trials  Progressing

## 2024-04-05 NOTE — CARE PLAN
The patient's goals for the shift include Good night's sleep    The clinical goals for the shift include Patient to remain HDS, safe and free from falls this shift.    Over the shift, the patient did not make progress toward the following goals. Barriers to progression include Patient altered mental status. Recommendations to address these barriers include gentle reminders and redirecting.

## 2024-04-05 NOTE — PROGRESS NOTES
New Castano is a 83 y.o. male on day 12 of admission presenting with Pneumonia of right lung due to infectious organism, unspecified part of lung.      Subjective   No chest pain  Resting , confused, does have mittens  R thoracentesis on 04/02/2024  He is more interactive today     No chest pain       Objective     Last Recorded Vitals  /78 (BP Location: Left arm, Patient Position: Lying)   Pulse 83   Temp 36.5 °C (97.7 °F) (Temporal)   Resp 18   Wt 99.8 kg (220 lb 0.3 oz)   SpO2 94%   Intake/Output last 3 Shifts:      Admission Weight  Weight: 104 kg (230 lb) (03/27/24 1300)    Daily Weight  04/02/24 : 99.8 kg (220 lb 0.3 oz)    Image Results  US thoracentesis  Narrative: Interpreted By:  Jair Valdes,   STUDY:  US THORACENTESIS4/4/20243:09 pm      INDICATION:  Signs/Symptoms:L pleural effusion left sided pleural effusion      COMPARISON:  US thoracentesis 4/2/2024      ACCESSION NUMBER(S):  [id number]      ORDERING CLINICIAN:  OTF DEL ROSARIO      TECHNIQUE:  INTERVENTIONALIST(S):  Jair Valdes      CONSENT:  The patient/patient's POA/next of kin was informed of the nature of  the proposed procedure. The purposes, alternatives, risks, and  benefits were explained and discussed. All questions were answered  and consent was obtained.      SEDATION:  None      MEDICATION/CONTRAST:  1% lidocaine was used to anesthetize subcutaneously.      TIME OUT:  A time out was performed immediately prior to procedure start with  the interventional team, correctly identifying the patient name, date  of birth, MRN, procedure, anatomy (including marking of site and  side), patient position, procedure consent form, relevant laboratory  and imaging test results, antibiotic administration, safety  precautions, and procedure-specific equipment needs.      FINDINGS:  The patient was placed in the sitting position.      The pleural space was examined with grey scale ultrasound, and the  most accessible fluid identified  and marked for thoracentesis.      The skin was prepped and draped in usual manner. Local anesthesia  with lidocaine was administered and a left-sided thoracentesis was  performed.  A 5 Cymro One-Step Valved thoracentesis needle/catheter  was then placed where marked. Approximately 450 mL of  xiang colored  fluid was removed.  The needle/catheter was then withdrawn.      The patient tolerated the procedure well and there were no immediate  complications.      Impression: Uneventful thoracentesis, as detailed above. Left pleural space, 450  mL      I personally performed and/or directly supervised this study and was  present for the entire procedure.      Performed and dictated at [facility].      Signed by: Ministerio Valdes 4/4/2024 3:10 PM  Dictation workstation:   [id number]  XR chest 1 view  Narrative: Interpreted By:  Aimee Dc,   STUDY:  XR CHEST 1 VIEW;  4/4/2024 12:57 pm      INDICATION:  Signs/Symptoms:s/p left chest xray.      COMPARISON:  04/02/2024      ACCESSION NUMBER(S):  [id number]      ORDERING CLINICIAN:  MINISTERIO VALDES      FINDINGS:  Artifact from overlying monitoring leads noted. Improved left basilar  opacity. Hazy right basilar opacity. Pleural angles are sharp. No  pneumothorax. The cardiac silhouette is mildly enlarged.      Impression: Hazy right basilar infiltrate. Improved left basilar opacity from 2  days prior. No pneumothorax.      MACRO:  None.      Signed by: Aimee Dc 4/4/2024 1:31 PM  Dictation workstation:   [id number]      Physical Exam    Constitutional:       Appearance: Normal appearance. awake, no distress, confusion less  HENT:      Head: Normocephalic and atraumatic.   Cardiovascular:      Rate and Rhythm: Normal rate and regular rhythm.   Pulmonary:      Effort: Pulmonary effort is normal.   Abdominal:      General: Abdomen obese, bs presetn   Musculoskeletal:           General: Skin is warm and dry.   Neurological:   Awake confused      Edema + on thighs and abdomen        No current facility-administered medications on file prior to encounter.     Current Outpatient Medications on File Prior to Encounter   Medication Sig Dispense Refill    apixaban (Eliquis) 2.5 mg tablet Take 1 tablet (2.5 mg) by mouth 2 times a day.      atorvastatin (Lipitor) 20 mg tablet Take 1 tablet (20 mg) by mouth once daily at bedtime.      doxazosin (Cardura) 4 mg tablet Take 1 tablet (4 mg) by mouth once daily at bedtime.      empagliflozin (Jardiance) 25 mg Take 1 tablet (25 mg) by mouth once daily.      finasteride (Proscar) 5 mg tablet Take 1 tablet (5 mg) by mouth once daily.      furosemide (Lasix) 40 mg tablet Take 1 tablet (40 mg) by mouth 2 times a day. 60 tablet 0    glimepiride (Amaryl) 2 mg tablet Take 1 tablet (2 mg) by mouth once daily in the morning. Take before meals.      losartan (Cozaar) 50 mg tablet Take 1 tablet (50 mg) by mouth once daily. 30 tablet 0    metFORMIN, OSM, (Fortamet) 1,000 mg 24 hr tablet Take 1 tablet (1,000 mg) by mouth 2 times a day with meals. Do not crush, chew, or split.      metoprolol succinate XL (Toprol-XL) 50 mg 24 hr tablet Take 3 tablets (150 mg) by mouth 2 times a day. 180 tablet 0    omeprazole (PriLOSEC) 20 mg DR capsule Take 1 capsule (20 mg) by mouth once daily in the morning. Take before meals. Do not crush or chew.         Results for orders placed or performed during the hospital encounter of 03/22/24 (from the past 24 hour(s))   Basic metabolic panel   Result Value Ref Range    Glucose 138 (H) 74 - 99 mg/dL    Sodium 143 136 - 145 mmol/L    Potassium 3.8 3.5 - 5.3 mmol/L    Chloride 102 98 - 107 mmol/L    Bicarbonate 34 (H) 21 - 32 mmol/L    Anion Gap 11 10 - 20 mmol/L    Urea Nitrogen 23 6 - 23 mg/dL    Creatinine 1.56 (H) 0.50 - 1.30 mg/dL    eGFR 44 (L) >60 mL/min/1.73m*2    Calcium 7.5 (L) 8.6 - 10.3 mg/dL   CBC   Result Value Ref Range    WBC 5.8 4.4 - 11.3 x10*3/uL    nRBC 0.0 0.0 - 0.0 /100 WBCs    RBC  4.76 4.50 - 5.90 x10*6/uL    Hemoglobin 12.4 (L) 13.5 - 17.5 g/dL    Hematocrit 43.4 41.0 - 52.0 %    MCV 91 80 - 100 fL    MCH 26.1 26.0 - 34.0 pg    MCHC 28.6 (L) 32.0 - 36.0 g/dL    RDW 17.1 (H) 11.5 - 14.5 %    Platelets 70 (L) 150 - 450 x10*3/uL       Vitals:    04/02/24 0443   Weight: 99.8 kg (220 lb 0.3 oz)       No intake or output data in the 24 hours ending 04/04/24 2306        Assessment/Plan      Acute respiratory failure with hypoxia d/t pneumonia-IV as per orders, better  Weakness-PT/OT following, will need SNF.   HTN- better with less agitation  Agitation- ativan as needed  Acute on chronic diastolic heart failure  A fib on eliquis, rate controlled  EF 50 in 12/23  Check bladder scan  Resume losartan   Hypernatremia  Dandre / CKD 3 w hypernatremia   Metabolic encephalopathy,   Pleural effusion camila        Input from specialities noted  Cont with Diuresis per renal , monitor labs and electrolytes  Did hve thoracentesis on the rt on 04/02/2024 will dw pulm for thora on L  Lasix on hold   Getting dextrose for hypernatremia  Hypernatremia gradually improving   He is more alert cont with current care    -Continue current treatment as ordered. Will make adjustments as necessary.    Plan of care discussed with: ZANDER Gunderson MD

## 2024-04-05 NOTE — PROGRESS NOTES
4/5/2024 3:58 PM I spoke with patient's daughter. She said SNF [facility] is [facility]. Angelina TAYLOR

## 2024-04-06 LAB
ANION GAP SERPL CALC-SCNC: 13 MMOL/L (ref 10–20)
BUN SERPL-MCNC: 23 MG/DL (ref 6–23)
CALCIUM SERPL-MCNC: 7.8 MG/DL (ref 8.6–10.3)
CHLORIDE SERPL-SCNC: 103 MMOL/L (ref 98–107)
CO2 SERPL-SCNC: 25 MMOL/L (ref 21–32)
CREAT SERPL-MCNC: 1.33 MG/DL (ref 0.5–1.3)
EGFRCR SERPLBLD CKD-EPI 2021: 53 ML/MIN/1.73M*2
ERYTHROCYTE [DISTWIDTH] IN BLOOD BY AUTOMATED COUNT: 17.1 % (ref 11.5–14.5)
GLUCOSE SERPL-MCNC: 231 MG/DL (ref 74–99)
HCT VFR BLD AUTO: 38.2 % (ref 41–52)
HGB BLD-MCNC: 11.2 G/DL (ref 13.5–17.5)
MCH RBC QN AUTO: 26.4 PG (ref 26–34)
MCHC RBC AUTO-ENTMCNC: 29.3 G/DL (ref 32–36)
MCV RBC AUTO: 90 FL (ref 80–100)
NRBC BLD-RTO: 0 /100 WBCS (ref 0–0)
PLATELET # BLD AUTO: 69 X10*3/UL (ref 150–450)
POTASSIUM SERPL-SCNC: 4.3 MMOL/L (ref 3.5–5.3)
RBC # BLD AUTO: 4.25 X10*6/UL (ref 4.5–5.9)
SODIUM SERPL-SCNC: 137 MMOL/L (ref 136–145)
WBC # BLD AUTO: 5.4 X10*3/UL (ref 4.4–11.3)

## 2024-04-06 PROCEDURE — 2500000001 HC RX 250 WO HCPCS SELF ADMINISTERED DRUGS (ALT 637 FOR MEDICARE OP): Performed by: INTERNAL MEDICINE

## 2024-04-06 PROCEDURE — 94640 AIRWAY INHALATION TREATMENT: CPT | Mod: MUE

## 2024-04-06 PROCEDURE — 2500000002 HC RX 250 W HCPCS SELF ADMINISTERED DRUGS (ALT 637 FOR MEDICARE OP, ALT 636 FOR OP/ED): Performed by: INTERNAL MEDICINE

## 2024-04-06 PROCEDURE — 82374 ASSAY BLOOD CARBON DIOXIDE: CPT | Performed by: FAMILY MEDICINE

## 2024-04-06 PROCEDURE — 36415 COLL VENOUS BLD VENIPUNCTURE: CPT | Performed by: FAMILY MEDICINE

## 2024-04-06 PROCEDURE — 1100000001 HC PRIVATE ROOM DAILY

## 2024-04-06 PROCEDURE — 94668 MNPJ CHEST WALL SBSQ: CPT

## 2024-04-06 PROCEDURE — 85027 COMPLETE CBC AUTOMATED: CPT | Performed by: FAMILY MEDICINE

## 2024-04-06 RX ADMIN — GLIMEPIRIDE 2 MG: 2 TABLET ORAL at 05:28

## 2024-04-06 RX ADMIN — FINASTERIDE 5 MG: 5 TABLET, FILM COATED ORAL at 09:55

## 2024-04-06 RX ADMIN — IPRATROPIUM BROMIDE AND ALBUTEROL SULFATE 3 ML: 2.5; .5 SOLUTION RESPIRATORY (INHALATION) at 07:23

## 2024-04-06 RX ADMIN — POTASSIUM CHLORIDE 20 MEQ: 1.5 POWDER, FOR SOLUTION ORAL at 20:44

## 2024-04-06 RX ADMIN — HYDRALAZINE HYDROCHLORIDE 50 MG: 50 TABLET ORAL at 20:44

## 2024-04-06 RX ADMIN — LOSARTAN POTASSIUM 50 MG: 50 TABLET, FILM COATED ORAL at 09:55

## 2024-04-06 RX ADMIN — LOPERAMIDE HYDROCHLORIDE 2 MG: 2 CAPSULE ORAL at 09:55

## 2024-04-06 RX ADMIN — HYDRALAZINE HYDROCHLORIDE 50 MG: 50 TABLET ORAL at 09:55

## 2024-04-06 RX ADMIN — METOPROLOL SUCCINATE 150 MG: 50 TABLET, EXTENDED RELEASE ORAL at 09:54

## 2024-04-06 RX ADMIN — DOXAZOSIN 8 MG: 2 TABLET ORAL at 20:44

## 2024-04-06 RX ADMIN — EMPAGLIFLOZIN 25 MG: 10 TABLET, FILM COATED ORAL at 09:54

## 2024-04-06 RX ADMIN — POTASSIUM CHLORIDE 20 MEQ: 1.5 POWDER, FOR SOLUTION ORAL at 09:56

## 2024-04-06 RX ADMIN — METOPROLOL SUCCINATE 150 MG: 50 TABLET, EXTENDED RELEASE ORAL at 20:44

## 2024-04-06 RX ADMIN — CYANOCOBALAMIN TAB 500 MCG 500 MCG: 500 TAB at 09:56

## 2024-04-06 RX ADMIN — IPRATROPIUM BROMIDE AND ALBUTEROL SULFATE 3 ML: 2.5; .5 SOLUTION RESPIRATORY (INHALATION) at 20:31

## 2024-04-06 RX ADMIN — ATORVASTATIN CALCIUM 20 MG: 20 TABLET, FILM COATED ORAL at 20:44

## 2024-04-06 RX ADMIN — APIXABAN 2.5 MG: 2.5 TABLET, FILM COATED ORAL at 09:55

## 2024-04-06 RX ADMIN — PANTOPRAZOLE SODIUM 40 MG: 40 TABLET, DELAYED RELEASE ORAL at 05:28

## 2024-04-06 RX ADMIN — APIXABAN 2.5 MG: 2.5 TABLET, FILM COATED ORAL at 20:44

## 2024-04-06 ASSESSMENT — PAIN SCALES - GENERAL: PAINLEVEL_OUTOF10: 0 - NO PAIN

## 2024-04-06 NOTE — PROGRESS NOTES
04/06/24 1255   Discharge Planning   Type of Post Acute Facility Services Skilled nursing   Patient expects to be discharged to: [facility] [place]-may be medically ready Sunday   Does the patient need discharge transport arranged? Yes   RoundTrip coordination needed? Yes   Has discharge transport been arranged? No

## 2024-04-06 NOTE — PROGRESS NOTES
New Castano is a 83 y.o. male on day 13 of admission presenting with Pneumonia of right lung due to infectious organism, unspecified part of lung.  Late entry      Subjective   No chest pain  Resting , alert appropriate  R thoracentesis on 04/02/2024  He is more interactive today     No chest pain       Objective     Last Recorded Vitals  /80 (BP Location: Right arm, Patient Position: Lying)   Pulse 88   Temp 36.5 °C (97.7 °F) (Temporal)   Resp 18   Wt 101 kg (222 lb 10.6 oz)   SpO2 96%   Intake/Output last 3 Shifts:      Admission Weight  Weight: 104 kg (230 lb) (03/27/24 1300)      Image Results  US thoracentesis  Narrative: Interpreted By:  Jair Valdes,   STUDY:  US THORACENTESIS4/4/20243:09 pm      INDICATION:  Signs/Symptoms:L pleural effusion left sided pleural effusion      COMPARISON:  US thoracentesis 4/2/2024      ACCESSION NUMBER(S):  [id number]      ORDERING CLINICIAN:  OTF DEL ROSARIO      TECHNIQUE:  INTERVENTIONALIST(S):  Jair Valdes      CONSENT:  The patient/patient's POA/next of kin was informed of the nature of  the proposed procedure. The purposes, alternatives, risks, and  benefits were explained and discussed. All questions were answered  and consent was obtained.      SEDATION:  None      MEDICATION/CONTRAST:  1% lidocaine was used to anesthetize subcutaneously.      TIME OUT:  A time out was performed immediately prior to procedure start with  the interventional team, correctly identifying the patient name, date  of birth, MRN, procedure, anatomy (including marking of site and  side), patient position, procedure consent form, relevant laboratory  and imaging test results, antibiotic administration, safety  precautions, and procedure-specific equipment needs.      FINDINGS:  The patient was placed in the sitting position.      The pleural space was examined with grey scale ultrasound, and the  most accessible fluid identified and marked for thoracentesis.      The skin was  prepped and draped in usual manner. Local anesthesia  with lidocaine was administered and a left-sided thoracentesis was  performed.  A 5 Indonesian One-Step Valved thoracentesis needle/catheter  was then placed where marked. Approximately 450 mL of  xiang colored  fluid was removed.  The needle/catheter was then withdrawn.      The patient tolerated the procedure well and there were no immediate  complications.      Impression: Uneventful thoracentesis, as detailed above. Left pleural space, 450  mL      I personally performed and/or directly supervised this study and was  present for the entire procedure.      Performed and dictated at [facility].      Signed by: Ministerio Valdes 4/4/2024 3:10 PM  Dictation workstation:   [id number]  XR chest 1 view  Narrative: Interpreted By:  Aimee Dc,   STUDY:  XR CHEST 1 VIEW;  4/4/2024 12:57 pm      INDICATION:  Signs/Symptoms:s/p left chest xray.      COMPARISON:  04/02/2024      ACCESSION NUMBER(S):  [id number]      ORDERING CLINICIAN:  MINISTERIO VALDES      FINDINGS:  Artifact from overlying monitoring leads noted. Improved left basilar  opacity. Hazy right basilar opacity. Pleural angles are sharp. No  pneumothorax. The cardiac silhouette is mildly enlarged.      Impression: Hazy right basilar infiltrate. Improved left basilar opacity from 2  days prior. No pneumothorax.      MACRO:  None.      Signed by: Aimee Dc 4/4/2024 1:31 PM  Dictation workstation:   [id number]      Physical Exam    Constitutional:       Appearance: Normal appearance. awake, no distress, confusion less  HENT:      Head: Normocephalic and atraumatic.   Cardiovascular:      Rate and Rhythm: Normal rate and regular rhythm.   Pulmonary:      Effort: Pulmonary effort is normal.   Abdominal:      General: Abdomen obese, bs presetn   Musculoskeletal:           General: Skin is warm and dry.   Neurological:   Alert appropriate able to hold conversation    Edema + on  thighs and abdomen        No current facility-administered medications on file prior to encounter.     Current Outpatient Medications on File Prior to Encounter   Medication Sig Dispense Refill    apixaban (Eliquis) 2.5 mg tablet Take 1 tablet (2.5 mg) by mouth 2 times a day.      atorvastatin (Lipitor) 20 mg tablet Take 1 tablet (20 mg) by mouth once daily at bedtime.      doxazosin (Cardura) 4 mg tablet Take 1 tablet (4 mg) by mouth once daily at bedtime.      empagliflozin (Jardiance) 25 mg Take 1 tablet (25 mg) by mouth once daily.      finasteride (Proscar) 5 mg tablet Take 1 tablet (5 mg) by mouth once daily.      furosemide (Lasix) 40 mg tablet Take 1 tablet (40 mg) by mouth 2 times a day. 60 tablet 0    glimepiride (Amaryl) 2 mg tablet Take 1 tablet (2 mg) by mouth once daily in the morning. Take before meals.      losartan (Cozaar) 50 mg tablet Take 1 tablet (50 mg) by mouth once daily. 30 tablet 0    metFORMIN, OSM, (Fortamet) 1,000 mg 24 hr tablet Take 1 tablet (1,000 mg) by mouth 2 times a day with meals. Do not crush, chew, or split.      metoprolol succinate XL (Toprol-XL) 50 mg 24 hr tablet Take 3 tablets (150 mg) by mouth 2 times a day. 180 tablet 0    omeprazole (PriLOSEC) 20 mg DR capsule Take 1 capsule (20 mg) by mouth once daily in the morning. Take before meals. Do not crush or chew.         Results for orders placed or performed during the hospital encounter of 03/22/24 (from the past 24 hour(s))   Basic Metabolic Panel   Result Value Ref Range    Glucose 171 (H) 74 - 99 mg/dL    Sodium 139 136 - 145 mmol/L    Potassium 4.4 3.5 - 5.3 mmol/L    Chloride 101 98 - 107 mmol/L    Bicarbonate 30 21 - 32 mmol/L    Anion Gap 12 10 - 20 mmol/L    Urea Nitrogen 25 (H) 6 - 23 mg/dL    Creatinine 1.41 (H) 0.50 - 1.30 mg/dL    eGFR 49 (L) >60 mL/min/1.73m*2    Calcium 7.3 (L) 8.6 - 10.3 mg/dL   Lavender Top   Result Value Ref Range    Extra Tube Hold for add-ons.    CBC   Result Value Ref Range    WBC 5.4  4.4 - 11.3 x10*3/uL    nRBC 0.0 0.0 - 0.0 /100 WBCs    RBC 4.25 (L) 4.50 - 5.90 x10*6/uL    Hemoglobin 11.2 (L) 13.5 - 17.5 g/dL    Hematocrit 38.2 (L) 41.0 - 52.0 %    MCV 90 80 - 100 fL    MCH 26.4 26.0 - 34.0 pg    MCHC 29.3 (L) 32.0 - 36.0 g/dL    RDW 17.1 (H) 11.5 - 14.5 %    Platelets 69 (L) 150 - 450 x10*3/uL   Basic Metabolic Panel   Result Value Ref Range    Glucose 231 (H) 74 - 99 mg/dL    Sodium 137 136 - 145 mmol/L    Potassium 4.3 3.5 - 5.3 mmol/L    Chloride 103 98 - 107 mmol/L    Bicarbonate 25 21 - 32 mmol/L    Anion Gap 13 10 - 20 mmol/L    Urea Nitrogen 23 6 - 23 mg/dL    Creatinine 1.33 (H) 0.50 - 1.30 mg/dL    eGFR 53 (L) >60 mL/min/1.73m*2    Calcium 7.8 (L) 8.6 - 10.3 mg/dL       Vitals:    04/06/24 0440   Weight: 101 kg (222 lb 10.6 oz)         Intake/Output Summary (Last 24 hours) at 4/6/2024 0957  Last data filed at 4/5/2024 1651  Gross per 24 hour   Intake 595.34 ml   Output --   Net 595.34 ml           Assessment/Plan      Acute respiratory failure with hypoxia d/t pneumonia-IV as per orders, better  Weakness-PT/OT following, will need SNF.   HTN- better with less agitation  Agitation- ativan as needed  Acute on chronic diastolic heart failure  A fib on eliquis, rate controlled  EF 50 in 12/23  Check bladder scan  Resume losartan   Hypernatremia  Dandre / CKD 3 w hypernatremia   Metabolic encephalopathy,   Pleural effusion camila        Input from specialities noted  Dc iv fluids  Renal signed off   Did hve thoracentesis on the rt on 04/02/2024 will dw pulm for thora on L  Lasix on hold   Hypernatremia gradually improving   He is more alert cont with current care  Ot pt and snf    -Continue current treatment as ordered. Will make adjustments as necessary.    Plan of care discussed with: ZANDER Gunderson MD

## 2024-04-06 NOTE — PROGRESS NOTES
New Castano is a 83 y.o. male on day 13 of admission presenting with Pneumonia of right lung due to infectious organism, unspecified part of lung.  Late entry      Subjective   No chest pain  Resting , alert appropriate  R thoracentesis on 04/02/2024  He is more interactive today     No chest pain       Objective     Last Recorded Vitals  /80 (BP Location: Right arm, Patient Position: Lying)   Pulse 88   Temp 36.5 °C (97.7 °F) (Temporal)   Resp 18   Wt 101 kg (222 lb 10.6 oz)   SpO2 96%   Intake/Output last 3 Shifts:      Admission Weight  Weight: 104 kg (230 lb) (03/27/24 1300)      Image Results  US thoracentesis  Narrative: Interpreted By:  Jair Valdes,   STUDY:  US THORACENTESIS4/4/20243:09 pm      INDICATION:  Signs/Symptoms:L pleural effusion left sided pleural effusion      COMPARISON:  US thoracentesis 4/2/2024      ACCESSION NUMBER(S):  [id number]      ORDERING CLINICIAN:  OTF DEL ROSARIO      TECHNIQUE:  INTERVENTIONALIST(S):  Jair Valdes      CONSENT:  The patient/patient's POA/next of kin was informed of the nature of  the proposed procedure. The purposes, alternatives, risks, and  benefits were explained and discussed. All questions were answered  and consent was obtained.      SEDATION:  None      MEDICATION/CONTRAST:  1% lidocaine was used to anesthetize subcutaneously.      TIME OUT:  A time out was performed immediately prior to procedure start with  the interventional team, correctly identifying the patient name, date  of birth, MRN, procedure, anatomy (including marking of site and  side), patient position, procedure consent form, relevant laboratory  and imaging test results, antibiotic administration, safety  precautions, and procedure-specific equipment needs.      FINDINGS:  The patient was placed in the sitting position.      The pleural space was examined with grey scale ultrasound, and the  most accessible fluid identified and marked for thoracentesis.      The skin was  prepped and draped in usual manner. Local anesthesia  with lidocaine was administered and a left-sided thoracentesis was  performed.  A 5 Yoruba One-Step Valved thoracentesis needle/catheter  was then placed where marked. Approximately 450 mL of  xiang colored  fluid was removed.  The needle/catheter was then withdrawn.      The patient tolerated the procedure well and there were no immediate  complications.      Impression: Uneventful thoracentesis, as detailed above. Left pleural space, 450  mL      I personally performed and/or directly supervised this study and was  present for the entire procedure.      Performed and dictated at [facility].      Signed by: Ministerio Valdes 4/4/2024 3:10 PM  Dictation workstation:   [id number]  XR chest 1 view  Narrative: Interpreted By:  Aimee Dc,   STUDY:  XR CHEST 1 VIEW;  4/4/2024 12:57 pm      INDICATION:  Signs/Symptoms:s/p left chest xray.      COMPARISON:  04/02/2024      ACCESSION NUMBER(S):  [id number]      ORDERING CLINICIAN:  MINISTERIO VALDES      FINDINGS:  Artifact from overlying monitoring leads noted. Improved left basilar  opacity. Hazy right basilar opacity. Pleural angles are sharp. No  pneumothorax. The cardiac silhouette is mildly enlarged.      Impression: Hazy right basilar infiltrate. Improved left basilar opacity from 2  days prior. No pneumothorax.      MACRO:  None.      Signed by: Aimee Dc 4/4/2024 1:31 PM  Dictation workstation:   [id number]      Physical Exam    Constitutional:       Appearance: Normal appearance. awake, no distress, confusion less  HENT:      Head: Normocephalic and atraumatic.   Cardiovascular:      Rate and Rhythm: Normal rate and regular rhythm.   Pulmonary:      Effort: Pulmonary effort is normal.   Abdominal:      General: Abdomen obese, bs presetn   Musculoskeletal:           General: Skin is warm and dry.   Neurological:   Alert appropriate able to hold conversation    Edema + on  thighs and abdomen        No current facility-administered medications on file prior to encounter.     Current Outpatient Medications on File Prior to Encounter   Medication Sig Dispense Refill    apixaban (Eliquis) 2.5 mg tablet Take 1 tablet (2.5 mg) by mouth 2 times a day.      atorvastatin (Lipitor) 20 mg tablet Take 1 tablet (20 mg) by mouth once daily at bedtime.      doxazosin (Cardura) 4 mg tablet Take 1 tablet (4 mg) by mouth once daily at bedtime.      empagliflozin (Jardiance) 25 mg Take 1 tablet (25 mg) by mouth once daily.      finasteride (Proscar) 5 mg tablet Take 1 tablet (5 mg) by mouth once daily.      furosemide (Lasix) 40 mg tablet Take 1 tablet (40 mg) by mouth 2 times a day. 60 tablet 0    glimepiride (Amaryl) 2 mg tablet Take 1 tablet (2 mg) by mouth once daily in the morning. Take before meals.      losartan (Cozaar) 50 mg tablet Take 1 tablet (50 mg) by mouth once daily. 30 tablet 0    metFORMIN, OSM, (Fortamet) 1,000 mg 24 hr tablet Take 1 tablet (1,000 mg) by mouth 2 times a day with meals. Do not crush, chew, or split.      metoprolol succinate XL (Toprol-XL) 50 mg 24 hr tablet Take 3 tablets (150 mg) by mouth 2 times a day. 180 tablet 0    omeprazole (PriLOSEC) 20 mg DR capsule Take 1 capsule (20 mg) by mouth once daily in the morning. Take before meals. Do not crush or chew.         Results for orders placed or performed during the hospital encounter of 03/22/24 (from the past 24 hour(s))   Basic Metabolic Panel   Result Value Ref Range    Glucose 171 (H) 74 - 99 mg/dL    Sodium 139 136 - 145 mmol/L    Potassium 4.4 3.5 - 5.3 mmol/L    Chloride 101 98 - 107 mmol/L    Bicarbonate 30 21 - 32 mmol/L    Anion Gap 12 10 - 20 mmol/L    Urea Nitrogen 25 (H) 6 - 23 mg/dL    Creatinine 1.41 (H) 0.50 - 1.30 mg/dL    eGFR 49 (L) >60 mL/min/1.73m*2    Calcium 7.3 (L) 8.6 - 10.3 mg/dL   Lavender Top   Result Value Ref Range    Extra Tube Hold for add-ons.    Basic Metabolic Panel   Result Value Ref  Range    Glucose 231 (H) 74 - 99 mg/dL    Sodium 137 136 - 145 mmol/L    Potassium 4.3 3.5 - 5.3 mmol/L    Chloride 103 98 - 107 mmol/L    Bicarbonate 25 21 - 32 mmol/L    Anion Gap 13 10 - 20 mmol/L    Urea Nitrogen 23 6 - 23 mg/dL    Creatinine 1.33 (H) 0.50 - 1.30 mg/dL    eGFR 53 (L) >60 mL/min/1.73m*2    Calcium 7.8 (L) 8.6 - 10.3 mg/dL       Vitals:    04/06/24 0440   Weight: 101 kg (222 lb 10.6 oz)         Intake/Output Summary (Last 24 hours) at 4/6/2024 0842  Last data filed at 4/5/2024 1651  Gross per 24 hour   Intake 955.34 ml   Output --   Net 955.34 ml           Assessment/Plan      Acute respiratory failure with hypoxia d/t pneumonia-IV as per orders, better  Weakness-PT/OT following, will need SNF.   HTN- better with less agitation  Agitation- ativan as needed  Acute on chronic diastolic heart failure  A fib on eliquis, rate controlled  EF 50 in 12/23  Check bladder scan  Resume losartan   Hypernatremia  Dandre / CKD 3 w hypernatremia   Metabolic encephalopathy,   Pleural effusion camila        Input from specialities noted  Cont with Diuresis per renal , monitor labs and electrolytes  Did hve thoracentesis on the rt on 04/02/2024 will dw pulm for thora on L  Lasix on hold   Getting dextrose for hypernatremia  Hypernatremia gradually improving   He is more alert cont with current care  Ot pt and snf    -Continue current treatment as ordered. Will make adjustments as necessary.    Plan of care discussed with: ZANDER Gunderson MD

## 2024-04-07 ENCOUNTER — APPOINTMENT (OUTPATIENT)
Dept: CARDIOLOGY | Facility: HOSPITAL | Age: 84
DRG: 193 | End: 2024-04-07
Payer: MEDICARE

## 2024-04-07 LAB
ANION GAP SERPL CALC-SCNC: 9 MMOL/L (ref 10–20)
ATRIAL RATE: 97 BPM
BUN SERPL-MCNC: 22 MG/DL (ref 6–23)
CALCIUM SERPL-MCNC: 8.3 MG/DL (ref 8.6–10.3)
CHLORIDE SERPL-SCNC: 103 MMOL/L (ref 98–107)
CO2 SERPL-SCNC: 34 MMOL/L (ref 21–32)
CREAT SERPL-MCNC: 1.37 MG/DL (ref 0.5–1.3)
EGFRCR SERPLBLD CKD-EPI 2021: 51 ML/MIN/1.73M*2
ERYTHROCYTE [DISTWIDTH] IN BLOOD BY AUTOMATED COUNT: 17.5 % (ref 11.5–14.5)
GLUCOSE SERPL-MCNC: 167 MG/DL (ref 74–99)
HCT VFR BLD AUTO: 39.6 % (ref 41–52)
HGB BLD-MCNC: 11.1 G/DL (ref 13.5–17.5)
MCH RBC QN AUTO: 25.4 PG (ref 26–34)
MCHC RBC AUTO-ENTMCNC: 28 G/DL (ref 32–36)
MCV RBC AUTO: 91 FL (ref 80–100)
NRBC BLD-RTO: 0 /100 WBCS (ref 0–0)
P AXIS: 90 DEGREES
P OFFSET: 201 MS
P ONSET: 171 MS
PLATELET # BLD AUTO: 81 X10*3/UL (ref 150–450)
POTASSIUM SERPL-SCNC: 4.8 MMOL/L (ref 3.5–5.3)
PR INTERVAL: 84 MS
Q ONSET: 213 MS
QRS COUNT: 16 BEATS
QRS DURATION: 126 MS
QT INTERVAL: 412 MS
QTC CALCULATION(BAZETT): 523 MS
QTC FREDERICIA: 483 MS
R AXIS: -29 DEGREES
RBC # BLD AUTO: 4.37 X10*6/UL (ref 4.5–5.9)
SODIUM SERPL-SCNC: 141 MMOL/L (ref 136–145)
T AXIS: 142 DEGREES
T OFFSET: 419 MS
VENTRICULAR RATE: 97 BPM
WBC # BLD AUTO: 6.4 X10*3/UL (ref 4.4–11.3)

## 2024-04-07 PROCEDURE — 94640 AIRWAY INHALATION TREATMENT: CPT | Mod: MUE

## 2024-04-07 PROCEDURE — 36415 COLL VENOUS BLD VENIPUNCTURE: CPT | Performed by: FAMILY MEDICINE

## 2024-04-07 PROCEDURE — 93005 ELECTROCARDIOGRAM TRACING: CPT

## 2024-04-07 PROCEDURE — 2500000002 HC RX 250 W HCPCS SELF ADMINISTERED DRUGS (ALT 637 FOR MEDICARE OP, ALT 636 FOR OP/ED): Performed by: INTERNAL MEDICINE

## 2024-04-07 PROCEDURE — 2500000001 HC RX 250 WO HCPCS SELF ADMINISTERED DRUGS (ALT 637 FOR MEDICARE OP): Performed by: INTERNAL MEDICINE

## 2024-04-07 PROCEDURE — 93010 ELECTROCARDIOGRAM REPORT: CPT | Performed by: INTERNAL MEDICINE

## 2024-04-07 PROCEDURE — 94668 MNPJ CHEST WALL SBSQ: CPT

## 2024-04-07 PROCEDURE — 1100000001 HC PRIVATE ROOM DAILY

## 2024-04-07 PROCEDURE — 85027 COMPLETE CBC AUTOMATED: CPT | Performed by: FAMILY MEDICINE

## 2024-04-07 PROCEDURE — 82435 ASSAY OF BLOOD CHLORIDE: CPT | Performed by: FAMILY MEDICINE

## 2024-04-07 RX ORDER — HALOPERIDOL 5 MG/ML
0.5 INJECTION INTRAMUSCULAR EVERY 6 HOURS PRN
Status: DISCONTINUED | OUTPATIENT
Start: 2024-04-07 | End: 2024-04-09 | Stop reason: HOSPADM

## 2024-04-07 RX ADMIN — DOCUSATE SODIUM 100 MG: 100 CAPSULE, LIQUID FILLED ORAL at 20:28

## 2024-04-07 RX ADMIN — PANTOPRAZOLE SODIUM 40 MG: 40 TABLET, DELAYED RELEASE ORAL at 06:38

## 2024-04-07 RX ADMIN — DOXAZOSIN 8 MG: 2 TABLET ORAL at 20:28

## 2024-04-07 RX ADMIN — ATORVASTATIN CALCIUM 20 MG: 20 TABLET, FILM COATED ORAL at 20:28

## 2024-04-07 RX ADMIN — GLIMEPIRIDE 2 MG: 2 TABLET ORAL at 06:38

## 2024-04-07 RX ADMIN — IPRATROPIUM BROMIDE AND ALBUTEROL SULFATE 3 ML: 2.5; .5 SOLUTION RESPIRATORY (INHALATION) at 19:56

## 2024-04-07 RX ADMIN — APIXABAN 2.5 MG: 2.5 TABLET, FILM COATED ORAL at 20:28

## 2024-04-07 RX ADMIN — METOPROLOL SUCCINATE 150 MG: 50 TABLET, EXTENDED RELEASE ORAL at 20:27

## 2024-04-07 RX ADMIN — IPRATROPIUM BROMIDE AND ALBUTEROL SULFATE 3 ML: 2.5; .5 SOLUTION RESPIRATORY (INHALATION) at 08:07

## 2024-04-07 RX ADMIN — IPRATROPIUM BROMIDE AND ALBUTEROL SULFATE 3 ML: 2.5; .5 SOLUTION RESPIRATORY (INHALATION) at 12:24

## 2024-04-07 RX ADMIN — HYDRALAZINE HYDROCHLORIDE 50 MG: 50 TABLET ORAL at 20:28

## 2024-04-07 ASSESSMENT — PAIN SCALES - GENERAL: PAINLEVEL_OUTOF10: 0 - NO PAIN

## 2024-04-07 ASSESSMENT — PAIN - FUNCTIONAL ASSESSMENT: PAIN_FUNCTIONAL_ASSESSMENT: 0-10

## 2024-04-08 LAB
ERYTHROCYTE [DISTWIDTH] IN BLOOD BY AUTOMATED COUNT: 17.8 % (ref 11.5–14.5)
HCT VFR BLD AUTO: 40.8 % (ref 41–52)
HGB BLD-MCNC: 11.5 G/DL (ref 13.5–17.5)
MCH RBC QN AUTO: 25.6 PG (ref 26–34)
MCHC RBC AUTO-ENTMCNC: 28.2 G/DL (ref 32–36)
MCV RBC AUTO: 91 FL (ref 80–100)
NRBC BLD-RTO: 0 /100 WBCS (ref 0–0)
PLATELET # BLD AUTO: 80 X10*3/UL (ref 150–450)
RBC # BLD AUTO: 4.5 X10*6/UL (ref 4.5–5.9)
WBC # BLD AUTO: 6.7 X10*3/UL (ref 4.4–11.3)

## 2024-04-08 PROCEDURE — 92526 ORAL FUNCTION THERAPY: CPT | Mod: GN

## 2024-04-08 PROCEDURE — 2500000001 HC RX 250 WO HCPCS SELF ADMINISTERED DRUGS (ALT 637 FOR MEDICARE OP): Performed by: INTERNAL MEDICINE

## 2024-04-08 PROCEDURE — 1100000001 HC PRIVATE ROOM DAILY

## 2024-04-08 PROCEDURE — 36415 COLL VENOUS BLD VENIPUNCTURE: CPT | Performed by: FAMILY MEDICINE

## 2024-04-08 PROCEDURE — 2500000002 HC RX 250 W HCPCS SELF ADMINISTERED DRUGS (ALT 637 FOR MEDICARE OP, ALT 636 FOR OP/ED): Performed by: INTERNAL MEDICINE

## 2024-04-08 PROCEDURE — 2500000004 HC RX 250 GENERAL PHARMACY W/ HCPCS (ALT 636 FOR OP/ED): Performed by: INTERNAL MEDICINE

## 2024-04-08 PROCEDURE — 85027 COMPLETE CBC AUTOMATED: CPT | Performed by: FAMILY MEDICINE

## 2024-04-08 PROCEDURE — 94640 AIRWAY INHALATION TREATMENT: CPT | Mod: MUE

## 2024-04-08 RX ORDER — ARIPIPRAZOLE 5 MG/1
2.5 TABLET ORAL EVERY EVENING
Status: DISCONTINUED | OUTPATIENT
Start: 2024-04-08 | End: 2024-04-09 | Stop reason: HOSPADM

## 2024-04-08 RX ADMIN — HYDRALAZINE HYDROCHLORIDE 50 MG: 50 TABLET ORAL at 09:09

## 2024-04-08 RX ADMIN — POLYETHYLENE GLYCOL 3350 17 G: 17 POWDER, FOR SOLUTION ORAL at 09:11

## 2024-04-08 RX ADMIN — CYANOCOBALAMIN TAB 500 MCG 500 MCG: 500 TAB at 09:17

## 2024-04-08 RX ADMIN — PANTOPRAZOLE SODIUM 40 MG: 40 TABLET, DELAYED RELEASE ORAL at 06:42

## 2024-04-08 RX ADMIN — POTASSIUM CHLORIDE 20 MEQ: 1.5 POWDER, FOR SOLUTION ORAL at 16:20

## 2024-04-08 RX ADMIN — METOPROLOL SUCCINATE 150 MG: 50 TABLET, EXTENDED RELEASE ORAL at 09:10

## 2024-04-08 RX ADMIN — POTASSIUM CHLORIDE 20 MEQ: 1.5 POWDER, FOR SOLUTION ORAL at 09:14

## 2024-04-08 RX ADMIN — IPRATROPIUM BROMIDE AND ALBUTEROL SULFATE 3 ML: 2.5; .5 SOLUTION RESPIRATORY (INHALATION) at 20:28

## 2024-04-08 RX ADMIN — GLIMEPIRIDE 2 MG: 2 TABLET ORAL at 06:42

## 2024-04-08 RX ADMIN — FINASTERIDE 5 MG: 5 TABLET, FILM COATED ORAL at 09:11

## 2024-04-08 RX ADMIN — LOSARTAN POTASSIUM 50 MG: 50 TABLET, FILM COATED ORAL at 09:10

## 2024-04-08 RX ADMIN — DOCUSATE SODIUM 100 MG: 100 CAPSULE, LIQUID FILLED ORAL at 09:13

## 2024-04-08 RX ADMIN — ACETAMINOPHEN 650 MG: 325 TABLET ORAL at 18:15

## 2024-04-08 RX ADMIN — EMPAGLIFLOZIN 25 MG: 10 TABLET, FILM COATED ORAL at 09:12

## 2024-04-08 RX ADMIN — APIXABAN 2.5 MG: 2.5 TABLET, FILM COATED ORAL at 09:12

## 2024-04-08 RX ADMIN — IPRATROPIUM BROMIDE AND ALBUTEROL SULFATE 3 ML: 2.5; .5 SOLUTION RESPIRATORY (INHALATION) at 14:04

## 2024-04-08 RX ADMIN — IPRATROPIUM BROMIDE AND ALBUTEROL SULFATE 3 ML: 2.5; .5 SOLUTION RESPIRATORY (INHALATION) at 07:24

## 2024-04-08 ASSESSMENT — PAIN SCALES - GENERAL
PAINLEVEL_OUTOF10: 8
PAINLEVEL_OUTOF10: 0 - NO PAIN

## 2024-04-08 ASSESSMENT — COGNITIVE AND FUNCTIONAL STATUS - GENERAL
TURNING FROM BACK TO SIDE WHILE IN FLAT BAD: A LOT
HELP NEEDED FOR BATHING: TOTAL
DAILY ACTIVITIY SCORE: 9
DRESSING REGULAR LOWER BODY CLOTHING: TOTAL
MOBILITY SCORE: 12
MOVING TO AND FROM BED TO CHAIR: A LOT
PERSONAL GROOMING: A LOT
TOILETING: TOTAL
WALKING IN HOSPITAL ROOM: TOTAL
STANDING UP FROM CHAIR USING ARMS: A LOT
EATING MEALS: A LITTLE
CLIMB 3 TO 5 STEPS WITH RAILING: TOTAL
DRESSING REGULAR UPPER BODY CLOTHING: TOTAL

## 2024-04-08 ASSESSMENT — PAIN - FUNCTIONAL ASSESSMENT
PAIN_FUNCTIONAL_ASSESSMENT: 0-10
PAIN_FUNCTIONAL_ASSESSMENT: 0-10

## 2024-04-08 ASSESSMENT — PAIN DESCRIPTION - LOCATION: LOCATION: HEAD

## 2024-04-08 NOTE — PROGRESS NOTES
New Castano is a 83 y.o. male on day 13 of admission presenting with Pneumonia of right lung due to infectious organism, unspecified part of lung.      Subjective   Doing ok  Remains confused      Objective     Last Recorded Vitals  BP (!) 142/94 (Patient Position: Lying)   Pulse 91   Temp 36.1 °C (97 °F)   Resp 18   Wt 104 kg (229 lb)   SpO2 92%   Intake/Output last 3 Shifts:      Admission Weight  Weight: 104 kg (230 lb) (03/27/24 1300)      Image Results  Electrocardiogram, 12-lead PRN ACS symptoms  Atrial fibrillation  Left posterior fascicular block  Cannot rule out Inferior infarct , age undetermined  ST & T wave abnormality, consider lateral ischemia or digitalis effect  Abnormal ECG  When compared with ECG of 28-MAR-2024 14:58,  Significant changes have occurred      Physical Exam     Constitutional:       Appearance: Normal appearance. awake, no distress, confusion less  HENT:      Head: Normocephalic and atraumatic.   Cardiovascular:      Rate and Rhythm: Normal rate and regular rhythm.   Pulmonary:      Effort: Pulmonary effort is normal.   Abdominal:      General: Abdomen obese, bs presetn   Musculoskeletal:           General: Skin is warm and dry.   Neurological:   Alert appropriate able to hold conversation    Edema + on thighs and abdomen        No current facility-administered medications on file prior to encounter.     Current Outpatient Medications on File Prior to Encounter   Medication Sig Dispense Refill    apixaban (Eliquis) 2.5 mg tablet Take 1 tablet (2.5 mg) by mouth 2 times a day.      atorvastatin (Lipitor) 20 mg tablet Take 1 tablet (20 mg) by mouth once daily at bedtime.      doxazosin (Cardura) 4 mg tablet Take 1 tablet (4 mg) by mouth once daily at bedtime.      empagliflozin (Jardiance) 25 mg Take 1 tablet (25 mg) by mouth once daily.      finasteride (Proscar) 5 mg tablet Take 1 tablet (5 mg) by mouth once daily.      furosemide (Lasix) 40 mg tablet Take 1 tablet (40 mg)  by mouth 2 times a day. 60 tablet 0    glimepiride (Amaryl) 2 mg tablet Take 1 tablet (2 mg) by mouth once daily in the morning. Take before meals.      losartan (Cozaar) 50 mg tablet Take 1 tablet (50 mg) by mouth once daily. 30 tablet 0    metFORMIN, OSM, (Fortamet) 1,000 mg 24 hr tablet Take 1 tablet (1,000 mg) by mouth 2 times a day with meals. Do not crush, chew, or split.      metoprolol succinate XL (Toprol-XL) 50 mg 24 hr tablet Take 3 tablets (150 mg) by mouth 2 times a day. 180 tablet 0    omeprazole (PriLOSEC) 20 mg DR capsule Take 1 capsule (20 mg) by mouth once daily in the morning. Take before meals. Do not crush or chew.         Results for orders placed or performed during the hospital encounter of 03/22/24 (from the past 24 hour(s))   Electrocardiogram, 12-lead PRN ACS symptoms   Result Value Ref Range    Ventricular Rate 78 BPM    Atrial Rate 104 BPM    QRS Duration 120 ms    QT Interval 400 ms    QTC Calculation(Bazett) 456 ms    R Axis 119 degrees    T Axis -61 degrees    QRS Count 13 beats    Q Onset 215 ms    T Offset 415 ms    QTC Fredericia 436 ms   CBC   Result Value Ref Range    WBC 6.7 4.4 - 11.3 x10*3/uL    nRBC 0.0 0.0 - 0.0 /100 WBCs    RBC 4.50 4.50 - 5.90 x10*6/uL    Hemoglobin 11.5 (L) 13.5 - 17.5 g/dL    Hematocrit 40.8 (L) 41.0 - 52.0 %    MCV 91 80 - 100 fL    MCH 25.6 (L) 26.0 - 34.0 pg    MCHC 28.2 (L) 32.0 - 36.0 g/dL    RDW 17.8 (H) 11.5 - 14.5 %    Platelets 80 (L) 150 - 450 x10*3/uL       Vitals:    04/08/24 0656   Weight: 104 kg (229 lb)         Intake/Output Summary (Last 24 hours) at 4/8/2024 0811  Last data filed at 4/8/2024 0556  Gross per 24 hour   Intake --   Output 600 ml   Net -600 ml         Assessment/Plan       Acute respiratory failure with hypoxia d/t pneumonia-IV as per orders, better  Weakness-PT/OT following, will need SNF.   HTN- better with less agitation  Agitation- ativan as needed  Acute on chronic diastolic heart failure  A fib on eliquis, rate  controlled  EF 50 in 12/23  Check bladder scan  Resume losartan   Hypernatremia  Dandre / CKD 3 w hypernatremia   Metabolic encephalopathy,   Pleural effusion camila        Input from specialities noted  Dc iv fluids  Renal signed off   Did hve thoracentesis on the rt on 04/02/2024 will dw pulm for thora on L  Lasix on hold   Hypernatremia gradually improving   Ot pt and snf when stable    We will cont to monitor for now  Cont w haldol, EKG 4/7/23 evening  Qtc 456  Add on Abilify 2.5mg PO every evening        -Continue current treatment as ordered. Will make adjustments as necessary.    Plan of care discussed with: RN    Patient case and plan of care discussed with Dr. EMELIA Gunderson.    VANEC Lieberman - CNP  -In collaboration with Dr. EMELIA Gunderson    [facility]  Office: [phone]  Fax: [phone]  I have reviewed the above note obtained and documented by the NP/PA and I personally participated in the key components. I have discussed the case and management of the patient's care. Changes made to the note, and all key components of history and physical/progress note done by me.  dw nursing  Dc planning for snf  Will add lasix 3 times a week on dc  Will need close monitoring of the renal function  Kevon Gunderson MD

## 2024-04-08 NOTE — PROGRESS NOTES
New Castano is a 83 y.o. male on day 13 of admission presenting with Pneumonia of right lung due to infectious organism, unspecified part of lung.  Late entry      Subjective   No chest pain  More confused  Did nto sleep last night       Objective     Last Recorded Vitals  BP (!) 137/92 (BP Location: Left arm, Patient Position: Lying)   Pulse 89   Temp 36.5 °C (97.7 °F) (Temporal)   Resp 18   Wt 101 kg (222 lb 10.6 oz)   SpO2 94%   Intake/Output last 3 Shifts:      Admission Weight  Weight: 104 kg (230 lb) (03/27/24 1300)      Image Results  Electrocardiogram, 12-lead PRN ACS symptoms  Atrial flutter with 4:1 AV conduction with occasional Premature ventricular complexes and Fusion complexes  Nonspecific intraventricular block  T wave abnormality, consider lateral ischemia  Abnormal ECG  Confirmed by Mundo Holguin ([id number]) on 4/7/2024 5:41:14 PM      Physical Exam    Constitutional:       Appearance: Normal appearance. awake, cnfused  HENT:      Head: Normocephalic and atraumatic.   Cardiovascular:      Rate and Rhythm: Normal rate and regular rhythm.   Pulmonary:      Effort: Pulmonary effort is normal.   Abdominal:      General: Abdomen obese, bs presetn   Musculoskeletal:           General: Skin is warm and dry.   Neurological:   Alert confused    Edema + on thighs and abdomen        No current facility-administered medications on file prior to encounter.     Current Outpatient Medications on File Prior to Encounter   Medication Sig Dispense Refill    apixaban (Eliquis) 2.5 mg tablet Take 1 tablet (2.5 mg) by mouth 2 times a day.      atorvastatin (Lipitor) 20 mg tablet Take 1 tablet (20 mg) by mouth once daily at bedtime.      doxazosin (Cardura) 4 mg tablet Take 1 tablet (4 mg) by mouth once daily at bedtime.      empagliflozin (Jardiance) 25 mg Take 1 tablet (25 mg) by mouth once daily.      finasteride (Proscar) 5 mg tablet Take 1 tablet (5 mg) by mouth once daily.      furosemide (Lasix) 40 mg  tablet Take 1 tablet (40 mg) by mouth 2 times a day. 60 tablet 0    glimepiride (Amaryl) 2 mg tablet Take 1 tablet (2 mg) by mouth once daily in the morning. Take before meals.      losartan (Cozaar) 50 mg tablet Take 1 tablet (50 mg) by mouth once daily. 30 tablet 0    metFORMIN, OSM, (Fortamet) 1,000 mg 24 hr tablet Take 1 tablet (1,000 mg) by mouth 2 times a day with meals. Do not crush, chew, or split.      metoprolol succinate XL (Toprol-XL) 50 mg 24 hr tablet Take 3 tablets (150 mg) by mouth 2 times a day. 180 tablet 0    omeprazole (PriLOSEC) 20 mg DR capsule Take 1 capsule (20 mg) by mouth once daily in the morning. Take before meals. Do not crush or chew.         Results for orders placed or performed during the hospital encounter of 03/22/24 (from the past 24 hour(s))   CBC   Result Value Ref Range    WBC 6.4 4.4 - 11.3 x10*3/uL    nRBC 0.0 0.0 - 0.0 /100 WBCs    RBC 4.37 (L) 4.50 - 5.90 x10*6/uL    Hemoglobin 11.1 (L) 13.5 - 17.5 g/dL    Hematocrit 39.6 (L) 41.0 - 52.0 %    MCV 91 80 - 100 fL    MCH 25.4 (L) 26.0 - 34.0 pg    MCHC 28.0 (L) 32.0 - 36.0 g/dL    RDW 17.5 (H) 11.5 - 14.5 %    Platelets 81 (L) 150 - 450 x10*3/uL   Basic Metabolic Panel   Result Value Ref Range    Glucose 167 (H) 74 - 99 mg/dL    Sodium 141 136 - 145 mmol/L    Potassium 4.8 3.5 - 5.3 mmol/L    Chloride 103 98 - 107 mmol/L    Bicarbonate 34 (H) 21 - 32 mmol/L    Anion Gap 9 (L) 10 - 20 mmol/L    Urea Nitrogen 22 6 - 23 mg/dL    Creatinine 1.37 (H) 0.50 - 1.30 mg/dL    eGFR 51 (L) >60 mL/min/1.73m*2    Calcium 8.3 (L) 8.6 - 10.3 mg/dL       Vitals:    04/06/24 0440   Weight: 101 kg (222 lb 10.6 oz)       No intake or output data in the 24 hours ending 04/08/24 0538        Assessment/Plan      Acute respiratory failure with hypoxia d/t pneumonia-IV as per orders, better  Weakness-PT/OT following, will need SNF.   HTN- better with less agitation  Agitation- ativan as needed  Acute on chronic diastolic heart failure  A fib on  eliquis, rate controlled  EF 50 in 12/23  Check bladder scan  Resume losartan   Hypernatremia  Dandre / CKD 3 w hypernatremia   Metabolic encephalopathy,   Pleural effusion camila        Input from specialities noted  Dc iv fluids  Renal signed off   Did hve thoracentesis on the rt on 04/02/2024 will dw pulm for thora on L  Lasix on hold   Hypernatremia gradually improving   Confused again today   Ot pt and snf  Add haldol  Check EKG  Dw nursing     -Continue current treatment as ordered. Will make adjustments as necessary.    Plan of care discussed with: ZANDER Gunderson MD

## 2024-04-08 NOTE — PROGRESS NOTES
Speech-Language Pathology    Speech-Language Pathology Clinical Swallow Treatment    Patient Name: New Castano  MRN: [id number]  : 1940  Today's Date: 24  Start time: Start Time: 846  Stop time: Stop Time: 904  Time calculation (min) : Time Calculation (min): 18 min    ASSESSMENT  SLP TX Intervention Outcome: Making Progress Towards Goals  Treatment Tolerance: Patient tolerated treatment well    Impressions: Patient has met goals of therapy    PLAN  Recommended solid consistency: Regular (IDDSI level 7)  Recommended liquid consistency: Thin (IDDSI 0)  Recommended medication administration: Whole  Safe swallow strategies: Upright positioning for all PO intake, Slow rate of intake, Small bites, and Small sips  Discharge recommendation: Recommend LOW intensity ST upon DC in order to ensure safety with least restrictive diet.  Inpatient/Swing Bed or Outpatient: Inpatient  SLP TX Plan: Continue Plan of Care  SLP Plan: Skilled SLP  SLP Frequency: 2x per week  Duration: 2 weeks  Next Treatment Priority: N/A  Discussed POC: Patient, Caregiver/family  Patient/Caregiver Agreeable: Yes  Next Treatment Priority: N/A    SUBJECTIVE  Prior to Session Communication: Bedside nurse  RN cleared pt to participate in session  Respiratory status: Supplemental oxygen via NC  Positioning: Upright in bed  Pt was alert, pleasant, and pleasantly confused for session.    Pain Assessment  Pain Assessment: 0-10  Pain Score: 0 - No pain  Joshi-Baker FACES Pain Rating: No hurt  Pain Interventions: Medication (See MAR)  Response to Interventions: relief  PAINAD Score: 0    Orientation: Oriented to self  Ability to follow functional commands: Follows simple commands    OBJECTIVE  Therapeutic Swallow  Therapeutic Swallow Intervention : PO Trials, Caregiver Education  Solid Diet Recommendations: Regular (IDDSI Level 7)  Liquid Diet Recommendations: Thin (IDDSI Level 0)  Swallow Comments: no overt signs of penetration or  aspiration    Treatment/Education:  Results and recommendations were relayed to: Patient  Education provided: Yes   Learner: Patient   Barriers to learning: Cognitive limitations barrier   Method of teaching: Verbal   Topic: Role of ST, results of assessment, risk for aspiration, recommended diet modifications, recommendation for MBSS, recommended safe swallow strategies, and recommendation for dysphagia follow-up   Outcome of teaching: Pt verbalized understanding and teachback/return demonstration    Goals:  Pt. to tolerate least restrictive diet without pulmonary compromise, Pt. to use safe swallow strategies independently in all observed trials  GOAL MET 4/08/24

## 2024-04-09 VITALS
SYSTOLIC BLOOD PRESSURE: 147 MMHG | BODY MASS INDEX: 31.95 KG/M2 | TEMPERATURE: 97.9 F | HEART RATE: 81 BPM | DIASTOLIC BLOOD PRESSURE: 89 MMHG | WEIGHT: 229 LBS | OXYGEN SATURATION: 98 % | RESPIRATION RATE: 20 BRPM

## 2024-04-09 LAB
ANION GAP SERPL CALC-SCNC: 11 MMOL/L (ref 10–20)
BUN SERPL-MCNC: 25 MG/DL (ref 6–23)
CALCIUM SERPL-MCNC: 8.2 MG/DL (ref 8.6–10.3)
CHLORIDE SERPL-SCNC: 103 MMOL/L (ref 98–107)
CO2 SERPL-SCNC: 30 MMOL/L (ref 21–32)
CREAT SERPL-MCNC: 1.43 MG/DL (ref 0.5–1.3)
EGFRCR SERPLBLD CKD-EPI 2021: 48 ML/MIN/1.73M*2
ERYTHROCYTE [DISTWIDTH] IN BLOOD BY AUTOMATED COUNT: 17.8 % (ref 11.5–14.5)
GLUCOSE SERPL-MCNC: 185 MG/DL (ref 74–99)
HCT VFR BLD AUTO: 39.1 % (ref 41–52)
HGB BLD-MCNC: 11.4 G/DL (ref 13.5–17.5)
MAGNESIUM SERPL-MCNC: 2.3 MG/DL (ref 1.6–2.4)
MCH RBC QN AUTO: 26.6 PG (ref 26–34)
MCHC RBC AUTO-ENTMCNC: 29.2 G/DL (ref 32–36)
MCV RBC AUTO: 91 FL (ref 80–100)
NRBC BLD-RTO: 0 /100 WBCS (ref 0–0)
PLATELET # BLD AUTO: 94 X10*3/UL (ref 150–450)
POTASSIUM SERPL-SCNC: 4.9 MMOL/L (ref 3.5–5.3)
RBC # BLD AUTO: 4.29 X10*6/UL (ref 4.5–5.9)
SODIUM SERPL-SCNC: 139 MMOL/L (ref 136–145)
WBC # BLD AUTO: 6.3 X10*3/UL (ref 4.4–11.3)

## 2024-04-09 PROCEDURE — 94640 AIRWAY INHALATION TREATMENT: CPT | Mod: MUE

## 2024-04-09 PROCEDURE — 2500000001 HC RX 250 WO HCPCS SELF ADMINISTERED DRUGS (ALT 637 FOR MEDICARE OP): Performed by: INTERNAL MEDICINE

## 2024-04-09 PROCEDURE — 2500000004 HC RX 250 GENERAL PHARMACY W/ HCPCS (ALT 636 FOR OP/ED): Performed by: INTERNAL MEDICINE

## 2024-04-09 PROCEDURE — 2500000002 HC RX 250 W HCPCS SELF ADMINISTERED DRUGS (ALT 637 FOR MEDICARE OP, ALT 636 FOR OP/ED): Performed by: INTERNAL MEDICINE

## 2024-04-09 PROCEDURE — 80048 BASIC METABOLIC PNL TOTAL CA: CPT | Performed by: NURSE PRACTITIONER

## 2024-04-09 PROCEDURE — 94668 MNPJ CHEST WALL SBSQ: CPT

## 2024-04-09 PROCEDURE — 36415 COLL VENOUS BLD VENIPUNCTURE: CPT | Performed by: NURSE PRACTITIONER

## 2024-04-09 PROCEDURE — 85027 COMPLETE CBC AUTOMATED: CPT | Performed by: NURSE PRACTITIONER

## 2024-04-09 PROCEDURE — 83735 ASSAY OF MAGNESIUM: CPT | Performed by: NURSE PRACTITIONER

## 2024-04-09 RX ORDER — DOXAZOSIN 8 MG/1
8 TABLET ORAL NIGHTLY
Start: 2024-04-09

## 2024-04-09 RX ORDER — UBIDECARENONE 75 MG
500 CAPSULE ORAL DAILY
Start: 2024-04-09

## 2024-04-09 RX ORDER — ARIPIPRAZOLE 5 MG/1
2.5 TABLET ORAL EVERY EVENING
Qty: 3 TABLET | Refills: 0 | Status: SHIPPED | OUTPATIENT
Start: 2024-04-09 | End: 2024-04-14

## 2024-04-09 RX ORDER — DOCUSATE SODIUM 100 MG/1
100 CAPSULE, LIQUID FILLED ORAL 2 TIMES DAILY
Start: 2024-04-09

## 2024-04-09 RX ORDER — POLYETHYLENE GLYCOL 3350 17 G/17G
17 POWDER, FOR SOLUTION ORAL DAILY
Start: 2024-04-09

## 2024-04-09 RX ORDER — HYDRALAZINE HYDROCHLORIDE 50 MG/1
50 TABLET, FILM COATED ORAL 2 TIMES DAILY
Start: 2024-04-09

## 2024-04-09 RX ORDER — POTASSIUM CHLORIDE 1.5 G/1.58G
20 POWDER, FOR SOLUTION ORAL 3 TIMES DAILY
Start: 2024-04-09

## 2024-04-09 RX ORDER — FUROSEMIDE 40 MG/1
40 TABLET ORAL DAILY
Start: 2024-04-09

## 2024-04-09 RX ORDER — IPRATROPIUM BROMIDE AND ALBUTEROL SULFATE 2.5; .5 MG/3ML; MG/3ML
3 SOLUTION RESPIRATORY (INHALATION)
Start: 2024-04-09

## 2024-04-09 RX ADMIN — POTASSIUM CHLORIDE 20 MEQ: 1.5 POWDER, FOR SOLUTION ORAL at 09:09

## 2024-04-09 RX ADMIN — IPRATROPIUM BROMIDE AND ALBUTEROL SULFATE 3 ML: 2.5; .5 SOLUTION RESPIRATORY (INHALATION) at 06:41

## 2024-04-09 RX ADMIN — DOCUSATE SODIUM 100 MG: 100 CAPSULE, LIQUID FILLED ORAL at 09:08

## 2024-04-09 RX ADMIN — GLIMEPIRIDE 2 MG: 2 TABLET ORAL at 06:31

## 2024-04-09 RX ADMIN — APIXABAN 2.5 MG: 2.5 TABLET, FILM COATED ORAL at 09:08

## 2024-04-09 RX ADMIN — CYANOCOBALAMIN TAB 500 MCG 500 MCG: 500 TAB at 09:10

## 2024-04-09 RX ADMIN — FINASTERIDE 5 MG: 5 TABLET, FILM COATED ORAL at 09:09

## 2024-04-09 RX ADMIN — METOPROLOL SUCCINATE 150 MG: 50 TABLET, EXTENDED RELEASE ORAL at 09:08

## 2024-04-09 RX ADMIN — HYDRALAZINE HYDROCHLORIDE 50 MG: 50 TABLET ORAL at 09:08

## 2024-04-09 RX ADMIN — EMPAGLIFLOZIN 25 MG: 10 TABLET, FILM COATED ORAL at 09:09

## 2024-04-09 RX ADMIN — IPRATROPIUM BROMIDE AND ALBUTEROL SULFATE 3 ML: 2.5; .5 SOLUTION RESPIRATORY (INHALATION) at 13:47

## 2024-04-09 RX ADMIN — PANTOPRAZOLE SODIUM 40 MG: 40 TABLET, DELAYED RELEASE ORAL at 06:31

## 2024-04-09 RX ADMIN — LOSARTAN POTASSIUM 50 MG: 50 TABLET, FILM COATED ORAL at 09:08

## 2024-04-09 RX ADMIN — POLYETHYLENE GLYCOL 3350 17 G: 17 POWDER, FOR SOLUTION ORAL at 09:08

## 2024-04-09 ASSESSMENT — COGNITIVE AND FUNCTIONAL STATUS - GENERAL
PERSONAL GROOMING: A LITTLE
TURNING FROM BACK TO SIDE WHILE IN FLAT BAD: A LITTLE
TOILETING: A LITTLE
MOBILITY SCORE: 18
WALKING IN HOSPITAL ROOM: A LITTLE
DAILY ACTIVITIY SCORE: 18
STANDING UP FROM CHAIR USING ARMS: A LITTLE
EATING MEALS: A LITTLE
DRESSING REGULAR LOWER BODY CLOTHING: A LITTLE
MOVING FROM LYING ON BACK TO SITTING ON SIDE OF FLAT BED WITH BEDRAILS: A LITTLE
CLIMB 3 TO 5 STEPS WITH RAILING: A LITTLE
HELP NEEDED FOR BATHING: A LITTLE
DRESSING REGULAR UPPER BODY CLOTHING: A LITTLE
MOVING TO AND FROM BED TO CHAIR: A LITTLE

## 2024-04-09 NOTE — PROGRESS NOTES
Physical Therapy                 Therapy Communication Note    Patient Name: New Castano  MRN: [id number]  Today's Date: 4/9/2024     Discipline: Physical Therapy    Missed Visit Reason: Missed Visit Reason:  (per discharge planning team pt is discharging at 1400)    Missed Time: Attempt    Comment:

## 2024-04-09 NOTE — PROGRESS NOTES
New Castano is a 83 y.o. male on day 13 of admission presenting with Pneumonia of right lung due to infectious organism, unspecified part of lung.      Subjective   Doing ok  Per nursing no events over night  No chest pain  No shortness of breaht   Remains on oxygen       Objective     Last Recorded Vitals  /86   Pulse 98   Temp 36.5 °C (97.7 °F)   Resp 20   Wt 104 kg (229 lb)   SpO2 94%   Intake/Output last 3 Shifts:      Admission Weight  Weight: 104 kg (230 lb) (03/27/24 1300)      Image Results  Electrocardiogram, 12-lead PRN ACS symptoms  Atrial fibrillation  Left posterior fascicular block  Cannot rule out Inferior infarct , age undetermined  ST & T wave abnormality, consider lateral ischemia or digitalis effect  Abnormal ECG  When compared with ECG of 28-MAR-2024 14:58,  Significant changes have occurred      Physical Exam     Constitutional:       Appearance: Normal appearance. awake, no distress, confusion less  HENT:      Head: Normocephalic and atraumatic.   Cardiovascular:      Rate and Rhythm: Normal rate and regular rhythm.   Pulmonary:      Effort: Pulmonary effort is normal.   Abdominal:      General: Abdomen obese, bs presetn   Musculoskeletal:           General: Skin is warm and dry.   Back ROM is limited  Neurological:   Alert appropriate able to hold conversation    Edema + on thighs and abdomen        No current facility-administered medications on file prior to encounter.     Current Outpatient Medications on File Prior to Encounter   Medication Sig Dispense Refill    apixaban (Eliquis) 2.5 mg tablet Take 1 tablet (2.5 mg) by mouth 2 times a day.      atorvastatin (Lipitor) 20 mg tablet Take 1 tablet (20 mg) by mouth once daily at bedtime.      doxazosin (Cardura) 4 mg tablet Take 1 tablet (4 mg) by mouth once daily at bedtime.      empagliflozin (Jardiance) 25 mg Take 1 tablet (25 mg) by mouth once daily.      finasteride (Proscar) 5 mg tablet Take 1 tablet (5 mg) by mouth  once daily.      furosemide (Lasix) 40 mg tablet Take 1 tablet (40 mg) by mouth 2 times a day. 60 tablet 0    glimepiride (Amaryl) 2 mg tablet Take 1 tablet (2 mg) by mouth once daily in the morning. Take before meals.      losartan (Cozaar) 50 mg tablet Take 1 tablet (50 mg) by mouth once daily. 30 tablet 0    metFORMIN, OSM, (Fortamet) 1,000 mg 24 hr tablet Take 1 tablet (1,000 mg) by mouth 2 times a day with meals. Do not crush, chew, or split.      metoprolol succinate XL (Toprol-XL) 50 mg 24 hr tablet Take 3 tablets (150 mg) by mouth 2 times a day. 180 tablet 0    omeprazole (PriLOSEC) 20 mg DR capsule Take 1 capsule (20 mg) by mouth once daily in the morning. Take before meals. Do not crush or chew.         Results for orders placed or performed during the hospital encounter of 03/22/24 (from the past 24 hour(s))   CBC   Result Value Ref Range    WBC 6.3 4.4 - 11.3 x10*3/uL    nRBC 0.0 0.0 - 0.0 /100 WBCs    RBC 4.29 (L) 4.50 - 5.90 x10*6/uL    Hemoglobin 11.4 (L) 13.5 - 17.5 g/dL    Hematocrit 39.1 (L) 41.0 - 52.0 %    MCV 91 80 - 100 fL    MCH 26.6 26.0 - 34.0 pg    MCHC 29.2 (L) 32.0 - 36.0 g/dL    RDW 17.8 (H) 11.5 - 14.5 %    Platelets 94 (L) 150 - 450 x10*3/uL   Basic Metabolic Panel   Result Value Ref Range    Glucose 185 (H) 74 - 99 mg/dL    Sodium 139 136 - 145 mmol/L    Potassium 4.9 3.5 - 5.3 mmol/L    Chloride 103 98 - 107 mmol/L    Bicarbonate 30 21 - 32 mmol/L    Anion Gap 11 10 - 20 mmol/L    Urea Nitrogen 25 (H) 6 - 23 mg/dL    Creatinine 1.43 (H) 0.50 - 1.30 mg/dL    eGFR 48 (L) >60 mL/min/1.73m*2    Calcium 8.2 (L) 8.6 - 10.3 mg/dL   Magnesium   Result Value Ref Range    Magnesium 2.30 1.60 - 2.40 mg/dL       Vitals:    04/08/24 0656   Weight: 104 kg (229 lb)         Intake/Output Summary (Last 24 hours) at 4/9/2024 0830  Last data filed at 4/8/2024 2146  Gross per 24 hour   Intake 240 ml   Output 700 ml   Net -460 ml           Assessment/Plan       Acute respiratory failure with hypoxia d/t  pneumonia-IV as per orders, better  Weakness-PT/OT following, will need SNF.   HTN- better with less agitation  Agitation- ativan as needed  Acute on chronic diastolic heart failure  A fib on eliquis, rate controlled  EF 50 in 12/23  Check bladder scan  Resume losartan   Hypernatremia  Dandre / CKD 3 w hypernatremia   Metabolic encephalopathy,   Pleural effusion camila        Input from specialities noted  Did hve thoracentesis on the rt on 04/02/2024    Lasix resume once daily , at home he was taking twice daily   Hypernatremia gradually improving will need ongoing monitoring for it in the snf  Ot pt and snf today     We will cont to monitor for now    EKG 4/7/23 evening  Qtc 456  Add on Abilify 2.5mg PO every evening for 5 days        -Continue current treatment as ordered. Will make adjustments as necessary.    Plan of care discussed with: RN   Time > 30 min in discharge planning    Kevon Gunderson MD

## 2024-04-09 NOTE — DISCHARGE SUMMARY
Discharge Diagnosis  Pneumonia of right lung due to infectious organism, unspecified part of lung    Issues Requiring Follow-Up  Pt admitted to hospital for weakness, his workup showed pneumonia and hypoxia, pt started on iv antibiotics, and course complicated by chf, pt was started on iv lasix, developed encephalopathy and decreased po intake , hypernatremia,   He was seen by pal care, poor pgornosis noted  Seen by pulmonary and did have rt thoracentesis consistent with chf  Pt gradually improved however still does have confusion  He is on oxygen   Improving po intake  Will be discharged to snf for ongiong care  Will need monitoring of BMP   Lasix has been resumed at 40 daily ( at home he was on twice daily lasix and can be adjusted further based on his course and labs)      Discharge Meds     Your medication list        START taking these medications        Instructions Last Dose Given Next Dose Due   ARIPiprazole 5 mg tablet  Commonly known as: Abilify      Take 0.5 tablets (2.5 mg) by mouth once daily in the evening for 5 days.       hydrALAZINE 50 mg tablet  Commonly known as: Apresoline      Take 1 tablet (50 mg) by mouth 2 times a day.       ipratropium-albuteroL 0.5-2.5 mg/3 mL nebulizer solution  Commonly known as: Duo-Neb      Take 3 mL by nebulization 3 times a day.       oxygen gas therapy  Commonly known as: O2      Inhale 1 each once every 24 hours.       polyethylene glycol 17 gram packet  Commonly known as: Glycolax, Miralax      Take 17 g by mouth once daily.       potassium chloride 20 mEq packet  Commonly known as: Klor-Con      Take 20 mEq by mouth 3 times a day.              CHANGE how you take these medications        Instructions Last Dose Given Next Dose Due   doxazosin 8 mg tablet  Commonly known as: Cardura  What changed:   medication strength  how much to take      Take 1 tablet (8 mg) by mouth once daily at bedtime.       furosemide 40 mg tablet  Commonly known as: Lasix  What changed:  when to take this      Take 1 tablet (40 mg) by mouth once daily.              CONTINUE taking these medications        Instructions Last Dose Given Next Dose Due   atorvastatin 20 mg tablet  Commonly known as: Lipitor           cyanocobalamin 500 mcg tablet  Commonly known as: Vitamin B-12      Take 1 tablet (500 mcg) by mouth once daily.       docusate sodium 100 mg capsule  Commonly known as: Colace      Take 1 capsule (100 mg) by mouth 2 times a day.       Eliquis 2.5 mg tablet  Generic drug: apixaban           empagliflozin 25 mg  Commonly known as: Jardiance           finasteride 5 mg tablet  Commonly known as: Proscar           glimepiride 2 mg tablet  Commonly known as: Amaryl           losartan 50 mg tablet  Commonly known as: Cozaar      Take 1 tablet (50 mg) by mouth once daily.       metoprolol succinate XL 50 mg 24 hr tablet  Commonly known as: Toprol-XL      Take 3 tablets (150 mg) by mouth 2 times a day.       omeprazole 20 mg DR capsule  Commonly known as: PriLOSEC                  STOP taking these medications      metFORMIN (OSM) 1,000 mg 24 hr tablet  Commonly known as: Fortamet                  Where to Get Your Medications        These medications were sent to [facility]  [address], [place] [address] [address]      Hours: 8 AM to 6 PM Mon-Fri, 9 AM to 1 PM Saturday Phone: [phone]   ARIPiprazole 5 mg tablet       Information about where to get these medications is not yet available    Ask your nurse or doctor about these medications  cyanocobalamin 500 mcg tablet  docusate sodium 100 mg capsule  doxazosin 8 mg tablet  furosemide 40 mg tablet  hydrALAZINE 50 mg tablet  ipratropium-albuteroL 0.5-2.5 mg/3 mL nebulizer solution  oxygen gas therapy  polyethylene glycol 17 gram packet  potassium chloride 20 mEq packet         Test Results Pending At Discharge  Pending Labs       Order Current Status    Extra Urine Don Tube Collected (03/22/24 1340)    Urinalysis with Reflex  Culture and Microscopic In process    AFB Culture/Smear Preliminary result    Fungal Culture/Smear Preliminary result            Hospital Course   See above    Pertinent Physical Exam At Time of Discharge       Outpatient Follow-Up  No future appointments.      Kevon Gunderson MD

## 2024-04-09 NOTE — SIGNIFICANT EVENT
Palliative care sign off note.  Palliative care consulted to address goals of care.  This has been addressed.  Palliative care remains available for re-consult if patient, family, or team's needs change.  Thank you for inviting us to participate in care.

## 2024-04-22 LAB
FUNGUS SPEC CULT: NORMAL
FUNGUS SPEC FUNGUS STN: NORMAL

## 2024-04-26 LAB
ATRIAL RATE: 104 BPM
Q ONSET: 215 MS
QRS COUNT: 13 BEATS
QRS DURATION: 120 MS
QT INTERVAL: 400 MS
QTC CALCULATION(BAZETT): 456 MS
QTC FREDERICIA: 436 MS
R AXIS: 119 DEGREES
T AXIS: -61 DEGREES
T OFFSET: 415 MS
VENTRICULAR RATE: 78 BPM

## 2024-04-30 NOTE — DOCUMENTATION CLARIFICATION NOTE
"    PATIENT:               JADE MENA  ACCT #:                  [id number]  MRN:                       [id number]  :                       1940  ADMIT DATE:       3/22/2024 11:44 AM  DISCH DATE:        2024 3:36 PM  RESPONDING PROVIDER #:        [id number]          PROVIDER RESPONSE TEXT:    Bacterial PNA, unable to further specify    CDI QUERY TEXT:    [facility]_Pneumonia Specificity    Question: Please further specify the type of pneumonia being treated:    When answering this query, please exercise your independent professional judgment. The fact that a question is being asked, does not imply that any particular answer is desired or expected.    The patient's clinical indicators include:  Clinical Information: 84 y/o male admitted since 3/22, being treated for pneumonia, acute CHF. Clarification is requested for the pneumonia type being treated.    Clinical Indicators: Nonproductive cough. Flu/covid negative. MBS on 3/29= \"No aspiration or penetration of the airway\". 3/29 Procal 0.16.    Treatment: IV Vanco 1500 mg x1 on 3/22, IV Zosyn 3.375g 3/22- 4/3    Risk Factors: Hx DM, advanced age  Options provided:  -- Aspiration PNA  -- Gram Negative PNA  -- Viral PNA  -- Bacterial PNA, unable to further specify  -- Other - I will add my own diagnosis  -- Refer to Clinical Documentation Reviewer    Query created by: Amelia Solis on 4/3/2024 1:40 PM      Electronically signed by:  OTF DEL ROSARIO MD 2024 4:49 AM          "

## 2024-05-22 LAB
ACID FAST STN SPEC: NORMAL
MYCOBACTERIUM SPEC CULT: NORMAL
